# Patient Record
Sex: FEMALE | Race: WHITE | NOT HISPANIC OR LATINO | Employment: UNEMPLOYED | URBAN - METROPOLITAN AREA
[De-identification: names, ages, dates, MRNs, and addresses within clinical notes are randomized per-mention and may not be internally consistent; named-entity substitution may affect disease eponyms.]

---

## 2019-01-15 ENCOUNTER — HOSPITAL ENCOUNTER (EMERGENCY)
Facility: HOSPITAL | Age: 69
Discharge: HOME | End: 2019-01-15
Attending: EMERGENCY MEDICINE
Payer: MEDICARE

## 2019-01-15 VITALS
RESPIRATION RATE: 18 BRPM | OXYGEN SATURATION: 97 % | SYSTOLIC BLOOD PRESSURE: 97 MMHG | WEIGHT: 151 LBS | HEIGHT: 62 IN | DIASTOLIC BLOOD PRESSURE: 61 MMHG | TEMPERATURE: 98 F | BODY MASS INDEX: 27.79 KG/M2 | HEART RATE: 94 BPM

## 2019-01-15 DIAGNOSIS — F17.200 TOBACCO DEPENDENCE: ICD-10-CM

## 2019-01-15 DIAGNOSIS — H65.91 FLUID LEVEL BEHIND TYMPANIC MEMBRANE OF RIGHT EAR: ICD-10-CM

## 2019-01-15 DIAGNOSIS — J40 BRONCHITIS: Primary | ICD-10-CM

## 2019-01-15 LAB
CTP QC/QA: YES
FLUAV AG NPH QL: NEGATIVE
FLUBV AG NPH QL: NEGATIVE

## 2019-01-15 PROCEDURE — 87804 INFLUENZA ASSAY W/OPTIC: CPT | Mod: ER

## 2019-01-15 PROCEDURE — 99284 EMERGENCY DEPT VISIT MOD MDM: CPT | Mod: ER

## 2019-01-15 RX ORDER — PROMETHAZINE HYDROCHLORIDE AND DEXTROMETHORPHAN HYDROBROMIDE 6.25; 15 MG/5ML; MG/5ML
5 SYRUP ORAL 4 TIMES DAILY PRN
Qty: 180 ML | Refills: 0 | Status: SHIPPED | OUTPATIENT
Start: 2019-01-15 | End: 2019-01-20

## 2019-01-15 RX ORDER — MOMETASONE FUROATE 50 UG/1
2 SPRAY, METERED NASAL DAILY
Qty: 17 G | Refills: 0 | Status: ON HOLD | OUTPATIENT
Start: 2019-01-15 | End: 2020-01-03

## 2019-01-15 RX ORDER — AZITHROMYCIN 250 MG/1
TABLET, FILM COATED ORAL
Qty: 6 TABLET | Refills: 0 | Status: ON HOLD | OUTPATIENT
Start: 2019-01-15 | End: 2019-12-28 | Stop reason: HOSPADM

## 2019-01-15 RX ORDER — IBUPROFEN 600 MG/1
600 TABLET ORAL EVERY 6 HOURS PRN
Qty: 20 TABLET | Refills: 0 | Status: ON HOLD | OUTPATIENT
Start: 2019-01-15 | End: 2020-01-03

## 2019-01-15 RX ORDER — ALBUTEROL SULFATE 90 UG/1
1-2 AEROSOL, METERED RESPIRATORY (INHALATION) EVERY 6 HOURS PRN
Qty: 1 INHALER | Refills: 0 | Status: SHIPPED | OUTPATIENT
Start: 2019-01-15 | End: 2019-07-06 | Stop reason: SDUPTHER

## 2019-01-15 RX ORDER — CETIRIZINE HYDROCHLORIDE, PSEUDOEPHEDRINE HYDROCHLORIDE 5; 120 MG/1; MG/1
1 TABLET, FILM COATED, EXTENDED RELEASE ORAL DAILY
Qty: 10 TABLET | Refills: 0 | Status: SHIPPED | OUTPATIENT
Start: 2019-01-15 | End: 2019-01-20

## 2019-01-15 NOTE — ED PROVIDER NOTES
Encounter Date: 1/15/2019    SCRIBE #1 NOTE: I, Kadi Henson, am scribing for, and in the presence of,  Dr. Espino . I have scribed the following portions of the note - Other sections scribed: HPI,ROS,PE .       History     Chief Complaint   Patient presents with    Cough     pt reports cough and congestion x's 1 week. states cough is productive and varies in color     67 y/o/f with a hx of smoking  presents with brown/white productive cough, runny nose and congestion for one week. She has been taking singulair, cough syrup with codeine which she states helped her at night. States she feels hot and cold. No fever. Sick contacts at home. Also complaining of clogged ears.  She is not using an inhaler and occasionally feels short of breath. She continues to smoke      The history is provided by the patient. No  was used.     Review of patient's allergies indicates:  No Known Allergies  Past Medical History:   Diagnosis Date    Allergic rhinitis     COPD (chronic obstructive pulmonary disease)     Depression     GERD (gastroesophageal reflux disease)     Headache     Hyperlipidemia     Hypertension     Polyarthralgia     Postablative hypothyroidism     hypothyroidism s/p OCASIO therapy    PVD (peripheral vascular disease)     Treated by Dr. Sim     Past Surgical History:   Procedure Laterality Date    CHOLECYSTECTOMY  1986    GALLBLADDER SURGERY  2006    HIP SURGERY  2005    Right hip fracture/surgery    PERIPHERAL ARTERIAL STENT GRAFT Right 2007    stent for femoral artery blockage     Family History   Problem Relation Age of Onset    COPD Mother     Thyroid disease Mother         thyroidectomy    Cancer Mother         lung    Heart attack Father 55        [facility] -     Heart attack Sister 53        [facility]     Social History     Tobacco Use    Smoking status: Current Every Day Smoker     Types: Cigarettes    Smokeless tobacco: Never Used   Substance Use Topics    Alcohol  use: No     Alcohol/week: 0.0 oz    Drug use: Not on file     Review of Systems   HENT: Positive for congestion and rhinorrhea. Negative for ear pain.    Respiratory: Positive for cough.    Cardiovascular: Negative for chest pain.   All other systems reviewed and are negative.      Physical Exam     Initial Vitals [01/15/19 1443]   BP Pulse Resp Temp SpO2   97/61 94 18 98.1 °F (36.7 °C) 97 %      MAP       --         Physical Exam    Nursing note and vitals reviewed.  Constitutional: She appears well-developed and well-nourished.   HENT:   Head: Normocephalic and atraumatic.   Left Ear: External ear normal.   Mouth/Throat: Oropharynx is clear and moist. No oropharyngeal exudate.   Fluid behind right tympanic membrane   Eyes: Conjunctivae are normal.   Neck: Normal range of motion. Neck supple.   Cardiovascular: Normal rate, regular rhythm, normal heart sounds and intact distal pulses. Exam reveals no gallop and no friction rub.    No murmur heard.  Pulmonary/Chest: Effort normal. No respiratory distress. She has decreased breath sounds. She has no wheezes. She has no rhonchi. She has no rales.   Musculoskeletal: Normal range of motion.   Neurological: She is alert and oriented to person, place, and time.   Skin: Skin is warm and dry. Ecchymosis (right chest) noted.        Psychiatric: She has a normal mood and affect.         ED Course   Procedures  Labs Reviewed   POCT INFLUENZA A/B          Imaging Results    None          Medical Decision Making:   Initial Assessment:   69 y/o/f presents with cough and congestion for one week. Exam significant for decreased breath sounds and fluid behind right ear.  Clinical Tests:   Lab Tests: Ordered and Reviewed  ED Management:  Ordered flu swab.  Flu swab was negative. Secondary to the patient's smoking history she will be started on azithromycin.  She was also prescribed Nasonex, Phenergan DM cough medicine, Zyrtec D and  will be referred to the tobacco treatment  program    Additional MDM:   Smoking Cessation: The patient is a smoker. The patient was counseled on smoking cessation for: 3 minutes. The patient was counseled on tobacco related  health complications. The patient was referred to a tobacco treatment program.          Scribe Attestation:   Scribe #1: I performed the above scribed service and the documentation accurately describes the services I performed. I attest to the accuracy of the note.       I, Dr. Flor Espino, personally performed the services described in this documentation. All medical record entries made by the scribe were at my direction and in my presence.  I have reviewed the chart and agree that the record reflects my personal performance and is accurate and complete. Flor Espino MD.  3:35 PM 01/15/2019             Clinical Impression:     1. Bronchitis    2. Tobacco dependence    3. Fluid level behind tympanic membrane of right ear                                   Flor Espino MD  01/15/19 1474

## 2019-01-15 NOTE — DISCHARGE INSTRUCTIONS
Rest.  Drink plenty of fluids.  Return here at any time.  Call your doctor for close follow-up.  No smoking

## 2019-12-26 ENCOUNTER — HOSPITAL ENCOUNTER (INPATIENT)
Facility: HOSPITAL | Age: 69
LOS: 2 days | Discharge: HOME | DRG: 190 | End: 2019-12-28
Attending: EMERGENCY MEDICINE | Admitting: STUDENT IN AN ORGANIZED HEALTH CARE EDUCATION/TRAINING PROGRAM
Payer: MEDICARE

## 2019-12-26 DIAGNOSIS — I73.9 PVD (PERIPHERAL VASCULAR DISEASE): ICD-10-CM

## 2019-12-26 DIAGNOSIS — R91.8 MASS OF MIDDLE LOBE OF RIGHT LUNG: ICD-10-CM

## 2019-12-26 DIAGNOSIS — R09.02 HYPOXIA: ICD-10-CM

## 2019-12-26 DIAGNOSIS — J18.9 PNEUMONIA DUE TO INFECTIOUS ORGANISM, UNSPECIFIED LATERALITY, UNSPECIFIED PART OF LUNG: ICD-10-CM

## 2019-12-26 DIAGNOSIS — J44.1 COPD EXACERBATION: Primary | ICD-10-CM

## 2019-12-26 DIAGNOSIS — R07.9 CHEST PAIN: ICD-10-CM

## 2019-12-26 DIAGNOSIS — J96.01 ACUTE HYPOXEMIC RESPIRATORY FAILURE: ICD-10-CM

## 2019-12-26 PROBLEM — R04.2 HEMOPTYSIS: Status: ACTIVE | Noted: 2019-12-26

## 2019-12-26 LAB
ALBUMIN SERPL BCP-MCNC: 3.2 G/DL (ref 3.5–5.2)
ALLENS TEST: ABNORMAL
ALP SERPL-CCNC: 92 U/L (ref 55–135)
ALT SERPL W/O P-5'-P-CCNC: 12 U/L (ref 10–44)
ANION GAP SERPL CALC-SCNC: 9 MMOL/L (ref 8–16)
AST SERPL-CCNC: 20 U/L (ref 10–40)
BASOPHILS # BLD AUTO: 0.07 K/UL (ref 0–0.2)
BASOPHILS NFR BLD: 0.4 % (ref 0–1.9)
BILIRUB SERPL-MCNC: 0.8 MG/DL (ref 0.1–1)
BNP SERPL-MCNC: 57 PG/ML (ref 0–99)
BUN SERPL-MCNC: 13 MG/DL (ref 8–23)
CALCIUM SERPL-MCNC: 9.1 MG/DL (ref 8.7–10.5)
CHLORIDE SERPL-SCNC: 100 MMOL/L (ref 95–110)
CO2 SERPL-SCNC: 24 MMOL/L (ref 23–29)
CREAT SERPL-MCNC: 1 MG/DL (ref 0.5–1.4)
DELSYS: ABNORMAL
DIFFERENTIAL METHOD: ABNORMAL
EOSINOPHIL # BLD AUTO: 0.2 K/UL (ref 0–0.5)
EOSINOPHIL NFR BLD: 1 % (ref 0–8)
ERYTHROCYTE [DISTWIDTH] IN BLOOD BY AUTOMATED COUNT: 18.4 % (ref 11.5–14.5)
EST. GFR  (AFRICAN AMERICAN): >60 ML/MIN/1.73 M^2
EST. GFR  (NON AFRICAN AMERICAN): 57.6 ML/MIN/1.73 M^2
FLOW: 4
GLUCOSE SERPL-MCNC: 88 MG/DL (ref 70–110)
HCO3 UR-SCNC: 26.6 MMOL/L (ref 24–28)
HCT VFR BLD AUTO: 40.3 % (ref 37–48.5)
HGB BLD-MCNC: 12.4 G/DL (ref 12–16)
IMM GRANULOCYTES # BLD AUTO: 0.09 K/UL (ref 0–0.04)
IMM GRANULOCYTES NFR BLD AUTO: 0.5 % (ref 0–0.5)
LACTATE SERPL-SCNC: 2 MMOL/L (ref 0.5–2.2)
LYMPHOCYTES # BLD AUTO: 1.9 K/UL (ref 1–4.8)
LYMPHOCYTES NFR BLD: 11.5 % (ref 18–48)
MCH RBC QN AUTO: 26.9 PG (ref 27–31)
MCHC RBC AUTO-ENTMCNC: 30.8 G/DL (ref 32–36)
MCV RBC AUTO: 87 FL (ref 82–98)
MODE: ABNORMAL
MONOCYTES # BLD AUTO: 1.6 K/UL (ref 0.3–1)
MONOCYTES NFR BLD: 9.4 % (ref 4–15)
NEUTROPHILS # BLD AUTO: 12.8 K/UL (ref 1.8–7.7)
NEUTROPHILS NFR BLD: 77.2 % (ref 38–73)
NRBC BLD-RTO: 0 /100 WBC
PCO2 BLDA: 44.9 MMHG (ref 35–45)
PH SMN: 7.38 [PH] (ref 7.35–7.45)
PLATELET # BLD AUTO: 267 K/UL (ref 150–350)
PMV BLD AUTO: 10.1 FL (ref 9.2–12.9)
PO2 BLDA: 32 MMHG (ref 40–60)
POC BE: 1 MMOL/L
POC SATURATED O2: 59 % (ref 95–100)
POC TCO2: 28 MMOL/L (ref 24–29)
POTASSIUM SERPL-SCNC: 4.6 MMOL/L (ref 3.5–5.1)
PROCALCITONIN SERPL IA-MCNC: 0.16 NG/ML
PROT SERPL-MCNC: 7 G/DL (ref 6–8.4)
RBC # BLD AUTO: 4.61 M/UL (ref 4–5.4)
SAMPLE: ABNORMAL
SITE: ABNORMAL
SODIUM SERPL-SCNC: 133 MMOL/L (ref 136–145)
SP02: 96
TROPONIN I SERPL DL<=0.01 NG/ML-MCNC: 0.12 NG/ML (ref 0–0.03)
WBC # BLD AUTO: 16.59 K/UL (ref 3.9–12.7)

## 2019-12-26 PROCEDURE — 99285 EMERGENCY DEPT VISIT HI MDM: CPT | Mod: 25

## 2019-12-26 PROCEDURE — 25000003 PHARM REV CODE 250: Performed by: PHYSICIAN ASSISTANT

## 2019-12-26 PROCEDURE — 93010 ELECTROCARDIOGRAM REPORT: CPT | Mod: ,,, | Performed by: INTERNAL MEDICINE

## 2019-12-26 PROCEDURE — 25000003 PHARM REV CODE 250: Performed by: STUDENT IN AN ORGANIZED HEALTH CARE EDUCATION/TRAINING PROGRAM

## 2019-12-26 PROCEDURE — 25000242 PHARM REV CODE 250 ALT 637 W/ HCPCS: Performed by: PHYSICIAN ASSISTANT

## 2019-12-26 PROCEDURE — 83605 ASSAY OF LACTIC ACID: CPT

## 2019-12-26 PROCEDURE — 11000001 HC ACUTE MED/SURG PRIVATE ROOM

## 2019-12-26 PROCEDURE — 25500020 PHARM REV CODE 255: Performed by: STUDENT IN AN ORGANIZED HEALTH CARE EDUCATION/TRAINING PROGRAM

## 2019-12-26 PROCEDURE — 87040 BLOOD CULTURE FOR BACTERIA: CPT

## 2019-12-26 PROCEDURE — 63600175 PHARM REV CODE 636 W HCPCS: Performed by: PHYSICIAN ASSISTANT

## 2019-12-26 PROCEDURE — 99223 1ST HOSP IP/OBS HIGH 75: CPT | Mod: AI,,, | Performed by: STUDENT IN AN ORGANIZED HEALTH CARE EDUCATION/TRAINING PROGRAM

## 2019-12-26 PROCEDURE — 84484 ASSAY OF TROPONIN QUANT: CPT

## 2019-12-26 PROCEDURE — 93005 ELECTROCARDIOGRAM TRACING: CPT

## 2019-12-26 PROCEDURE — 85025 COMPLETE CBC W/AUTO DIFF WBC: CPT

## 2019-12-26 PROCEDURE — 93010 ELECTROCARDIOGRAM REPORT: CPT | Mod: 77,ICN,, | Performed by: INTERNAL MEDICINE

## 2019-12-26 PROCEDURE — 93010 EKG 12-LEAD: ICD-10-PCS | Mod: ,,, | Performed by: INTERNAL MEDICINE

## 2019-12-26 PROCEDURE — 99223 PR INITIAL HOSPITAL CARE,LEVL III: ICD-10-PCS | Mod: AI,,, | Performed by: STUDENT IN AN ORGANIZED HEALTH CARE EDUCATION/TRAINING PROGRAM

## 2019-12-26 PROCEDURE — 96375 TX/PRO/DX INJ NEW DRUG ADDON: CPT

## 2019-12-26 PROCEDURE — 99900035 HC TECH TIME PER 15 MIN (STAT)

## 2019-12-26 PROCEDURE — 94640 AIRWAY INHALATION TREATMENT: CPT

## 2019-12-26 PROCEDURE — 84145 PROCALCITONIN (PCT): CPT

## 2019-12-26 PROCEDURE — 83880 ASSAY OF NATRIURETIC PEPTIDE: CPT

## 2019-12-26 PROCEDURE — 27000221 HC OXYGEN, UP TO 24 HOURS

## 2019-12-26 PROCEDURE — 80048 BASIC METABOLIC PNL TOTAL CA: CPT

## 2019-12-26 PROCEDURE — 25000242 PHARM REV CODE 250 ALT 637 W/ HCPCS: Performed by: STUDENT IN AN ORGANIZED HEALTH CARE EDUCATION/TRAINING PROGRAM

## 2019-12-26 PROCEDURE — 80053 COMPREHEN METABOLIC PANEL: CPT

## 2019-12-26 PROCEDURE — 96374 THER/PROPH/DIAG INJ IV PUSH: CPT

## 2019-12-26 PROCEDURE — 99291 CRITICAL CARE FIRST HOUR: CPT | Mod: ,,, | Performed by: PHYSICIAN ASSISTANT

## 2019-12-26 PROCEDURE — 93010 EKG 12-LEAD: ICD-10-PCS | Mod: 77,ICN,, | Performed by: INTERNAL MEDICINE

## 2019-12-26 PROCEDURE — 94761 N-INVAS EAR/PLS OXIMETRY MLT: CPT

## 2019-12-26 PROCEDURE — 99291 PR CRITICAL CARE, E/M 30-74 MINUTES: ICD-10-PCS | Mod: ,,, | Performed by: PHYSICIAN ASSISTANT

## 2019-12-26 RX ORDER — ACETAMINOPHEN 325 MG/1
650 TABLET ORAL EVERY 8 HOURS PRN
Status: DISCONTINUED | OUTPATIENT
Start: 2019-12-26 | End: 2019-12-28 | Stop reason: HOSPADM

## 2019-12-26 RX ORDER — CLONAZEPAM 1 MG/1
1 TABLET ORAL 2 TIMES DAILY
Status: DISCONTINUED | OUTPATIENT
Start: 2019-12-26 | End: 2019-12-28 | Stop reason: HOSPADM

## 2019-12-26 RX ORDER — TIOTROPIUM BROMIDE 18 UG/1
1 CAPSULE ORAL; RESPIRATORY (INHALATION) DAILY
Qty: 30 CAPSULE | Refills: 3 | Status: SHIPPED | OUTPATIENT
Start: 2019-12-26 | End: 2019-12-27 | Stop reason: HOSPADM

## 2019-12-26 RX ORDER — METHYLPREDNISOLONE SOD SUCC 125 MG
125 VIAL (EA) INJECTION
Status: COMPLETED | OUTPATIENT
Start: 2019-12-26 | End: 2019-12-26

## 2019-12-26 RX ORDER — MELOXICAM 7.5 MG/1
7.5 TABLET ORAL NIGHTLY
Status: DISCONTINUED | OUTPATIENT
Start: 2019-12-26 | End: 2019-12-28 | Stop reason: HOSPADM

## 2019-12-26 RX ORDER — ATORVASTATIN CALCIUM 20 MG/1
40 TABLET, FILM COATED ORAL DAILY
Status: DISCONTINUED | OUTPATIENT
Start: 2019-12-26 | End: 2019-12-28 | Stop reason: HOSPADM

## 2019-12-26 RX ORDER — PREDNISONE 20 MG/1
40 TABLET ORAL DAILY
Status: DISCONTINUED | OUTPATIENT
Start: 2019-12-27 | End: 2019-12-28 | Stop reason: HOSPADM

## 2019-12-26 RX ORDER — SODIUM CHLORIDE 0.9 % (FLUSH) 0.9 %
3 SYRINGE (ML) INJECTION
Status: DISCONTINUED | OUTPATIENT
Start: 2019-12-26 | End: 2019-12-28 | Stop reason: HOSPADM

## 2019-12-26 RX ORDER — LEVOTHYROXINE SODIUM 100 UG/1
100 TABLET ORAL
Status: DISCONTINUED | OUTPATIENT
Start: 2019-12-27 | End: 2019-12-28 | Stop reason: HOSPADM

## 2019-12-26 RX ORDER — FERROUS SULFATE, DRIED 160(50) MG
1 TABLET, EXTENDED RELEASE ORAL 2 TIMES DAILY WITH MEALS
Status: DISCONTINUED | OUTPATIENT
Start: 2019-12-26 | End: 2019-12-28 | Stop reason: HOSPADM

## 2019-12-26 RX ORDER — POLYETHYLENE GLYCOL 3350 17 G/17G
17 POWDER, FOR SOLUTION ORAL DAILY
Status: DISCONTINUED | OUTPATIENT
Start: 2019-12-27 | End: 2019-12-28 | Stop reason: HOSPADM

## 2019-12-26 RX ORDER — AMLODIPINE BESYLATE 5 MG/1
5 TABLET ORAL DAILY
Status: DISCONTINUED | OUTPATIENT
Start: 2019-12-26 | End: 2019-12-28 | Stop reason: HOSPADM

## 2019-12-26 RX ORDER — IPRATROPIUM BROMIDE AND ALBUTEROL SULFATE 2.5; .5 MG/3ML; MG/3ML
3 SOLUTION RESPIRATORY (INHALATION)
Status: COMPLETED | OUTPATIENT
Start: 2019-12-26 | End: 2019-12-26

## 2019-12-26 RX ORDER — BUTALBITAL, ACETAMINOPHEN AND CAFFEINE 50; 325; 40 MG/1; MG/1; MG/1
1 TABLET ORAL EVERY 12 HOURS PRN
Status: DISCONTINUED | OUTPATIENT
Start: 2019-12-26 | End: 2019-12-28 | Stop reason: HOSPADM

## 2019-12-26 RX ORDER — MONTELUKAST SODIUM 10 MG/1
10 TABLET ORAL DAILY
Status: DISCONTINUED | OUTPATIENT
Start: 2019-12-26 | End: 2019-12-28 | Stop reason: HOSPADM

## 2019-12-26 RX ORDER — LOSARTAN POTASSIUM 25 MG/1
25 TABLET ORAL DAILY
Status: DISCONTINUED | OUTPATIENT
Start: 2019-12-26 | End: 2019-12-28 | Stop reason: HOSPADM

## 2019-12-26 RX ORDER — HYDROCODONE BITARTRATE AND ACETAMINOPHEN 5; 325 MG/1; MG/1
1 TABLET ORAL EVERY 6 HOURS PRN
Status: DISCONTINUED | OUTPATIENT
Start: 2019-12-26 | End: 2019-12-28 | Stop reason: HOSPADM

## 2019-12-26 RX ORDER — AZITHROMYCIN 250 MG/1
500 TABLET, FILM COATED ORAL
Status: COMPLETED | OUTPATIENT
Start: 2019-12-26 | End: 2019-12-26

## 2019-12-26 RX ORDER — IBUPROFEN 200 MG
16 TABLET ORAL
Status: DISCONTINUED | OUTPATIENT
Start: 2019-12-26 | End: 2019-12-28 | Stop reason: HOSPADM

## 2019-12-26 RX ORDER — ENOXAPARIN SODIUM 100 MG/ML
40 INJECTION SUBCUTANEOUS EVERY 24 HOURS
Status: DISCONTINUED | OUTPATIENT
Start: 2019-12-26 | End: 2019-12-26

## 2019-12-26 RX ORDER — GLUCAGON 1 MG
1 KIT INJECTION
Status: DISCONTINUED | OUTPATIENT
Start: 2019-12-26 | End: 2019-12-28 | Stop reason: HOSPADM

## 2019-12-26 RX ORDER — IBUPROFEN 200 MG
1 TABLET ORAL DAILY
Status: DISCONTINUED | OUTPATIENT
Start: 2019-12-26 | End: 2019-12-28 | Stop reason: HOSPADM

## 2019-12-26 RX ORDER — ONDANSETRON 8 MG/1
8 TABLET, ORALLY DISINTEGRATING ORAL EVERY 8 HOURS PRN
Status: DISCONTINUED | OUTPATIENT
Start: 2019-12-26 | End: 2019-12-28 | Stop reason: HOSPADM

## 2019-12-26 RX ORDER — TALC
6 POWDER (GRAM) TOPICAL NIGHTLY PRN
Status: DISCONTINUED | OUTPATIENT
Start: 2019-12-26 | End: 2019-12-28 | Stop reason: HOSPADM

## 2019-12-26 RX ORDER — IPRATROPIUM BROMIDE AND ALBUTEROL SULFATE 2.5; .5 MG/3ML; MG/3ML
3 SOLUTION RESPIRATORY (INHALATION)
Status: DISCONTINUED | OUTPATIENT
Start: 2019-12-26 | End: 2019-12-28 | Stop reason: HOSPADM

## 2019-12-26 RX ORDER — CLOPIDOGREL BISULFATE 75 MG/1
75 TABLET ORAL DAILY
Status: DISCONTINUED | OUTPATIENT
Start: 2019-12-26 | End: 2019-12-26

## 2019-12-26 RX ORDER — IBUPROFEN 200 MG
24 TABLET ORAL
Status: DISCONTINUED | OUTPATIENT
Start: 2019-12-26 | End: 2019-12-28 | Stop reason: HOSPADM

## 2019-12-26 RX ORDER — CEFTRIAXONE 1 G/1
1 INJECTION, POWDER, FOR SOLUTION INTRAMUSCULAR; INTRAVENOUS
Status: COMPLETED | OUTPATIENT
Start: 2019-12-26 | End: 2019-12-26

## 2019-12-26 RX ORDER — ASPIRIN 325 MG
325 TABLET ORAL
Status: DISCONTINUED | OUTPATIENT
Start: 2019-12-26 | End: 2019-12-26

## 2019-12-26 RX ORDER — IPRATROPIUM BROMIDE AND ALBUTEROL SULFATE 2.5; .5 MG/3ML; MG/3ML
3 SOLUTION RESPIRATORY (INHALATION) EVERY 4 HOURS PRN
Status: DISCONTINUED | OUTPATIENT
Start: 2019-12-26 | End: 2019-12-28 | Stop reason: HOSPADM

## 2019-12-26 RX ORDER — PANTOPRAZOLE SODIUM 40 MG/1
40 TABLET, DELAYED RELEASE ORAL DAILY
Status: DISCONTINUED | OUTPATIENT
Start: 2019-12-27 | End: 2019-12-28 | Stop reason: HOSPADM

## 2019-12-26 RX ORDER — BISACODYL 10 MG
10 SUPPOSITORY, RECTAL RECTAL DAILY PRN
Status: DISCONTINUED | OUTPATIENT
Start: 2019-12-26 | End: 2019-12-28 | Stop reason: HOSPADM

## 2019-12-26 RX ORDER — TIOTROPIUM BROMIDE 18 UG/1
1 CAPSULE ORAL; RESPIRATORY (INHALATION) DAILY
Status: DISCONTINUED | OUTPATIENT
Start: 2019-12-26 | End: 2019-12-28 | Stop reason: HOSPADM

## 2019-12-26 RX ORDER — ASPIRIN 81 MG/1
81 TABLET ORAL DAILY
Status: DISCONTINUED | OUTPATIENT
Start: 2019-12-26 | End: 2019-12-26

## 2019-12-26 RX ORDER — LEVOFLOXACIN 750 MG/1
750 TABLET ORAL DAILY
Status: DISCONTINUED | OUTPATIENT
Start: 2019-12-27 | End: 2019-12-28 | Stop reason: HOSPADM

## 2019-12-26 RX ORDER — ALBUTEROL SULFATE 90 UG/1
2 AEROSOL, METERED RESPIRATORY (INHALATION) EVERY 6 HOURS PRN
Status: DISCONTINUED | OUTPATIENT
Start: 2019-12-26 | End: 2019-12-26

## 2019-12-26 RX ADMIN — CEFTRIAXONE SODIUM 1 G: 1 INJECTION, POWDER, FOR SOLUTION INTRAMUSCULAR; INTRAVENOUS at 01:12

## 2019-12-26 RX ADMIN — HYDROCODONE BITARTRATE AND ACETAMINOPHEN 1 TABLET: 5; 325 TABLET ORAL at 10:12

## 2019-12-26 RX ADMIN — METHYLPREDNISOLONE SODIUM SUCCINATE 125 MG: 125 INJECTION, POWDER, FOR SOLUTION INTRAMUSCULAR; INTRAVENOUS at 12:12

## 2019-12-26 RX ADMIN — IPRATROPIUM BROMIDE AND ALBUTEROL SULFATE 3 ML: .5; 3 SOLUTION RESPIRATORY (INHALATION) at 01:12

## 2019-12-26 RX ADMIN — IPRATROPIUM BROMIDE AND ALBUTEROL SULFATE 3 ML: .5; 3 SOLUTION RESPIRATORY (INHALATION) at 04:12

## 2019-12-26 RX ADMIN — IOHEXOL 100 ML: 350 INJECTION, SOLUTION INTRAVENOUS at 06:12

## 2019-12-26 RX ADMIN — IPRATROPIUM BROMIDE AND ALBUTEROL SULFATE 3 ML: .5; 3 SOLUTION RESPIRATORY (INHALATION) at 07:12

## 2019-12-26 RX ADMIN — AZITHROMYCIN DIHYDRATE 500 MG: 250 TABLET, FILM COATED ORAL at 01:12

## 2019-12-26 RX ADMIN — MELOXICAM 7.5 MG: 7.5 TABLET ORAL at 09:12

## 2019-12-26 RX ADMIN — CLONAZEPAM 1 MG: 1 TABLET ORAL at 09:12

## 2019-12-26 NOTE — ED TRIAGE NOTES
Sharita Castañeda, an 69 y.o. female presents to the ED with chest pain and right breast pain x 3 days along with a painful cough.  Patient is a half a pack day smoker after having quit for 12 years.  Airway tight on initial auscultation and room air saturation was 78%.  Placed on 4L NC and called respiratory for continuous neb treatments.        Review of patient's allergies indicates:  No Known Allergies  Chief Complaint   Patient presents with    Chest Pain     x 3 days - SOB      Past Medical History:   Diagnosis Date    Allergic rhinitis     COPD (chronic obstructive pulmonary disease)     Depression     GERD (gastroesophageal reflux disease)     Headache     Hyperlipidemia     Hypertension     Polyarthralgia     Postablative hypothyroidism     hypothyroidism s/p OCASIO therapy    PVD (peripheral vascular disease)     Treated by Dr. Sim

## 2019-12-26 NOTE — ED PROVIDER NOTES
Encounter Date: 2019       History     Chief Complaint   Patient presents with    Chest Pain     x 3 days - SOB      12:18 PM.   Patient is a 69-year-old female with a history of HTN, HLD, COPD on rescue inhalers, never use the nebulizer before presents to the ED with shortness of breath, cough, and right sided chest pain.  States for the past week she has been having her symptoms.  Endorses pink tinged sputum last night and noted some red blood this morning with her cough.  Last used her albuterol inhaler this morning without relief of her symptoms which prompted her to come into the emergency department.  Is a chronic tobacco user and quit for 12 years before starting again.  She smokes approximately 10 cigarettes per day, but is trying to decrease the amount. Endorses subjective fever and chills.         Review of patient's allergies indicates:  No Known Allergies  Past Medical History:   Diagnosis Date    Allergic rhinitis     COPD (chronic obstructive pulmonary disease)     Depression     GERD (gastroesophageal reflux disease)     Headache     Hyperlipidemia     Hypertension     Polyarthralgia     Postablative hypothyroidism     hypothyroidism s/p OCASIO therapy    PVD (peripheral vascular disease)     Treated by Dr. Sim     Past Surgical History:   Procedure Laterality Date    CHOLECYSTECTOMY      GALLBLADDER SURGERY  2006    HIP SURGERY  2005    Right hip fracture/surgery    PERIPHERAL ARTERIAL STENT GRAFT Right 2007    stent for femoral artery blockage     Family History   Problem Relation Age of Onset    COPD Mother     Thyroid disease Mother         thyroidectomy    Cancer Mother         lung    Heart attack Father 55        [facility] -     Heart attack Sister 53        [facility]     Social History     Tobacco Use    Smoking status: Current Every Day Smoker     Types: Cigarettes    Smokeless tobacco: Never Used   Substance Use Topics    Alcohol use: No     Alcohol/week: 0.0  standard drinks    Drug use: Not on file     Review of Systems   Constitutional: Positive for chills and fever (sibj).   HENT: Negative for sore throat.    Eyes: Negative for photophobia.   Respiratory: Positive for cough and shortness of breath.    Cardiovascular: Positive for chest pain.   Gastrointestinal: Negative for nausea.   Genitourinary: Negative for dysuria.   Musculoskeletal: Negative for back pain.   Skin: Negative for rash.   Neurological: Negative for weakness.   Hematological: Does not bruise/bleed easily.       Physical Exam     Initial Vitals [12/26/19 1101]   BP Pulse Resp Temp SpO2   (!) 117/54 110 18 99.3 °F (37.4 °C) 96 %      MAP       --         Physical Exam    Vitals reviewed.  Constitutional: She appears well-developed and well-nourished. She is not diaphoretic. She appears distressed.   HENT:   Head: Normocephalic and atraumatic.   Nose: Nose normal.   Eyes: Conjunctivae and EOM are normal.   Neck: Normal range of motion.   Cardiovascular: Normal rate, regular rhythm and normal heart sounds. Exam reveals no friction rub.    No murmur heard.  No peripheral edema.   Pulmonary/Chest: Accessory muscle usage present. Tachypnea noted. She is in respiratory distress. She has decreased breath sounds. She has wheezes.   Abdominal: Soft. Bowel sounds are normal. She exhibits no distension. There is no tenderness. There is no rebound.   Musculoskeletal: Normal range of motion.   Neurological: She is alert and oriented to person, place, and time. She has normal strength. No sensory deficit.   Skin: Skin is warm and dry. No erythema. No pallor.   Psychiatric: She has a normal mood and affect. Her behavior is normal. Judgment and thought content normal.         ED Course   Procedures  Labs Reviewed   CBC W/ AUTO DIFFERENTIAL - Abnormal; Notable for the following components:       Result Value    WBC 16.59 (*)     Mean Corpuscular Hemoglobin 26.9 (*)     Mean Corpuscular Hemoglobin Conc 30.8 (*)     RDW  18.4 (*)     Gran # (ANC) 12.8 (*)     Immature Grans (Abs) 0.09 (*)     Mono # 1.6 (*)     Gran% 77.2 (*)     Lymph% 11.5 (*)     All other components within normal limits   COMPREHENSIVE METABOLIC PANEL - Abnormal; Notable for the following components:    Sodium 133 (*)     Albumin 3.2 (*)     eGFR if non  57.6 (*)     All other components within normal limits   ISTAT PROCEDURE - Abnormal; Notable for the following components:    POC PO2 32 (*)     POC SATURATED O2 59 (*)     All other components within normal limits   LACTIC ACID, PLASMA   B-TYPE NATRIURETIC PEPTIDE   B-TYPE NATRIURETIC PEPTIDE    Narrative:     ADD ON BNP [id number] PER DR. DRAKE  12/26/2019  14:12         ECG Results          EKG 12-lead (Final result)  Result time 12/26/19 12:26:43    Final result by Interface, Lab In [facility] (12/26/19 12:26:43)                 Narrative:    Test Reason : R07.9,    Vent. Rate : 115 BPM     Atrial Rate : 115 BPM     P-R Int : 128 ms          QRS Dur : 076 ms      QT Int : 316 ms       P-R-T Axes : 056 080 060 degrees     QTc Int : 437 ms    Sinus tachycardia with occasional Premature ventricular complexes  Nonspecific ST abnormality  Abnormal ECG  No previous ECGs available  Confirmed by LEVI OLSON MD ([id number]) on 12/26/2019 12:26:35 PM    Referred By:             Confirmed By:LEVI OLSON MD                            Imaging Results          X-Ray Chest AP Portable (Final result)  Result time 12/26/19 13:10:03    Final result by Megan Street MD (12/26/19 13:10:03)                 Impression:      Abnormal chest radiograph.      Electronically signed by: Megan Street MD  Date:    12/26/2019  Time:    13:10             Narrative:    EXAMINATION:  XR CHEST AP PORTABLE    CLINICAL HISTORY:  Chest Pain;    TECHNIQUE:  Single frontal view of the chest was performed.    COMPARISON:  None    FINDINGS:  Mediastinal structures are midline allowing for slight left posterior oblique rotation.  There is  dense calcification in the aortic arch of this 69-year-old woman.    There is soft tissue opacity overlying and obscuring detail of the right hilum and infrahilar region.  Increased attenuation in the right lower lung zone suggests middle lobe disease with or without lower lobe disease.  Differential diagnosis for lung disease and perihilar soft tissue mass includes infection, noninfectious inflammation and neoplasm in this patient with a history of cigarette use.  Chest CT with intravenous contrast medium might provide further information.    I also suspect a small amount of right pleural fluid.  I detect no left pleural fluid.    Patchy heterogeneous subsegmental opacities are present in the right upper lung zone and the left upper lung zone, of uncertain chronicity in the absence of old films.  Although I detect no airspace disease I can not exclude interstitial lung disease including but not limited to interstitial pulmonary edema.    I do not identify pneumothorax, pneumomediastinum, pneumoperitoneum or significant osseous abnormality.                                 Medical Decision Making:   History:   Old Medical Records: I decided to obtain old medical records.  Initial Assessment:   Patient is a 69-year-old female with a history of HTN, HLD, COPD on rescue inhalers, presents to the ED with shortness of breath, cough, and right sided chest pain. She is a chronic tobacco use; quit for 12 years, but started again.  Differential Diagnosis:   Includes but is not limited to COPD exacerbation, pneumonia, pulmonary embolism, and less likely ACS or heart failure.  Clinical Tests:   Lab Tests: Reviewed and Ordered  Radiological Study: Ordered and Reviewed  Medical Tests: Ordered and Reviewed  ED Management:  Patient is satting at 79% on room air during initial exam.  Respiratory therapist notified for urgent need for breathing treatment.  Patient placed on 4 L via nasal cannula.  Satting at 97% on room air.  Much  more comfortable appearing.  Will initiate workup to rule out life-threatening or emergent causes and monitor closely.    EKG with sinus tach at 115 beats per minute.  Occasional PVCs noted.  CBC with leukocytosis at 16.  With left shift at 77%.  No anemia.  CMP with mild hyponatremia at 133.  No kidney injury.  No hyperbilirubinemia or transaminitis.  VBG with normal pH at 7.38.  No signs of hypercapnia.  Chest x-ray with abnormal findings.  Soft tissue opacity overlying right hilum and infrahilar region.  Also with increased attenuation.  Lactic acid within normal limits at 2.0.  Blood cultures pending.    Given history, physical exam, and workup today, will treat for pneumonia given chest x-ray findings and leukocytosis with left shift.  Will give ceftriaxone 1 g IV and azithromycin p.o.  Case discussed with Hospital Medicine .  Hospital Medicine will admit to their service for further evaluation and management in this patient. Will defer further treatment and imaging studies to Hospital Medicine team.    I have reviewed patient's chart and discussed this case with my supervising MD.     Nati Patrick PA-C  Emergent Department  [facility]  [facility] #[id number] or #[phone]                Attending Attestation:     Physician Attestation Statement for NP/PA:   I have conducted a face to face encounter with this patient in addition to the NP/PA, due to Medical Complexity    Other NP/PA Attestation Additions:    History of Present Illness: 68 yo f, h/o COPD, here with SOB/cough, new onset hypoxia, diffuse wheezing.  CXR concerning for pneumonia, possible underlying mass.  Admitted to medicine and treated for CAP and COPD exacerbation.  Will likely need CT chest inpatient.       Attending Critical Care:   Critical Care Times:   ==============================================================  · Total Critical Care Time - exclusive of procedural time: 30  minutes.  ==============================================================  Critical care was necessary to treat or prevent imminent or life-threatening deterioration of the following conditions: respiratory failure.   Critical care was time spent personally by me on the following activities: obtaining history from patient or relative, examination of patient, review of x-rays / CT sent with the patient, review of old charts, ordering lab, x-rays, and/or EKG, development of treatment plan with patient or relative, ordering and performing treatments and interventions, evaluation of patient's response to treatment and re-evaluation of patient's conition.   Critical Care Condition: critical                             Clinical Impression:       ICD-10-CM ICD-9-CM   1. COPD exacerbation J44.1 491.21   2. Chest pain R07.9 786.50   3. Pneumonia due to infectious organism, unspecified laterality, unspecified part of lung J18.9 136.9     484.8   4. Hypoxia R09.02 799.02         Disposition:   Disposition: Admitted  Condition: Xavier Patrick PA-C  12/27/19 0740

## 2019-12-27 LAB
ANION GAP SERPL CALC-SCNC: 12 MMOL/L (ref 8–16)
BASOPHILS # BLD AUTO: 0.02 K/UL (ref 0–0.2)
BASOPHILS NFR BLD: 0.1 % (ref 0–1.9)
BUN SERPL-MCNC: 17 MG/DL (ref 8–23)
CALCIUM SERPL-MCNC: 8.5 MG/DL (ref 8.7–10.5)
CHLORIDE SERPL-SCNC: 102 MMOL/L (ref 95–110)
CO2 SERPL-SCNC: 22 MMOL/L (ref 23–29)
CREAT SERPL-MCNC: 0.9 MG/DL (ref 0.5–1.4)
DIFFERENTIAL METHOD: ABNORMAL
EOSINOPHIL # BLD AUTO: 0 K/UL (ref 0–0.5)
EOSINOPHIL NFR BLD: 0 % (ref 0–8)
ERYTHROCYTE [DISTWIDTH] IN BLOOD BY AUTOMATED COUNT: 18.3 % (ref 11.5–14.5)
EST. GFR  (AFRICAN AMERICAN): >60 ML/MIN/1.73 M^2
EST. GFR  (NON AFRICAN AMERICAN): >60 ML/MIN/1.73 M^2
GLUCOSE SERPL-MCNC: 143 MG/DL (ref 70–110)
HCT VFR BLD AUTO: 34 % (ref 37–48.5)
HGB BLD-MCNC: 10.6 G/DL (ref 12–16)
IMM GRANULOCYTES # BLD AUTO: 0.09 K/UL (ref 0–0.04)
IMM GRANULOCYTES NFR BLD AUTO: 0.6 % (ref 0–0.5)
LYMPHOCYTES # BLD AUTO: 1 K/UL (ref 1–4.8)
LYMPHOCYTES NFR BLD: 6.3 % (ref 18–48)
MAGNESIUM SERPL-MCNC: 2.1 MG/DL (ref 1.6–2.6)
MCH RBC QN AUTO: 27.1 PG (ref 27–31)
MCHC RBC AUTO-ENTMCNC: 31.2 G/DL (ref 32–36)
MCV RBC AUTO: 87 FL (ref 82–98)
MONOCYTES # BLD AUTO: 0.8 K/UL (ref 0.3–1)
MONOCYTES NFR BLD: 4.8 % (ref 4–15)
NEUTROPHILS # BLD AUTO: 14.3 K/UL (ref 1.8–7.7)
NEUTROPHILS NFR BLD: 88.2 % (ref 38–73)
NRBC BLD-RTO: 0 /100 WBC
PHOSPHATE SERPL-MCNC: 2.7 MG/DL (ref 2.7–4.5)
PLATELET # BLD AUTO: 241 K/UL (ref 150–350)
PMV BLD AUTO: 10.9 FL (ref 9.2–12.9)
POTASSIUM SERPL-SCNC: 3.8 MMOL/L (ref 3.5–5.1)
RBC # BLD AUTO: 3.91 M/UL (ref 4–5.4)
SODIUM SERPL-SCNC: 136 MMOL/L (ref 136–145)
TROPONIN I SERPL DL<=0.01 NG/ML-MCNC: 0.1 NG/ML (ref 0–0.03)
TSH SERPL DL<=0.005 MIU/L-ACNC: 0.64 UIU/ML (ref 0.4–4)
WBC # BLD AUTO: 16.2 K/UL (ref 3.9–12.7)

## 2019-12-27 PROCEDURE — 99233 SBSQ HOSP IP/OBS HIGH 50: CPT | Mod: ,,, | Performed by: STUDENT IN AN ORGANIZED HEALTH CARE EDUCATION/TRAINING PROGRAM

## 2019-12-27 PROCEDURE — 27000221 HC OXYGEN, UP TO 24 HOURS

## 2019-12-27 PROCEDURE — 25000003 PHARM REV CODE 250: Performed by: STUDENT IN AN ORGANIZED HEALTH CARE EDUCATION/TRAINING PROGRAM

## 2019-12-27 PROCEDURE — 80048 BASIC METABOLIC PNL TOTAL CA: CPT

## 2019-12-27 PROCEDURE — 85025 COMPLETE CBC W/AUTO DIFF WBC: CPT

## 2019-12-27 PROCEDURE — 94640 AIRWAY INHALATION TREATMENT: CPT

## 2019-12-27 PROCEDURE — 25000242 PHARM REV CODE 250 ALT 637 W/ HCPCS: Performed by: STUDENT IN AN ORGANIZED HEALTH CARE EDUCATION/TRAINING PROGRAM

## 2019-12-27 PROCEDURE — 99233 PR SUBSEQUENT HOSPITAL CARE,LEVL III: ICD-10-PCS | Mod: ,,, | Performed by: STUDENT IN AN ORGANIZED HEALTH CARE EDUCATION/TRAINING PROGRAM

## 2019-12-27 PROCEDURE — 84484 ASSAY OF TROPONIN QUANT: CPT

## 2019-12-27 PROCEDURE — 11000001 HC ACUTE MED/SURG PRIVATE ROOM

## 2019-12-27 PROCEDURE — 36415 COLL VENOUS BLD VENIPUNCTURE: CPT

## 2019-12-27 PROCEDURE — 94761 N-INVAS EAR/PLS OXIMETRY MLT: CPT

## 2019-12-27 PROCEDURE — 84100 ASSAY OF PHOSPHORUS: CPT

## 2019-12-27 PROCEDURE — 84443 ASSAY THYROID STIM HORMONE: CPT

## 2019-12-27 PROCEDURE — S4991 NICOTINE PATCH NONLEGEND: HCPCS | Performed by: STUDENT IN AN ORGANIZED HEALTH CARE EDUCATION/TRAINING PROGRAM

## 2019-12-27 PROCEDURE — 63600175 PHARM REV CODE 636 W HCPCS: Performed by: STUDENT IN AN ORGANIZED HEALTH CARE EDUCATION/TRAINING PROGRAM

## 2019-12-27 PROCEDURE — 83735 ASSAY OF MAGNESIUM: CPT

## 2019-12-27 RX ADMIN — MELOXICAM 7.5 MG: 7.5 TABLET ORAL at 08:12

## 2019-12-27 RX ADMIN — OYSTER SHELL CALCIUM WITH VITAMIN D 1 TABLET: 500; 200 TABLET, FILM COATED ORAL at 06:12

## 2019-12-27 RX ADMIN — CLONAZEPAM 1 MG: 1 TABLET ORAL at 09:12

## 2019-12-27 RX ADMIN — LEVOTHYROXINE SODIUM 100 MCG: 100 TABLET ORAL at 05:12

## 2019-12-27 RX ADMIN — Medication 1 PATCH: at 09:12

## 2019-12-27 RX ADMIN — CLONAZEPAM 1 MG: 1 TABLET ORAL at 08:12

## 2019-12-27 RX ADMIN — IPRATROPIUM BROMIDE AND ALBUTEROL SULFATE 3 ML: .5; 3 SOLUTION RESPIRATORY (INHALATION) at 07:12

## 2019-12-27 RX ADMIN — MONTELUKAST 10 MG: 10 TABLET, FILM COATED ORAL at 09:12

## 2019-12-27 RX ADMIN — PREDNISONE 40 MG: 20 TABLET ORAL at 09:12

## 2019-12-27 RX ADMIN — OYSTER SHELL CALCIUM WITH VITAMIN D 1 TABLET: 500; 200 TABLET, FILM COATED ORAL at 09:12

## 2019-12-27 RX ADMIN — IPRATROPIUM BROMIDE AND ALBUTEROL SULFATE 3 ML: .5; 3 SOLUTION RESPIRATORY (INHALATION) at 08:12

## 2019-12-27 RX ADMIN — LEVOFLOXACIN 750 MG: 750 TABLET, FILM COATED ORAL at 09:12

## 2019-12-27 RX ADMIN — ATORVASTATIN CALCIUM 40 MG: 20 TABLET, FILM COATED ORAL at 09:12

## 2019-12-27 RX ADMIN — PANTOPRAZOLE SODIUM 40 MG: 40 TABLET, DELAYED RELEASE ORAL at 09:12

## 2019-12-27 RX ADMIN — IPRATROPIUM BROMIDE AND ALBUTEROL SULFATE 3 ML: .5; 3 SOLUTION RESPIRATORY (INHALATION) at 03:12

## 2019-12-27 RX ADMIN — IPRATROPIUM BROMIDE AND ALBUTEROL SULFATE 3 ML: .5; 3 SOLUTION RESPIRATORY (INHALATION) at 11:12

## 2019-12-27 NOTE — PLAN OF CARE
12/27/19 1112   Discharge Assessment   Assessment Type Discharge Planning Assessment   Confirmed/corrected address and phone number on facesheet? Yes   Assessment information obtained from? Patient   Expected Length of Stay (days) 3   Communicated expected length of stay with patient/caregiver yes   Prior to hospitilization cognitive status: Alert/Oriented   Prior to hospitalization functional status: Independent   Current cognitive status: Alert/Oriented   Current Functional Status: Independent   Lives With child(alba), adult   Able to Return to Prior Arrangements yes   Is patient able to care for self after discharge? Yes   Patient's perception of discharge disposition home or selfcare   Readmission Within the Last 30 Days no previous admission in last 30 days   Patient currently being followed by outpatient case management? No   Patient currently receives any other outside agency services? No   Equipment Currently Used at Home none   Do you have any problems affording any of your prescribed medications? No   Is the patient taking medications as prescribed? yes   Does the patient have transportation home? Yes   Transportation Anticipated family or friend will provide   Discharge Plan A Home with family   Discharge Plan B Home Health   Patient/Family in Agreement with Plan yes   Met with patient at bedside. She requests nebulizer for home when discharged.

## 2019-12-27 NOTE — CARE UPDATE
Patient was evaluated secondary to contrast extravasation in the left wrist and distal forearm. Patient noted pain and swelling in the left distal forearm, but denied any nausea, vomiting, or loss of consciousness. On physical exam, patient's left distal forearm demonstrated swelling and nodularity. Patient was counseled to alert medical staff if symptoms worsened or if any acute changes occurred. Will evaluate patient again in 24 hours.    Talon López M.D.   PGY-3  Radiology

## 2019-12-27 NOTE — H&P
"Hospital Medicine  History and Physical      Patient Name: Sharita Castañeda  MRN:  [id number]  [facility] Medicine Team: [facility]OSP MED R Joni Matthew MD  Date of Admission:  12/26/2019     Principal Problem:  Chronic obstructive pulmonary disease with acute exacerbation   Primary Care Physician: Wallace Deng MD       History of Present Illness:    Ms. Sharita Castañeda is a 69 y.o. female with PMHx of COPD (diagnosed by PCP, no PFT's on file) on rescue inhaler only, allergic rhinitis, HTN, HLD, PVD on aspirin and plavix, depression, and postablative hypothyroidism who presents to the ED with shortness of breath, hemoptysis, and right sided chest pain. Patient states that she had bright red blood streaks in her sputum yesterday and again this morning and her daughter encouraged her to come in to the ED. No prior episodes of hemoptysis but was treated recently with doxycycline for URI. Also states she has been more short of breath on exertion and has used her rescue inhaler with no relief. Does not have oxygen at home and no recent hospitalizations for COPD exacerbations. Right sided chest pain is described as a dull chronic pain that seems to be "in the ribs." She is an everyday smoker and has been for decades - daughter states that patient smokes 1PPD.     In the ED, patient hypoxic to 79% on room air, given a breathing treatment and placed on 4L NC with improvement in oxygen saturation to 97%. CXR concerning for possible soft tissue mass. Labs notable for leukocytosis of 16k however otherwise unremarkable. Patient was given azithromycin, ceftriaxone, solumedrol 125mg IV and admitted to hospital medicine.         Review of Systems:  Constitutional: Negative for chills, fatigue, fever.   HENT: Negative for sore throat, trouble swallowing.    Eyes: Negative for photophobia, visual disturbance.   Respiratory: +cough, +shortness of breath, +hemoptysis  Cardiovascular: Negative for chest pain, palpitations, leg swelling. "   Gastrointestinal: Negative for abdominal pain, constipation, diarrhea, nausea, vomiting.   Endocrine: Negative for cold intolerance, heat intolerance.   Genitourinary: Negative for dysuria, frequency.   Musculoskeletal: Negative for arthralgias, myalgias.   Skin: Negative for rash, wound, erythema   Neurological: Negative for dizziness, syncope, weakness, light-headedness.   Psychiatric/Behavioral: Negative for confusion, hallucinations, anxiety  All other systems reviewed and are negative.      Past Medical History: Patient has a past medical history of Allergic rhinitis, COPD (chronic obstructive pulmonary disease), Depression, GERD (gastroesophageal reflux disease), Headache, Hyperlipidemia, Hypertension, Polyarthralgia, Postablative hypothyroidism, and PVD (peripheral vascular disease).      Past Surgical History: Patient has a past surgical history that includes Gallbladder surgery (2006); Hip surgery (2005); Cholecystectomy (1986); and Peripheral arterial stent graft (Right, 2007).      Social History: Patient reports that she has been smoking cigarettes. She has never used smokeless tobacco. She reports that she does not drink alcohol.      Family History: Patient's family history includes COPD in her mother; Cancer in her mother; Heart attack (age of onset: 53) in her sister; Heart attack (age of onset: 55) in her father; Thyroid disease in her mother.      Medications: Scheduled Meds:   albuterol-ipratropium  3 mL Nebulization Q4H WAKE    amLODIPine  5 mg Oral Daily    atorvastatin  40 mg Oral Daily    calcium-vitamin D3  1 tablet Oral BID WM    clonazePAM  1 mg Oral BID    [START ON 12/27/2019] levoFLOXacin  750 mg Oral Daily    [START ON 12/27/2019] levothyroxine  100 mcg Oral Before breakfast    losartan  25 mg Oral Daily    meloxicam  7.5 mg Oral QHS    montelukast  10 mg Oral Daily    nicotine  1 patch Transdermal Daily    [START ON 12/27/2019] predniSONE  40 mg Oral Daily    tiotropium   1 capsule Inhalation Daily     Continuous Infusions:  PRN Meds:.acetaminophen, albuterol, butalbital-acetaminophen-caffeine -40 mg, melatonin, ondansetron, sodium chloride 0.9%      Allergies: Patient has No Known Allergies.      Physical Exam:    Temp:  [98.2 °F (36.8 °C)-99.3 °F (37.4 °C)]   Pulse:  []   Resp:  [16-18]   BP: (117-144)/(54-87)   SpO2:  [79 %-100 %]     Constitutional: Appears comfortable on NC, in no acute distress  Head: Normocephalic and atraumatic.   Mouth/Throat: Oropharynx is clear and moist.   Eyes: EOM are normal. Pupils are equal, round, and reactive to light. No scleral icterus.   Neck: Normal range of motion. Neck supple.   Cardiovascular: Normal heart rate.  Regular heart rhythm.  No murmur heard.  Pulmonary/Chest: Effort normal. No respiratory distress. +mild expiratory wheezing, +decreased breath sounds RML/RLL  Abdominal: Soft. Bowel sounds are normal.  No distension.  No tenderness  Musculoskeletal: Normal range of motion. No edema.   Neurological: Alert and oriented to person, place, and time.   Skin: Skin is warm and dry.   Psychiatric: Normal mood and affect. Behavior is normal.       No intake or output data in the 24 hours ending 12/26/19 2012  Recent Labs   Lab 12/26/19  1230   WBC 16.59*   HGB 12.4   HCT 40.3        Recent Labs   Lab 12/26/19  1230   *   K 4.6      CO2 24   BUN 13   CREATININE 1.0   GLU 88   CALCIUM 9.1     Recent Labs   Lab 12/26/19  1230   ALKPHOS 92   ALT 12   AST 20   ALBUMIN 3.2*   PROT 7.0   BILITOT 0.8      No results for input(s): POCTGLUCOSE in the last 168 hours.  Recent Labs     12/26/19  1230   TROPONINI 0.120*       Recent Labs     12/26/19  1257   LACTATE 2.0          Assessment and Plan:    Ms. Sharita Castañeda is a 69 y.o. female who presented to [facility] on 12/26/2019 with hemoptysis.    Hemoptysis  Mass of middle lobe of right lung  - patient presented with hemoptysis however Hb stable, CXR showed perihilar soft  "tissue mass  - CTA showed "Large, irregular soft tissue mass which demonstrates heterogeneous enhancement mainly encompassing the right middle lobe, and involving the right hilum as well as portions of the mediastinum posterior to the left atrium" -- concerning for primary lung malignancy as well as metastatic disease in patient with strong smoking history   - currently stable on 2L NC  - pulm consulted for possible bronch  - if any episodes of hemoptysis with hemodynamic instability/airway compromise, will obtain STAT CTA and IR consult  - MICU team aware of patient overnight  - hold aspirin, plavix, DVT prophylaxis    COPD Exacerbation  Allergic Rhinitis  Acute hypoxic respiratory failure   - patient with diagnosis of COPD by PCP however has not been seen by pulmonary. Presented with SOB and found to be hypoxic however labs only remarkable for leukocytosis (procalcitonin, LA, BMP within normal limits)  - extensive smoking history, nicotine patch while inpatient  - only has rescue albuterol inhaler at home  - given IV solumedrol 125 mg in the ED as well as ceftriaxone and azithromycin  - duonebs q4h, prednisone 40 daily, incentive spirometry  - levofloxacin 750 mg daily  - continue home montelukast daily  - start tiotropium while inpatient   - wean oxygen to maintain O2 sats 88 - 92%    PVD  - hold aspirin and plavix given hemoptysis  - has been on for several years, no recent stent placement    GERD  - chronic and stable  - continue home PPI    Depression  - chronic and stable  - continue home clonazepam    HLD  - chronic and stable  - continue home atorvastatin    HTN  - chronic and stable  - continue home amlodipine and losartan     Postablative hypothyroidism  - chronic and stable  - continue home levothyroxine  - will repeat TSH in AM; previous TSH January 2019 within normal limits (2.510)    Polyarthralgia   - continue home meloxicam qhs        Diet:  Cardiac, NPO at midnight  GI PPx:  N/A  DVT PPx:  " SCD  Goals of Care:  Full       Disposition:  TBD  Discharge Needs:  TBD      Joni Matthew MD  [facility] Medicine  Cell:  [phone]  Spectra:  [phone]  Pager:  [phone]

## 2019-12-27 NOTE — CONSULTS
BRIEF [facility] PCCM CONSULT NOTE        Consult to Pulmonology acknowledged.   Full consult note with recommendations to follow.        Lida Aldridge M.D., PGY - IV  [facility] Pul[facility]/Critical Care Fellow

## 2019-12-27 NOTE — PLAN OF CARE
Fall precaution maintained this shift call bell in reach bed in low position. No acute distress noted. Vs stable. Pt c/o of pain to left swollen arm. Prn pain meds given as directed. Instructed pt to call for assistance.

## 2019-12-27 NOTE — PROGRESS NOTES
Hospital Medicine  Progress Note      Patient Name: Sharita Castañeda  MRN:  [id number]  [facility] Medicine Team: [facility] R Joni Matthew MD  Date of Admission:  12/26/2019     Length of Stay:  LOS: 1 day   Expected Discharge Date: 12/30/2019  Principal Problem:  Hemoptysis      Subjective:    Interval History/Overnight Events:  No acute events overnight, patient reports improvement in breathing and no further hemoptysis however remains on nasal cannula. Pulm consulted, recommend IR consult for biopsy as she is too high risk with mass invading PA. Patient can follow up in pulm clinic in 1-2 weeks after discharge for further options on biopsy. Discussed with patient findings on CT scan were concerning for metastatic cancer with lung primary and patient voiced understanding.       Review of Systems:  Constitutional: Negative for chills, fatigue, fever.   Respiratory: +cough, +shortness of breath.    Cardiovascular: Negative for chest pain, palpitations, leg swelling.   Gastrointestinal: Negative for abdominal pain, nausea, vomiting, diarrhea, constipation  Genitourinary: Negative for dysuria, frequency.   All other systems reviewed and are negative.      Objective:    Physical Exam:  Temp:  [97.3 °F (36.3 °C)-98.2 °F (36.8 °C)]   Pulse:  []   Resp:  [16-20]   BP: ()/(48-87)   SpO2:  [90 %-99 %]     Constitutional: Appears comfortable on NC  Eyes: No scleral icterus or eye discharge  Cardiovascular: Normal heart rate.  Regular heart rhythm.  No murmur heard.  Pulmonary/Chest: +mild expiratory wheezing, +decreased breath sounds RML/RLL  Abdominal: Soft. Bowel sounds are normal.  No distension.  No tenderness  Musculoskeletal: No deformities.  No edema.   Neurological: Alert.  Oriented to person, place, and time.   Skin: Skin is warm and dry.       Assessment and Plan:    Ms. Sharita Castañeda is a 69 y.o. female who presented to [facility] on 12/26/2019 with hemoptysis. Started on COPD pathway and CTA concerning for  "primary lung malignancy as well as metastatic disease in patient with strong smoking history. Pulm consulted, recommend IR consult for biopsy as she is too high risk with mass invading PA. Patient can follow up in pulm clinic in 1-2 weeks after discharge for further options on biopsy. Discussed with patient findings on CT scan were concerning for metastatic cancer with lung primary and patient voiced understanding.       Hemoptysis  Mass of middle lobe of right lung  - patient presented with hemoptysis however Hb stable, CXR showed perihilar soft tissue mass  - CTA showed "Large, irregular soft tissue mass which demonstrates heterogeneous enhancement mainly encompassing the right middle lobe, and involving the right hilum as well as portions of the mediastinum posterior to the left atrium" -- concerning for primary lung malignancy as well as metastatic disease in patient with strong smoking history   - currently stable on 2L NC  - pulm consulted, recommend IR consult for biopsy. Upon further discussion with pulm, she will follow up in their clinic in 1-2 weeks for further options on biopsy  - if any episodes of hemoptysis with hemodynamic instability/airway compromise, will obtain STAT CTA and IR consult  - hold aspirin, plavix, DVT prophylaxis     COPD Exacerbation  Allergic Rhinitis  Acute hypoxic respiratory failure   - patient with diagnosis of COPD by PCP however has not been seen by pulmonary. Presented with SOB and found to be hypoxic however labs only remarkable for leukocytosis (procalcitonin, LA, BMP within normal limits)  - extensive smoking history, nicotine patch while inpatient  - only has rescue albuterol inhaler at home  - given IV solumedrol 125 mg in the ED as well as ceftriaxone and azithromycin  - duonebs q4h, prednisone 40 daily, incentive spirometry  - levofloxacin 750 mg daily  - continue home montelukast daily  - start tiotropium while inpatient   - wean oxygen to maintain O2 sats 88 - " 92%     PVD  - hold aspirin and plavix given hemoptysis  - has been on for several years, no recent stent placement  - will likely hold DAPT upon discharge until follow up with pulm      GERD  - chronic and stable  - continue home PPI     Depression  - chronic and stable  - continue home clonazepam     HLD  - chronic and stable  - continue home atorvastatin     HTN  - chronic and stable  - continue home amlodipine and losartan      Postablative hypothyroidism  - chronic and stable  - continue home levothyroxine  - will repeat TSH in AM; previous TSH January 2019 within normal limits (2.510)     Polyarthralgia   - continue home meloxicam qhs         Diet:  Cardiac  GI PPx:  N/A  DVT PPx:  SCD  Goals of Care:  Full         Disposition:  Home pending clinical improvement  Discharge Needs:  follow up with pipo Matthew MD  [facility] Medicine  Cell:  [phone]  Spectra:  [phone]  Pager:  [phone]

## 2019-12-27 NOTE — CONSULTS
Consult Note  [facility] Pulmonary & Critical Care Medicine    Attending: Rick Joshi  Fellow: Lida Aldridge  Admit Date: 12/26/2019  Today's Date: 12/27/2019  Reason for Consult:  Hemoptysis, Mediastinal Mass    SUBJECTIVE:     HPI:  Ms. Castañeda is a 70yo F with PMHx listed below presenting with hemoptysis. Pulmonology consulted for hemoptysis and mediastinal mass on imaging. Reports productive cough with scant sputum -- first episode of hemoptysis day of presentation. Patient denies other constitutional symptoms such as fatigue, unintentional weight loss, night sweats, swellings consistent with lymphadenopathy.     Reported history of COPD -- states that she has had PFTs in the past with PCP that were consistent with COPD; no PFTs on file. Current regimen only consists of albuterol prn. Denies inhaler use, nebulizer, reports no prior exacerbations of COPD. Current every day smoker with ~26ppy history. Reports extensive family history of lung cancer including mother, brother, and sister all smoking related with mother having metastatic disease at time of diagnosis. Extensive hx of PAD with last DAPT yesterday.  Patient has never had mammogram, no recent breast exam. Reports normal colon cancer screening with Cologuard this year.         Review of patient's allergies indicates:  No Known Allergies    Past Medical History:   Diagnosis Date    Allergic rhinitis     COPD (chronic obstructive pulmonary disease)     Depression     GERD (gastroesophageal reflux disease)     Headache     Hyperlipidemia     Hypertension     Polyarthralgia     Postablative hypothyroidism     hypothyroidism s/p OCAISO therapy    PVD (peripheral vascular disease)     Treated by Dr. Sim     Past Surgical History:   Procedure Laterality Date    CHOLECYSTECTOMY  1986    GALLBLADDER SURGERY  2006    HIP SURGERY  2005    Right hip fracture/surgery    PERIPHERAL ARTERIAL STENT GRAFT Right 2007    stent for femoral artery blockage     Family  History   Problem Relation Age of Onset    COPD Mother     Thyroid disease Mother         thyroidectomy    Cancer Mother         lung    Heart attack Father 55        [facility] -     Heart attack Sister 53        [facility]     Social History     Tobacco Use    Smoking status: Current Every Day Smoker     Types: Cigarettes    Smokeless tobacco: Never Used   Substance Use Topics    Alcohol use: No     Alcohol/week: 0.0 standard drinks    Drug use: Not on file       All medications reviewed.    ROS    OBJECTIVE:     Vital Signs Trends/Hx Reviewed  Vitals:    19 0500 19 0801 19 0806 19 0907   BP: (!) 96/52   (!) 113/56   BP Location: Right arm   Right arm   Patient Position: Lying   Lying   Pulse:  66 85 75   Resp:   20    Temp:    97.3 °F (36.3 °C)   TempSrc:    Oral   SpO2:   96% (!) 94%   Weight:       Height:           Physical Exam:  General: NAD, cooperative & interactive.  HEENT: AT/NC, PERRL, EOMI, oral and nasal mucosa moist.   Neck: Supple without JVD or palpable LAD.   Cardiac: normal rate, regular rhythm, with no MRG with brisk cap refill and symmetric pulses in distal extremities.  Respiratory: Bilateral Rhonchi   Abdomen: Soft, NT/ND. +BS. No hepatosplenomegaly.   Extremities: No edema.   Neuro: Grossly intact to brief exam. Oriented x3 with appropriate mood/affect to situation.       Laboratory:  Recent Labs   Lab 19  1320   PH 7.380   PCO2 44.9   PO2 32*   HCO3 26.6   POCSATURATED 59*   BE 1     Recent Labs   Lab 19  0333   WBC 16.20*   RBC 3.91*   HGB 10.6*   HCT 34.0*      MCV 87   MCH 27.1   MCHC 31.2*     Recent Labs   Lab 19  0332      K 3.8      CO2 22*   BUN 17   CREATININE 0.9   MG 2.1       Microbiology Data:   Microbiology Results (last 7 days)     Procedure Component Value Units Date/Time    Blood culture #1 **CANNOT BE ORDERED STAT** [[id number]] Collected:  19 1230    Order Status:  Completed Specimen:  Blood from Peripheral,  Lower Arm, Left Updated:  12/26/19 1945     Blood Culture, Routine No Growth to date    Blood culture #2 **CANNOT BE ORDERED STAT** [[id number]] Collected:  12/26/19 1257    Order Status:  Completed Specimen:  Blood from Peripheral, Lower Arm, Right Updated:  12/26/19 1945     Blood Culture, Routine No Growth to date           Chest Imaging:   CTA Impression   · No pulmonary embolism to the segmental level.  · Large, irregular soft tissue mass which demonstrates heterogeneous enhancement mainly encompassing the right middle lobe, and involving the right hilum as well as portions of the mediastinum posterior to the left atrium.  These findings are most consistent with primary lung malignancy, and prompt evaluation with pulmonary medicine is recommended.  · Apparent mediastinal involvement of right middle lobe mass including significant mediastinal lymphadenopathy and apparent filling defect in the right superior pulmonary vein, which may represent tumor invasion or associated thrombus.  Clinical correlation is advised.  · 1.2 x 1.0 cm irregular soft tissue nodule in the anterior basal segment of the left lower lobe as well as a soft tissue nodule in the anterior segment of the right upper lobe measuring 1.0 cm.  These nodules are concerning for metastatic disease in this patient with large irregular soft tissue mass in the right middle lobe.  · Dense consolidation mix with ground-glass attenuation in the peripheral right middle lobe, with considerations including infection, non infectious inflammation, pulmonary edema, alveolar hemorrhage, as well as aspiration.  Given large approximate right middle lobe mass, postobstructive atelectasis is also a consideration.  · Irregular, heterogeneous mass in the left adrenal gland measuring 7.3 x 4.3 cm, most consistent with metastatic disease.  · Multiple hypodense lesions throughout the liver, the largest right hepatic lobe measuring 1.8 x 1.5 cm, concerning for metastatic  disease.  · Trace right pleural effusion with associated compressive atelectasis.    Scheduled Medications:    albuterol-ipratropium  3 mL Nebulization Q4H WAKE    amLODIPine  5 mg Oral Daily    atorvastatin  40 mg Oral Daily    calcium-vitamin D3  1 tablet Oral BID WM    clonazePAM  1 mg Oral BID    levoFLOXacin  750 mg Oral Daily    levothyroxine  100 mcg Oral Before breakfast    losartan  25 mg Oral Daily    meloxicam  7.5 mg Oral QHS    montelukast  10 mg Oral Daily    nicotine  1 patch Transdermal Daily    pantoprazole  40 mg Oral Daily    polyethylene glycol  17 g Oral Daily    predniSONE  40 mg Oral Daily    tiotropium  1 capsule Inhalation Daily       PRN Medications:   acetaminophen, albuterol-ipratropium, bisacodyl, butalbital-acetaminophen-caffeine -40 mg, Dextrose 10% Bolus, Dextrose 10% Bolus, glucagon (human recombinant), glucose, glucose, HYDROcodone-acetaminophen, melatonin, ondansetron, promethazine (PHENERGAN) IVPB, sodium chloride 0.9%    Assessment & Plan:   Patient Active Problem List   Diagnosis    Postablative hypothyroidism    Hyperlipidemia    Hypertension    Depression    PVD (peripheral vascular disease)    Allergic rhinitis    Chronic obstructive pulmonary disease with acute exacerbation    GERD (gastroesophageal reflux disease)    Polyarthralgia    COPD exacerbation    Hemoptysis    Acute hypoxemic respiratory failure    Mass of middle lobe of right lung       ASSESSMENT & RECOMMENDATIONS     Ms. Castañeda is a 70 yo F with PMHx as above presenting with hemoptysis. CTA notable for large right sided soft tissue mass that encases the right PA with bilateral lung nodules, hilar and mediastinal lymphadenopathy, adrenal mass and liver nodules. These findings are likely representative of metastatic primary lung cancer. Patient has strong risk factors with extensive smoking history, active smoking, and strong family history.     · Would suggest IR guided biopsy as  patient has high risk lesion with evidence of ?erosion into PA given hemoptysis; would be high risk for EBUS  · Patient counseled on the importance of smoking cessation  · Given evidence of metastatic disease, it would be appropriate to consult Palliative care to help guide patient through health care decisions    Thank you for allowing us to participate in the care of this patient. We will sign off. Please call with questions.    Lida Aldridge M.D., PGY-IV  [facility]/Critical Care Fellow

## 2019-12-27 NOTE — PLAN OF CARE
Patient resting in bed. No pain noted. Patient medicated per orders. Patient free of falls. Bed in lowest position. Side rails up x2. Call light in reach.

## 2019-12-28 VITALS
SYSTOLIC BLOOD PRESSURE: 125 MMHG | DIASTOLIC BLOOD PRESSURE: 60 MMHG | TEMPERATURE: 98 F | HEART RATE: 83 BPM | RESPIRATION RATE: 18 BRPM | HEIGHT: 62 IN | WEIGHT: 150 LBS | BODY MASS INDEX: 27.6 KG/M2 | OXYGEN SATURATION: 94 %

## 2019-12-28 LAB
ANION GAP SERPL CALC-SCNC: 8 MMOL/L (ref 8–16)
BASOPHILS # BLD AUTO: 0.03 K/UL (ref 0–0.2)
BASOPHILS NFR BLD: 0.1 % (ref 0–1.9)
BUN SERPL-MCNC: 18 MG/DL (ref 8–23)
CALCIUM SERPL-MCNC: 9 MG/DL (ref 8.7–10.5)
CHLORIDE SERPL-SCNC: 103 MMOL/L (ref 95–110)
CO2 SERPL-SCNC: 27 MMOL/L (ref 23–29)
CREAT SERPL-MCNC: 0.9 MG/DL (ref 0.5–1.4)
DIFFERENTIAL METHOD: ABNORMAL
EOSINOPHIL # BLD AUTO: 0 K/UL (ref 0–0.5)
EOSINOPHIL NFR BLD: 0 % (ref 0–8)
ERYTHROCYTE [DISTWIDTH] IN BLOOD BY AUTOMATED COUNT: 18.5 % (ref 11.5–14.5)
EST. GFR  (AFRICAN AMERICAN): >60 ML/MIN/1.73 M^2
EST. GFR  (NON AFRICAN AMERICAN): >60 ML/MIN/1.73 M^2
GLUCOSE SERPL-MCNC: 101 MG/DL (ref 70–110)
HCT VFR BLD AUTO: 35.2 % (ref 37–48.5)
HGB BLD-MCNC: 10.8 G/DL (ref 12–16)
IMM GRANULOCYTES # BLD AUTO: 0.12 K/UL (ref 0–0.04)
IMM GRANULOCYTES NFR BLD AUTO: 0.6 % (ref 0–0.5)
LYMPHOCYTES # BLD AUTO: 1.5 K/UL (ref 1–4.8)
LYMPHOCYTES NFR BLD: 7.1 % (ref 18–48)
MAGNESIUM SERPL-MCNC: 2.1 MG/DL (ref 1.6–2.6)
MCH RBC QN AUTO: 27 PG (ref 27–31)
MCHC RBC AUTO-ENTMCNC: 30.7 G/DL (ref 32–36)
MCV RBC AUTO: 88 FL (ref 82–98)
MONOCYTES # BLD AUTO: 1.3 K/UL (ref 0.3–1)
MONOCYTES NFR BLD: 6.6 % (ref 4–15)
NEUTROPHILS # BLD AUTO: 17.4 K/UL (ref 1.8–7.7)
NEUTROPHILS NFR BLD: 85.6 % (ref 38–73)
NRBC BLD-RTO: 0 /100 WBC
PHOSPHATE SERPL-MCNC: 3.4 MG/DL (ref 2.7–4.5)
PLATELET # BLD AUTO: 282 K/UL (ref 150–350)
PMV BLD AUTO: 10.3 FL (ref 9.2–12.9)
POTASSIUM SERPL-SCNC: 5.1 MMOL/L (ref 3.5–5.1)
RBC # BLD AUTO: 4 M/UL (ref 4–5.4)
SODIUM SERPL-SCNC: 138 MMOL/L (ref 136–145)
WBC # BLD AUTO: 20.29 K/UL (ref 3.9–12.7)

## 2019-12-28 PROCEDURE — 63600175 PHARM REV CODE 636 W HCPCS: Performed by: STUDENT IN AN ORGANIZED HEALTH CARE EDUCATION/TRAINING PROGRAM

## 2019-12-28 PROCEDURE — 36415 COLL VENOUS BLD VENIPUNCTURE: CPT

## 2019-12-28 PROCEDURE — 83735 ASSAY OF MAGNESIUM: CPT

## 2019-12-28 PROCEDURE — 27000221 HC OXYGEN, UP TO 24 HOURS

## 2019-12-28 PROCEDURE — 99239 PR HOSPITAL DISCHARGE DAY,>30 MIN: ICD-10-PCS | Mod: ,,, | Performed by: INTERNAL MEDICINE

## 2019-12-28 PROCEDURE — 99223 1ST HOSP IP/OBS HIGH 75: CPT | Mod: ,,, | Performed by: INTERNAL MEDICINE

## 2019-12-28 PROCEDURE — 84100 ASSAY OF PHOSPHORUS: CPT

## 2019-12-28 PROCEDURE — S4991 NICOTINE PATCH NONLEGEND: HCPCS | Performed by: STUDENT IN AN ORGANIZED HEALTH CARE EDUCATION/TRAINING PROGRAM

## 2019-12-28 PROCEDURE — 99239 HOSP IP/OBS DSCHRG MGMT >30: CPT | Mod: ,,, | Performed by: INTERNAL MEDICINE

## 2019-12-28 PROCEDURE — 94640 AIRWAY INHALATION TREATMENT: CPT

## 2019-12-28 PROCEDURE — 99223 PR INITIAL HOSPITAL CARE,LEVL III: ICD-10-PCS | Mod: ,,, | Performed by: INTERNAL MEDICINE

## 2019-12-28 PROCEDURE — 94761 N-INVAS EAR/PLS OXIMETRY MLT: CPT

## 2019-12-28 PROCEDURE — 25000242 PHARM REV CODE 250 ALT 637 W/ HCPCS: Performed by: STUDENT IN AN ORGANIZED HEALTH CARE EDUCATION/TRAINING PROGRAM

## 2019-12-28 PROCEDURE — 25000003 PHARM REV CODE 250: Performed by: STUDENT IN AN ORGANIZED HEALTH CARE EDUCATION/TRAINING PROGRAM

## 2019-12-28 PROCEDURE — 85025 COMPLETE CBC W/AUTO DIFF WBC: CPT

## 2019-12-28 PROCEDURE — 80048 BASIC METABOLIC PNL TOTAL CA: CPT

## 2019-12-28 RX ORDER — PREDNISONE 20 MG/1
40 TABLET ORAL DAILY
Qty: 4 TABLET | Refills: 0 | Status: SHIPPED | OUTPATIENT
Start: 2019-12-29 | End: 2019-12-31

## 2019-12-28 RX ORDER — LEVOFLOXACIN 750 MG/1
750 TABLET ORAL DAILY
Qty: 2 TABLET | Refills: 0 | Status: SHIPPED | OUTPATIENT
Start: 2019-12-29 | End: 2019-12-31

## 2019-12-28 RX ORDER — ASPIRIN 81 MG/1
81 TABLET ORAL DAILY
Qty: 90 TABLET | Refills: 3 | Status: ON HOLD | OUTPATIENT
Start: 2019-12-28 | End: 2020-01-15 | Stop reason: HOSPADM

## 2019-12-28 RX ORDER — CLOPIDOGREL BISULFATE 75 MG/1
75 TABLET ORAL DAILY
Status: ON HOLD
Start: 2019-12-28 | End: 2020-01-15 | Stop reason: HOSPADM

## 2019-12-28 RX ADMIN — TIOTROPIUM BROMIDE 18 MCG: 18 CAPSULE ORAL; RESPIRATORY (INHALATION) at 08:12

## 2019-12-28 RX ADMIN — IPRATROPIUM BROMIDE AND ALBUTEROL SULFATE 3 ML: .5; 3 SOLUTION RESPIRATORY (INHALATION) at 09:12

## 2019-12-28 RX ADMIN — LOSARTAN POTASSIUM 25 MG: 25 TABLET ORAL at 08:12

## 2019-12-28 RX ADMIN — ATORVASTATIN CALCIUM 40 MG: 20 TABLET, FILM COATED ORAL at 08:12

## 2019-12-28 RX ADMIN — MONTELUKAST 10 MG: 10 TABLET, FILM COATED ORAL at 08:12

## 2019-12-28 RX ADMIN — AMLODIPINE BESYLATE 5 MG: 5 TABLET ORAL at 08:12

## 2019-12-28 RX ADMIN — LEVOTHYROXINE SODIUM 100 MCG: 100 TABLET ORAL at 05:12

## 2019-12-28 RX ADMIN — CLONAZEPAM 1 MG: 1 TABLET ORAL at 08:12

## 2019-12-28 RX ADMIN — Medication 1 PATCH: at 08:12

## 2019-12-28 RX ADMIN — OYSTER SHELL CALCIUM WITH VITAMIN D 1 TABLET: 500; 200 TABLET, FILM COATED ORAL at 08:12

## 2019-12-28 RX ADMIN — PANTOPRAZOLE SODIUM 40 MG: 40 TABLET, DELAYED RELEASE ORAL at 08:12

## 2019-12-28 RX ADMIN — PREDNISONE 40 MG: 20 TABLET ORAL at 08:12

## 2019-12-28 RX ADMIN — LEVOFLOXACIN 750 MG: 750 TABLET, FILM COATED ORAL at 08:12

## 2019-12-28 RX ADMIN — OYSTER SHELL CALCIUM WITH VITAMIN D 1 TABLET: 500; 200 TABLET, FILM COATED ORAL at 05:12

## 2019-12-28 NOTE — NURSING
Patient is being discharged home.  Monitored patients O2 sats on room air for 3 hours.  Patient range per md was to [facility] o2 sats at 88%.  Patient maintained sats at 92-96%.  No distress noted.  Condition stable at this time.

## 2019-12-28 NOTE — PLAN OF CARE
Fall precaution maintained this shift call bell in reach. No acute distress noted vs stable. Instructed pt to call for assistance.. Denies any pain this shift.

## 2019-12-29 NOTE — HPI
"Ms. Sharita Castañeda is a 69 y.o. female with PMHx of COPD (diagnosed by PCP, no PFT's on file) on rescue inhaler only, allergic rhinitis, HTN, HLD, PVD on aspirin and plavix, depression, and postablative hypothyroidism who presents to the ED with shortness of breath, hemoptysis, and right sided chest pain. Patient states that she had bright red blood streaks in her sputum yesterday and again this morning and her daughter encouraged her to come in to the ED. No prior episodes of hemoptysis but was treated recently with doxycycline for URI. Also states she has been more short of breath on exertion and has used her rescue inhaler with no relief. Does not have oxygen at home and no recent hospitalizations for COPD exacerbations. Right sided chest pain is described as a dull chronic pain that seems to be "in the ribs." She is an everyday smoker and has been for decades - daughter states that patient smokes 1PPD.      In the ED, patient hypoxic to 79% on room air, given a breathing treatment and placed on 4L NC with improvement in oxygen saturation to 97%. CXR concerning for possible soft tissue mass. Labs notable for leukocytosis of 16k however otherwise unremarkable. Patient was given azithromycin, ceftriaxone, solumedrol 125mg IV and admitted to hospital medicine.      "

## 2019-12-29 NOTE — HOSPITAL COURSE
Ms. Sharita Castañeda is a 69 y.o. female who presented to [facility] on 12/26/2019 with hemoptysis. Started on COPD pathway and CTA concerning for primary lung malignancy as well as metastatic disease in patient with significant smoking history. Pulm consulted, recommend IR consult for biopsy as she is too high risk with mass invading PA. Patient can follow up in pulm clinic in 1-2 weeks after discharge for further options on biopsy. Discussed with patient findings on CT scan were concerning for metastatic cancer with lung primary and patient voiced understanding. On 12/28 pt successfully weaned off oxygen. Plan to continue levaquin and pred 40mg po daily to complete 5 days total. Outpt f/u with pulmonology next week. Medically stable for discharge.

## 2019-12-29 NOTE — DISCHARGE SUMMARY
"[facility] Hospital Medicine  Discharge Summary      Patient Name: Sharita Castañeda  MRN: [id number]  Admission Date: 12/26/2019  Hospital Length of Stay: 2 days  Discharge Date and Time:  12/28/2019 8:13 PM  Attending Physician: No att. providers found   Discharging Provider: Carlita Clay MD  Primary Care Provider: Wallace Deng MD  [facility] Medicine Team: [facility] MED  Carlita Clay MD    HPI:   Ms. Sharita Castañeda is a 69 y.o. female with PMHx of COPD (diagnosed by PCP, no PFT's on file) on rescue inhaler only, allergic rhinitis, HTN, HLD, PVD on aspirin and plavix, depression, and postablative hypothyroidism who presents to the ED with shortness of breath, hemoptysis, and right sided chest pain. Patient states that she had bright red blood streaks in her sputum yesterday and again this morning and her daughter encouraged her to come in to the ED. No prior episodes of hemoptysis but was treated recently with doxycycline for URI. Also states she has been more short of breath on exertion and has used her rescue inhaler with no relief. Does not have oxygen at home and no recent hospitalizations for COPD exacerbations. Right sided chest pain is described as a dull chronic pain that seems to be "in the ribs." She is an everyday smoker and has been for decades - daughter states that patient smokes 1PPD.      In the ED, patient hypoxic to 79% on room air, given a breathing treatment and placed on 4L NC with improvement in oxygen saturation to 97%. CXR concerning for possible soft tissue mass. Labs notable for leukocytosis of 16k however otherwise unremarkable. Patient was given azithromycin, ceftriaxone, solumedrol 125mg IV and admitted to hospital medicine.        * No surgery found *      Hospital Course:   Ms. Sharita Castañeda is a 69 y.o. female who presented to [facility] on 12/26/2019 with hemoptysis. Started on COPD pathway and CTA concerning for primary lung malignancy as well as metastatic " disease in patient with significant smoking history. Pulm consulted, recommend IR consult for biopsy as she is too high risk with mass invading PA. Patient can follow up in pulm clinic in 1-2 weeks after discharge for further options on biopsy. Hemoptysis resolved. Discussed with patient findings on CT scan were concerning for metastatic cancer with lung primary and patient voiced understanding.  On 12/28 pt successfully weaned off oxygen. Plan to continue levaquin and pred 40mg po daily to complete 5 days total. Outpt f/u with pulmonology next week. Medically stable for discharge.          Consults:   Consults (From admission, onward)        Status Ordering Provider     Inpatient consult to Pulmonology  Once     Provider:  (Not yet assigned)    Completed AALIYAH DRAKE          No new Assessment & Plan notes have been filed under this hospital service since the last note was generated.  Service: Hospital Medicine    Final Active Diagnoses:    Diagnosis Date Noted POA    PRINCIPAL PROBLEM:  Hemoptysis [R04.2] 12/26/2019 Yes    COPD exacerbation [J44.1] 12/26/2019 Yes    Acute hypoxemic respiratory failure [J96.01] 12/26/2019 Yes    Mass of middle lobe of right lung [R91.8] 12/26/2019 Yes    Allergic rhinitis [J30.9]  Yes    Chronic obstructive pulmonary disease with acute exacerbation [J44.1]  Yes    Depression [F32.9]  Yes    GERD (gastroesophageal reflux disease) [K21.9]  Yes    Hyperlipidemia [E78.5]  Yes    Hypertension [I10]  Yes    Polyarthralgia [M25.50]  Yes    Postablative hypothyroidism [E89.0]  Yes    PVD (peripheral vascular disease) [I73.9]  Yes      Problems Resolved During this Admission:       Discharged Condition: stable    Disposition: Home or Self Care    Follow Up:  Follow-up Information     Wallace Deng MD.    Specialty:  Family Medicine  Contact information:  [address] [facility] [address] [phone]                 Patient Instructions:      Ambulatory Referral to  Pulmonology   Referral Priority: Routine Referral Type: Consultation   Referral Reason: Specialty Services Required   Referred to Provider: ANGELICA HOPKINS Requested Specialty: Pulmonary Disease   Number of Visits Requested: 1     Diet Cardiac     Notify your health care provider if you experience any of the following:  temperature >100.4     Notify your health care provider if you experience any of the following:  difficulty breathing or increased cough     Notify your health care provider if you experience any of the following:  persistent dizziness, light-headedness, or visual disturbances     Notify your health care provider if you experience any of the following:  persistent nausea and vomiting or diarrhea     Notify your health care provider if you experience any of the following:  increased confusion or weakness     Activity as tolerated       Significant Diagnostic Studies: Labs: All labs within the past 24 hours have been reviewed    Pending Diagnostic Studies:     None         Medications:  Reconciled Home Medications:      Medication List      START taking these medications    Incruse Ellipta 62.5 mcg/actuation Dsdv  Generic drug:  umeclidinium  Inhale 1 each into the lungs once daily. Controller     levoFLOXacin 750 MG tablet  Commonly known as:  LEVAQUIN  Take 1 tablet (750 mg total) by mouth once daily. for 2 days  Start taking on:  December 29, 2019     predniSONE 20 MG tablet  Commonly known as:  DELTASONE  Take 2 tablets (40 mg total) by mouth once daily. for 2 days  Start taking on:  December 29, 2019        CHANGE how you take these medications    montelukast 10 mg tablet  Commonly known as:  SINGULAIR  TAKE 1 TABLET(10 MG) BY MOUTH EVERY DAY  What changed:  Another medication with the same name was removed. Continue taking this medication, and follow the directions you see here.        CONTINUE taking these medications    * albuterol 90 mcg/actuation inhaler  Commonly known as:  ProAir  HFA  Inhale 2 puffs into the lungs every 6 (six) hours as needed for Wheezing.     * albuterol 90 mcg/actuation inhaler  Commonly known as:  PROVENTIL/VENTOLIN HFA  Inhale 1-2 puffs into the lungs every 6 (six) hours as needed for Wheezing.     alendronate 70 MG tablet  Commonly known as:  FOSAMAX  TAKE 1 TABLET(70 MG) BY MOUTH EVERY 7 DAYS     amLODIPine 5 MG tablet  Commonly known as:  NORVASC  Take 1 tablet (5 mg total) by mouth once daily.     aspirin 81 MG EC tablet  Commonly known as:  ECOTRIN  Take 1 tablet (81 mg total) by mouth once daily.     atorvastatin 40 MG tablet  Commonly known as:  LIPITOR  TAKE 1 TABLET(40 MG) BY MOUTH EVERY DAY     butalbital-acetaminophen-caffeine -40 mg -40 mg per tablet  Commonly known as:  FIORICET, ESGIC  Take 1 tablet by mouth every 12 (twelve) hours as needed for Pain or Headaches.     calcium-vitamin D3 500 mg(1,250mg) -200 unit per tablet  Commonly known as:  Calcium 500 + D  Take 1 tablet by mouth 2 (two) times daily with meals.     clonazePAM 1 MG tablet  Commonly known as:  KLONOPIN  Take 1 tablet (1 mg total) by mouth 2 (two) times daily.     clopidogrel 75 mg tablet  Commonly known as:  PLAVIX  Take 1 tablet (75 mg total) by mouth once daily.     ergocalciferol 50,000 unit Cap  Commonly known as:  ERGOCALCIFEROL  Take 1 capsule (50,000 Units total) by mouth every 7 days.     HYDROcodone-acetaminophen 7.5-325 mg per tablet  Commonly known as:  NORCO  Take 1 tablet by mouth 3 (three) times daily.     ibuprofen 600 MG tablet  Commonly known as:  ADVIL,MOTRIN  Take 1 tablet (600 mg total) by mouth every 6 (six) hours as needed for Pain.     levothyroxine 100 MCG tablet  Commonly known as:  SYNTHROID  Take 1 tablet (100 mcg total) by mouth before breakfast.     losartan 25 MG tablet  Commonly known as:  COZAAR  Take 1 tablet (25 mg total) by mouth once daily.     meloxicam 7.5 MG tablet  Commonly known as:  MOBIC  TAKE 1 TABLET(7.5 MG) BY MOUTH EVERY DAY      mometasone 50 mcg/actuation nasal spray  Commonly known as:  NASONEX  2 sprays by Nasal route once daily.     naproxen 500 MG tablet  Commonly known as:  NAPROSYN  Take 1 tablet (500 mg total) by mouth 2 (two) times daily with meals.     paroxetine 20 MG tablet  Commonly known as:  PAXIL  Take 1 tablet (20 mg total) by mouth every morning.     promethazine-codeine 6.25-10 mg/5 ml 6.25-10 mg/5 mL syrup  Commonly known as:  PHENERGAN with CODEINE  Take 5 mLs by mouth nightly as needed for Cough. DO NOT DRIVE AFTER TAKING MED     promethazine-dextromethorphan 6.25-15 mg/5 mL Syrp  Commonly known as:  PROMETHAZINE-DM  1 teaspoon PO Q 8 hrs PRN cough. DO NOT DRIVE AFTER TAKING MED     ranitidine 150 MG tablet  Commonly known as:  ZANTAC  Take 1 tablet (150 mg total) by mouth 2 (two) times daily.     topiramate 25 MG tablet  Commonly known as:  TOPAMAX  Take 1 tablet (25 mg total) by mouth 2 (two) times daily.         * This list has 2 medication(s) that are the same as other medications prescribed for you. Read the directions carefully, and ask your doctor or other care provider to review them with you.            STOP taking these medications    azithromycin 250 MG tablet  Commonly known as:  Z-MARYLIN     doxycycline 100 MG tablet  Commonly known as:  VIBRA-TABS     omeprazole 40 MG capsule  Commonly known as:  PRILOSEC     pantoprazole 40 MG tablet  Commonly known as:  PROTONIX            Indwelling Lines/Drains at time of discharge:   Lines/Drains/Airways     None                 Time spent on the discharge of patient: 35 minutes  Patient was seen and examined on the date of discharge and determined to be suitable for discharge.         Carlita Clay MD  Department of Hospital Medicine  [facility]

## 2019-12-31 LAB
BACTERIA BLD CULT: NORMAL
BACTERIA BLD CULT: NORMAL

## 2020-01-02 ENCOUNTER — HOSPITAL ENCOUNTER (INPATIENT)
Facility: HOSPITAL | Age: 70
LOS: 6 days | Discharge: HOME HEALTH | DRG: 871 | End: 2020-01-08
Attending: EMERGENCY MEDICINE | Admitting: EMERGENCY MEDICINE
Payer: MEDICARE

## 2020-01-02 DIAGNOSIS — Z71.89 ADVANCED CARE PLANNING/COUNSELING DISCUSSION: ICD-10-CM

## 2020-01-02 DIAGNOSIS — J18.9 HAP (HOSPITAL-ACQUIRED PNEUMONIA): Primary | ICD-10-CM

## 2020-01-02 DIAGNOSIS — J18.9 POSTOBSTRUCTIVE PNEUMONIA: ICD-10-CM
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
## 2020-01-02 DIAGNOSIS — R07.9 CHEST PAIN: ICD-10-CM

## 2020-01-02 DIAGNOSIS — J44.1 COPD EXACERBATION: ICD-10-CM

## 2020-01-02 DIAGNOSIS — R00.0 TACHYCARDIA: ICD-10-CM

## 2020-01-02 DIAGNOSIS — R91.8 MASS OF MIDDLE LOBE OF RIGHT LUNG: ICD-10-CM

## 2020-01-02 DIAGNOSIS — Z71.89 GOALS OF CARE, COUNSELING/DISCUSSION: ICD-10-CM

## 2020-01-02 DIAGNOSIS — Z51.5 PALLIATIVE CARE ENCOUNTER: ICD-10-CM

## 2020-01-02 DIAGNOSIS — J18.9 PNEUMONIA: ICD-10-CM

## 2020-01-02 PROBLEM — G43.909 MIGRAINE: Status: ACTIVE | Noted: 2020-01-02

## 2020-01-02 PROBLEM — R11.0 NAUSEA: Status: ACTIVE | Noted: 2020-01-02

## 2020-01-02 PROBLEM — F41.9 ANXIETY: Status: ACTIVE | Noted: 2020-01-02

## 2020-01-02 LAB
ADENOVIRUS: NOT DETECTED
ALBUMIN SERPL BCP-MCNC: 3 G/DL (ref 3.5–5.2)
ALP SERPL-CCNC: 90 U/L (ref 55–135)
ALT SERPL W/O P-5'-P-CCNC: 19 U/L (ref 10–44)
ANION GAP SERPL CALC-SCNC: 11 MMOL/L (ref 8–16)
ANION GAP SERPL CALC-SCNC: 9 MMOL/L (ref 8–16)
ANISOCYTOSIS BLD QL SMEAR: SLIGHT
AST SERPL-CCNC: 29 U/L (ref 10–40)
BACTERIA #/AREA URNS AUTO: NORMAL /HPF
BASOPHILS # BLD AUTO: 0.05 K/UL (ref 0–0.2)
BASOPHILS NFR BLD: 0.2 % (ref 0–1.9)
BILIRUB SERPL-MCNC: 1 MG/DL (ref 0.1–1)
BILIRUB UR QL STRIP: NEGATIVE
BNP SERPL-MCNC: 77 PG/ML (ref 0–99)
BORDETELLA PARAPERTUSSIS (IS1001): NOT DETECTED
BORDETELLA PERTUSSIS (PTXP): NOT DETECTED
BUN SERPL-MCNC: 12 MG/DL (ref 8–23)
BUN SERPL-MCNC: 15 MG/DL (ref 8–23)
CALCIUM SERPL-MCNC: 7.2 MG/DL (ref 8.7–10.5)
CALCIUM SERPL-MCNC: 8.9 MG/DL (ref 8.7–10.5)
CHLAMYDIA PNEUMONIAE: NOT DETECTED
CHLORIDE SERPL-SCNC: 105 MMOL/L (ref 95–110)
CHLORIDE SERPL-SCNC: 97 MMOL/L (ref 95–110)
CLARITY UR REFRACT.AUTO: CLEAR
CO2 SERPL-SCNC: 19 MMOL/L (ref 23–29)
CO2 SERPL-SCNC: 25 MMOL/L (ref 23–29)
COLOR UR AUTO: YELLOW
CORONAVIRUS 229E, COMMON COLD VIRUS: NOT DETECTED
CORONAVIRUS HKU1, COMMON COLD VIRUS: NOT DETECTED
CORONAVIRUS NL63, COMMON COLD VIRUS: NOT DETECTED
CORONAVIRUS OC43, COMMON COLD VIRUS: NOT DETECTED
CREAT SERPL-MCNC: 0.9 MG/DL (ref 0.5–1.4)
CREAT SERPL-MCNC: 1.1 MG/DL (ref 0.5–1.4)
DIFFERENTIAL METHOD: ABNORMAL
DOHLE BOD BLD QL SMEAR: PRESENT
EOSINOPHIL # BLD AUTO: 0.3 K/UL (ref 0–0.5)
EOSINOPHIL NFR BLD: 1 % (ref 0–8)
ERYTHROCYTE [DISTWIDTH] IN BLOOD BY AUTOMATED COUNT: 18.2 % (ref 11.5–14.5)
EST. GFR  (AFRICAN AMERICAN): 59.2 ML/MIN/1.73 M^2
EST. GFR  (AFRICAN AMERICAN): >60 ML/MIN/1.73 M^2
EST. GFR  (NON AFRICAN AMERICAN): 51.3 ML/MIN/1.73 M^2
EST. GFR  (NON AFRICAN AMERICAN): >60 ML/MIN/1.73 M^2
ESTIMATED AVG GLUCOSE: 108 MG/DL (ref 68–131)
FLUBV RNA NPH QL NAA+NON-PROBE: NOT DETECTED
GLUCOSE SERPL-MCNC: 106 MG/DL (ref 70–110)
GLUCOSE SERPL-MCNC: 96 MG/DL (ref 70–110)
GLUCOSE UR QL STRIP: NEGATIVE
HBA1C MFR BLD HPLC: 5.4 % (ref 4–5.6)
HCT VFR BLD AUTO: 40.5 % (ref 37–48.5)
HGB BLD-MCNC: 12.8 G/DL (ref 12–16)
HGB UR QL STRIP: ABNORMAL
HPIV1 RNA NPH QL NAA+NON-PROBE: NOT DETECTED
HPIV2 RNA NPH QL NAA+NON-PROBE: NOT DETECTED
HPIV3 RNA NPH QL NAA+NON-PROBE: NOT DETECTED
HPIV4 RNA NPH QL NAA+NON-PROBE: NOT DETECTED
HUMAN METAPNEUMOVIRUS: NOT DETECTED
HYPOCHROMIA BLD QL SMEAR: ABNORMAL
IMM GRANULOCYTES # BLD AUTO: 0.35 K/UL (ref 0–0.04)
IMM GRANULOCYTES NFR BLD AUTO: 1.4 % (ref 0–0.5)
INFLUENZA A (SUBTYPES H1,H1-2009,H3): NOT DETECTED
INFLUENZA A, MOLECULAR: NEGATIVE
INFLUENZA B, MOLECULAR: NEGATIVE
INR PPP: 1 (ref 0.8–1.2)
KETONES UR QL STRIP: NEGATIVE
LACTATE SERPL-SCNC: 1.5 MMOL/L (ref 0.5–2.2)
LACTATE SERPL-SCNC: 3.1 MMOL/L (ref 0.5–2.2)
LEUKOCYTE ESTERASE UR QL STRIP: ABNORMAL
LYMPHOCYTES # BLD AUTO: 2.1 K/UL (ref 1–4.8)
LYMPHOCYTES NFR BLD: 8.5 % (ref 18–48)
MCH RBC QN AUTO: 26.9 PG (ref 27–31)
MCHC RBC AUTO-ENTMCNC: 31.6 G/DL (ref 32–36)
MCV RBC AUTO: 85 FL (ref 82–98)
MICROSCOPIC COMMENT: NORMAL
MONOCYTES # BLD AUTO: 2.3 K/UL (ref 0.3–1)
MONOCYTES NFR BLD: 9 % (ref 4–15)
MYCOPLASMA PNEUMONIAE: NOT DETECTED
NEUTROPHILS # BLD AUTO: 20 K/UL (ref 1.8–7.7)
NEUTROPHILS NFR BLD: 79.9 % (ref 38–73)
NITRITE UR QL STRIP: NEGATIVE
NRBC BLD-RTO: 0 /100 WBC
OVALOCYTES BLD QL SMEAR: ABNORMAL
PH UR STRIP: 7 [PH] (ref 5–8)
PLATELET # BLD AUTO: 193 K/UL (ref 150–350)
PLATELET BLD QL SMEAR: ABNORMAL
PMV BLD AUTO: 11 FL (ref 9.2–12.9)
POIKILOCYTOSIS BLD QL SMEAR: SLIGHT
POLYCHROMASIA BLD QL SMEAR: ABNORMAL
POTASSIUM SERPL-SCNC: 4.6 MMOL/L (ref 3.5–5.1)
POTASSIUM SERPL-SCNC: 5.2 MMOL/L (ref 3.5–5.1)
PROT SERPL-MCNC: 7 G/DL (ref 6–8.4)
PROT UR QL STRIP: NEGATIVE
PROTHROMBIN TIME: 10.6 SEC (ref 9–12.5)
RBC # BLD AUTO: 4.76 M/UL (ref 4–5.4)
RBC #/AREA URNS AUTO: 1 /HPF (ref 0–4)
RESPIRATORY INFECTION PANEL SOURCE: NORMAL
RSV RNA NPH QL NAA+NON-PROBE: NOT DETECTED
RV+EV RNA NPH QL NAA+NON-PROBE: NOT DETECTED
SODIUM SERPL-SCNC: 133 MMOL/L (ref 136–145)
SODIUM SERPL-SCNC: 133 MMOL/L (ref 136–145)
SP GR UR STRIP: 1.02 (ref 1–1.03)
SPECIMEN SOURCE: NORMAL
SQUAMOUS #/AREA URNS AUTO: 1 /HPF
T4 FREE SERPL-MCNC: 0.81 NG/DL (ref 0.71–1.51)
TOXIC GRANULES BLD QL SMEAR: PRESENT
TROPONIN I SERPL DL<=0.01 NG/ML-MCNC: 0.02 NG/ML (ref 0–0.03)
TSH SERPL DL<=0.005 MIU/L-ACNC: 9.85 UIU/ML (ref 0.4–4)
URN SPEC COLLECT METH UR: ABNORMAL
WBC # BLD AUTO: 25.03 K/UL (ref 3.9–12.7)
WBC #/AREA URNS AUTO: 0 /HPF (ref 0–5)

## 2020-01-02 PROCEDURE — 96375 TX/PRO/DX INJ NEW DRUG ADDON: CPT

## 2020-01-02 PROCEDURE — 63600175 PHARM REV CODE 636 W HCPCS: Performed by: STUDENT IN AN ORGANIZED HEALTH CARE EDUCATION/TRAINING PROGRAM

## 2020-01-02 PROCEDURE — 85610 PROTHROMBIN TIME: CPT

## 2020-01-02 PROCEDURE — 83880 ASSAY OF NATRIURETIC PEPTIDE: CPT

## 2020-01-02 PROCEDURE — 99900035 HC TECH TIME PER 15 MIN (STAT)

## 2020-01-02 PROCEDURE — 94761 N-INVAS EAR/PLS OXIMETRY MLT: CPT

## 2020-01-02 PROCEDURE — 99291 CRITICAL CARE FIRST HOUR: CPT | Mod: ,,, | Performed by: EMERGENCY MEDICINE

## 2020-01-02 PROCEDURE — 96366 THER/PROPH/DIAG IV INF ADDON: CPT

## 2020-01-02 PROCEDURE — 80053 COMPREHEN METABOLIC PANEL: CPT

## 2020-01-02 PROCEDURE — 25000003 PHARM REV CODE 250: Performed by: STUDENT IN AN ORGANIZED HEALTH CARE EDUCATION/TRAINING PROGRAM

## 2020-01-02 PROCEDURE — 99291 PR CRITICAL CARE, E/M 30-74 MINUTES: ICD-10-PCS | Mod: ,,, | Performed by: EMERGENCY MEDICINE

## 2020-01-02 PROCEDURE — 93005 ELECTROCARDIOGRAM TRACING: CPT

## 2020-01-02 PROCEDURE — 25500020 PHARM REV CODE 255: Performed by: EMERGENCY MEDICINE

## 2020-01-02 PROCEDURE — 25000242 PHARM REV CODE 250 ALT 637 W/ HCPCS: Performed by: STUDENT IN AN ORGANIZED HEALTH CARE EDUCATION/TRAINING PROGRAM

## 2020-01-02 PROCEDURE — 96365 THER/PROPH/DIAG IV INF INIT: CPT

## 2020-01-02 PROCEDURE — 93010 EKG 12-LEAD: ICD-10-PCS | Mod: ,,, | Performed by: INTERNAL MEDICINE

## 2020-01-02 PROCEDURE — 99285 EMERGENCY DEPT VISIT HI MDM: CPT | Mod: 25

## 2020-01-02 PROCEDURE — 63600175 PHARM REV CODE 636 W HCPCS: Performed by: EMERGENCY MEDICINE

## 2020-01-02 PROCEDURE — 83605 ASSAY OF LACTIC ACID: CPT | Mod: 91

## 2020-01-02 PROCEDURE — 87633 RESP VIRUS 12-25 TARGETS: CPT

## 2020-01-02 PROCEDURE — 96367 TX/PROPH/DG ADDL SEQ IV INF: CPT

## 2020-01-02 PROCEDURE — 36415 COLL VENOUS BLD VENIPUNCTURE: CPT

## 2020-01-02 PROCEDURE — 85025 COMPLETE CBC W/AUTO DIFF WBC: CPT

## 2020-01-02 PROCEDURE — 99223 PR INITIAL HOSPITAL CARE,LEVL III: ICD-10-PCS | Mod: AI,GC,,

## 2020-01-02 PROCEDURE — 87502 INFLUENZA DNA AMP PROBE: CPT

## 2020-01-02 PROCEDURE — 27000221 HC OXYGEN, UP TO 24 HOURS

## 2020-01-02 PROCEDURE — 94799 UNLISTED PULMONARY SVC/PX: CPT

## 2020-01-02 PROCEDURE — 81001 URINALYSIS AUTO W/SCOPE: CPT

## 2020-01-02 PROCEDURE — 96372 THER/PROPH/DIAG INJ SC/IM: CPT | Mod: 59

## 2020-01-02 PROCEDURE — 99223 1ST HOSP IP/OBS HIGH 75: CPT | Mod: AI,GC,,

## 2020-01-02 PROCEDURE — 94640 AIRWAY INHALATION TREATMENT: CPT

## 2020-01-02 PROCEDURE — 84439 ASSAY OF FREE THYROXINE: CPT

## 2020-01-02 PROCEDURE — 87070 CULTURE OTHR SPECIMN AEROBIC: CPT

## 2020-01-02 PROCEDURE — 87040 BLOOD CULTURE FOR BACTERIA: CPT | Mod: 59

## 2020-01-02 PROCEDURE — 93010 ELECTROCARDIOGRAM REPORT: CPT | Mod: ,,, | Performed by: INTERNAL MEDICINE

## 2020-01-02 PROCEDURE — 96361 HYDRATE IV INFUSION ADD-ON: CPT

## 2020-01-02 PROCEDURE — 11000001 HC ACUTE MED/SURG PRIVATE ROOM

## 2020-01-02 PROCEDURE — 83036 HEMOGLOBIN GLYCOSYLATED A1C: CPT

## 2020-01-02 PROCEDURE — 84484 ASSAY OF TROPONIN QUANT: CPT

## 2020-01-02 PROCEDURE — 80048 BASIC METABOLIC PNL TOTAL CA: CPT

## 2020-01-02 PROCEDURE — 84443 ASSAY THYROID STIM HORMONE: CPT

## 2020-01-02 PROCEDURE — 87205 SMEAR GRAM STAIN: CPT

## 2020-01-02 RX ORDER — PAROXETINE HYDROCHLORIDE 20 MG/1
20 TABLET, FILM COATED ORAL EVERY MORNING
Status: DISCONTINUED | OUTPATIENT
Start: 2020-01-02 | End: 2020-01-08 | Stop reason: HOSPADM

## 2020-01-02 RX ORDER — HEPARIN SODIUM 5000 [USP'U]/ML
5000 INJECTION, SOLUTION INTRAVENOUS; SUBCUTANEOUS EVERY 8 HOURS
Status: DISCONTINUED | OUTPATIENT
Start: 2020-01-02 | End: 2020-01-03

## 2020-01-02 RX ORDER — BENZONATATE 100 MG/1
100 CAPSULE ORAL 3 TIMES DAILY PRN
Status: DISCONTINUED | OUTPATIENT
Start: 2020-01-02 | End: 2020-01-08 | Stop reason: HOSPADM

## 2020-01-02 RX ORDER — GLUCAGON 1 MG
1 KIT INJECTION
Status: DISCONTINUED | OUTPATIENT
Start: 2020-01-02 | End: 2020-01-08 | Stop reason: HOSPADM

## 2020-01-02 RX ORDER — MORPHINE SULFATE 2 MG/ML
2 INJECTION, SOLUTION INTRAMUSCULAR; INTRAVENOUS
Status: COMPLETED | OUTPATIENT
Start: 2020-01-02 | End: 2020-01-02

## 2020-01-02 RX ORDER — MONTELUKAST SODIUM 10 MG/1
10 TABLET ORAL DAILY
Status: DISCONTINUED | OUTPATIENT
Start: 2020-01-02 | End: 2020-01-08 | Stop reason: HOSPADM

## 2020-01-02 RX ORDER — FAMOTIDINE 20 MG/1
20 TABLET, FILM COATED ORAL DAILY
Status: DISCONTINUED | OUTPATIENT
Start: 2020-01-02 | End: 2020-01-08 | Stop reason: HOSPADM

## 2020-01-02 RX ORDER — IBUPROFEN 200 MG
16 TABLET ORAL
Status: DISCONTINUED | OUTPATIENT
Start: 2020-01-02 | End: 2020-01-08 | Stop reason: HOSPADM

## 2020-01-02 RX ORDER — CLONAZEPAM 1 MG/1
1 TABLET ORAL 2 TIMES DAILY PRN
Status: DISCONTINUED | OUTPATIENT
Start: 2020-01-02 | End: 2020-01-02

## 2020-01-02 RX ORDER — TOPIRAMATE 25 MG/1
25 TABLET ORAL 2 TIMES DAILY
Status: DISCONTINUED | OUTPATIENT
Start: 2020-01-02 | End: 2020-01-08 | Stop reason: HOSPADM

## 2020-01-02 RX ORDER — IBUPROFEN 200 MG
24 TABLET ORAL
Status: DISCONTINUED | OUTPATIENT
Start: 2020-01-02 | End: 2020-01-08 | Stop reason: HOSPADM

## 2020-01-02 RX ORDER — PREDNISONE 10 MG/1
40 TABLET ORAL DAILY
Status: DISCONTINUED | OUTPATIENT
Start: 2020-01-02 | End: 2020-01-07

## 2020-01-02 RX ORDER — BUTALBITAL, ACETAMINOPHEN AND CAFFEINE 50; 325; 40 MG/1; MG/1; MG/1
1 TABLET ORAL 2 TIMES DAILY PRN
Status: DISCONTINUED | OUTPATIENT
Start: 2020-01-02 | End: 2020-01-08 | Stop reason: HOSPADM

## 2020-01-02 RX ORDER — IPRATROPIUM BROMIDE AND ALBUTEROL SULFATE 2.5; .5 MG/3ML; MG/3ML
3 SOLUTION RESPIRATORY (INHALATION) EVERY 4 HOURS
Status: DISCONTINUED | OUTPATIENT
Start: 2020-01-02 | End: 2020-01-08 | Stop reason: HOSPADM

## 2020-01-02 RX ORDER — LEVOTHYROXINE SODIUM 100 UG/1
100 TABLET ORAL
Status: DISCONTINUED | OUTPATIENT
Start: 2020-01-03 | End: 2020-01-08 | Stop reason: HOSPADM

## 2020-01-02 RX ORDER — PROMETHAZINE HYDROCHLORIDE 12.5 MG/1
12.5 TABLET ORAL EVERY 6 HOURS PRN
Status: DISCONTINUED | OUTPATIENT
Start: 2020-01-02 | End: 2020-01-08 | Stop reason: HOSPADM

## 2020-01-02 RX ORDER — SODIUM CHLORIDE 0.9 % (FLUSH) 0.9 %
10 SYRINGE (ML) INJECTION
Status: DISCONTINUED | OUTPATIENT
Start: 2020-01-02 | End: 2020-01-08 | Stop reason: HOSPADM

## 2020-01-02 RX ORDER — VANCOMYCIN 2 GRAM/500 ML IN 0.9 % SODIUM CHLORIDE INTRAVENOUS
2000
Status: COMPLETED | OUTPATIENT
Start: 2020-01-02 | End: 2020-01-02

## 2020-01-02 RX ORDER — ALBUTEROL SULFATE 90 UG/1
2 AEROSOL, METERED RESPIRATORY (INHALATION) EVERY 6 HOURS PRN
Status: DISCONTINUED | OUTPATIENT
Start: 2020-01-02 | End: 2020-01-02

## 2020-01-02 RX ORDER — ATORVASTATIN CALCIUM 20 MG/1
40 TABLET, FILM COATED ORAL NIGHTLY
Status: DISCONTINUED | OUTPATIENT
Start: 2020-01-02 | End: 2020-01-08 | Stop reason: HOSPADM

## 2020-01-02 RX ORDER — TIOTROPIUM BROMIDE 18 UG/1
1 CAPSULE ORAL; RESPIRATORY (INHALATION) DAILY
Status: DISCONTINUED | OUTPATIENT
Start: 2020-01-03 | End: 2020-01-08 | Stop reason: HOSPADM

## 2020-01-02 RX ORDER — HYDROCODONE BITARTRATE AND ACETAMINOPHEN 7.5; 325 MG/1; MG/1
1 TABLET ORAL EVERY 8 HOURS PRN
Status: DISCONTINUED | OUTPATIENT
Start: 2020-01-02 | End: 2020-01-08 | Stop reason: HOSPADM

## 2020-01-02 RX ORDER — ONDANSETRON 2 MG/ML
4 INJECTION INTRAMUSCULAR; INTRAVENOUS EVERY 8 HOURS PRN
Status: DISCONTINUED | OUTPATIENT
Start: 2020-01-02 | End: 2020-01-08 | Stop reason: HOSPADM

## 2020-01-02 RX ORDER — FLUTICASONE PROPIONATE 50 MCG
2 SPRAY, SUSPENSION (ML) NASAL DAILY
Status: DISCONTINUED | OUTPATIENT
Start: 2020-01-03 | End: 2020-01-02

## 2020-01-02 RX ORDER — IPRATROPIUM BROMIDE AND ALBUTEROL SULFATE 2.5; .5 MG/3ML; MG/3ML
3 SOLUTION RESPIRATORY (INHALATION) EVERY 4 HOURS PRN
Status: DISCONTINUED | OUTPATIENT
Start: 2020-01-02 | End: 2020-01-02

## 2020-01-02 RX ADMIN — PREDNISONE 40 MG: 10 TABLET ORAL at 02:01

## 2020-01-02 RX ADMIN — PAROXETINE 20 MG: 10 TABLET, FILM COATED ORAL at 12:01

## 2020-01-02 RX ADMIN — HYDROCODONE BITARTRATE AND ACETAMINOPHEN 1 TABLET: 7.5; 325 TABLET ORAL at 12:01

## 2020-01-02 RX ADMIN — PIPERACILLIN AND TAZOBACTAM 4.5 G: 4; .5 INJECTION, POWDER, LYOPHILIZED, FOR SOLUTION INTRAVENOUS; PARENTERAL at 09:01

## 2020-01-02 RX ADMIN — SODIUM CHLORIDE 2013 ML: 0.9 INJECTION, SOLUTION INTRAVENOUS at 07:01

## 2020-01-02 RX ADMIN — IPRATROPIUM BROMIDE AND ALBUTEROL SULFATE 3 ML: .5; 3 SOLUTION RESPIRATORY (INHALATION) at 05:01

## 2020-01-02 RX ADMIN — IOHEXOL 75 ML: 350 INJECTION, SOLUTION INTRAVENOUS at 08:01

## 2020-01-02 RX ADMIN — VANCOMYCIN HYDROCHLORIDE 2000 MG: 100 INJECTION, POWDER, LYOPHILIZED, FOR SOLUTION INTRAVENOUS at 10:01

## 2020-01-02 RX ADMIN — MONTELUKAST 10 MG: 10 TABLET, FILM COATED ORAL at 01:01

## 2020-01-02 RX ADMIN — ONDANSETRON 4 MG: 2 INJECTION INTRAMUSCULAR; INTRAVENOUS at 03:01

## 2020-01-02 RX ADMIN — ATORVASTATIN CALCIUM 40 MG: 20 TABLET, FILM COATED ORAL at 09:01

## 2020-01-02 RX ADMIN — MORPHINE SULFATE 2 MG: 2 INJECTION, SOLUTION INTRAMUSCULAR; INTRAVENOUS at 09:01

## 2020-01-02 RX ADMIN — HEPARIN SODIUM 5000 UNITS: 5000 INJECTION, SOLUTION INTRAVENOUS; SUBCUTANEOUS at 02:01

## 2020-01-02 RX ADMIN — PIPERACILLIN AND TAZOBACTAM 4.5 G: 4; .5 INJECTION, POWDER, FOR SOLUTION INTRAVENOUS at 06:01

## 2020-01-02 RX ADMIN — IPRATROPIUM BROMIDE AND ALBUTEROL SULFATE 3 ML: .5; 3 SOLUTION RESPIRATORY (INHALATION) at 07:01

## 2020-01-02 RX ADMIN — BENZONATATE 100 MG: 100 CAPSULE ORAL at 04:01

## 2020-01-02 RX ADMIN — FAMOTIDINE 20 MG: 20 TABLET ORAL at 01:01

## 2020-01-02 RX ADMIN — HEPARIN SODIUM 5000 UNITS: 5000 INJECTION, SOLUTION INTRAVENOUS; SUBCUTANEOUS at 09:01

## 2020-01-02 RX ADMIN — HYDROCODONE BITARTRATE AND ACETAMINOPHEN 1 TABLET: 7.5; 325 TABLET ORAL at 09:01

## 2020-01-02 RX ADMIN — TOPIRAMATE 25 MG: 25 TABLET, FILM COATED ORAL at 09:01

## 2020-01-02 RX ADMIN — SODIUM CHLORIDE 500 ML: 0.9 INJECTION, SOLUTION INTRAVENOUS at 04:01

## 2020-01-02 NOTE — ASSESSMENT & PLAN NOTE
Patients presents with probable pneumonia that is causing an exacerbation of her COPD    Plan  Scheduled duo-nebs q4  Tiotropium qd

## 2020-01-02 NOTE — ASSESSMENT & PLAN NOTE
Patients CT scans very concerning for lung cancer with mets.    Plan  Will try to obtain biopsy while patient is in the hospital  Pallative care consulted

## 2020-01-02 NOTE — PHYSICIAN QUERY
PT Name: Sharita Castañeda  MR #: [id number]    Physician Query Form - Relationship to Procedure Clarification     CDS: Jovon Giraldo RN CCDS               Contact information: [email or web address] or [phone]    This form is a permanent document in the medical record.     Query Date: January 2, 2020      By submitting this query, we are merely seeking further clarification of documentation. Please utilize your independent clinical judgment when addressing the question(s) below.    The Medical record contains the following:  Supporting Clinical Findings Location in Medical Record   Patient was evaluated secondary to contrast extravasation in the left wrist and distal forearm. Patient noted pain and swelling in the left distal forearm, but denied any nausea, vomiting, or loss of consciousness. On physical exam, patient's left distal forearm demonstrated swelling and nodularity. Patient was counseled to alert medical staff if symptoms worsened or if any acute changes occurred. Will evaluate patient again in 24 hours.    No acute events overnight, patient reports improvement in breathing and no further hemoptysis however remains on nasal cannula. Pulm consulted, recommend IR consult for biopsy as she is too high risk with mass invading PA. Patient can follow up in pulm clinic in 1-2 weeks after discharge for further options on biopsy. Discussed with patient findings on CT scan were concerning for metastatic cancer with lung primary and patient voiced understanding.   Skin: Skin is warm and dry. 12/26/19 IR care update Talon López MD               12/27/19 Hospital Medicine progress notes Joni Matthew MD       Please clarify if extravasation in the left wrist and distal forearm is      [  ] Incidental finding, not clinically significant   [x  ] Complication of procedure   [  ] Other (please specify): __________________   [  ] Clinically Undetermined

## 2020-01-02 NOTE — H&P
[facility] Medicine  History & Physical    Patient Name: Sharita Castañeda  MRN: [id number]  Admission Date: 1/2/2020  Attending Physician: Julio Sagastume MD   Primary Care Provider: Wallace Deng MD    [facility] Medicine Team: [facility] HOSP MED 2 Andrea Crowell MD     Patient information was obtained from patient, relative(s), past medical records and ER records.     Subjective:     Principal Problem:HAP (hospital-acquired pneumonia)    Chief Complaint:   Chief Complaint   Patient presents with    Tachycardia     Endorsing generalized weakness sicne yesterday, Pt stated to feel her heart race. Per EMS EKG showed sinus tachycardia vent rate 130s. Recent dx of liver and lung cancer. Not currently on chemo or radiation.         HPI:  68 yo WF w/ hx of COPD, HTN, HLD, PVD (was on ASA and plavix, recently held in preparation for biopsy), MDD, hypothyroidism (post-ablation), and allergic rhinitis who presents to [facility] ED c/o tachycardia, SOB, and R sided CP. Patient was recently admitted to [facility] on 12/26 for hemoptysis concerning for malignancy. CT scan revealed a large mass in the right lung/ Patient also presented with signs of pneumonia and was treated with levaquin. Patient discharged on 12/28 and was instructed to complete 5 day course of levaquin and prednisone 40mg PO. Patient stated that she was doing well since discharge on w/ 1 or 2 further small episodes of hemoptysis. However, patient notes that her coughing became worse over the past few days w/ some pleuritic CP appreciated that could be relieved by massaging the area. Her symptoms of SOB began around 1AM this morning w/ severe right sided CP (8/10 - worse than before).      In the ER patient remained SOB and tachycardic w/ severe pleuritic CP. Patient placed on 2L nc w/ improvement in SOB and SpO2 to low to mid 90s. Patient febrile to 100.5 F and WBC of 25k. Patient endorses nausea, chills, fatigue, CP, SOB, cough, wheeze, constipation, and  congestion. ER started patient on Vanc and Zosyn and gave fluid 30 cc/kg. CTA negative for PE. EKG unremarkable    On presentation to hospital medicine team patients vitals are stable. The shortness of breath has gotten better with oxygen. Still complaining of a sharp right sided chest pain. Got better with morphine.     Past Medical History:   Diagnosis Date    Allergic rhinitis     COPD (chronic obstructive pulmonary disease)     Depression     GERD (gastroesophageal reflux disease)     Headache     Hyperlipidemia     Hypertension     Polyarthralgia     Postablative hypothyroidism     hypothyroidism s/p OCASIO therapy    PVD (peripheral vascular disease)     Treated by Dr. Sim       Past Surgical History:   Procedure Laterality Date    CHOLECYSTECTOMY  1986    GALLBLADDER SURGERY  2006    HIP SURGERY  2005    Right hip fracture/surgery    PERIPHERAL ARTERIAL STENT GRAFT Right 2007    stent for femoral artery blockage       Review of patient's allergies indicates:  No Known Allergies    No current facility-administered medications on file prior to encounter.      Current Outpatient Medications on File Prior to Encounter   Medication Sig    albuterol (PROAIR HFA) 90 mcg/actuation inhaler Inhale 2 puffs into the lungs every 6 (six) hours as needed for Wheezing.    albuterol (PROVENTIL/VENTOLIN HFA) 90 mcg/actuation inhaler Inhale 1-2 puffs into the lungs every 6 (six) hours as needed for Wheezing.    alendronate (FOSAMAX) 70 MG tablet TAKE 1 TABLET(70 MG) BY MOUTH EVERY 7 DAYS    amLODIPine (NORVASC) 5 MG tablet Take 1 tablet (5 mg total) by mouth once daily.    aspirin (ECOTRIN) 81 MG EC tablet Take 1 tablet (81 mg total) by mouth once daily.    atorvastatin (LIPITOR) 40 MG tablet TAKE 1 TABLET(40 MG) BY MOUTH EVERY DAY    butalbital-acetaminophen-caffeine -40 mg (FIORICET, ESGIC) -40 mg per tablet Take 1 tablet by mouth every 12 (twelve) hours as needed for Pain or Headaches.     calcium-vitamin D3 (CALCIUM 500 + D) 500 mg(1,250mg) -200 unit per tablet Take 1 tablet by mouth 2 (two) times daily with meals.    clonazePAM (KLONOPIN) 1 MG tablet Take 1 tablet (1 mg total) by mouth 2 (two) times daily.    clopidogrel (PLAVIX) 75 mg tablet Take 1 tablet (75 mg total) by mouth once daily.    ergocalciferol (ERGOCALCIFEROL) 50,000 unit Cap Take 1 capsule (50,000 Units total) by mouth every 7 days.    hydrocodone-acetaminophen 7.5-325mg (NORCO) 7.5-325 mg per tablet Take 1 tablet by mouth 3 (three) times daily.    ibuprofen (ADVIL,MOTRIN) 600 MG tablet Take 1 tablet (600 mg total) by mouth every 6 (six) hours as needed for Pain.    levothyroxine (SYNTHROID) 100 MCG tablet Take 1 tablet (100 mcg total) by mouth before breakfast.    losartan (COZAAR) 25 MG tablet Take 1 tablet (25 mg total) by mouth once daily.    meloxicam (MOBIC) 7.5 MG tablet TAKE 1 TABLET(7.5 MG) BY MOUTH EVERY DAY    mometasone (NASONEX) 50 mcg/actuation nasal spray 2 sprays by Nasal route once daily.    montelukast (SINGULAIR) 10 mg tablet TAKE 1 TABLET(10 MG) BY MOUTH EVERY DAY    naproxen (NAPROSYN) 500 MG tablet Take 1 tablet (500 mg total) by mouth 2 (two) times daily with meals.    paroxetine (PAXIL) 20 MG tablet Take 1 tablet (20 mg total) by mouth every morning.    promethazine-codeine 6.25-10 mg/5 ml (PHENERGAN WITH CODEINE) 6.25-10 mg/5 mL syrup Take 5 mLs by mouth nightly as needed for Cough. DO NOT DRIVE AFTER TAKING MED    promethazine-dextromethorphan (PROMETHAZINE-DM) 6.25-15 mg/5 mL Syrp 1 teaspoon PO Q 8 hrs PRN cough. DO NOT DRIVE AFTER TAKING MED    ranitidine (ZANTAC) 150 MG tablet Take 1 tablet (150 mg total) by mouth 2 (two) times daily.    topiramate (TOPAMAX) 25 MG tablet Take 1 tablet (25 mg total) by mouth 2 (two) times daily.    umeclidinium (INCRUSE ELLIPTA) 62.5 mcg/actuation DsDv Inhale 1 each into the lungs once daily. Controller     Family History     Problem Relation (Age of  Onset)    COPD Mother    Cancer Mother    Heart attack Father (55), Sister (53)    Thyroid disease Mother        Tobacco Use    Smoking status: Current Every Day Smoker     Types: Cigarettes    Smokeless tobacco: Never Used   Substance and Sexual Activity    Alcohol use: No     Alcohol/week: 0.0 standard drinks    Drug use: Not on file    Sexual activity: Not on file     Review of Systems   Constitutional: Positive for activity change, chills and fever.   HENT: Negative for congestion and postnasal drip.    Eyes: Negative for pain and discharge.   Respiratory: Positive for cough, shortness of breath and wheezing. Negative for chest tightness.    Cardiovascular: Positive for chest pain. Negative for palpitations and leg swelling.   Gastrointestinal: Negative for abdominal pain, constipation, diarrhea, nausea and vomiting.   Genitourinary: Negative for difficulty urinating and dysuria.   Musculoskeletal: Positive for arthralgias and myalgias.   Skin: Negative for rash and wound.   Neurological: Negative for light-headedness and headaches.   Psychiatric/Behavioral: Negative for agitation and confusion.     Objective:     Vital Signs (Most Recent):  Temp: (!) 100.5 °F (38.1 °C) (01/02/20 0635)  Pulse: 94 (01/02/20 1250)  Resp: (!) 24 (01/02/20 1250)  BP: 134/62 (01/02/20 1250)  SpO2: (!) 92 % (01/02/20 1250) Vital Signs (24h Range):  Temp:  [100.5 °F (38.1 °C)] 100.5 °F (38.1 °C)  Pulse:  [] 94  Resp:  [16-31] 24  SpO2:  [88 %-99 %] 92 %  BP: (104-134)/(51-76) 134/62     Weight: 67.1 kg (148 lb)  Body mass index is 27.07 kg/m².    Physical Exam   Constitutional: She is oriented to person, place, and time. She appears well-developed and well-nourished. She appears distressed.   HENT:   Head: Normocephalic and atraumatic.   Eyes: Conjunctivae and EOM are normal. No scleral icterus.   Neck: Normal range of motion.   Cardiovascular: Normal rate, regular rhythm and intact distal pulses. Exam reveals no gallop and  no friction rub.   No murmur heard.  Pulmonary/Chest: Effort normal. She has wheezes. She exhibits tenderness.   Wheezing throughout bilateral lung fields   Abdominal: Soft. Bowel sounds are normal. She exhibits no distension. There is no tenderness.   Musculoskeletal: Normal range of motion. She exhibits no edema.   Neurological: She is alert and oriented to person, place, and time. No cranial nerve deficit.   Skin: Skin is warm and dry.   Psychiatric: She has a normal mood and affect.         CRANIAL NERVES     CN III, IV, VI   Extraocular motions are normal.        Significant Labs:   Recent Results (from the past 48 hour(s))   CBC auto differential    Collection Time: 01/02/20  7:05 AM   Result Value Ref Range    WBC 25.03 (H) 3.90 - 12.70 K/uL    RBC 4.76 4.00 - 5.40 M/uL    Hemoglobin 12.8 12.0 - 16.0 g/dL    Hematocrit 40.5 37.0 - 48.5 %    Mean Corpuscular Volume 85 82 - 98 fL    Mean Corpuscular Hemoglobin 26.9 (L) 27.0 - 31.0 pg    Mean Corpuscular Hemoglobin Conc 31.6 (L) 32.0 - 36.0 g/dL    RDW 18.2 (H) 11.5 - 14.5 %    Platelets 193 150 - 350 K/uL    MPV 11.0 9.2 - 12.9 fL    Immature Granulocytes 1.4 (H) 0.0 - 0.5 %    Gran # (ANC) 20.0 (H) 1.8 - 7.7 K/uL    Immature Grans (Abs) 0.35 (H) 0.00 - 0.04 K/uL    Lymph # 2.1 1.0 - 4.8 K/uL    Mono # 2.3 (H) 0.3 - 1.0 K/uL    Eos # 0.3 0.0 - 0.5 K/uL    Baso # 0.05 0.00 - 0.20 K/uL    nRBC 0 0 /100 WBC    Gran% 79.9 (H) 38.0 - 73.0 %    Lymph% 8.5 (L) 18.0 - 48.0 %    Mono% 9.0 4.0 - 15.0 %    Eosinophil% 1.0 0.0 - 8.0 %    Basophil% 0.2 0.0 - 1.9 %    Platelet Estimate Appears normal     Aniso Slight     Poik Slight     Poly Occasional     Hypo Occasional     Ovalocytes Occasional     Dohle Bodies Present     Toxic Granulation Present     Differential Method Automated    Comprehensive metabolic panel    Collection Time: 01/02/20  7:05 AM   Result Value Ref Range    Sodium 133 (L) 136 - 145 mmol/L    Potassium 5.2 (H) 3.5 - 5.1 mmol/L    Chloride 97 95 - 110  mmol/L    CO2 25 23 - 29 mmol/L    Glucose 96 70 - 110 mg/dL    BUN, Bld 15 8 - 23 mg/dL    Creatinine 1.1 0.5 - 1.4 mg/dL    Calcium 8.9 8.7 - 10.5 mg/dL    Total Protein 7.0 6.0 - 8.4 g/dL    Albumin 3.0 (L) 3.5 - 5.2 g/dL    Total Bilirubin 1.0 0.1 - 1.0 mg/dL    Alkaline Phosphatase 90 55 - 135 U/L    AST 29 10 - 40 U/L    ALT 19 10 - 44 U/L    Anion Gap 11 8 - 16 mmol/L    eGFR if African American 59.2 (A) >60 mL/min/1.73 m^2    eGFR if non  51.3 (A) >60 mL/min/1.73 m^2   Lactic acid, plasma #1    Collection Time: 01/02/20  7:05 AM   Result Value Ref Range    Lactate (Lactic Acid) 3.1 (H) 0.5 - 2.2 mmol/L   Protime-INR    Collection Time: 01/02/20  7:05 AM   Result Value Ref Range    Prothrombin Time 10.6 9.0 - 12.5 sec    INR 1.0 0.8 - 1.2   Urinalysis, Reflex to Urine Culture Urine, Clean Catch    Collection Time: 01/02/20  9:08 AM   Result Value Ref Range    Specimen UA Urine, Clean Catch     Color, UA Yellow Yellow, Straw, Viridiana    Appearance, UA Clear Clear    pH, UA 7.0 5.0 - 8.0    Specific Gravity, UA 1.025 1.005 - 1.030    Protein, UA Negative Negative    Glucose, UA Negative Negative    Ketones, UA Negative Negative    Bilirubin (UA) Negative Negative    Occult Blood UA 1+ (A) Negative    Nitrite, UA Negative Negative    Leukocytes, UA 1+ (A) Negative   Urinalysis Microscopic    Collection Time: 01/02/20  9:08 AM   Result Value Ref Range    RBC, UA 1 0 - 4 /hpf    WBC, UA 0 0 - 5 /hpf    Bacteria Rare None-Occ /hpf    Squam Epithel, UA 1 /hpf    Microscopic Comment SEE COMMENT    Lactic acid, plasma #2    Collection Time: 01/02/20 11:40 AM   Result Value Ref Range    Lactate (Lactic Acid) 1.5 0.5 - 2.2 mmol/L   Culture, Respiratory with Gram Stain    Collection Time: 01/02/20 12:10 PM   Result Value Ref Range    Gram Stain (Respiratory) <10 epithelial cells per low power field.     Gram Stain (Respiratory) Rare WBC's     Gram Stain (Respiratory) Few Gram positive cocci     Gram Stain  (Respiratory) Few Gram negative diplococci          Significant Imaging:   Imaging Results          CTA Chest Non-Coronary (PE Study) (Final result)  Result time 01/02/20 10:16:01    Final result by Megan Street MD (01/02/20 10:16:01)                 Impression:      1.  No pulmonary arterial filling defect to suggest thromboembolism.    2.  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  Additional metastatic disease likely present in the right axilla, liver, spleen, and left renal fossa.    3.  Note is made of a stable appearing filling defect in the right superior pulmonary vein approach in its ostium suggesting neoplastic venous invasion.    4.  Stable, indeterminate, left lower lobe pulmonary nodule.    5.  Trace right pleural fluid with associated compressive atelectasis.    Electronically signed by resident: Javier Nunez  Date:    01/02/2020  Time:    09:34    Electronically signed by: Megan Street MD  Date:    01/02/2020  Time:    10:16             Narrative:    EXAMINATION:  CTA CHEST NON CORONARY    CLINICAL HISTORY:  Chest pain, acute, nonspecific, low prob CAD;    TECHNIQUE:  The chest was surveyed from the costophrenic angles through the lung apices at 3-mm increments after the administration of 75 cc of Omnipaque 350 intravenous contrast material according to the PE protocol which is optimized for vascular contrast resolution.  Axial, sagittal and coronal maximum intensity projection images were reviewed.    Total Exam DLP: 333.30mGy-cm.    COMPARISON:  CTA chest 12/26/2019    FINDINGS:  Examination of the soft tissue and vascular structures at the base of the neck is unremarkable.    There is a left-sided aortic arch with 3 branch vessels.  The thoracic aorta maintains normal caliber, contour, and course with extensive atherosclerotic calcification.  There is no  evidence of aneurysmal dilation or dissection.    The pulmonary arteries distribute normally without evidence of filling defect to indicate pulmonary thromboembolism.  Note is made of stable, attenuated appearance of the middle lobe segmental/subsegmental pulmonary arteries likely secondary to adjacent mass.  There are four pulmonary veins.  Again identified is a filling defect in the right superior pulmonary vein near its ostium consistent with venous invasion by the adjacent neoplastic process.  Systemic and pulmonary venoatrial connections remain concordant.    The heart is not enlarged and there is no evidence of pericardial effusion.    There is a stable 2.3 cm nodular soft tissue density in the right axilla likely representing lymphadenopathy.  Subcarinal and right hilar soft tissue density appears stable and likely represents a combination of mass invasion/lymphadenopathy.    The esophagus maintains a normal course.    Limited images of the upper abdomen obtained during the course of this dedicated thoracic CT multiple hepatic hypodensities measuring up to 2.3 cm, a 3.3 cm splenic hypodensity, and partially visualized 7 cm nodular opacity in the left renal fossa.    The osseous structures demonstrate degenerative joint disease without aggressive marrow replacement process or acute fracture.    The trachea and proximal airways are patent.  Middle lobe segmental/subsegmental airways are mostly obstructed.    The lungs demonstrate a 12.5 x 4.5 cm middle lobe mass extending into the right hilum and subcarinal mediastinum with significant postobstructive consolidation.  There is stable subsegmental consolidative opacity in the anterior segment of the right upper lobe, as well as compressive atelectasis in the right lung base.  A 1.1 cm nodular opacity remains present in the anterior basal segment of the left lower lobe.  There is underlying apical predominant centrilobular and paraseptal emphysema.  Trace right  pleural fluid is present.  No pneumothorax.                               X-Ray Chest AP Portable (Final result)  Result time 01/02/20 07:41:48    Final result by Jae Bolden MD (01/02/20 07:41:48)                 Impression:      Continued abnormal chest radiograph, but no significant detrimental interval change, allowing for differences in patient positioning, since 12/26/2019.      Electronically signed by: Jae Bolden MD  Date:    01/02/2020  Time:    07:41             Narrative:    EXAMINATION:  XR CHEST AP PORTABLE    CLINICAL HISTORY:  Sepsis;    COMPARISON:  Comparison is made to the chest radiograph of 12/26/2019.  Reference is also made to a subsequent thoracic CT examination of that same date.    FINDINGS:  Opacity in the inferior hemothorax on the right side with obliteration of the right cardiac border and extending to the inferior aspect of the right hilum is again observed.  This was documented on the CT examination referenced above to primarily relate to a mass opacity in the right middle lobe.  This opacity appears essentially unchanged since the previous exam.  Heart size remains normal.  The left lung and the mid and upper lung zones on the right side appear stable, with no significant superimposed airspace consolidation or volume loss.  No pleural fluid of any substantial volume is seen on either side.  No pneumothorax.  Calcification in the wall of the aortic arch is again incidentally observed.                                  Assessment/Plan:     * HAP (hospital-acquired pneumonia)   70 yo WF w/ hx of COPD, HTN, HLD, PVD (was on ASA and plavix, recently held in preparation for biopsy), MDD, hypothyroidism (post-ablation), and allergic rhinitis who presents to [facility] ED c/o tachycardia, SOB, and R sided CP. Patient recently admitted and treated for pneumonia with 5 day course of levaquin. Symptoms and vitals still point to pneumonia.     Plan  Vanc and Zosyn  Sputum Culture  Blood cultures  pending  Home [place] 7.5-35 q8 prn      Chronic obstructive pulmonary disease with acute exacerbation  Patients presents with probable pneumonia that is causing an exacerbation of her COPD    Plan  Scheduled duo-nebs q4  Tiotropium qd    Migraine  Home fiorcet ordered prn      Nausea  Promethazine and zofran ordered prn      Anxiety  Holding klonopin in setting of increased 02 requirements      Mass of middle lobe of right lung  Patients CT scans very concerning for lung cancer with mets.    Plan  Will try to obtain biopsy while patient is in the hospital  Pallative care consulted      GERD (gastroesophageal reflux disease)  Famotidine 20 mg daily      Allergic rhinitis  Continue home montelukast       PVD (peripheral vascular disease)  Patient has stents in her leg. Normally on asprin and plavix. Being held for possible biopsy of lung mass       Depression  Continue home paxil and topiramate    Hypertension  Holding home meds in setting of sepsis      Hyperlipidemia  Continue home statin      Postablative hypothyroidism  Continue home levothyroxine        VTE Risk Mitigation (From admission, onward)         Ordered     heparin (porcine) injection 5,000 Units  Every 8 hours      01/02/20 1211     Place sequential compression device  Until discontinued      01/02/20 1150     IP VTE HIGH RISK PATIENT  Once      01/02/20 1150                   Andrea Crowell MD  Department of Hospital Medicine   [facility]

## 2020-01-02 NOTE — SUBJECTIVE & OBJECTIVE
Past Medical History:   Diagnosis Date    Allergic rhinitis     COPD (chronic obstructive pulmonary disease)     Depression     GERD (gastroesophageal reflux disease)     Headache     Hyperlipidemia     Hypertension     Polyarthralgia     Postablative hypothyroidism     hypothyroidism s/p OCASIO therapy    PVD (peripheral vascular disease)     Treated by Dr. Sim       Past Surgical History:   Procedure Laterality Date    CHOLECYSTECTOMY  1986    GALLBLADDER SURGERY  2006    HIP SURGERY  2005    Right hip fracture/surgery    PERIPHERAL ARTERIAL STENT GRAFT Right 2007    stent for femoral artery blockage       Review of patient's allergies indicates:  No Known Allergies    No current facility-administered medications on file prior to encounter.      Current Outpatient Medications on File Prior to Encounter   Medication Sig    albuterol (PROAIR HFA) 90 mcg/actuation inhaler Inhale 2 puffs into the lungs every 6 (six) hours as needed for Wheezing.    albuterol (PROVENTIL/VENTOLIN HFA) 90 mcg/actuation inhaler Inhale 1-2 puffs into the lungs every 6 (six) hours as needed for Wheezing.    alendronate (FOSAMAX) 70 MG tablet TAKE 1 TABLET(70 MG) BY MOUTH EVERY 7 DAYS    amLODIPine (NORVASC) 5 MG tablet Take 1 tablet (5 mg total) by mouth once daily.    aspirin (ECOTRIN) 81 MG EC tablet Take 1 tablet (81 mg total) by mouth once daily.    atorvastatin (LIPITOR) 40 MG tablet TAKE 1 TABLET(40 MG) BY MOUTH EVERY DAY    butalbital-acetaminophen-caffeine -40 mg (FIORICET, ESGIC) -40 mg per tablet Take 1 tablet by mouth every 12 (twelve) hours as needed for Pain or Headaches.    calcium-vitamin D3 (CALCIUM 500 + D) 500 mg(1,250mg) -200 unit per tablet Take 1 tablet by mouth 2 (two) times daily with meals.    clonazePAM (KLONOPIN) 1 MG tablet Take 1 tablet (1 mg total) by mouth 2 (two) times daily.    clopidogrel (PLAVIX) 75 mg tablet Take 1 tablet (75 mg total) by mouth once daily.     ergocalciferol (ERGOCALCIFEROL) 50,000 unit Cap Take 1 capsule (50,000 Units total) by mouth every 7 days.    hydrocodone-acetaminophen 7.5-325mg (NORCO) 7.5-325 mg per tablet Take 1 tablet by mouth 3 (three) times daily.    ibuprofen (ADVIL,MOTRIN) 600 MG tablet Take 1 tablet (600 mg total) by mouth every 6 (six) hours as needed for Pain.    levothyroxine (SYNTHROID) 100 MCG tablet Take 1 tablet (100 mcg total) by mouth before breakfast.    losartan (COZAAR) 25 MG tablet Take 1 tablet (25 mg total) by mouth once daily.    meloxicam (MOBIC) 7.5 MG tablet TAKE 1 TABLET(7.5 MG) BY MOUTH EVERY DAY    mometasone (NASONEX) 50 mcg/actuation nasal spray 2 sprays by Nasal route once daily.    montelukast (SINGULAIR) 10 mg tablet TAKE 1 TABLET(10 MG) BY MOUTH EVERY DAY    naproxen (NAPROSYN) 500 MG tablet Take 1 tablet (500 mg total) by mouth 2 (two) times daily with meals.    paroxetine (PAXIL) 20 MG tablet Take 1 tablet (20 mg total) by mouth every morning.    promethazine-codeine 6.25-10 mg/5 ml (PHENERGAN WITH CODEINE) 6.25-10 mg/5 mL syrup Take 5 mLs by mouth nightly as needed for Cough. DO NOT DRIVE AFTER TAKING MED    promethazine-dextromethorphan (PROMETHAZINE-DM) 6.25-15 mg/5 mL Syrp 1 teaspoon PO Q 8 hrs PRN cough. DO NOT DRIVE AFTER TAKING MED    ranitidine (ZANTAC) 150 MG tablet Take 1 tablet (150 mg total) by mouth 2 (two) times daily.    topiramate (TOPAMAX) 25 MG tablet Take 1 tablet (25 mg total) by mouth 2 (two) times daily.    umeclidinium (INCRUSE ELLIPTA) 62.5 mcg/actuation DsDv Inhale 1 each into the lungs once daily. Controller     Family History     Problem Relation (Age of Onset)    COPD Mother    Cancer Mother    Heart attack Father (55), Sister (53)    Thyroid disease Mother        Tobacco Use    Smoking status: Current Every Day Smoker     Types: Cigarettes    Smokeless tobacco: Never Used   Substance and Sexual Activity    Alcohol use: No     Alcohol/week: 0.0 standard drinks     Drug use: Not on file    Sexual activity: Not on file     Review of Systems   Constitutional: Positive for activity change, chills and fever.   HENT: Negative for congestion and postnasal drip.    Eyes: Negative for pain and discharge.   Respiratory: Positive for cough, shortness of breath and wheezing. Negative for chest tightness.    Cardiovascular: Positive for chest pain. Negative for palpitations and leg swelling.   Gastrointestinal: Negative for abdominal pain, constipation, diarrhea, nausea and vomiting.   Genitourinary: Negative for difficulty urinating and dysuria.   Musculoskeletal: Positive for arthralgias and myalgias.   Skin: Negative for rash and wound.   Neurological: Negative for light-headedness and headaches.   Psychiatric/Behavioral: Negative for agitation and confusion.     Objective:     Vital Signs (Most Recent):  Temp: (!) 100.5 °F (38.1 °C) (01/02/20 0635)  Pulse: 94 (01/02/20 1250)  Resp: (!) 24 (01/02/20 1250)  BP: 134/62 (01/02/20 1250)  SpO2: (!) 92 % (01/02/20 1250) Vital Signs (24h Range):  Temp:  [100.5 °F (38.1 °C)] 100.5 °F (38.1 °C)  Pulse:  [] 94  Resp:  [16-31] 24  SpO2:  [88 %-99 %] 92 %  BP: (104-134)/(51-76) 134/62     Weight: 67.1 kg (148 lb)  Body mass index is 27.07 kg/m².    Physical Exam   Constitutional: She is oriented to person, place, and time. She appears well-developed and well-nourished. She appears distressed.   HENT:   Head: Normocephalic and atraumatic.   Eyes: Conjunctivae and EOM are normal. No scleral icterus.   Neck: Normal range of motion.   Cardiovascular: Normal rate, regular rhythm and intact distal pulses. Exam reveals no gallop and no friction rub.   No murmur heard.  Pulmonary/Chest: Effort normal. She has wheezes. She exhibits tenderness.   Wheezing throughout bilateral lung fields   Abdominal: Soft. Bowel sounds are normal. She exhibits no distension. There is no tenderness.   Musculoskeletal: Normal range of motion. She exhibits no edema.    Neurological: She is alert and oriented to person, place, and time. No cranial nerve deficit.   Skin: Skin is warm and dry.   Psychiatric: She has a normal mood and affect.         CRANIAL NERVES     CN III, IV, VI   Extraocular motions are normal.        Significant Labs:   Recent Results (from the past 48 hour(s))   CBC auto differential    Collection Time: 01/02/20  7:05 AM   Result Value Ref Range    WBC 25.03 (H) 3.90 - 12.70 K/uL    RBC 4.76 4.00 - 5.40 M/uL    Hemoglobin 12.8 12.0 - 16.0 g/dL    Hematocrit 40.5 37.0 - 48.5 %    Mean Corpuscular Volume 85 82 - 98 fL    Mean Corpuscular Hemoglobin 26.9 (L) 27.0 - 31.0 pg    Mean Corpuscular Hemoglobin Conc 31.6 (L) 32.0 - 36.0 g/dL    RDW 18.2 (H) 11.5 - 14.5 %    Platelets 193 150 - 350 K/uL    MPV 11.0 9.2 - 12.9 fL    Immature Granulocytes 1.4 (H) 0.0 - 0.5 %    Gran # (ANC) 20.0 (H) 1.8 - 7.7 K/uL    Immature Grans (Abs) 0.35 (H) 0.00 - 0.04 K/uL    Lymph # 2.1 1.0 - 4.8 K/uL    Mono # 2.3 (H) 0.3 - 1.0 K/uL    Eos # 0.3 0.0 - 0.5 K/uL    Baso # 0.05 0.00 - 0.20 K/uL    nRBC 0 0 /100 WBC    Gran% 79.9 (H) 38.0 - 73.0 %    Lymph% 8.5 (L) 18.0 - 48.0 %    Mono% 9.0 4.0 - 15.0 %    Eosinophil% 1.0 0.0 - 8.0 %    Basophil% 0.2 0.0 - 1.9 %    Platelet Estimate Appears normal     Aniso Slight     Poik Slight     Poly Occasional     Hypo Occasional     Ovalocytes Occasional     Dohle Bodies Present     Toxic Granulation Present     Differential Method Automated    Comprehensive metabolic panel    Collection Time: 01/02/20  7:05 AM   Result Value Ref Range    Sodium 133 (L) 136 - 145 mmol/L    Potassium 5.2 (H) 3.5 - 5.1 mmol/L    Chloride 97 95 - 110 mmol/L    CO2 25 23 - 29 mmol/L    Glucose 96 70 - 110 mg/dL    BUN, Bld 15 8 - 23 mg/dL    Creatinine 1.1 0.5 - 1.4 mg/dL    Calcium 8.9 8.7 - 10.5 mg/dL    Total Protein 7.0 6.0 - 8.4 g/dL    Albumin 3.0 (L) 3.5 - 5.2 g/dL    Total Bilirubin 1.0 0.1 - 1.0 mg/dL    Alkaline Phosphatase 90 55 - 135 U/L    AST 29 10  - 40 U/L    ALT 19 10 - 44 U/L    Anion Gap 11 8 - 16 mmol/L    eGFR if African American 59.2 (A) >60 mL/min/1.73 m^2    eGFR if non  51.3 (A) >60 mL/min/1.73 m^2   Lactic acid, plasma #1    Collection Time: 01/02/20  7:05 AM   Result Value Ref Range    Lactate (Lactic Acid) 3.1 (H) 0.5 - 2.2 mmol/L   Protime-INR    Collection Time: 01/02/20  7:05 AM   Result Value Ref Range    Prothrombin Time 10.6 9.0 - 12.5 sec    INR 1.0 0.8 - 1.2   Urinalysis, Reflex to Urine Culture Urine, Clean Catch    Collection Time: 01/02/20  9:08 AM   Result Value Ref Range    Specimen UA Urine, Clean Catch     Color, UA Yellow Yellow, Straw, Viridiana    Appearance, UA Clear Clear    pH, UA 7.0 5.0 - 8.0    Specific Gravity, UA 1.025 1.005 - 1.030    Protein, UA Negative Negative    Glucose, UA Negative Negative    Ketones, UA Negative Negative    Bilirubin (UA) Negative Negative    Occult Blood UA 1+ (A) Negative    Nitrite, UA Negative Negative    Leukocytes, UA 1+ (A) Negative   Urinalysis Microscopic    Collection Time: 01/02/20  9:08 AM   Result Value Ref Range    RBC, UA 1 0 - 4 /hpf    WBC, UA 0 0 - 5 /hpf    Bacteria Rare None-Occ /hpf    Squam Epithel, UA 1 /hpf    Microscopic Comment SEE COMMENT    Lactic acid, plasma #2    Collection Time: 01/02/20 11:40 AM   Result Value Ref Range    Lactate (Lactic Acid) 1.5 0.5 - 2.2 mmol/L   Culture, Respiratory with Gram Stain    Collection Time: 01/02/20 12:10 PM   Result Value Ref Range    Gram Stain (Respiratory) <10 epithelial cells per low power field.     Gram Stain (Respiratory) Rare WBC's     Gram Stain (Respiratory) Few Gram positive cocci     Gram Stain (Respiratory) Few Gram negative diplococci          Significant Imaging:   Imaging Results          CTA Chest Non-Coronary (PE Study) (Final result)  Result time 01/02/20 10:16:01    Final result by Megan Street MD (01/02/20 10:16:01)                 Impression:      1.  No pulmonary arterial filling defect to  suggest thromboembolism.    2.  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  Additional metastatic disease likely present in the right axilla, liver, spleen, and left renal fossa.    3.  Note is made of a stable appearing filling defect in the right superior pulmonary vein approach in its ostium suggesting neoplastic venous invasion.    4.  Stable, indeterminate, left lower lobe pulmonary nodule.    5.  Trace right pleural fluid with associated compressive atelectasis.    Electronically signed by resident: Javier Nunez  Date:    01/02/2020  Time:    09:34    Electronically signed by: Megan Street MD  Date:    01/02/2020  Time:    10:16             Narrative:    EXAMINATION:  CTA CHEST NON CORONARY    CLINICAL HISTORY:  Chest pain, acute, nonspecific, low prob CAD;    TECHNIQUE:  The chest was surveyed from the costophrenic angles through the lung apices at 3-mm increments after the administration of 75 cc of Omnipaque 350 intravenous contrast material according to the PE protocol which is optimized for vascular contrast resolution.  Axial, sagittal and coronal maximum intensity projection images were reviewed.    Total Exam DLP: 333.30mGy-cm.    COMPARISON:  CTA chest 12/26/2019    FINDINGS:  Examination of the soft tissue and vascular structures at the base of the neck is unremarkable.    There is a left-sided aortic arch with 3 branch vessels.  The thoracic aorta maintains normal caliber, contour, and course with extensive atherosclerotic calcification.  There is no evidence of aneurysmal dilation or dissection.    The pulmonary arteries distribute normally without evidence of filling defect to indicate pulmonary thromboembolism.  Note is made of stable, attenuated appearance of the middle lobe segmental/subsegmental pulmonary arteries likely secondary to adjacent mass.  There are  four pulmonary veins.  Again identified is a filling defect in the right superior pulmonary vein near its ostium consistent with venous invasion by the adjacent neoplastic process.  Systemic and pulmonary venoatrial connections remain concordant.    The heart is not enlarged and there is no evidence of pericardial effusion.    There is a stable 2.3 cm nodular soft tissue density in the right axilla likely representing lymphadenopathy.  Subcarinal and right hilar soft tissue density appears stable and likely represents a combination of mass invasion/lymphadenopathy.    The esophagus maintains a normal course.    Limited images of the upper abdomen obtained during the course of this dedicated thoracic CT multiple hepatic hypodensities measuring up to 2.3 cm, a 3.3 cm splenic hypodensity, and partially visualized 7 cm nodular opacity in the left renal fossa.    The osseous structures demonstrate degenerative joint disease without aggressive marrow replacement process or acute fracture.    The trachea and proximal airways are patent.  Middle lobe segmental/subsegmental airways are mostly obstructed.    The lungs demonstrate a 12.5 x 4.5 cm middle lobe mass extending into the right hilum and subcarinal mediastinum with significant postobstructive consolidation.  There is stable subsegmental consolidative opacity in the anterior segment of the right upper lobe, as well as compressive atelectasis in the right lung base.  A 1.1 cm nodular opacity remains present in the anterior basal segment of the left lower lobe.  There is underlying apical predominant centrilobular and paraseptal emphysema.  Trace right pleural fluid is present.  No pneumothorax.                               X-Ray Chest AP Portable (Final result)  Result time 01/02/20 07:41:48    Final result by Jae Bolden MD (01/02/20 07:41:48)                 Impression:      Continued abnormal chest radiograph, but no significant detrimental interval change,  allowing for differences in patient positioning, since 12/26/2019.      Electronically signed by: Jae Bolden MD  Date:    01/02/2020  Time:    07:41             Narrative:    EXAMINATION:  XR CHEST AP PORTABLE    CLINICAL HISTORY:  Sepsis;    COMPARISON:  Comparison is made to the chest radiograph of 12/26/2019.  Reference is also made to a subsequent thoracic CT examination of that same date.    FINDINGS:  Opacity in the inferior hemothorax on the right side with obliteration of the right cardiac border and extending to the inferior aspect of the right hilum is again observed.  This was documented on the CT examination referenced above to primarily relate to a mass opacity in the right middle lobe.  This opacity appears essentially unchanged since the previous exam.  Heart size remains normal.  The left lung and the mid and upper lung zones on the right side appear stable, with no significant superimposed airspace consolidation or volume loss.  No pleural fluid of any substantial volume is seen on either side.  No pneumothorax.  Calcification in the wall of the aortic arch is again incidentally observed.

## 2020-01-02 NOTE — ED PROVIDER NOTES
Encounter Date: 1/2/2020       History     Chief Complaint   Patient presents with    Tachycardia     Endorsing generalized weakness sicne yesterday, Pt stated to feel her heart race. Per EMS EKG showed sinus tachycardia vent rate 130s. Recent dx of liver and lung cancer. Not currently on chemo or radiation.      Sharita Castañeda is a 70 yo WF w/ hx of COPD, HTN, HLD, PVD (was on ASA and plavix, recently held in preparation for biopsy), MDD, hypothyroidism (post-ablation), and allergic rhinitis who [place] [facility] [place] c/o tachycardia, SOB, and R sided CP. Patient was recently [place] [facility] [place] for hemoptysis concerning for malignancy. CT imaging concerning for malignancy w/ lung primary and mets to liver. Pulmonary was consulted for biopsy, however, location of the primary lesion near the PA prompetd referral to IR for biopsy. Hemoptysis resolved and patient was to be scheduled for OP biopsy w/ Pulmonology follow up a week later. Patient discharged on 12/28 and was instructed to complete 5 day course of levaquin and prednisone 40mg PO. Patient stated that she was doing well since discharge on w/ 1 or 2 further small episodes of hemoptysis. However, patient notes that her coughing became worse over the past few days w/ some pleuritic CP appreciated that could be relieved by massaging the area. Her symptoms of SOB began around 1AM this morning w/ severe right sided CP (8/10 - worse than before). Patient reports an SpO2 in the 70s and 80s at home w/ tachycardia to 130s prompting her visit to the ED. On arrival, patient remained SOB and tachycardic w/ severe pleuritic CP. Patient placed on 2L nc w/ improvement in SOB and SpO2 to low to mid 90s. Patient febrile to 100.5 F and WBC of 25k. Patient endorses nausea, chills, fatigue, CP, SOB, cough, wheeze, constipation, and congestion. She denies headache, vomiting, fever, palp, abd pain, hematochezia, dysuria, hematuria, light headed ness, dizziness, weakness, sore  throat, recent travel, or recent illness.         Review of patient's allergies indicates:  No Known Allergies  Past Medical History:   Diagnosis Date    Allergic rhinitis     COPD (chronic obstructive pulmonary disease)     Depression     GERD (gastroesophageal reflux disease)     Headache     Hyperlipidemia     Hypertension     Polyarthralgia     Postablative hypothyroidism     hypothyroidism s/p OCASIO therapy    PVD (peripheral vascular disease)     Treated by Dr. Sim     Past Surgical History:   Procedure Laterality Date    CHOLECYSTECTOMY  1986    GALLBLADDER SURGERY  2006    HIP SURGERY  2005    Right hip fracture/surgery    PERIPHERAL ARTERIAL STENT GRAFT Right 2007    stent for femoral artery blockage     Family History   Problem Relation Age of Onset    COPD Mother     Thyroid disease Mother         thyroidectomy    Cancer Mother         lung    Heart attack Father 55        [facility] -     Heart attack Sister 53        [facility]     Social History     Tobacco Use    Smoking status: Current Every Day Smoker     Types: Cigarettes    Smokeless tobacco: Never Used   Substance Use Topics    Alcohol use: No     Alcohol/week: 0.0 standard drinks    Drug use: Not on file     Review of Systems   Constitutional: Positive for chills, fatigue and unexpected weight change. Negative for diaphoresis and fever.   HENT: Positive for congestion. Negative for hearing loss and sore throat.    Eyes: Negative for visual disturbance.   Respiratory: Positive for cough, shortness of breath and wheezing.    Cardiovascular: Positive for chest pain. Negative for palpitations and leg swelling.   Gastrointestinal: Positive for constipation and nausea. Negative for abdominal distention, abdominal pain, blood in stool, diarrhea and vomiting.   Genitourinary: Negative for difficulty urinating, dysuria and hematuria.   Musculoskeletal: Positive for back pain and neck pain. Negative for myalgias.   Skin: Negative for color  change and rash.   Neurological: Negative for dizziness, weakness, light-headedness, numbness and headaches.   Psychiatric/Behavioral: Negative for agitation, behavioral problems and confusion.       Physical Exam     Initial Vitals [01/02/20 0635]   BP Pulse Resp Temp SpO2   110/60 (!) 130 16 (!) 100.5 °F (38.1 °C) 98 %      MAP       --         Physical Exam    Nursing note and vitals reviewed.  Constitutional: She appears well-developed and well-nourished. She is not diaphoretic. No distress.   HENT:   Head: Normocephalic and atraumatic.   Mouth/Throat: No oropharyngeal exudate.   White plaques noted on tongue   Eyes: EOM are normal. Pupils are equal, round, and reactive to light. No scleral icterus.   Neck: Normal range of motion. Neck supple. No JVD present.   Cardiovascular: Regular rhythm, normal heart sounds and intact distal pulses.   No murmur heard.  Tachycardia   Pulmonary/Chest: She is in respiratory distress. She has no wheezes. She has no rales.   Decreased BS in RLL field   Abdominal: Soft. Bowel sounds are normal. She exhibits no distension. There is tenderness.   Mild L flank tenderness   Musculoskeletal: Normal range of motion. She exhibits tenderness. She exhibits no edema.   R sided chest wall tenderness under R breast  Pleuritic and reproducible upon palpation   Neurological: She is alert and oriented to person, place, and time. She has normal strength. No cranial nerve deficit or sensory deficit.   Skin: Skin is warm and dry. No erythema.   Psychiatric: She has a normal mood and affect. Her behavior is normal. Judgment and thought content normal.         ED Course   Procedures  Labs Reviewed   CBC W/ AUTO DIFFERENTIAL - Abnormal; Notable for the following components:       Result Value    WBC 25.03 (*)     Mean Corpuscular Hemoglobin 26.9 (*)     Mean Corpuscular Hemoglobin Conc 31.6 (*)     RDW 18.2 (*)     Immature Granulocytes 1.4 (*)     Gran # (ANC) 20.0 (*)     Immature Grans (Abs) 0.35  (*)     Mono # 2.3 (*)     Gran% 79.9 (*)     Lymph% 8.5 (*)     All other components within normal limits   COMPREHENSIVE METABOLIC PANEL - Abnormal; Notable for the following components:    Sodium 133 (*)     Potassium 5.2 (*)     Albumin 3.0 (*)     eGFR if  59.2 (*)     eGFR if non  51.3 (*)     All other components within normal limits   LACTIC ACID, PLASMA - Abnormal; Notable for the following components:    Lactate (Lactic Acid) 3.1 (*)     All other components within normal limits   URINALYSIS, REFLEX TO URINE CULTURE - Abnormal; Notable for the following components:    Occult Blood UA 1+ (*)     Leukocytes, UA 1+ (*)     All other components within normal limits    Narrative:     Preferred Collection Type->Urine, Clean Catch   INFLUENZA A & B BY MOLECULAR   PROTIME-INR   URINALYSIS MICROSCOPIC    Narrative:     Preferred Collection Type->Urine, Clean Catch   LACTIC ACID, PLASMA        ECG Results          EKG 12-lead (Final result)  Result time 01/02/20 08:42:44    Final result by Interface, Lab In [facility] (01/02/20 08:42:44)                 Narrative:    Test Reason : R00.0,    Vent. Rate : 135 BPM     Atrial Rate : 135 BPM     P-R Int : 124 ms          QRS Dur : 066 ms      QT Int : 300 ms       P-R-T Axes : 063 084 074 degrees     QTc Int : 450 ms    Sinus tachycardia  Nonspecific ST and/or T wave abnormalities  Abnormal ECG  When compared with ECG of 26-DEC-2019 16:32,  No significant change was found  Confirmed by Piotr Brunner MD ([id number]) on 1/2/2020 8:42:33 AM    Referred By: [id number]   SELF           Confirmed By:Piotr Brunner MD                            Imaging Results          CTA Chest Non-Coronary (PE Study) (Final result)  Result time 01/02/20 10:16:01    Final result by Megan Street MD (01/02/20 10:16:01)                 Impression:      1.  No pulmonary arterial filling defect to suggest thromboembolism.    2.  Overall unchanged thoracic appearance  noting a large middle lobe mass extending to the right hilum and subcarinal mediastinum consistent with primary and secondary neoplasia.  Additional metastatic disease likely present in the right axilla, liver, spleen, and left renal fossa.    3.  Note is made of a stable appearing filling defect in the right superior pulmonary vein approach in its ostium suggesting neoplastic venous invasion.    4.  Stable, indeterminate, left lower lobe pulmonary nodule.    5.  Trace right pleural fluid with associated compressive atelectasis.    Electronically signed by resident: Javier Nunez  Date:    01/02/2020  Time:    09:34    Electronically signed by: eMgan Street MD  Date:    01/02/2020  Time:    10:16             Narrative:    EXAMINATION:  CTA CHEST NON CORONARY    CLINICAL HISTORY:  Chest pain, acute, nonspecific, low prob CAD;    TECHNIQUE:  The chest was surveyed from the costophrenic angles through the lung apices at 3-mm increments after the administration of 75 cc of Omnipaque 350 intravenous contrast material according to the PE protocol which is optimized for vascular contrast resolution.  Axial, sagittal and coronal maximum intensity projection images were reviewed.    Total Exam DLP: 333.30mGy-cm.    COMPARISON:  CTA chest 12/26/2019    FINDINGS:  Examination of the soft tissue and vascular structures at the base of the neck is unremarkable.    There is a left-sided aortic arch with 3 branch vessels.  The thoracic aorta maintains normal caliber, contour, and course with extensive atherosclerotic calcification.  There is no evidence of aneurysmal dilation or dissection.    The pulmonary arteries distribute normally without evidence of filling defect to indicate pulmonary thromboembolism.  Note is made of stable, attenuated appearance of the middle lobe segmental/subsegmental pulmonary arteries likely secondary to adjacent mass.  There are four pulmonary veins.  Again identified is a filling defect in the  right superior pulmonary vein near its ostium consistent with venous invasion by the adjacent neoplastic process.  Systemic and pulmonary venoatrial connections remain concordant.    The heart is not enlarged and there is no evidence of pericardial effusion.    There is a stable 2.3 cm nodular soft tissue density in the right axilla likely representing lymphadenopathy.  Subcarinal and right hilar soft tissue density appears stable and likely represents a combination of mass invasion/lymphadenopathy.    The esophagus maintains a normal course.    Limited images of the upper abdomen obtained during the course of this dedicated thoracic CT multiple hepatic hypodensities measuring up to 2.3 cm, a 3.3 cm splenic hypodensity, and partially visualized 7 cm nodular opacity in the left renal fossa.    The osseous structures demonstrate degenerative joint disease without aggressive marrow replacement process or acute fracture.    The trachea and proximal airways are patent.  Middle lobe segmental/subsegmental airways are mostly obstructed.    The lungs demonstrate a 12.5 x 4.5 cm middle lobe mass extending into the right hilum and subcarinal mediastinum with significant postobstructive consolidation.  There is stable subsegmental consolidative opacity in the anterior segment of the right upper lobe, as well as compressive atelectasis in the right lung base.  A 1.1 cm nodular opacity remains present in the anterior basal segment of the left lower lobe.  There is underlying apical predominant centrilobular and paraseptal emphysema.  Trace right pleural fluid is present.  No pneumothorax.                               X-Ray Chest AP Portable (Final result)  Result time 01/02/20 07:41:48    Final result by Jae Bolden MD (01/02/20 07:41:48)                 Impression:      Continued abnormal chest radiograph, but no significant detrimental interval change, allowing for differences in patient positioning, since  12/26/2019.      Electronically signed by: Jae Bolden MD  Date:    01/02/2020  Time:    07:41             Narrative:    EXAMINATION:  XR CHEST AP PORTABLE    CLINICAL HISTORY:  Sepsis;    COMPARISON:  Comparison is made to the chest radiograph of 12/26/2019.  Reference is also made to a subsequent thoracic CT examination of that same date.    FINDINGS:  Opacity in the inferior hemothorax on the right side with obliteration of the right cardiac border and extending to the inferior aspect of the right hilum is again observed.  This was documented on the CT examination referenced above to primarily relate to a mass opacity in the right middle lobe.  This opacity appears essentially unchanged since the previous exam.  Heart size remains normal.  The left lung and the mid and upper lung zones on the right side appear stable, with no significant superimposed airspace consolidation or volume loss.  No pleural fluid of any substantial volume is seen on either side.  No pneumothorax.  Calcification in the wall of the aortic arch is again incidentally observed.                                 Medical Decision Making:   History:   Old Medical Records: I decided to obtain old medical records.  Old Records Summarized: records from previous admission(s).       <> Summary of Records: Patient was recently admitted to [facility] on [place]/26 for hemoptysis concerning for malignancy. CT imaging concerning for malignancy w/ lung primary and mets to liver. Pulmonary was consulted for biopsy, however, location of the primary lesion near the PA prompetd referral to IR for biopsy. Hemoptysis resolved and patient was to be scheduled for OP biopsy w/ Pulmonology follow up a week later. Patient discharged on 12/28 and was instructed to complete 5 day course of levaquin and prednisone 40mg PO.  Initial Assessment:   Sharita Castañeda is a 70 yo WF w/ hx of COPD, HTN, HLD, PVD (previously on ASA and plavix), who presents to [facility] ED c/o tachycardia, SOB, and R  sided CP. Patient was [place] [facility] [place] for hemoptysis concerning for malignancy. CT imaging concerning for malignancy w/ lung primary and mets to liver. Patient was to be scheduled for OP biopsy w/ Pulmonology follow up a week later. Persistent cough post admission w/ associated CP, relieved by rubbing area. Symptoms became progressively worse and SOB began around 1AM this morning w/ severe right sided CP (8/10 - worse than before). Patient reports an SpO2 in the 70s and 80s at home w/ tachycardia to 130s prompting her visit to the ED. On arrival, patient remained SOB and tachycardic w/ severe pleuritic CP. Patient placed on 2L nc w/ improvement in SOB and SpO2 to low to mid 90s. Patient febrile to 100.5 F and WBC of 25k. Patient endorses nausea, chills, fatigue, CP, SOB, cough, wheeze, constipation, and congestion. Concern for sepsis 2/2 to HAP vs PE vs Pleural effusion vs pneumothorax.  Differential Diagnosis:   Differential includes but is not limited to pneumonia (post-obstructive), sepsis, Pulmonary Embolism, Pleural effusion, metastatic bone pain, pneumothorax, ACS  Clinical Tests:   Lab Tests: Ordered and Reviewed  The following lab test(s) were unremarkable: PT       <> Summary of Lab: CBC: WBC 25k  CMP: Na 133, K+ 5.2, Alb 3  Lactic Acid: 3.1  Radiological Study: Ordered and Reviewed  Medical Tests: Ordered and Reviewed  ED Management:  Patient seen and examined in ED this AM in mild respiratory distress w/ acute onset R sided, pleuritic chest pain. Patient states that she has had this chest pain on and off for the past week, but this has been the worst it has been. Patient reports SOB and CP SpO2 in the 70s and 80s at home plus tachycardia to the 130s. Patient placed on 2L nc w/ improvement in SOB and SpO2 to low to mid 90s. Patient still notes sharp pain w/ deep inspiration -> morphine 2mg IV. Sepsis work up initiated as patient tachy w/ fever to 100.5. Blood cultures drawn (x2). CBC w/  WBC of 25k. CXR significant for RLL infiltrate vs effusion concerning for post-obstructive PNA vs. PE vs. Pleural effusion. CTA chest non-coronary ordered. Tachycardia improved w/ administration of NS bolus. Lactic acid elevated at 3.1. Initiate BS antibiotics w/ vanc and zosyn for prophylactic treatment of HAP. Patient to be admitted to Hospital Medicine for further evaluation and management of likely sepsis 2/2 HAP.              Attending Attestation:   Physician Attestation Statement for Resident:  As the supervising MD   Physician Attestation Statement: I have personally seen and examined this patient.   I agree with the above history. -: 69-year-old woman who was recently diagnosed with lung cancer.  She developed right-sided pleuritic chest pain which is severe.  She complains of shortness of breath associated with it.   As the supervising MD I agree with the above PE.   -: Patient appears uncomfortable.  She is tachypneic.  She is splinting.  Her lungs are clear to auscultation.  She is tachycardic.   As the supervising MD I agree with the above treatment, course, plan, and disposition.   -: Patient was given 30 cc/kilos fluid bolus for the possibility of sepsis.  Her chest x-ray showed some increased opacification at the right base on my independent interpretation.  A CT PE study was ordered to further evaluate this area as well as to rule out PE.  No PE was seen but a postobstructive pneumonia was seen.  There was placed on antibiotics to cover healthcare associated pneumonia.  She will be admitted to the hospital.  At the time of admission she was feeling much improved after some pain relief as well as the IV fluids.  Her vital signs had improved as well.        Attending Critical Care:   Critical Care Times:   Direct Patient Care (initial evaluation, reassessments, and time considering the case)................................................................15 minutes.   Additional History from reviewing  old medical records or taking additional history from the family, EMS, PCP, etc.......................5 minutes.   Ordering, Reviewing, and Interpreting Diagnostic Studies...............................................................................................................5 minutes.   Documentation..................................................................................................................................................................................5 minutes.   ==============================================================  · Total Critical Care Time - exclusive of procedural time: 30 minutes.  ==============================================================  Critical care was necessary to treat or prevent imminent or life-threatening deterioration of the following conditions: sepsis.   Critical Care Condition: life-threatening   Critical Care Comments: Critical care time required in this patient who presented with signs and symptoms concerning for sepsis that ultimately proved to be related to a post obstructive pneumonia.Patient had the potential for deterioration in condition at any time.                               Clinical Impression:       ICD-10-CM ICD-9-CM   1. HAP (hospital-acquired pneumonia) J18.9 486    Y95    2. Tachycardia R00.0 785.0   3. Chest pain R07.9 786.50   4. Pneumonia J18.9 486   5. Advanced care planning/counseling discussion Z71.89 V65.49   6. Goals of care, counseling/discussion Z71.89 V65.49   7. Palliative care encounter Z51.5 V66.7   8. Postobstructive pneumonia J18.9 486   9. Mass of middle lobe of right lung R91.8 786.6   10. COPD exacerbation J44.1 491.21         Disposition:   Disposition: Admitted  Condition: Stable  Admit to hospital Medicine                     Arnold Prado MD  Resident  01/02/20 1132       Iris Sutton MD  01/07/20 1001

## 2020-01-02 NOTE — ASSESSMENT & PLAN NOTE
70 yo WF w/ hx of COPD, HTN, HLD, PVD (was on ASA and plavix, recently held in preparation for biopsy), MDD, hypothyroidism (post-ablation), and allergic rhinitis who [place] [facility] [place] c/o tachycardia, SOB, and R sided CP. Patient recently admitted and treated for pneumonia with 5 day course of levaquin. Symptoms and vitals still point to pneumonia.     Plan  Vanc and Zosyn  Sputum Culture  Blood cultures pending  Home [place] 7.5-35 q8 prn

## 2020-01-02 NOTE — CONSULTS
Palliative medicine consult received for goals of care.  Patient discussed with Dr. Thibodeaux, IM2 resident.  Currently in ED and will be admitted.  Palliative medicine will see when admitted.     Full consult to follow.

## 2020-01-02 NOTE — HPI
70 yo WF w/ hx of COPD, HTN, HLD, PVD (was on ASA and plavix, recently held in preparation for biopsy), MDD, hypothyroidism (post-ablation), and allergic rhinitis who presents to [facility] ED c/o tachycardia, SOB, and R sided CP. Patient was recently admitted to [facility] on 12/26 for hemoptysis concerning for malignancy. CT scan revealed a large mass in the right lung/ Patient also presented with signs of pneumonia and was treated with levaquin. Patient discharged on 12/28 and was instructed to complete 5 day course of levaquin and prednisone 40mg PO. Patient stated that she was doing well since discharge on w/ 1 or 2 further small episodes of hemoptysis. However, patient notes that her coughing became worse over the past few days w/ some pleuritic CP appreciated that could be relieved by massaging the area. Her symptoms of SOB began around 1AM this morning w/ severe right sided CP (8/10 - worse than before).      In the ER patient remained SOB and tachycardic w/ severe pleuritic CP. Patient placed on 2L nc w/ improvement in SOB and SpO2 to low to mid 90s. Patient febrile to 100.5 F and WBC of 25k. Patient endorses nausea, chills, fatigue, CP, SOB, cough, wheeze, constipation, and congestion. ER started patient on Vanc and Zosyn and gave fluid 30 cc/kg. CTA negative for PE. EKG unremarkable    On presentation to hospital medicine team patients vitals are stable. The shortness of breath has gotten better with oxygen. Still complaining of a sharp right sided chest pain. Got better with morphine.

## 2020-01-02 NOTE — ED NOTES
Assumed care of patient at this time.   Patient is in bed with side rails x2 and bed wheels locked. Call bell within reach. Pt is on ongoing cardiac monitoring and continuous pulse ox. Family at bedside. Will continue to monitor.

## 2020-01-02 NOTE — HOSPITAL COURSE
Admitted on 1/2 for suspected pneumonia. Started on vanc and zosyn. Patient with large mass in the right lung. Most likely post obstructive pneumonia.   1/4/2020: Patient symptoms are improving she can speak in full sentences. Maintaining O2 sat on 2 L NC. She is on antibiotics (piperacillin/tazobactam and vanc), awaiting cultures and sensitivities results.    1/6/20 Patient doing well and still maintaning sats on 2l. Liver biopsy done today. Vanc dced and only on zosyn  1/7/20 Patient having some abdominal pain. She felt better after a bowel movement but still complaining of some pain. U/S liver being performed to ensure no bleeding. She is also having pain in her right leg after the 6 minute walk test  1/8/20 Patient is doing much better today. She is safe to be discharged. She will followup with pulm. She is being dced with home oxygen and nebulizer machine.

## 2020-01-02 NOTE — ED NOTES
Patient identifiers verified and correct for Sharita Castañeda.    LOC: The patient is awake, alert and aware of environment with an appropriate affect, the patient is oriented x3 and speaking appropriately.  APPEARANCE: Patient resting comfortably and in no acute distress, patient is clean and well groomed, patient's clothing is properly fastened.  SKIN: The skin is warm and dry, color consistent with ethnicity, patient has normal skin turgor and moist mucus membranes, skin intact, no breakdown or bruising noted.  MUSCULOSKELETAL: Patient moving all extremities spontaneously. Generalized weakness. Pt states she ambulates at home with standby assistance. Pt does complain of some lower left sided flank pain.   RESPIRATORY: Airway is open and patent, respirations are spontaneous. Pts breathing is slightly tacheypnic. She has been placed on 2L of O2; does not use O2 at home.   CARDIAC: Patient is tachycardic. HR = 110-130s. no periphreal edema noted, capillary refill < 3 seconds.  ABDOMEN: Soft and non tender to palpation.

## 2020-01-02 NOTE — HPI
HPI obtained from chart review:     Ms. Castañeda is a  68 yo lady with PMH of:  COPD, HTN, HLD, PVD, MDD, hypothyroidism (post-ablation), and allergic rhinitis. [place] [facility] [place] c/o tachycardia, SOB, and R sided CP. Recently [place] [facility] [place] for hemoptysis concerning for malignancy.     CT imaging concerning for primary lung cancer with mets to liver. Pulmonary was consulted for biopsy,  primary lesion near the PA, IR referral placed for biopsy and scheduled  Outpatient with follow up in pulmonary clinic.  Discharged on 12/28 with course of levaquine and prednisone.     Patient reports doing well since discharge with only 1 or 2 further small episodes of hemoptysis.   Coughing became worse and she had some  pleuritic CP. Her symptoms of SOB began around 1AM this morning w/ severe right sided CP (8/10 - worse than before). Patient reports an SpO2 in the 70s and 80s at home w/ tachycardia to 130s.     On arrival, placed on 2L nc w/ improvement in SOB and SpO2 to low to mid 90s. Patient febrile to 100.5 F and WBC of 25k. Patient also complaints of  nausea, chills, fatigue, CP, SOB, cough, wheeze, constipation, and congestion. No reports of:  headache, vomiting, fever, palp, abd pain, hematochezia, dysuria, hematuria, light headed ness, dizziness, weakness, sore throat, recent travel, or recent illness.     Palliative medicine consulted for goals of care.

## 2020-01-02 NOTE — PROGRESS NOTES
[facility] Hospital Medicine  Progress Note    Patient Name: Sharita Castañeda  MRN: [id number]  Patient Class: IP- Inpatient   Admission Date: 1/2/2020  Length of Stay: 0 days  Attending Physician: Julio Sagastume MD  Primary Care Provider: Wallace Deng MD    [facility] Medicine Team: [facility] 2 Andrea Crowell MD    Subjective:     Principal Problem:HAP (hospital-acquired pneumonia)        HPI:   70 yo WF w/ hx of COPD, HTN, HLD, PVD (was on ASA and plavix, recently held in preparation for biopsy), MDD, hypothyroidism (post-ablation), and allergic rhinitis who [place] [facility] [place] c/o tachycardia, SOB, and R sided CP. Patient was recently [place] [facility] [place] for hemoptysis concerning for malignancy. CT scan revealed a large mass in the right lung/ Patient also presented with signs of pneumonia and was treated with levaquin. Patient discharged on 12/28 and was instructed to complete 5 day course of levaquin and prednisone 40mg PO. Patient stated that she was doing well since discharge on w/ 1 or 2 further small episodes of hemoptysis. However, patient notes that her coughing became worse over the past few days w/ some pleuritic CP appreciated that could be relieved by massaging the area. Her symptoms of SOB began around 1AM this morning w/ severe right sided CP (8/10 - worse than before).      In the ER patient remained SOB and tachycardic w/ severe pleuritic CP. Patient placed on 2L nc w/ improvement in SOB and SpO2 to low to mid 90s. Patient febrile to 100.5 F and WBC of 25k. Patient endorses nausea, chills, fatigue, CP, SOB, cough, wheeze, constipation, and congestion. ER started patient on Vanc and Zosyn and gave fluid 30 cc/kg. CTA negative for PE. EKG unremarkable    On presentation to hospital medicine team patients vitals are stable. The shortness of breath has gotten better with oxygen. Still complaining of a sharp right sided chest pain. Got better with morphine.     Overview/Hospital  Course:  Admitted on 1/2 for suspected pneumonia. Started on vanc and zosyn. Patient with large mass in the right lung. Most likely post obstructive pneumonia.     Past Medical History:   Diagnosis Date    Allergic rhinitis     COPD (chronic obstructive pulmonary disease)     Depression     GERD (gastroesophageal reflux disease)     Headache     Hyperlipidemia     Hypertension     Polyarthralgia     Postablative hypothyroidism     hypothyroidism s/p OCASIO therapy    PVD (peripheral vascular disease)     Treated by Dr. Sim       Past Surgical History:   Procedure Laterality Date    CHOLECYSTECTOMY  1986    GALLBLADDER SURGERY  2006    HIP SURGERY  2005    Right hip fracture/surgery    PERIPHERAL ARTERIAL STENT GRAFT Right 2007    stent for femoral artery blockage       Review of patient's allergies indicates:  No Known Allergies    No current facility-administered medications on file prior to encounter.      Current Outpatient Medications on File Prior to Encounter   Medication Sig    albuterol (PROAIR HFA) 90 mcg/actuation inhaler Inhale 2 puffs into the lungs every 6 (six) hours as needed for Wheezing.    albuterol (PROVENTIL/VENTOLIN HFA) 90 mcg/actuation inhaler Inhale 1-2 puffs into the lungs every 6 (six) hours as needed for Wheezing.    alendronate (FOSAMAX) 70 MG tablet TAKE 1 TABLET(70 MG) BY MOUTH EVERY 7 DAYS    amLODIPine (NORVASC) 5 MG tablet Take 1 tablet (5 mg total) by mouth once daily.    aspirin (ECOTRIN) 81 MG EC tablet Take 1 tablet (81 mg total) by mouth once daily.    atorvastatin (LIPITOR) 40 MG tablet TAKE 1 TABLET(40 MG) BY MOUTH EVERY DAY    butalbital-acetaminophen-caffeine -40 mg (FIORICET, ESGIC) -40 mg per tablet Take 1 tablet by mouth every 12 (twelve) hours as needed for Pain or Headaches.    calcium-vitamin D3 (CALCIUM 500 + D) 500 mg(1,250mg) -200 unit per tablet Take 1 tablet by mouth 2 (two) times daily with meals.    clonazePAM (KLONOPIN) 1 MG  tablet Take 1 tablet (1 mg total) by mouth 2 (two) times daily.    clopidogrel (PLAVIX) 75 mg tablet Take 1 tablet (75 mg total) by mouth once daily.    ergocalciferol (ERGOCALCIFEROL) 50,000 unit Cap Take 1 capsule (50,000 Units total) by mouth every 7 days.    hydrocodone-acetaminophen 7.5-325mg (NORCO) 7.5-325 mg per tablet Take 1 tablet by mouth 3 (three) times daily.    ibuprofen (ADVIL,MOTRIN) 600 MG tablet Take 1 tablet (600 mg total) by mouth every 6 (six) hours as needed for Pain.    levothyroxine (SYNTHROID) 100 MCG tablet Take 1 tablet (100 mcg total) by mouth before breakfast.    losartan (COZAAR) 25 MG tablet Take 1 tablet (25 mg total) by mouth once daily.    meloxicam (MOBIC) 7.5 MG tablet TAKE 1 TABLET(7.5 MG) BY MOUTH EVERY DAY    mometasone (NASONEX) 50 mcg/actuation nasal spray 2 sprays by Nasal route once daily.    montelukast (SINGULAIR) 10 mg tablet TAKE 1 TABLET(10 MG) BY MOUTH EVERY DAY    naproxen (NAPROSYN) 500 MG tablet Take 1 tablet (500 mg total) by mouth 2 (two) times daily with meals.    paroxetine (PAXIL) 20 MG tablet Take 1 tablet (20 mg total) by mouth every morning.    promethazine-codeine 6.25-10 mg/5 ml (PHENERGAN WITH CODEINE) 6.25-10 mg/5 mL syrup Take 5 mLs by mouth nightly as needed for Cough. DO NOT DRIVE AFTER TAKING MED    promethazine-dextromethorphan (PROMETHAZINE-DM) 6.25-15 mg/5 mL Syrp 1 teaspoon PO Q 8 hrs PRN cough. DO NOT DRIVE AFTER TAKING MED    ranitidine (ZANTAC) 150 MG tablet Take 1 tablet (150 mg total) by mouth 2 (two) times daily.    topiramate (TOPAMAX) 25 MG tablet Take 1 tablet (25 mg total) by mouth 2 (two) times daily.    umeclidinium (INCRUSE ELLIPTA) 62.5 mcg/actuation DsDv Inhale 1 each into the lungs once daily. Controller     Family History     Problem Relation (Age of Onset)    COPD Mother    Cancer Mother    Heart attack Father (55), Sister (53)    Thyroid disease Mother        Tobacco Use    Smoking status: Current Every Day  Smoker     Types: Cigarettes    Smokeless tobacco: Never Used   Substance and Sexual Activity    Alcohol use: No     Alcohol/week: 0.0 standard drinks    Drug use: Not on file    Sexual activity: Not on file     Review of Systems   Constitutional: Positive for activity change, chills and fever.   HENT: Negative for congestion and postnasal drip.    Eyes: Negative for pain and discharge.   Respiratory: Positive for cough, shortness of breath and wheezing. Negative for chest tightness.    Cardiovascular: Positive for chest pain. Negative for palpitations and leg swelling.   Gastrointestinal: Negative for abdominal pain, constipation, diarrhea, nausea and vomiting.   Genitourinary: Negative for difficulty urinating and dysuria.   Musculoskeletal: Positive for arthralgias and myalgias.   Skin: Negative for rash and wound.   Neurological: Negative for light-headedness and headaches.   Psychiatric/Behavioral: Negative for agitation and confusion.     Objective:     Vital Signs (Most Recent):  Temp: (!) 100.5 °F (38.1 °C) (01/02/20 0635)  Pulse: 94 (01/02/20 1250)  Resp: (!) 24 (01/02/20 1250)  BP: 134/62 (01/02/20 1250)  SpO2: (!) 92 % (01/02/20 1250) Vital Signs (24h Range):  Temp:  [100.5 °F (38.1 °C)] 100.5 °F (38.1 °C)  Pulse:  [] 94  Resp:  [16-31] 24  SpO2:  [88 %-99 %] 92 %  BP: (104-134)/(51-76) 134/62     Weight: 67.1 kg (148 lb)  Body mass index is 27.07 kg/m².    Physical Exam   Constitutional: She is oriented to person, place, and time. She appears well-developed and well-nourished. She appears distressed.   HENT:   Head: Normocephalic and atraumatic.   Eyes: Conjunctivae and EOM are normal. No scleral icterus.   Neck: Normal range of motion.   Cardiovascular: Normal rate, regular rhythm and intact distal pulses. Exam reveals no gallop and no friction rub.   No murmur heard.  Pulmonary/Chest: Effort normal. She has wheezes. She exhibits tenderness.   Wheezing throughout bilateral lung fields    Abdominal: Soft. Bowel sounds are normal. She exhibits no distension. There is no tenderness.   Musculoskeletal: Normal range of motion. She exhibits no edema.   Neurological: She is alert and oriented to person, place, and time. No cranial nerve deficit.   Skin: Skin is warm and dry.   Psychiatric: She has a normal mood and affect.         CRANIAL NERVES     CN III, IV, VI   Extraocular motions are normal.        Significant Labs:   Recent Results (from the past 48 hour(s))   CBC auto differential    Collection Time: 01/02/20  7:05 AM   Result Value Ref Range    WBC 25.03 (H) 3.90 - 12.70 K/uL    RBC 4.76 4.00 - 5.40 M/uL    Hemoglobin 12.8 12.0 - 16.0 g/dL    Hematocrit 40.5 37.0 - 48.5 %    Mean Corpuscular Volume 85 82 - 98 fL    Mean Corpuscular Hemoglobin 26.9 (L) 27.0 - 31.0 pg    Mean Corpuscular Hemoglobin Conc 31.6 (L) 32.0 - 36.0 g/dL    RDW 18.2 (H) 11.5 - 14.5 %    Platelets 193 150 - 350 K/uL    MPV 11.0 9.2 - 12.9 fL    Immature Granulocytes 1.4 (H) 0.0 - 0.5 %    Gran # (ANC) 20.0 (H) 1.8 - 7.7 K/uL    Immature Grans (Abs) 0.35 (H) 0.00 - 0.04 K/uL    Lymph # 2.1 1.0 - 4.8 K/uL    Mono # 2.3 (H) 0.3 - 1.0 K/uL    Eos # 0.3 0.0 - 0.5 K/uL    Baso # 0.05 0.00 - 0.20 K/uL    nRBC 0 0 /100 WBC    Gran% 79.9 (H) 38.0 - 73.0 %    Lymph% 8.5 (L) 18.0 - 48.0 %    Mono% 9.0 4.0 - 15.0 %    Eosinophil% 1.0 0.0 - 8.0 %    Basophil% 0.2 0.0 - 1.9 %    Platelet Estimate Appears normal     Aniso Slight     Poik Slight     Poly Occasional     Hypo Occasional     Ovalocytes Occasional     Dohle Bodies Present     Toxic Granulation Present     Differential Method Automated    Comprehensive metabolic panel    Collection Time: 01/02/20  7:05 AM   Result Value Ref Range    Sodium 133 (L) 136 - 145 mmol/L    Potassium 5.2 (H) 3.5 - 5.1 mmol/L    Chloride 97 95 - 110 mmol/L    CO2 25 23 - 29 mmol/L    Glucose 96 70 - 110 mg/dL    BUN, Bld 15 8 - 23 mg/dL    Creatinine 1.1 0.5 - 1.4 mg/dL    Calcium 8.9 8.7 - 10.5 mg/dL     Total Protein 7.0 6.0 - 8.4 g/dL    Albumin 3.0 (L) 3.5 - 5.2 g/dL    Total Bilirubin 1.0 0.1 - 1.0 mg/dL    Alkaline Phosphatase 90 55 - 135 U/L    AST 29 10 - 40 U/L    ALT 19 10 - 44 U/L    Anion Gap 11 8 - 16 mmol/L    eGFR if African American 59.2 (A) >60 mL/min/1.73 m^2    eGFR if non  51.3 (A) >60 mL/min/1.73 m^2   Lactic acid, plasma #1    Collection Time: 01/02/20  7:05 AM   Result Value Ref Range    Lactate (Lactic Acid) 3.1 (H) 0.5 - 2.2 mmol/L   Protime-INR    Collection Time: 01/02/20  7:05 AM   Result Value Ref Range    Prothrombin Time 10.6 9.0 - 12.5 sec    INR 1.0 0.8 - 1.2   Urinalysis, Reflex to Urine Culture Urine, Clean Catch    Collection Time: 01/02/20  9:08 AM   Result Value Ref Range    Specimen UA Urine, Clean Catch     Color, UA Yellow Yellow, Straw, Virid[place]    Appearance, UA Clear Clear    pH, UA 7.0 5.0 - 8.0    Specific Gravity, UA 1.025 1.005 - 1.030    Protein, UA Negative Negative    Glucose, UA Negative Negative    Ketones, UA Negative Negative    Bilirubin (UA) Negative Negative    Occult Blood UA 1+ (A) Negative    Nitrite, UA Negative Negative    Leukocytes, UA 1+ (A) Negative   Urinalysis Microscopic    Collection Time: 01/02/20  9:08 AM   Result Value Ref Range    RBC, UA 1 0 - 4 /hpf    WBC, UA 0 0 - 5 /hpf    Bacteria Rare None-Occ /hpf    Squam Epithel, UA 1 /hpf    Microscopic Comment SEE COMMENT    Lactic acid, plasma #2    Collection Time: 01/02/20 11:40 AM   Result Value Ref Range    Lactate (Lactic Acid) 1.5 0.5 - 2.2 mmol/L   Culture, Respiratory with Gram Stain    Collection Time: 01/02/20 12:10 PM   Result Value Ref Range    Gram Stain (Respiratory) <10 epithelial cells per low power field.     Gram Stain (Respiratory) Rare WBC's     Gram Stain (Respiratory) Few Gram positive cocci     Gram Stain (Respiratory) Few Gram negative diplococci          Significant Imaging:   Imaging Results          CTA Chest Non-Coronary (PE Study) (Final result)  Result  time 01/02/20 10:16:01    Final result by Megan Street MD (01/02/20 10:16:01)                 Impression:      1.  No pulmonary arterial filling defect to suggest thromboembolism.    2.  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  Additional metastatic disease likely present in the right axilla, liver, spleen, and left renal fossa.    3.  Note is made of a stable appearing filling defect in the right superior pulmonary vein approach in its ostium suggesting neoplastic venous invasion.    4.  Stable, indeterminate, left lower lobe pulmonary nodule.    5.  Trace right pleural fluid with associated compressive atelectasis.    Electronically signed by resident: Javier Nunez  Date:    01/02/2020  Time:    09:34    Electronically signed by: Megan Street MD  Date:    01/02/2020  Time:    10:16             Narrative:    EXAMINATION:  CTA CHEST NON CORONARY    CLINICAL HISTORY:  Chest pain, acute, nonspecific, low prob CAD;    TECHNIQUE:  The chest was surveyed from the costophrenic angles through the lung apices at 3-mm increments after the administration of 75 cc of Omnipaque 350 intravenous contrast material according to the PE protocol which is optimized for vascular contrast resolution.  Axial, sagittal and coronal maximum intensity projection images were reviewed.    Total Exam DLP: 333.30mGy-cm.    COMPARISON:  CTA chest 12/26/2019    FINDINGS:  Examination of the soft tissue and vascular structures at the base of the neck is unremarkable.    There is a left-sided aortic arch with 3 branch vessels.  The thoracic aorta maintains normal caliber, contour, and course with extensive atherosclerotic calcification.  There is no evidence of aneurysmal dilation or dissection.    The pulmonary arteries distribute normally without evidence of filling defect to indicate pulmonary  thromboembolism.  Note is made of stable, attenuated appearance of the middle lobe segmental/subsegmental pulmonary arteries likely secondary to adjacent mass.  There are four pulmonary veins.  Again identified is a filling defect in the right superior pulmonary vein near its ostium consistent with venous invasion by the adjacent neoplastic process.  Systemic and pulmonary venoatrial connections remain concordant.    The heart is not enlarged and there is no evidence of pericardial effusion.    There is a stable 2.3 cm nodular soft tissue density in the right axilla likely representing lymphadenopathy.  Subcarinal and right hilar soft tissue density appears stable and likely represents a combination of mass invasion/lymphadenopathy.    The esophagus maintains a normal course.    Limited images of the upper abdomen obtained during the course of this dedicated thoracic CT multiple hepatic hypodensities measuring up to 2.3 cm, a 3.3 cm splenic hypodensity, and partially visualized 7 cm nodular opacity in the left renal fossa.    The osseous structures demonstrate degenerative joint disease without aggressive marrow replacement process or acute fracture.    The trachea and proximal airways are patent.  Middle lobe segmental/subsegmental airways are mostly obstructed.    The lungs demonstrate a 12.5 x 4.5 cm middle lobe mass extending into the right hilum and subcarinal mediastinum with significant postobstructive consolidation.  There is stable subsegmental consolidative opacity in the anterior segment of the right upper lobe, as well as compressive atelectasis in the right lung base.  A 1.1 cm nodular opacity remains present in the anterior basal segment of the left lower lobe.  There is underlying apical predominant centrilobular and paraseptal emphysema.  Trace right pleural fluid is present.  No pneumothorax.                               X-Ray Chest AP Portable (Final result)  Result time 01/02/20 07:41:48    Final  result by Jae Bolden MD (01/02/20 07:41:48)                 Impression:      Continued abnormal chest radiograph, but no significant detrimental interval change, allowing for differences in patient positioning, since 12/26/2019.      Electronically signed by: Jae Bolden MD  Date:    01/02/2020  Time:    07:41             Narrative:    EXAMINATION:  XR CHEST AP PORTABLE    CLINICAL HISTORY:  Sepsis;    COMPARISON:  Comparison is made to the chest radiograph of 12/26/2019.  Reference is also made to a subsequent thoracic CT examination of that same date.    FINDINGS:  Opacity in the inferior hemothorax on the right side with obliteration of the right cardiac border and extending to the inferior aspect of the right hilum is again observed.  This was documented on the CT examination referenced above to primarily relate to a mass opacity in the right middle lobe.  This opacity appears essentially unchanged since the previous exam.  Heart size remains normal.  The left lung and the mid and upper lung zones on the right side appear stable, with no significant superimposed airspace consolidation or volume loss.  No pleural fluid of any substantial volume is seen on either side.  No pneumothorax.  Calcification in the wall of the aortic arch is again incidentally observed.                                    Assessment/Plan:      * HAP (hospital-acquired pneumonia)   70 yo WF w/ hx of COPD, HTN, HLD, PVD (was on ASA and plavix, recently held in preparation for biopsy), MDD, hypothyroidism (post-ablation), and allergic rhinitis who [place] [facility] [place]/o tachycardia, SOB, and R sided CP. Patient recently admitted and treated for pneumonia with 5 day course of levaquin. Symptoms and vitals still point to pneumonia.     Plan  Vanc and Zosyn  Sputum Culture  Blood cultures pending  Home [place] 7.5-35 q8 prn      Chronic obstructive pulmonary disease with acute exacerbation  Patients presents with probable pneumonia that is  causing an exacerbation of her COPD    Plan  Scheduled duo-nebs q4  Tiotropium qd    Migraine  Home fiorcet ordered prn      Nausea  Promethazine and zofran ordered prn      Anxiety  Holding klonopin in setting of increased 02 requirements      Mass of middle lobe of right lung  Patients CT scans very concerning for lung cancer with mets.    Plan  Will try to obtain biopsy while patient is in the hospital  Pallative care consulted      GERD (gastroesophageal reflux disease)  Famotidine 20 mg daily      Allergic rhinitis  Continue home montelukast       PVD (peripheral vascular disease)  Patient has stents in her leg. Normally on asprin and plavix. Being held for possible biopsy of lung mass       Depression  Continue home paxil and topiramate    Hypertension  Holding home meds in setting of sepsis      Hyperlipidemia  Continue home statin      Postablative hypothyroidism  Continue home levothyroxine        VTE Risk Mitigation (From admission, onward)         Ordered     heparin (porcine) injection 5,000 Units  Every 8 hours      01/02/20 1211     Place sequential compression device  Until discontinued      01/02/20 1150     IP VTE HIGH RISK PATIENT  Once      01/02/20 1150                      Andrea Crowell MD  Department of Hospital Medicine   [facility]

## 2020-01-02 NOTE — ASSESSMENT & PLAN NOTE
Patient has stents in her leg. Normally on asprin and plavix. Being held for possible biopsy of lung mass

## 2020-01-02 NOTE — ASSESSMENT & PLAN NOTE
Palliative medicine consult for goals of care received.  Chart reviewed and patient discussed with Dr. Carter. Palliative medicine APRN and [facility] at bedside.  Palliative Medicine introduced and psychosocial assessment completed.     Impression:   is a 70 yo lady admitted with shortness of breath and tachycardia.  Recent diagnosis of lung and liver mass. Tissue diagnosis pending.  She is awake, alert and oriented to person, place, time and situation.  No complaints of pain or shortness of breath.  No acute distress     Advance Care Planning   - no advanced directives received  - Advanced care planning education - How to start the conversation provided.  At this time she states she is unable to designate a surrogate decision maker.   - Next of kin for medical decisions are her adult children Shruti Castañeda [phone] and Leon Castañeda   - full code per primary team. Ms. Castañeda is in agreement.  She states she would choose to have every chance to recover from an significant event.  She also states she would not choose to have long term life support if it would cause suffering and she would never be herself again.         Goals of Care  -palliative medicine building rapport with patient.  She is receptive to continued palliative medicine.   - Ms. Castañeda states understanding this mass is most likely a malignancy. She appears to be coping well with this information.  - Most important to Ms. Castañeda is to have a definitive diagnosis. After having the biopsy her goal is to receive available treatment - undetermined at this time.   -Her goal is to be live  long enough to meet her great granddaughter - May 2020.      Plan/Recommendation:  - Would benefit from continued information and education regarding treatment plan  - Palliative medicine will continue to follow for goals of care and advanced care planning.   - If discharged over the weekend, recommend follow up in the [facility]  Outpatient Palliative Medicine Clinic for goals of care and advanced care planning   - emotional support     Primary team aware of the above conversation and recommendation.

## 2020-01-03 LAB
ALBUMIN SERPL BCP-MCNC: 2.1 G/DL (ref 3.5–5.2)
ALP SERPL-CCNC: 70 U/L (ref 55–135)
ALT SERPL W/O P-5'-P-CCNC: 20 U/L (ref 10–44)
ANION GAP SERPL CALC-SCNC: 9 MMOL/L (ref 8–16)
ANISOCYTOSIS BLD QL SMEAR: SLIGHT
ANISOCYTOSIS BLD QL SMEAR: SLIGHT
APTT BLDCRRT: 26.2 SEC (ref 21–32)
ASCENDING AORTA: 3.16 CM
AST SERPL-CCNC: 20 U/L (ref 10–40)
AV INDEX (PROSTH): 0.68
AV MEAN GRADIENT: 5 MMHG
AV PEAK GRADIENT: 11 MMHG
AV VALVE AREA: 2.01 CM2
AV VELOCITY RATIO: 0.73
BASO STIPL BLD QL SMEAR: ABNORMAL
BASO STIPL BLD QL SMEAR: ABNORMAL
BASOPHILS # BLD AUTO: 0.03 K/UL (ref 0–0.2)
BASOPHILS # BLD AUTO: 0.04 K/UL (ref 0–0.2)
BASOPHILS NFR BLD: 0.1 % (ref 0–1.9)
BASOPHILS NFR BLD: 0.2 % (ref 0–1.9)
BILIRUB SERPL-MCNC: 0.5 MG/DL (ref 0.1–1)
BSA FOR ECHO PROCEDURE: 1.71 M2
BUN SERPL-MCNC: 14 MG/DL (ref 8–23)
BURR CELLS BLD QL SMEAR: ABNORMAL
BURR CELLS BLD QL SMEAR: ABNORMAL
CALCIUM SERPL-MCNC: 7.7 MG/DL (ref 8.7–10.5)
CHLORIDE SERPL-SCNC: 107 MMOL/L (ref 95–110)
CO2 SERPL-SCNC: 22 MMOL/L (ref 23–29)
CREAT SERPL-MCNC: 0.9 MG/DL (ref 0.5–1.4)
CV ECHO LV RWT: 0.37 CM
D DIMER PPP IA.FEU-MCNC: 4.55 MG/L FEU
DIFFERENTIAL METHOD: ABNORMAL
DIFFERENTIAL METHOD: ABNORMAL
DOP CALC AO PEAK VEL: 1.65 M/S
DOP CALC AO VTI: 34.99 CM
DOP CALC LVOT AREA: 3 CM2
DOP CALC LVOT DIAMETER: 1.94 CM
DOP CALC LVOT PEAK VEL: 1.21 M/S
DOP CALC LVOT STROKE VOLUME: 70.49 CM3
DOP CALCLVOT PEAK VEL VTI: 23.86 CM
E WAVE DECELERATION TIME: 182.5 MSEC
E/A RATIO: 1.46
E/E' RATIO: 10.32 M/S
ECHO LV POSTERIOR WALL: 0.8 CM (ref 0.6–1.1)
EOSINOPHIL # BLD AUTO: 0 K/UL (ref 0–0.5)
EOSINOPHIL # BLD AUTO: 0 K/UL (ref 0–0.5)
EOSINOPHIL NFR BLD: 0 % (ref 0–8)
EOSINOPHIL NFR BLD: 0.1 % (ref 0–8)
ERYTHROCYTE [DISTWIDTH] IN BLOOD BY AUTOMATED COUNT: 18.1 % (ref 11.5–14.5)
ERYTHROCYTE [DISTWIDTH] IN BLOOD BY AUTOMATED COUNT: 18.2 % (ref 11.5–14.5)
EST. GFR  (AFRICAN AMERICAN): >60 ML/MIN/1.73 M^2
EST. GFR  (NON AFRICAN AMERICAN): >60 ML/MIN/1.73 M^2
FIBRINOGEN PPP-MCNC: 564 MG/DL (ref 182–366)
FOLATE SERPL-MCNC: 3.4 NG/ML (ref 4–24)
FRACTIONAL SHORTENING: 26 % (ref 28–44)
GIANT PLATELETS BLD QL SMEAR: PRESENT
GIANT PLATELETS BLD QL SMEAR: PRESENT
GLUCOSE SERPL-MCNC: 97 MG/DL (ref 70–110)
HAPTOGLOB SERPL-MCNC: 336 MG/DL (ref 30–250)
HCT VFR BLD AUTO: 30.5 % (ref 37–48.5)
HCT VFR BLD AUTO: 31.1 % (ref 37–48.5)
HGB BLD-MCNC: 9.4 G/DL (ref 12–16)
HGB BLD-MCNC: 9.5 G/DL (ref 12–16)
HYPOCHROMIA BLD QL SMEAR: ABNORMAL
HYPOCHROMIA BLD QL SMEAR: ABNORMAL
IMM GRANULOCYTES # BLD AUTO: 0.24 K/UL (ref 0–0.04)
IMM GRANULOCYTES # BLD AUTO: 0.25 K/UL (ref 0–0.04)
IMM GRANULOCYTES NFR BLD AUTO: 1 % (ref 0–0.5)
IMM GRANULOCYTES NFR BLD AUTO: 1 % (ref 0–0.5)
INR PPP: 1 (ref 0.8–1.2)
INTERVENTRICULAR SEPTUM: 0.88 CM (ref 0.6–1.1)
IRON SERPL-MCNC: <10 UG/DL (ref 30–160)
LA MAJOR: 4.77 CM
LA MINOR: 4.78 CM
LA WIDTH: 3.53 CM
LDH SERPL L TO P-CCNC: 411 U/L (ref 110–260)
LEFT ATRIUM SIZE: 3 CM
LEFT ATRIUM VOLUME INDEX: 25.5 ML/M2
LEFT ATRIUM VOLUME: 42.98 CM3
LEFT INTERNAL DIMENSION IN SYSTOLE: 3.14 CM (ref 2.1–4)
LEFT VENTRICLE DIASTOLIC VOLUME INDEX: 48.52 ML/M2
LEFT VENTRICLE DIASTOLIC VOLUME: 81.86 ML
LEFT VENTRICLE MASS INDEX: 66 G/M2
LEFT VENTRICLE SYSTOLIC VOLUME INDEX: 23.1 ML/M2
LEFT VENTRICLE SYSTOLIC VOLUME: 38.99 ML
LEFT VENTRICULAR INTERNAL DIMENSION IN DIASTOLE: 4.27 CM (ref 3.5–6)
LEFT VENTRICULAR MASS: 111.08 G
LV LATERAL E/E' RATIO: 8.91 M/S
LV SEPTAL E/E' RATIO: 12.25 M/S
LYMPHOCYTES # BLD AUTO: 1.5 K/UL (ref 1–4.8)
LYMPHOCYTES # BLD AUTO: 1.6 K/UL (ref 1–4.8)
LYMPHOCYTES NFR BLD: 6.1 % (ref 18–48)
LYMPHOCYTES NFR BLD: 6.4 % (ref 18–48)
MAGNESIUM SERPL-MCNC: 2.1 MG/DL (ref 1.6–2.6)
MCH RBC QN AUTO: 26.9 PG (ref 27–31)
MCH RBC QN AUTO: 27.2 PG (ref 27–31)
MCHC RBC AUTO-ENTMCNC: 30.2 G/DL (ref 32–36)
MCHC RBC AUTO-ENTMCNC: 31.1 G/DL (ref 32–36)
MCV RBC AUTO: 87 FL (ref 82–98)
MCV RBC AUTO: 89 FL (ref 82–98)
MONOCYTES # BLD AUTO: 2 K/UL (ref 0.3–1)
MONOCYTES # BLD AUTO: 2.1 K/UL (ref 0.3–1)
MONOCYTES NFR BLD: 7.9 % (ref 4–15)
MONOCYTES NFR BLD: 8.5 % (ref 4–15)
MV PEAK A VEL: 0.67 M/S
MV PEAK E VEL: 0.98 M/S
NEUTROPHILS # BLD AUTO: 20.6 K/UL (ref 1.8–7.7)
NEUTROPHILS # BLD AUTO: 20.9 K/UL (ref 1.8–7.7)
NEUTROPHILS NFR BLD: 84.3 % (ref 38–73)
NEUTROPHILS NFR BLD: 84.4 % (ref 38–73)
NRBC BLD-RTO: 0 /100 WBC
NRBC BLD-RTO: 0 /100 WBC
OVALOCYTES BLD QL SMEAR: ABNORMAL
OVALOCYTES BLD QL SMEAR: ABNORMAL
PATH REV BLD -IMP: NORMAL
PATH REV BLD -IMP: NORMAL
PHOSPHATE SERPL-MCNC: 3.1 MG/DL (ref 2.7–4.5)
PISA TR MAX VEL: 3.26 M/S
PLATELET # BLD AUTO: 137 K/UL (ref 150–350)
PLATELET # BLD AUTO: 151 K/UL (ref 150–350)
PLATELET BLD QL SMEAR: ABNORMAL
PLATELET BLD QL SMEAR: ABNORMAL
PMV BLD AUTO: 10.4 FL (ref 9.2–12.9)
PMV BLD AUTO: 11.1 FL (ref 9.2–12.9)
POCT GLUCOSE: 108 MG/DL (ref 70–110)
POCT GLUCOSE: 146 MG/DL (ref 70–110)
POCT GLUCOSE: 171 MG/DL (ref 70–110)
POIKILOCYTOSIS BLD QL SMEAR: SLIGHT
POIKILOCYTOSIS BLD QL SMEAR: SLIGHT
POLYCHROMASIA BLD QL SMEAR: ABNORMAL
POLYCHROMASIA BLD QL SMEAR: ABNORMAL
POTASSIUM SERPL-SCNC: 5 MMOL/L (ref 3.5–5.1)
PROT SERPL-MCNC: 5.3 G/DL (ref 6–8.4)
PROTHROMBIN TIME: 10.3 SEC (ref 9–12.5)
PULM VEIN S/D RATIO: 1.36
PV PEAK D VEL: 0.75 M/S
PV PEAK S VEL: 1.02 M/S
RA MAJOR: 3.53 CM
RA PRESSURE: 3 MMHG
RA WIDTH: 3.23 CM
RBC # BLD AUTO: 3.49 M/UL (ref 4–5.4)
RBC # BLD AUTO: 3.5 M/UL (ref 4–5.4)
RETICS/RBC NFR AUTO: 2.2 % (ref 0.5–2.5)
RIGHT VENTRICULAR END-DIASTOLIC DIMENSION: 3.04 CM
SATURATED IRON: ABNORMAL % (ref 20–50)
SCHISTOCYTES BLD QL SMEAR: ABNORMAL
SCHISTOCYTES BLD QL SMEAR: PRESENT
SINUS: 3.53 CM
SMUDGE CELLS BLD QL SMEAR: PRESENT
SODIUM SERPL-SCNC: 138 MMOL/L (ref 136–145)
STJ: 2.56 CM
TDI LATERAL: 0.11 M/S
TDI SEPTAL: 0.08 M/S
TDI: 0.1 M/S
TOTAL IRON BINDING CAPACITY: 219 UG/DL (ref 250–450)
TOXIC GRANULES BLD QL SMEAR: PRESENT
TR MAX PG: 43 MMHG
TRANSFERRIN SERPL-MCNC: 148 MG/DL (ref 200–375)
TRICUSPID ANNULAR PLANE SYSTOLIC EXCURSION: 2.11 CM
TV REST PULMONARY ARTERY PRESSURE: 46 MMHG
VIT B12 SERPL-MCNC: 401 PG/ML (ref 210–950)
WBC # BLD AUTO: 24.49 K/UL (ref 3.9–12.7)
WBC # BLD AUTO: 24.73 K/UL (ref 3.9–12.7)

## 2020-01-03 PROCEDURE — 25000003 PHARM REV CODE 250: Performed by: STUDENT IN AN ORGANIZED HEALTH CARE EDUCATION/TRAINING PROGRAM

## 2020-01-03 PROCEDURE — 99497 PR ADVNCD CARE PLAN 30 MIN: ICD-10-PCS | Mod: ,,, | Performed by: CLINICAL NURSE SPECIALIST

## 2020-01-03 PROCEDURE — 11000001 HC ACUTE MED/SURG PRIVATE ROOM

## 2020-01-03 PROCEDURE — 99900035 HC TECH TIME PER 15 MIN (STAT)

## 2020-01-03 PROCEDURE — 83735 ASSAY OF MAGNESIUM: CPT

## 2020-01-03 PROCEDURE — 83010 ASSAY OF HAPTOGLOBIN QUANT: CPT

## 2020-01-03 PROCEDURE — 83615 LACTATE (LD) (LDH) ENZYME: CPT

## 2020-01-03 PROCEDURE — 27000221 HC OXYGEN, UP TO 24 HOURS

## 2020-01-03 PROCEDURE — 94668 MNPJ CHEST WALL SBSQ: CPT

## 2020-01-03 PROCEDURE — 92610 EVALUATE SWALLOWING FUNCTION: CPT

## 2020-01-03 PROCEDURE — 85379 FIBRIN DEGRADATION QUANT: CPT

## 2020-01-03 PROCEDURE — 80053 COMPREHEN METABOLIC PANEL: CPT

## 2020-01-03 PROCEDURE — 94640 AIRWAY INHALATION TREATMENT: CPT

## 2020-01-03 PROCEDURE — 99497 ADVNCD CARE PLAN 30 MIN: CPT | Mod: ,,, | Performed by: CLINICAL NURSE SPECIALIST

## 2020-01-03 PROCEDURE — 94664 DEMO&/EVAL PT USE INHALER: CPT

## 2020-01-03 PROCEDURE — 83540 ASSAY OF IRON: CPT

## 2020-01-03 PROCEDURE — 99233 SBSQ HOSP IP/OBS HIGH 50: CPT | Mod: GC,,, | Performed by: STUDENT IN AN ORGANIZED HEALTH CARE EDUCATION/TRAINING PROGRAM

## 2020-01-03 PROCEDURE — 99233 PR SUBSEQUENT HOSPITAL CARE,LEVL III: ICD-10-PCS | Mod: ,,, | Performed by: CLINICAL NURSE SPECIALIST

## 2020-01-03 PROCEDURE — 84100 ASSAY OF PHOSPHORUS: CPT

## 2020-01-03 PROCEDURE — 63600175 PHARM REV CODE 636 W HCPCS: Performed by: STUDENT IN AN ORGANIZED HEALTH CARE EDUCATION/TRAINING PROGRAM

## 2020-01-03 PROCEDURE — 85060 PATHOLOGIST REVIEW: ICD-10-PCS | Mod: ,,, | Performed by: PATHOLOGY

## 2020-01-03 PROCEDURE — 82746 ASSAY OF FOLIC ACID SERUM: CPT

## 2020-01-03 PROCEDURE — 25000242 PHARM REV CODE 250 ALT 637 W/ HCPCS: Performed by: STUDENT IN AN ORGANIZED HEALTH CARE EDUCATION/TRAINING PROGRAM

## 2020-01-03 PROCEDURE — 97161 PT EVAL LOW COMPLEX 20 MIN: CPT

## 2020-01-03 PROCEDURE — 85060 BLOOD SMEAR INTERPRETATION: CPT | Mod: ,,, | Performed by: PATHOLOGY

## 2020-01-03 PROCEDURE — 27000646 HC AEROBIKA DEVICE

## 2020-01-03 PROCEDURE — 85025 COMPLETE CBC W/AUTO DIFF WBC: CPT | Mod: 91

## 2020-01-03 PROCEDURE — 99233 PR SUBSEQUENT HOSPITAL CARE,LEVL III: ICD-10-PCS | Mod: GC,,, | Performed by: STUDENT IN AN ORGANIZED HEALTH CARE EDUCATION/TRAINING PROGRAM

## 2020-01-03 PROCEDURE — 94799 UNLISTED PULMONARY SVC/PX: CPT

## 2020-01-03 PROCEDURE — 85045 AUTOMATED RETICULOCYTE COUNT: CPT

## 2020-01-03 PROCEDURE — 94761 N-INVAS EAR/PLS OXIMETRY MLT: CPT

## 2020-01-03 PROCEDURE — 85610 PROTHROMBIN TIME: CPT

## 2020-01-03 PROCEDURE — 99233 SBSQ HOSP IP/OBS HIGH 50: CPT | Mod: ,,, | Performed by: CLINICAL NURSE SPECIALIST

## 2020-01-03 PROCEDURE — 63600175 PHARM REV CODE 636 W HCPCS

## 2020-01-03 PROCEDURE — 85384 FIBRINOGEN ACTIVITY: CPT

## 2020-01-03 PROCEDURE — 36415 COLL VENOUS BLD VENIPUNCTURE: CPT

## 2020-01-03 PROCEDURE — 97116 GAIT TRAINING THERAPY: CPT

## 2020-01-03 PROCEDURE — 94667 MNPJ CHEST WALL 1ST: CPT

## 2020-01-03 PROCEDURE — 85730 THROMBOPLASTIN TIME PARTIAL: CPT

## 2020-01-03 PROCEDURE — 82607 VITAMIN B-12: CPT

## 2020-01-03 RX ORDER — ENOXAPARIN SODIUM 100 MG/ML
40 INJECTION SUBCUTANEOUS EVERY 24 HOURS
Status: DISCONTINUED | OUTPATIENT
Start: 2020-01-03 | End: 2020-01-05

## 2020-01-03 RX ORDER — VANCOMYCIN HCL IN 5 % DEXTROSE 1G/250ML
1000 PLASTIC BAG, INJECTION (ML) INTRAVENOUS
Status: DISCONTINUED | OUTPATIENT
Start: 2020-01-03 | End: 2020-01-04

## 2020-01-03 RX ADMIN — PIPERACILLIN AND TAZOBACTAM 4.5 G: 4; .5 INJECTION, POWDER, FOR SOLUTION INTRAVENOUS at 05:01

## 2020-01-03 RX ADMIN — IPRATROPIUM BROMIDE AND ALBUTEROL SULFATE 3 ML: .5; 3 SOLUTION RESPIRATORY (INHALATION) at 11:01

## 2020-01-03 RX ADMIN — FAMOTIDINE 20 MG: 20 TABLET ORAL at 10:01

## 2020-01-03 RX ADMIN — HEPARIN SODIUM 5000 UNITS: 5000 INJECTION, SOLUTION INTRAVENOUS; SUBCUTANEOUS at 06:01

## 2020-01-03 RX ADMIN — LEVOTHYROXINE SODIUM 100 MCG: 100 TABLET ORAL at 06:01

## 2020-01-03 RX ADMIN — IPRATROPIUM BROMIDE AND ALBUTEROL SULFATE 3 ML: .5; 3 SOLUTION RESPIRATORY (INHALATION) at 04:01

## 2020-01-03 RX ADMIN — ATORVASTATIN CALCIUM 40 MG: 20 TABLET, FILM COATED ORAL at 09:01

## 2020-01-03 RX ADMIN — ENOXAPARIN SODIUM 40 MG: 100 INJECTION SUBCUTANEOUS at 05:01

## 2020-01-03 RX ADMIN — HYDROCODONE BITARTRATE AND ACETAMINOPHEN 1 TABLET: 7.5; 325 TABLET ORAL at 12:01

## 2020-01-03 RX ADMIN — IPRATROPIUM BROMIDE AND ALBUTEROL SULFATE 3 ML: .5; 3 SOLUTION RESPIRATORY (INHALATION) at 07:01

## 2020-01-03 RX ADMIN — PIPERACILLIN AND TAZOBACTAM 4.5 G: 4; .5 INJECTION, POWDER, FOR SOLUTION INTRAVENOUS at 11:01

## 2020-01-03 RX ADMIN — IPRATROPIUM BROMIDE AND ALBUTEROL SULFATE 3 ML: .5; 3 SOLUTION RESPIRATORY (INHALATION) at 08:01

## 2020-01-03 RX ADMIN — TIOTROPIUM BROMIDE 18 MCG: 18 CAPSULE ORAL; RESPIRATORY (INHALATION) at 10:01

## 2020-01-03 RX ADMIN — MONTELUKAST 10 MG: 10 TABLET, FILM COATED ORAL at 10:01

## 2020-01-03 RX ADMIN — PAROXETINE 20 MG: 10 TABLET, FILM COATED ORAL at 06:01

## 2020-01-03 RX ADMIN — VANCOMYCIN HYDROCHLORIDE 1000 MG: 1 INJECTION, POWDER, LYOPHILIZED, FOR SOLUTION INTRAVENOUS at 09:01

## 2020-01-03 RX ADMIN — PIPERACILLIN AND TAZOBACTAM 4.5 G: 4; .5 INJECTION, POWDER, FOR SOLUTION INTRAVENOUS at 01:01

## 2020-01-03 RX ADMIN — PREDNISONE 40 MG: 10 TABLET ORAL at 10:01

## 2020-01-03 NOTE — PLAN OF CARE
Quiet and cooperative. O2 4L NC in place. Tolerating well. NPO past midnight maintained. Complain of pain at beginning of shift. Pain medicine given as ordered. Relief noted. Bed low and locked. Side rails up x 2. Call bell in reach. Telemetry

## 2020-01-03 NOTE — PLAN OF CARE
"[facility]  Palliative Care   Psychosocial Assessment    Patient Name: Sharita Castañeda  MRN: [id number]  Admission Date: 1/2/2020  Hospital Length of Stay: 1 days  Code Status: Full Code   Attending Provider: Arnold Banegas MD  Palliative Care Provider: KISHORE Barriga  Primary Care Physician: Wallace Deng MD  Principal Problem:HAP (hospital-acquired pneumonia)    Reason for Referral: assistance with clarification of goals of care  Consult Order Date: 01/03/2019  Primary CM/SW: Monique / Sneha     Present during Interview: patient in bed, palliatice care APRN, palliatice care  .      Primary Language:English   Needed: no      Past Medical Situation:   PMH:   Past Medical History:   Diagnosis Date    Allergic rhinitis     COPD (chronic obstructive pulmonary disease)     Depression     GERD (gastroesophageal reflux disease)     Headache     Hyperlipidemia     Hypertension     Polyarthralgia     Postablative hypothyroidism     hypothyroidism s/p OCASIO therapy    PVD (peripheral vascular disease)     Treated by Dr. Sim     Mental Health/Substance Use History: patient reports drinking "a little" in her twenties. Patient smoked most of her life, quit for twelve years and started again six years ago  Non-traditional Health practices: none identified     Understanding of diagnosis and prognosis: Patient can describe how she has been feeling the last few months. Patient is aware of the lung mass and awaiting a biopsy and other test results to be able to fully understand prognosis.   Experience/Comfort level with health care system: Good     Patients Mental Status: Patient is alert and oriented to person, place, and time. Patient reports not sleeping well and wakes up disoriented but able to re orient quickly.     Socio-Economic Factors/Resources:  Address: [address] [place] [address] [address] [place] [address]  Phone Number: [phone] (home)     Marital Status: "   Household Composition: Patient lives alone in [place]   Children: Patient has two children Shaylee  Relationships with Family:  Patient reports having a loving, caring, concerned family. Patient states that there are differences of opinion within the family and patient states trying to be neutral and allowing all to have their own perspectives. When patient talks about children she gives good insight into understanding their through processes and how they deal with challenges.     Patient reports four grandchildren. Patient states her youngest grandson Galdino, who is 19, lived with patient for a while. Patient reports one great granddaughter and a second one on the way that is due in May of this year.     Emergency Contacts: Daveyteoscar / daughter [phone]    Activities of Daily Living: Patient is independent with activities of daily living.   Support Systems-Family & Community (Home Health, HME etc): Patient's family is supportive     Transportation:  yes    Work/Education History: disability   History: no    Financial Resources:Medicare      Advanced Care Planning & Legal Concerns:   Advanced Directives/Living Will: no   Planning:  no    Power of : no  Surrogate Decision Maker: Discussion was had regarding who patient would like to make decisions for her if she were unable. Patient stated needing time to think about this.       Spirituality, Culture & Coping Mechanisms:  [place] and Belief: Caodaism     I - Importance: yes    C - Community/Culture Values:     A - Address in Care: yes      Strengths/Coping Strategies: Patient is pleasant and outgoing. Patient appears to receive support and motivation from family   Self-Care Activities/Hobbies: Patient states liking to dance, cook, and bake.     Goals/Hopes/Expectations: Patient wants to live to see her great granddaughter in May of this year  Fears/Anxiety/Concerns: Patient did not express any fears during conversation  but anxiety surrounding diagnosis and biopsy results is to be expected         Preferences about EOL Environment: (own bed, family nearby, pets, music, etc)      Complicated Bereavement Risk Assessment Tool (CBRAT)  Reference:  [facility] Practice Group (May 2016). Bereavement Risk Screening and Management Guidelines.  Retrieved from: http://www.LightUpcc.com.au/wp-content/uploads//AQJTJ-Bvqpwoudfno-Jxjrxjbvh-and-Management-Guideline-2016.pdf      Bereaved Client Characteristics   ? Under 18      no  ? Was a Twin   no  ? Young Spouse   no  ? Elderly Spouse    no  ? Isolated    no  ? Lacks Meaningful Social Support   no  ? Dissatisfied with help available during illness   no  ? New to [place] no  ? New to Decision-Making   no    Illness  ? Inherited Disorder   no  ? Stigmatized Disease in the family/community   yes  ? Lengthy/Burdensome   no     Bereaved Client's History of Loss   ? Cumulative Multiple Losses   no  ? Previous Mental Health Illnesses   no  ? Current Mental Health Illness   no  ? Other Significant Health Issues   no   ? Migrant/Refugee   no Death  ? Sudden or Unexpected   no  ? Traumatic Circumstances Associated with Death   no  ? Significant Cultural/Social Burdens as a result of Death   no   Relationship with   ? Profound Lifelong Partner   no  ? Highly Dependent    no  ? Antagonistic   no  ? Ambivalent   no  ? Deeply Connected   no  ? Culturally Defined   no   Risk Factors Scores  0-2  Low  3-5  Moderate  5+  High  All persons scoring moderate to high presume to be at risk**    (** It is acknowledged that protective factors and resilience may outweigh apparent risk factors.      Total Risk Factors Score:   Mild to Moderate    The way patient describes her relationship with children and grandchildren indicates patient's death will have an impact on the family. Grief may be intense at times. Family will benefit from  follow up and offerings of support.       Discharge Planning Needs/Plan of Care:     Palliative care APRN and  visited patient in room. Patient was pleasant and forthcoming with information about illness, family, and life history. Patient perceives she will discharge home after this hospital stay. As of now it is unknown what needs patient will have upon discharge. This  to follow for support.     Matheus Booker, REJIW

## 2020-01-03 NOTE — PT/OT/SLP EVAL
Speech Language Pathology Evaluation & Discharge Summary  Bedside Swallow    Patient Name:  Sharita Castañeda   MRN:  [id number]  Admitting Diagnosis: Postobstructive pneumonia    Recommendations:                 General Recommendations:  Follow-up not indicated  Diet recommendations:  Regular, Thin   Aspiration Precautions: Standard aspiration precautions   General Precautions: Standard,    Communication strategies:  none    History:     Past Medical History:   Diagnosis Date    Allergic rhinitis     COPD (chronic obstructive pulmonary disease)     Depression     GERD (gastroesophageal reflux disease)     Headache     Hyperlipidemia     Hypertension     Polyarthralgia     Postablative hypothyroidism     hypothyroidism s/p OCASIO therapy    PVD (peripheral vascular disease)     Treated by Dr. Sim       Past Surgical History:   Procedure Laterality Date    CHOLECYSTECTOMY  1986    GALLBLADDER SURGERY  2006    HIP SURGERY  2005    Right hip fracture/surgery    PERIPHERAL ARTERIAL STENT GRAFT Right 2007    stent for femoral artery blockage     Prior Intubation HX:  None this admission    Modified Barium Swallow: None on file    Chest X-Rays 1/02/20: The left lung and the mid and upper lung zones on the right side appear stable, with no significant superimposed airspace consolidation or volume loss.  No pleural fluid of any substantial volume is seen on either side.  No pneumothorax.     Prior diet: regular/thin    Subjective     Patient awake and alert during session  Communicated with team prior to eval     Pain/Comfort:  · Pain Rating 1: 0/10  · Pain Rating Post-Intervention 1: 0/10    Objective:     Oral Musculature Evaluation  · Oral Musculature: WFL  · Dentition: upper and lower dentures, uses dentures to eat  · Secretion Management: adequate  · Mucosal Quality: good  · Mandibular Strength and Mobility: WFL  · Oral Labial Strength and Mobility: WFL  · Lingual Strength and Mobility: WFL  · Buccal Strength and  Mobility: WFL  · Volitional Cough: strong  · Volitional Swallow: timely; good laryngeal elevation  · Voice Prior to PO Intake: WFL    Bedside Swallow Eval:   Consistencies Assessed:  · Thin liquids x6 (via straw)  · Solids x5 (erwin crackers)    Oral Phase:   · WFL    Pharyngeal Phase:   · no overt clinical signs/symptoms of aspiration  · no overt clinical signs/symptoms of pharyngeal dysphagia    Compensatory Strategies  · None    Treatment: Patient seen for a bedside swallow eval. She was sitting up in chair with empty lunch tray next to her. She reported no difficulties with swallowing prior to or during admission. SLP educated her regarding risks/warning signs of aspiration and she verbalized understanding. Whiteboard updated. Patient left in room with RN present.     Assessment:     Sharita Castañeda is a 69 y.o. female who presents with a safe oropharyngeal swallow at this time.     Goals:   Multidisciplinary Problems     SLP Goals     Not on file          Multidisciplinary Problems (Resolved)        Problem: SLP Goal    Goal Priority Disciplines Outcome   SLP Goal   (Resolved)     SLP Met                   Plan:     · Plan of Care reviewed with:  patient   · SLP Follow-Up:  No       Discharge recommendations:  (no further ST recommended)   Barriers to Discharge:  None    Time Tracking:     SLP Treatment Date:   01/03/20  Speech Start Time:  1524  Speech Stop Time:  1537     Speech Total Time (min):  13 min    Billable Minutes: Eval Swallow and Oral Function 13    Jarrod Sanchez CCC-SLP  Speech-Language Pathology  Pager: [phone]   01/03/2020

## 2020-01-03 NOTE — CONSULTS
[facility]  Palliative Medicine  Consult Note    Patient Name: Sharita Castañeda  MRN: [id number]  Admission Date: 1/2/2020  Hospital Length of Stay: 1 days  Code Status: Full Code   Attending Provider: Arnold Banegas MD  Consulting Provider: NIKOLAS Garces  Primary Care Physician: Wallace Deng MD  Principal Problem:HAP (hospital-acquired pneumonia)    Patient information was obtained from patient, past medical records and ER records.      Consults  Assessment/Plan:     Palliative care encounter  Palliative medicine consult for goals of care received.  Chart reviewed and patient discussed with Dr. Carter. Palliative medicine APRN and [place] Booker [facility] met with Ms. Castañeda at bedside.  Palliative Medicine introduced and psychosocial assessment completed.     Impression:   is a 70 yo lady admitted with shortness of breath and tachycardia.  Recent diagnosis of lung and liver mass. Tissue diagnosis pending.  She is awake, alert and oriented to person, place, time and situation.  No complaints of pain or shortness of breath.  No acute distress     Advance Care Planning   - no advanced directives received  - Advanced care planning education - How to start the conversation provided.  At this time she states she is unable to designate a surrogate decision maker.   - Next of kin for medical decisions are her adult children Shruti Castañeda [phone] and Leon Castañeda   - full code per primary team. Ms. Castañeda is in agreement.  She states she would choose to have every chance to recover from an significant event.  She also states she would not choose to have long term life support if it would cause suffering and she would never be herself again.        Goals of Care  -palliative medicine building rapport with patient.  She is receptive to continued palliative medicine.   - Ms. Castañeda states understanding this mass is most likely a malignancy. She appears to be coping well with this information.  - Most  important to Ms. Castañeda is to have a definitive diagnosis. After having the biopsy her goal is to receive available treatment - undetermined at this time.   -Her goal is to be live  long enough to meet her great granddaughter - May 2020.      Plan/Recommendation:  - Would benefit from continued information and education regarding treatment plan  - Palliative medicine will continue to follow for goals of care and advanced care planning.   - If discharged over the weekend, recommend follow up in the [facility] for goals of care and advanced care planning   - emotional support     Primary team aware of the above conversation and recommendation.           Thank you for your consult. I will follow-up with patient. Please contact us if you have any additional questions.    Subjective:     HPI:   HPI obtained from chart review:     Ms. Castañeda is a  68 yo lady with PMH of:  COPD, HTN, HLD, PVD, MDD, hypothyroidism (post-ablation), and allergic rhinitis. She presented to [facility] ED with c/o tachycardia, SOB, and R sided CP. Recently admitted to [facility] on 12/26 for hemoptysis concerning for malignancy.     CT imaging concerning for primary lung cancer with mets to liver. Pulmonary was consulted for biopsy,  primary lesion near the PA, IR referral placed for biopsy and scheduled  Outpatient with follow up in pulmonary clinic.  Discharged on 12/28 with course of levaquine and prednisone.     Patient reports doing well since discharge with only 1 or 2 further small episodes of hemoptysis.   Coughing became worse and she had some  pleuritic CP. Her symptoms of SOB began around 1AM this morning w/ severe right sided CP (8/10 - worse than before). Patient reports an SpO2 in the 70s and 80s at home w/ tachycardia to 130s.     On arrival, placed on 2L nc w/ improvement in SOB and SpO2 to low to mid 90s. Patient febrile to 100.5 F and WBC of 25k. Patient also complaints of  nausea, chills, fatigue, CP,  SOB, cough, wheeze, constipation, and congestion. No reports of:  headache, vomiting, fever, palp, abd pain, hematochezia, dysuria, hematuria, light headed ness, dizziness, weakness, sore throat, recent travel, or recent illness.     Palliative medicine consulted for goals of care.     Hospital Course:  No notes on file    Interval History:     Past Medical History:   Diagnosis Date    Allergic rhinitis     COPD (chronic obstructive pulmonary disease)     Depression     GERD (gastroesophageal reflux disease)     Headache     Hyperlipidemia     Hypertension     Polyarthralgia     Postablative hypothyroidism     hypothyroidism s/p OCASIO therapy    PVD (peripheral vascular disease)     Treated by Dr. Sim       Past Surgical History:   Procedure Laterality Date    CHOLECYSTECTOMY  1986    GALLBLADDER SURGERY  2006    HIP SURGERY  2005    Right hip fracture/surgery    PERIPHERAL ARTERIAL STENT GRAFT Right 2007    stent for femoral artery blockage       Review of patient's allergies indicates:  No Known Allergies    Medications:  Continuous Infusions:  Scheduled Meds:   albuterol-ipratropium  3 mL Nebulization Q4H    atorvastatin  40 mg Oral QHS    enoxaparin  40 mg Subcutaneous Daily    famotidine  20 mg Oral Daily    levothyroxine  100 mcg Oral Before breakfast    montelukast  10 mg Oral Daily    paroxetine  20 mg Oral QAM    piperacillin-tazobactam (ZOSYN) IVPB  4.5 g Intravenous Q8H    predniSONE  40 mg Oral Daily    tiotropium  1 capsule Inhalation Daily    topiramate  25 mg Oral BID    vancomycin (VANCOCIN) IVPB  1,000 mg Intravenous Q24H     PRN Meds:benzonatate, butalbital-acetaminophen-caffeine -40 mg, Dextrose 10% Bolus, Dextrose 10% Bolus, glucagon (human recombinant), glucose, glucose, HYDROcodone-acetaminophen, ondansetron, promethazine, sodium chloride 0.9%    Family History     Problem Relation (Age of Onset)    COPD Mother    Cancer Mother    Heart attack Father (55),  Sister (53)    Thyroid disease Mother        Tobacco Use    Smoking status: Current Every Day Smoker     Types: Cigarettes    Smokeless tobacco: Never Used   Substance and Sexual Activity    Alcohol use: No     Alcohol/week: 0.0 standard drinks    Drug use: Not on file    Sexual activity: Not on file       Review of Systems  Objective:     Vital Signs (Most Recent):  Temp: 97.5 °F (36.4 °C) (01/03/20 0802)  Pulse: 75 (01/03/20 1102)  Resp: 17 (01/03/20 1052)  BP: (!) 105/57 (01/03/20 0802)  SpO2: (!) 94 % (01/03/20 1052) Vital Signs (24h Range):  Temp:  [97.4 °F (36.3 °C)-98.5 °F (36.9 °C)] 97.5 °F (36.4 °C)  Pulse:  [] 75  Resp:  [15-29] 17  SpO2:  [54 %-97 %] 94 %  BP: ()/(56-75) 105/57     Weight: 66.7 kg (147 lb 0.8 oz)  Body mass index is 26.47 kg/m².    Review of Symptoms  Symptom Assessment (ESAS 0-10 scale)   ESAS 0 1 2 3 4 5 6 7 8 9 10   Pain x             Dyspnea x             Anxiety x             Nausea x             Depression  x             Anorexia    x          Fatigue x             Insomnia x             Restlessness  x             Agitation x             CAM / Delirium __ --  ___+   Constipation     __ --  ___+   Diarrhea           __ --  ___+  Bowel Management Plan (BMP): No    Comments: reports no pain or shortness of breath at this time.  Able to have long conversation and does not appear to have dyspnea     Pain Assessment: reports no pain     OME in 24 hours: 0    Performance Status: 60    ECOG Performance Status Grade: 1 - Ambulates, capable of light work    Physical Exam    Significant Labs: All pertinent labs within the past 24 hours have been reviewed.  CBC:   Recent Labs   Lab 01/03/20  1045   WBC 24.73*   HGB 9.5*   HCT 30.5*   MCV 87        BMP:  Recent Labs   Lab 01/03/20  0651   GLU 97      K 5.0      CO2 22*   BUN 14   CREATININE 0.9   CALCIUM 7.7*   MG 2.1     LFT:  Lab Results   Component Value Date    AST 20 01/03/2020    ALKPHOS 70 01/03/2020     BILITOT 0.5 01/03/2020     Albumin:   Albumin   Date Value Ref Range Status   01/03/2020 2.1 (L) 3.5 - 5.2 g/dL Final     Protein:   Total Protein   Date Value Ref Range Status   01/03/2020 5.3 (L) 6.0 - 8.4 g/dL Final     Lactic acid:   Lab Results   Component Value Date    LACTATE 1.5 01/02/2020    LACTATE 3.1 (H) 01/02/2020       Significant Imaging: I have reviewed all pertinent imaging results/findings within the past 24 hours.    Advance Care Planning   Advanced Directives::  Living Will: No  LaPOST: No  Do Not Resuscitate Status: No  Medical Power of : No    Decision-Making Capacity: Patient answered questions       Living Arrangements: Lives alone    Psychosocial/Cultural:  , two adult children, three grandchildren, one great grandchild and one on the way. Worked as a  and at [employer].    Spiritual:     [place] and Belief:  Raised Anabaptist and identifies as a non Moravian Gnosticism     I - Importance:   .  C - Community:     A - Address in Care: amenable to  visits and would receive anointing of the sick.       > 50% of 70  min visit spent in chart review, face to face discussion of goals of care,  symptom assessment, coordination of care and emotional support.  >20 minutes spent in advanced care planning     Cherise Montelongo, CNS  Palliative Medicine  [facility]

## 2020-01-03 NOTE — PLAN OF CARE
Patient seen for a bedside swallow eval. SLP recommending continued regular diet/thin liquids as patient exhibits a safe oropharyngeal swallow at this time.     Jarrod Sanchez CCC-SLP  Speech-Language Pathology  Pager: [phone]

## 2020-01-03 NOTE — PLAN OF CARE
Problem: Physical Therapy Goal  Goal: Physical Therapy Goal  Description  Goals to be met by: 20     Patient will increase functional independence with mobility by performin. Bed to chair transfer with Stand-by Assistance using Rolling Walker.  2. Gait  x 150 feet with Stand-by Assistance using Rolling Walker.   3. Ascend/Descend 6 inch curb step with Stand-by Assistance using Rolling Walker.  4. Lower extremity exercise program x 20 reps per handout, with assistance as needed.     Outcome: Ongoing, Progressing     PT goals established.

## 2020-01-03 NOTE — ASSESSMENT & PLAN NOTE
Patients CT scans very concerning for lung cancer with mets. CT surgery contacted and are unable to stent     Plan  Will try to obtain biopsy while patient is in the hospital.  IR will attempt liver biopsy Monday.   Pulm consulted and possible bronch on Monday  Pallative care consulted for goals of care

## 2020-01-03 NOTE — CONSULTS
Patient was discharged home prior to being staffed by pulmonary.  Readmitted this week with large right upper lobe lung mass.  Full consult note to follow, plan on bronchoscopy Monday morning.  Please make NPO p MN

## 2020-01-03 NOTE — CARE UPDATE
IR full note to follow, will plan for outpatient lung biopsy of R lung mass.  We will contact patient with date of procedure upon discharge.        Maicol Reid MD, MS  Radiology  PGY-2  Pager: [phone]

## 2020-01-03 NOTE — PLAN OF CARE
CM met with patient for discharge planning.  Patient states she lives with her daughter, son-in-law and grandson in a one story house with one step to enter.  Patient is independent with ADL's.  Patient's daughter will provide ride home.  Plan is to discharge home with family.    Pharmacy:    [facility] - [place] - [address]  [address]  [place] [address]  Phone: [phone] Fax: [phone]    PCP:  Wallace Deng MD    Payor: MEDICARE / Plan: MEDICARE PART A & B / Product Type: [facility] /      01/03/20 1342   Discharge Assessment   Assessment Type Discharge Planning Assessment   Confirmed/corrected address and phone number on facesheet? Yes   Assessment information obtained from? Patient   Expected Length of Stay (days) 3   Communicated expected length of stay with patient/caregiver yes   Prior to hospitilization cognitive status: Alert/Oriented   Prior to hospitalization functional status: Independent   Current cognitive status: Alert/Oriented   Current Functional Status: Independent   Facility Arrived From: Emergency room   Lives With child(alba), adult;grandchild(alba)   Able to Return to Prior Arrangements yes   Is patient able to care for self after discharge? Unable to determine at this time (comments)   Patient's perception of discharge disposition home or selfcare   Readmission Within the Last 30 Days no previous admission in last 30 days   Patient currently being followed by outpatient case management? No   Patient currently receives any other outside agency services? No   Equipment Currently Used at Home walker, rolling;wheelchair   Do you have any problems affording any of your prescribed medications? No   Is the patient taking medications as prescribed? yes   Does the patient have transportation home? Yes   Transportation Anticipated family or friend will provide   Does the patient receive services at the Coumadin Clinic? No   Discharge Plan A Home with family    DME Needed Upon Discharge  none   Patient/Family in Agreement with Plan yes

## 2020-01-03 NOTE — PHARMACY MED REC
"Admission Medication Reconciliation - Pharmacy Consult Note    The home medication history was taken by Dora Salas CPhT.  Based on information gathered and subsequent review by the clinical pharmacist, the items below may need attention.     You may go to "Admission" then "Reconcile Home Medications" tabs to review and/or act upon these items.     Potentially problematic discrepancies with current MAR  o Patient IS taking the following which was not ordered upon admit  o Alendronate 70mg PO QMondays  o Patient IS NOT taking the following which was ordered upon admit  o Paroxetine 20mg PO QD  o Topiramate 25mg BID     Potential issues to be addressed PRIOR TO DISCHARGE  o Patient reports not taking Albuterol inhaler, Paroxetine, and Topiramate, may need refills upon discharge if clinically indicated.   o Amlodipine and losartan are held in the setting of sepsis, restart when appropriate  o Clopidogrel and aspirin were held in preparation for biopsy, restart when appropriate    Please address this information as you see fit.  Feel free to contact us if you have any questions or require assistance.    Yasmine Penn, PGY-1 Resident   [id number]                .    .            "

## 2020-01-03 NOTE — SUBJECTIVE & OBJECTIVE
Interval History:     Past Medical History:   Diagnosis Date    Allergic rhinitis     COPD (chronic obstructive pulmonary disease)     Depression     GERD (gastroesophageal reflux disease)     Headache     Hyperlipidemia     Hypertension     Polyarthralgia     Postablative hypothyroidism     hypothyroidism s/p OCASIO therapy    PVD (peripheral vascular disease)     Treated by Dr. Sim       Past Surgical History:   Procedure Laterality Date    CHOLECYSTECTOMY  1986    GALLBLADDER SURGERY  2006    HIP SURGERY  2005    Right hip fracture/surgery    PERIPHERAL ARTERIAL STENT GRAFT Right 2007    stent for femoral artery blockage       Review of patient's allergies indicates:  No Known Allergies    Medications:  Continuous Infusions:  Scheduled Meds:   albuterol-ipratropium  3 mL Nebulization Q4H    atorvastatin  40 mg Oral QHS    enoxaparin  40 mg Subcutaneous Daily    famotidine  20 mg Oral Daily    levothyroxine  100 mcg Oral Before breakfast    montelukast  10 mg Oral Daily    paroxetine  20 mg Oral QAM    piperacillin-tazobactam (ZOSYN) IVPB  4.5 g Intravenous Q8H    predniSONE  40 mg Oral Daily    tiotropium  1 capsule Inhalation Daily    topiramate  25 mg Oral BID    vancomycin (VANCOCIN) IVPB  1,000 mg Intravenous Q24H     PRN Meds:benzonatate, butalbital-acetaminophen-caffeine -40 mg, Dextrose 10% Bolus, Dextrose 10% Bolus, glucagon (human recombinant), glucose, glucose, HYDROcodone-acetaminophen, ondansetron, promethazine, sodium chloride 0.9%    Family History     Problem Relation (Age of Onset)    COPD Mother    Cancer Mother    Heart attack Father (55), Sister (53)    Thyroid disease Mother        Tobacco Use    Smoking status: Current Every Day Smoker     Types: Cigarettes    Smokeless tobacco: Never Used   Substance and Sexual Activity    Alcohol use: No     Alcohol/week: 0.0 standard drinks    Drug use: Not on file    Sexual activity: Not on file       Review of  Systems  Objective:     Vital Signs (Most Recent):  Temp: 97.5 °F (36.4 °C) (01/03/20 0802)  Pulse: 75 (01/03/20 1102)  Resp: 17 (01/03/20 1052)  BP: (!) 105/57 (01/03/20 0802)  SpO2: (!) 94 % (01/03/20 1052) Vital Signs (24h Range):  Temp:  [97.4 °F (36.3 °C)-98.5 °F (36.9 °C)] 97.5 °F (36.4 °C)  Pulse:  [] 75  Resp:  [15-29] 17  SpO2:  [54 %-97 %] 94 %  BP: ()/(56-75) 105/57     Weight: 66.7 kg (147 lb 0.8 oz)  Body mass index is 26.47 kg/m².    Review of Symptoms  Symptom Assessment (ESAS 0-10 scale)   ESAS 0 1 2 3 4 5 6 7 8 9 10   Pain x             Dyspnea x             Anxiety x             Nausea x             Depression  x             Anorexia    x          Fatigue x             Insomnia x             Restlessness  x             Agitation x             CAM / Delirium __ --  ___+   Constipation     __ --  ___+   Diarrhea           __ --  ___+  Bowel Management Plan (BMP): No    Comments: reports no pain or shortness of breath at this time.  Able to have long conversation and does not appear to have dyspnea     Pain Assessment: reports no pain     OME in 24 hours: 0    Performance Status: 60    ECOG Performance Status Grade: 1 - Ambulates, capable of light work    Physical Exam    Significant Labs: All pertinent labs within the past 24 hours have been reviewed.  CBC:   Recent Labs   Lab 01/03/20  1045   WBC 24.73*   HGB 9.5*   HCT 30.5*   MCV 87        BMP:  Recent Labs   Lab 01/03/20  0651   GLU 97      K 5.0      CO2 22*   BUN 14   CREATININE 0.9   CALCIUM 7.7*   MG 2.1     LFT:  Lab Results   Component Value Date    AST 20 01/03/2020    ALKPHOS 70 01/03/2020    BILITOT 0.5 01/03/2020     Albumin:   Albumin   Date Value Ref Range Status   01/03/2020 2.1 (L) 3.5 - 5.2 g/dL Final     Protein:   Total Protein   Date Value Ref Range Status   01/03/2020 5.3 (L) 6.0 - 8.4 g/dL Final     Lactic acid:   Lab Results   Component Value Date    LACTATE 1.5 01/02/2020    LACTATE 3.1 (H)  01/02/2020       Significant Imaging: I have reviewed all pertinent imaging results/findings within the past 24 hours.    Advance Care Planning   Advanced Directives::  Living Will: No  LaPOST: No  Do Not Resuscitate Status: No  Medical Power of : No    Decision-Making Capacity: Patient answered questions       Living Arrangements: Lives alone    Psychosocial/Cultural:  , two adult children, three grandchildren, one great grandchild and one on the way. Worked as a  and at [employer].    Spiritual:     [place] and Belief:  Raised Adventist and identifies as a non Baptist Yazidi     I - Importance:   .  C - Community:     A - Address in Care: amenable to  visits and would receive anointing of the sick.

## 2020-01-03 NOTE — PT/OT/SLP EVAL
"Physical Therapy Evaluation    Patient Name:  Sharita Castañeda   MRN:  [id number]    Recommendations:     Discharge Recommendations:  outpatient PT   Discharge Equipment Recommendations: bath bench   Barriers to discharge: None    Assessment:     Sharita Castañeda is a 69 y.o. female admitted with a medical diagnosis of Postobstructive pneumonia.  She presents with the following impairments/functional limitations:  weakness, impaired endurance, impaired functional mobilty, gait instability, impaired balance, pain, decreased safety awareness, impaired cardiopulmonary response to activity.    Pt is safe to amb in the hallway with CGA of 1 person, with use of RW for support.  Pt requires supp O2 via NC at this time, 6 LPM.  Pt remains in the hospital for consult with oncology and to determine if her metastases have spread to the brain.  No history of falls, though pt admits to episodes of LOB - what she called "near falls".    Rehab Prognosis: Good; patient would benefit from acute skilled PT services to address these deficits and reach maximum level of function.    Recent Surgery: * No surgery found *      Plan:     During this hospitalization, patient to be seen 2 x/week to address the identified rehab impairments via gait training, therapeutic activities, therapeutic exercises, neuromuscular re-education and progress toward the following goals:    · Plan of Care Expires:  01/30/20    Subjective     Chief Complaint: Chest pain  Patient/Family Comments/goals: "I've been taking the breathing treatments."  Pain/Comfort:  · Pain Rating 1: 4/10  · Location 1: chest  · Pain Addressed 1: Pre-medicate for activity, Cessation of Activity, Distraction    Patients cultural, spiritual, Roman Catholic conflicts given the current situation: no    Living Environment:  Pt lives with daughter, son in law, and grandson, SSH, threshold to enter.  Prior to admission, patients level of function was I with all gait, requires A with tub transfers.  " Equipment used at home: none.  DME owned (not currently used): rolling walker, single point cane, bedside commode, wheelchair and rollator.  Upon discharge, patient will have assistance from family.    Objective:     Communicated with nursing prior to session.  Patient found supine with peripheral IV, oxygen  upon PT entry to room.    General Precautions: Standard, fall, respiratory   Orthopedic Precautions:N/A   Braces: N/A     Exams:  · Cognitive Exam:  Patient is oriented to Person, Place, Time and Situation  · Fine Motor Coordination:    · -       Intact  Left hand thumb/finger opposition skills and Right hand thumb/finger opposition skills  · Gross Motor Coordination:  WFL  · Postural Exam:  Patient presented with the following abnormalities:    · -       No postural abnormalities identified  · Sensation:    · -       Intact  · Skin Integrity/Edema:      · -       Edema: None noted B LEs  · RUE ROM: WFL  · RUE Strength: WFL  · LUE ROM: WFL  · LUE Strength: WFL  · RLE ROM: WFL  · RLE Strength: WFL  · LLE ROM: WFL  · LLE Strength: WFL    Functional Mobility:  · Bed Mobility:     · Rolling Right: independence  · Scooting: independence  · Supine to Sit: independence    · Transfers:     · Sit to Stand:  independence with no AD  · Bed to Chair: contact guard assistance with  rolling walker  using  Stand Pivot    · Gait: Pt amb 176', CGA, RW, with 6LPM of supp O2 via NC, 2 episodes of LOB both times occured with horizontal head turns, deviation from straight path noted    · Balance: CGA: dynamic standing balance with AD      Therapeutic Activities and Exercises:   Whiteboard updated    AM-PAC 6 CLICK MOBILITY  Total Score:21     Patient left up in chair with all lines intact, call button in reach and nursing present.    GOALS:   Multidisciplinary Problems     Physical Therapy Goals        Problem: Physical Therapy Goal    Goal Priority Disciplines Outcome Goal Variances Interventions   Physical Therapy Goal     PT,  PT/OT Ongoing, Progressing     Description:  Goals to be met by: 20     Patient will increase functional independence with mobility by performin. Bed to chair transfer with Stand-by Assistance using Rolling Walker.  2. Gait  x 150 feet with Stand-by Assistance using Rolling Walker.   3. Ascend/Descend 6 inch curb step with Stand-by Assistance using Rolling Walker.  4. Lower extremity exercise program x 20 reps per handout, with assistance as needed.                      History:     Past Medical History:   Diagnosis Date    Allergic rhinitis     COPD (chronic obstructive pulmonary disease)     Depression     GERD (gastroesophageal reflux disease)     Headache     Hyperlipidemia     Hypertension     Polyarthralgia     Postablative hypothyroidism     hypothyroidism s/p OCASIO therapy    PVD (peripheral vascular disease)     Treated by Dr. Sim       Past Surgical History:   Procedure Laterality Date    CHOLECYSTECTOMY  1986    GALLBLADDER SURGERY  2006    HIP SURGERY  2005    Right hip fracture/surgery    PERIPHERAL ARTERIAL STENT GRAFT Right 2007    stent for femoral artery blockage       Time Tracking:     PT Received On: 20  PT Start Time: 1218     PT Stop Time: 1253  PT Total Time (min): 35 min     Billable Minutes: Evaluation 8 and Gait Training 24      Kathy Hernandez, PT  2020

## 2020-01-03 NOTE — PROGRESS NOTES
[facility] Hospital Medicine  Progress Note    Patient Name: Sharita Castañeda  MRN: [id number]  Patient Class: IP- Inpatient   Admission Date: 1/2/2020  Length of Stay: 1 days  Attending Physician: Arnold Banegas MD  Primary Care Provider: Wallace Deng MD    [facility] Medicine Team: [facility] HOSP MED 2 Andrea Crowell MD    Subjective:     Principal Problem:Postobstructive pneumonia        HPI:   70 yo WF w/ hx of COPD, HTN, HLD, PVD (was on ASA and plavix, recently held in preparation for biopsy), MDD, hypothyroidism (post-ablation), and allergic rhinitis who presents to [facility] ED c/o tachycardia, SOB, and R sided CP. Patient was recently admitted to [facility] on 12/26 for hemoptysis concerning for malignancy. CT scan revealed a large mass in the right lung/ Patient also presented with signs of pneumonia and was treated with levaquin. Patient discharged on 12/28 and was instructed to complete 5 day course of levaquin and prednisone 40mg PO. Patient stated that she was doing well since discharge on w/ 1 or 2 further small episodes of hemoptysis. However, patient notes that her coughing became worse over the past few days w/ some pleuritic CP appreciated that could be relieved by massaging the area. Her symptoms of SOB began around 1AM this morning w/ severe right sided CP (8/10 - worse than before).      In the ER patient remained SOB and tachycardic w/ severe pleuritic CP. Patient placed on 2L nc w/ improvement in SOB and SpO2 to low to mid 90s. Patient febrile to 100.5 F and WBC of 25k. Patient endorses nausea, chills, fatigue, CP, SOB, cough, wheeze, constipation, and congestion. ER started patient on Vanc and Zosyn and gave fluid 30 cc/kg. CTA negative for PE. EKG unremarkable    On presentation to hospital medicine team patients vitals are stable. The shortness of breath has gotten better with oxygen. Still complaining of a sharp right sided chest pain. Got better with morphine.     Overview/Hospital  Course:  Admitted on 1/2 for suspected pneumonia. Started on vanc and zosyn. Patient with large mass in the right lung. Most likely post obstructive pneumonia.     Interval History: Patient doing about the same today. Still requiring 5 liters of oxygen. Pulm is going to attempt a bronch on Monday. Possible that IR gets a liver biopsy on Monday. Will continue vanc and zosyn.     Review of Systems   Constitutional: Positive for activity change, chills and fever.   HENT: Negative for congestion and postnasal drip.    Eyes: Negative for pain and discharge.   Respiratory: Positive for cough, shortness of breath and wheezing. Negative for chest tightness.    Cardiovascular: Positive for chest pain. Negative for palpitations and leg swelling.   Gastrointestinal: Negative for abdominal pain, constipation, diarrhea, nausea and vomiting.   Genitourinary: Negative for difficulty urinating and dysuria.   Musculoskeletal: Positive for arthralgias and myalgias.   Skin: Negative for rash and wound.   Neurological: Negative for light-headedness and headaches.   Psychiatric/Behavioral: Negative for agitation and confusion.     Objective:     Vital Signs (Most Recent):  Temp: 98.2 °F (36.8 °C) (01/03/20 1142)  Pulse: 81 (01/03/20 1142)  Resp: 18 (01/03/20 1142)  BP: (!) 131/58 (01/03/20 1142)  SpO2: 97 % (01/03/20 1142) Vital Signs (24h Range):  Temp:  [97.4 °F (36.3 °C)-98.5 °F (36.9 °C)] 98.2 °F (36.8 °C)  Pulse:  [] 81  Resp:  [15-28] 18  SpO2:  [54 %-97 %] 97 %  BP: ()/(56-75) 131/58     Weight: 66.7 kg (147 lb 0.8 oz)  Body mass index is 26.47 kg/m².    Intake/Output Summary (Last 24 hours) at 1/3/2020 1233  Last data filed at 1/3/2020 0500  Gross per 24 hour   Intake 320 ml   Output 550 ml   Net -230 ml      Physical Exam   Constitutional: She is oriented to person, place, and time. She appears well-developed and well-nourished. She appears distressed.   HENT:   Head: Normocephalic and atraumatic.   Eyes:  Conjunctivae and EOM are normal. No scleral icterus.   Neck: Normal range of motion.   Cardiovascular: Normal rate, regular rhythm and intact distal pulses. Exam reveals no gallop and no friction rub.   No murmur heard.  Pulmonary/Chest: Effort normal. She has wheezes. She exhibits tenderness.   Wheezing throughout bilateral lung fields   Abdominal: Soft. Bowel sounds are normal. She exhibits no distension. There is no tenderness.   Musculoskeletal: Normal range of motion. She exhibits no edema.   Neurological: She is alert and oriented to person, place, and time. No cranial nerve deficit.   Skin: Skin is warm and dry.   Psychiatric: She has a normal mood and affect.       Significant Labs:   Recent Results (from the past 48 hour(s))   Blood culture x two cultures. Draw prior to antibiotics.    Collection Time: 01/02/20  7:00 AM   Result Value Ref Range    Blood Culture, Routine No Growth to date     Blood Culture, Routine No Growth to date    CBC auto differential    Collection Time: 01/02/20  7:05 AM   Result Value Ref Range    WBC 25.03 (H) 3.90 - 12.70 K/uL    RBC 4.76 4.00 - 5.40 M/uL    Hemoglobin 12.8 12.0 - 16.0 g/dL    Hematocrit 40.5 37.0 - 48.5 %    Mean Corpuscular Volume 85 82 - 98 fL    Mean Corpuscular Hemoglobin 26.9 (L) 27.0 - 31.0 pg    Mean Corpuscular Hemoglobin Conc 31.6 (L) 32.0 - 36.0 g/dL    RDW 18.2 (H) 11.5 - 14.5 %    Platelets 193 150 - 350 K/uL    MPV 11.0 9.2 - 12.9 fL    Immature Granulocytes 1.4 (H) 0.0 - 0.5 %    Gran # (ANC) 20.0 (H) 1.8 - 7.7 K/uL    Immature Grans (Abs) 0.35 (H) 0.00 - 0.04 K/uL    Lymph # 2.1 1.0 - 4.8 K/uL    Mono # 2.3 (H) 0.3 - 1.0 K/uL    Eos # 0.3 0.0 - 0.5 K/uL    Baso # 0.05 0.00 - 0.20 K/uL    nRBC 0 0 /100 WBC    Gran% 79.9 (H) 38.0 - 73.0 %    Lymph% 8.5 (L) 18.0 - 48.0 %    Mono% 9.0 4.0 - 15.0 %    Eosinophil% 1.0 0.0 - 8.0 %    Basophil% 0.2 0.0 - 1.9 %    Platelet Estimate Appears normal     Aniso Slight     Poik Slight     Poly Occasional     Hypo  Occasional     Ovalocytes Occasional     Dohle Bodies Present     Toxic Granulation Present     Differential Method Automated    Comprehensive metabolic panel    Collection Time: 01/02/20  7:05 AM   Result Value Ref Range    Sodium 133 (L) 136 - 145 mmol/L    Potassium 5.2 (H) 3.5 - 5.1 mmol/L    Chloride 97 95 - 110 mmol/L    CO2 25 23 - 29 mmol/L    Glucose 96 70 - 110 mg/dL    BUN, Bld 15 8 - 23 mg/dL    Creatinine 1.1 0.5 - 1.4 mg/dL    Calcium 8.9 8.7 - 10.5 mg/dL    Total Protein 7.0 6.0 - 8.4 g/dL    Albumin 3.0 (L) 3.5 - 5.2 g/dL    Total Bilirubin 1.0 0.1 - 1.0 mg/dL    Alkaline Phosphatase 90 55 - 135 U/L    AST 29 10 - 40 U/L    ALT 19 10 - 44 U/L    Anion Gap 11 8 - 16 mmol/L    eGFR if African American 59.2 (A) >60 mL/min/1.73 m^2    eGFR if non  51.3 (A) >60 mL/min/1.73 m^2   Lactic acid, plasma #1    Collection Time: 01/02/20  7:05 AM   Result Value Ref Range    Lactate (Lactic Acid) 3.1 (H) 0.5 - 2.2 mmol/L   Protime-INR    Collection Time: 01/02/20  7:05 AM   Result Value Ref Range    Prothrombin Time 10.6 9.0 - 12.5 sec    INR 1.0 0.8 - 1.2   Blood culture x two cultures. Draw prior to antibiotics.    Collection Time: 01/02/20  7:25 AM   Result Value Ref Range    Blood Culture, Routine No Growth to date     Blood Culture, Routine No Growth to date    Urinalysis, Reflex to Urine Culture Urine, Clean Catch    Collection Time: 01/02/20  9:08 AM   Result Value Ref Range    Specimen UA Urine, Clean Catch     Color, UA Yellow Yellow, Straw, Virid[place]    Appearance, UA Clear Clear    pH, UA 7.0 5.0 - 8.0    Specific Gravity, UA 1.025 1.005 - 1.030    Protein, UA Negative Negative    Glucose, UA Negative Negative    Ketones, UA Negative Negative    Bilirubin (UA) Negative Negative    Occult Blood UA 1+ (A) Negative    Nitrite, UA Negative Negative    Leukocytes, UA 1+ (A) Negative   Urinalysis Microscopic    Collection Time: 01/02/20  9:08 AM   Result Value Ref Range    RBC, UA 1 0 - 4 /hpf     WBC, UA 0 0 - 5 /hpf    Bacteria Rare None-Occ /hpf    Squam Epithel, UA 1 /hpf    Microscopic Comment SEE COMMENT    Lactic acid, plasma #2    Collection Time: 01/02/20 11:40 AM   Result Value Ref Range    Lactate (Lactic Acid) 1.5 0.5 - 2.2 mmol/L   Culture, Respiratory with Gram Stain    Collection Time: 01/02/20 12:10 PM   Result Value Ref Range    Respiratory Culture Normal respiratory shamar     Gram Stain (Respiratory) <10 epithelial cells per low power field.     Gram Stain (Respiratory) Rare WBC's     Gram Stain (Respiratory) Few Gram positive cocci     Gram Stain (Respiratory) Few Gram negative diplococci    Influenza A & B by Molecular    Collection Time: 01/02/20  2:03 PM   Result Value Ref Range    Influenza A, Molecular Negative Negative    Influenza B, Molecular Negative Negative    Flu A & B Source Nasal swab    Hemoglobin A1c    Collection Time: 01/02/20  4:07 PM   Result Value Ref Range    Hemoglobin A1C 5.4 4.0 - 5.6 %    Estimated Avg Glucose 108 68 - 131 mg/dL   TSH    Collection Time: 01/02/20  4:07 PM   Result Value Ref Range    TSH 9.853 (H) 0.400 - 4.000 uIU/mL   Brain natriuretic peptide    Collection Time: 01/02/20  4:07 PM   Result Value Ref Range    BNP 77 0 - 99 pg/mL   Basic metabolic panel    Collection Time: 01/02/20  4:07 PM   Result Value Ref Range    Sodium 133 (L) 136 - 145 mmol/L    Potassium 4.6 3.5 - 5.1 mmol/L    Chloride 105 95 - 110 mmol/L    CO2 19 (L) 23 - 29 mmol/L    Glucose 106 70 - 110 mg/dL    BUN, Bld 12 8 - 23 mg/dL    Creatinine 0.9 0.5 - 1.4 mg/dL    Calcium 7.2 (L) 8.7 - 10.5 mg/dL    Anion Gap 9 8 - 16 mmol/L    eGFR if African American >60.0 >60 mL/min/1.73 m^2    eGFR if non African American >60.0 >60 mL/min/1.73 m^2   T4, free    Collection Time: 01/02/20  4:07 PM   Result Value Ref Range    Free T4 0.81 0.71 - 1.51 ng/dL   Troponin I    Collection Time: 01/02/20  4:07 PM   Result Value Ref Range    Troponin I 0.022 0.000 - 0.026 ng/mL   Respiratory Infection  Panel, Nasopharyngeal    Collection Time: 01/02/20  5:35 PM   Result Value Ref Range    Respiratory Infection Panel Source NP Swab Not Detected    Adenovirus Not Detected Not Detected    Coronavirus 229E Not Detected Not Detected    Coronavirus HKU1 Not Detected Not Detected    Coronavirus NL63 Not Detected Not Detected    Coronavirus OC43 Not Detected Not Detected    Human Metapneumovirus Not Detected Not Detected    Human Rhinovirus/Enterovirus Not Detected Not Detected    Influenza A (subtypes H1, H1-2009,H3) Not Detected Not Detected    Influenza B Not Detected Not Detected    Parainfluenza Virus 1 Not Detected Not Detected    Parainfluenza Virus 2 Not Detected Not Detected    Parainfluenza Virus 3 Not Detected Not Detected    Parainfluenza Virus 4 Not Detected Not Detected    Respiratory Syncytial Virus Not Detected Not Detected    Bordetella Parapertussis (SS0968) Not Detected Not Detected    Bordetella pertussis (ptxP) Not Detected Not Detected    Chlamydia pneumoniae Not Detected Not Detected    Mycoplasma pneumoniae Not Detected Not Detected   CBC auto differential    Collection Time: 01/03/20  6:51 AM   Result Value Ref Range    WBC 24.49 (H) 3.90 - 12.70 K/uL    RBC 3.50 (L) 4.00 - 5.40 M/uL    Hemoglobin 9.4 (L) 12.0 - 16.0 g/dL    Hematocrit 31.1 (L) 37.0 - 48.5 %    Mean Corpuscular Volume 89 82 - 98 fL    Mean Corpuscular Hemoglobin 26.9 (L) 27.0 - 31.0 pg    Mean Corpuscular Hemoglobin Conc 30.2 (L) 32.0 - 36.0 g/dL    RDW 18.2 (H) 11.5 - 14.5 %    Platelets 137 (L) 150 - 350 K/uL    MPV 11.1 9.2 - 12.9 fL    Immature Granulocytes 1.0 (H) 0.0 - 0.5 %    Gran # (ANC) 20.6 (H) 1.8 - 7.7 K/uL    Immature Grans (Abs) 0.25 (H) 0.00 - 0.04 K/uL    Lymph # 1.5 1.0 - 4.8 K/uL    Mono # 2.1 (H) 0.3 - 1.0 K/uL    Eos # 0.0 0.0 - 0.5 K/uL    Baso # 0.03 0.00 - 0.20 K/uL    nRBC 0 0 /100 WBC    Gran% 84.3 (H) 38.0 - 73.0 %    Lymph% 6.1 (L) 18.0 - 48.0 %    Mono% 8.5 4.0 - 15.0 %    Eosinophil% 0.0 0.0 - 8.0 %     Basophil% 0.1 0.0 - 1.9 %    Platelet Estimate Decreased (A)     Aniso Slight     Poik Slight     Poly Occasional     Hypo Occasional     Ovalocytes Occasional     [place] Cells Occasional     Schistocytes Present     Basophilic Stippling Occasional     Large/Giant Platelets Present     Fragmented Cells Occasional     Differential Method Automated    Comprehensive metabolic panel    Collection Time: 01/03/20  6:51 AM   Result Value Ref Range    Sodium 138 136 - 145 mmol/L    Potassium 5.0 3.5 - 5.1 mmol/L    Chloride 107 95 - 110 mmol/L    CO2 22 (L) 23 - 29 mmol/L    Glucose 97 70 - 110 mg/dL    BUN, Bld 14 8 - 23 mg/dL    Creatinine 0.9 0.5 - 1.4 mg/dL    Calcium 7.7 (L) 8.7 - 10.5 mg/dL    Total Protein 5.3 (L) 6.0 - 8.4 g/dL    Albumin 2.1 (L) 3.5 - 5.2 g/dL    Total Bilirubin 0.5 0.1 - 1.0 mg/dL    Alkaline Phosphatase 70 55 - 135 U/L    AST 20 10 - 40 U/L    ALT 20 10 - 44 U/L    Anion Gap 9 8 - 16 mmol/L    eGFR if African American >60.0 >60 mL/min/1.73 m^2    eGFR if non African American >60.0 >60 mL/min/1.73 m^2   Phosphorus    Collection Time: 01/03/20  6:51 AM   Result Value Ref Range    Phosphorus 3.1 2.7 - 4.5 mg/dL   Magnesium    Collection Time: 01/03/20  6:51 AM   Result Value Ref Range    Magnesium 2.1 1.6 - 2.6 mg/dL   Echo Color Flow Doppler? Yes    Collection Time: 01/03/20  8:54 AM   Result Value Ref Range    Ascending aorta 3.16 cm    STJ 2.56 cm    AV mean gradient 5 mmHg    Ao peak akna 1.65 m/s    Ao VTI 34.99 cm    IVS 0.88 0.6 - 1.1 cm    LA size 3.00 cm    Left Atrium Major Axis 4.77 cm    Left Atrium Minor Axis 4.78 cm    LVIDD 4.27 3.5 - 6.0 cm    LVIDS 3.14 2.1 - 4.0 cm    LVOT diameter 1.94 cm    LVOT peak VTI 23.86 cm    PW 0.80 0.6 - 1.1 cm    MV Peak A Kana 0.67 m/s    E wave decelartion time 182.50 msec    MV Peak E Kana 0.98 m/s    PV Peak D Kana 0.75 m/s    PV Peak S Kana 1.02 m/s    RA Major Axis 3.53 cm    RA Width 3.23 cm    RVDD 3.04 cm    Sinus 3.53 cm    TAPSE 2.11 cm    TR Max  Kana 3.26 m/s    TDI LATERAL 0.11 m/s    TDI SEPTAL 0.08 m/s    LA WIDTH 3.53 cm    LV Diastolic Volume 81.86 mL    LV Systolic Volume 38.99 mL    LVOT peak kana 1.21 m/s    LV LATERAL E/E' RATIO 8.91 m/s    LV SEPTAL E/E' RATIO 12.25 m/s    FS 26 %    LA volume 42.98 cm3    LV mass 111.08 g    Left Ventricle Relative Wall Thickness 0.37 cm    AV valve area 2.01 cm2    AV Velocity Ratio 0.73     AV index (prosthetic) 0.68     E/A ratio 1.46     Mean e' 0.10 m/s    Pulm vein S/D ratio 1.36     LVOT area 3.0 cm2    LVOT stroke volume 70.49 cm3    AV peak gradient 11 mmHg    E/E' ratio 10.32 m/s    LV Systolic Volume Index 23.1 mL/m2    LV Diastolic Volume Index 48.52 mL/m2    LA Volume Index 25.5 mL/m2    LV Mass Index 66 g/m2    Triscuspid Valve Regurgitation Peak Gradient 43 mmHg    BSA 1.71 m2    Right Atrial Pressure (from IVC) 3 mmHg    TV rest pulmonary artery pressure 46 mmHg   Lactate dehydrogenase    Collection Time: 01/03/20 10:45 AM   Result Value Ref Range     (H) 110 - 260 U/L   Reticulocytes    Collection Time: 01/03/20 10:45 AM   Result Value Ref Range    Retic 2.2 0.5 - 2.5 %   Iron and TIBC    Collection Time: 01/03/20 10:45 AM   Result Value Ref Range    Iron <10 (L) 30 - 160 ug/dL    Transferrin 148 (L) 200 - 375 mg/dL    TIBC 219 (L) 250 - 450 ug/dL    Saturated Iron Unable to calculate 20 - 50 %   Folate    Collection Time: 01/03/20 10:45 AM   Result Value Ref Range    Folate 3.4 (L) 4.0 - 24.0 ng/mL   Vitamin B12    Collection Time: 01/03/20 10:45 AM   Result Value Ref Range    Vitamin B-12 401 210 - 950 pg/mL   Pathologist Interpretation Differential    Collection Time: 01/03/20 10:45 AM   Result Value Ref Range    Pathologist Review Review required    CBC auto differential    Collection Time: 01/03/20 10:45 AM   Result Value Ref Range    WBC 24.73 (H) 3.90 - 12.70 K/uL    RBC 3.49 (L) 4.00 - 5.40 M/uL    Hemoglobin 9.5 (L) 12.0 - 16.0 g/dL    Hematocrit 30.5 (L) 37.0 - 48.5 %    Mean  Corpuscular Volume 87 82 - 98 fL    Mean Corpuscular Hemoglobin 27.2 27.0 - 31.0 pg    Mean Corpuscular Hemoglobin Conc 31.1 (L) 32.0 - 36.0 g/dL    RDW 18.1 (H) 11.5 - 14.5 %    Platelets 151 150 - 350 K/uL    MPV 10.4 9.2 - 12.9 fL    Immature Granulocytes 1.0 (H) 0.0 - 0.5 %    Gran # (ANC) 20.9 (H) 1.8 - 7.7 K/uL    Immature Grans (Abs) 0.24 (H) 0.00 - 0.04 K/uL    Lymph # 1.6 1.0 - 4.8 K/uL    Mono # 2.0 (H) 0.3 - 1.0 K/uL    Eos # 0.0 0.0 - 0.5 K/uL    Baso # 0.04 0.00 - 0.20 K/uL    nRBC 0 0 /100 WBC    Gran% 84.4 (H) 38.0 - 73.0 %    Lymph% 6.4 (L) 18.0 - 48.0 %    Mono% 7.9 4.0 - 15.0 %    Eosinophil% 0.1 0.0 - 8.0 %    Basophil% 0.2 0.0 - 1.9 %    Platelet Estimate Appears normal     Aniso Slight     Poik Slight     Poly Occasional     Hypo Occasional     Ovalocytes Occasional     [place] Cells Occasional     Basophilic Stippling Occasional     Toxic Granulation Present     Smudge Cells Present     Large/Giant Platelets Present     Differential Method Automated    Fibrinogen    Collection Time: 01/03/20 10:45 AM   Result Value Ref Range    Fibrinogen 564 (H) 182 - 366 mg/dL   D dimer, quantitative    Collection Time: 01/03/20 10:45 AM   Result Value Ref Range    D-Dimer 4.55 (H) <0.50 mg/L FEU         Significant Imaging:   Imaging Results          CTA Chest Non-Coronary (PE Study) (Final result)  Result time 01/02/20 10:16:01    Final result by Megan Street MD (01/02/20 10:16:01)                 Impression:      1.  No pulmonary arterial filling defect to suggest thromboembolism.    2.  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  Additional metastatic disease likely present in the right axilla, liver, spleen, and left renal fossa.    3.  Note is made of a stable appearing filling defect in the right superior pulmonary vein approach in its ostium suggesting neoplastic venous  invasion.    4.  Stable, indeterminate, left lower lobe pulmonary nodule.    5.  Trace right pleural fluid with associated compressive atelectasis.    Electronically signed by resident: Javier Nunez  Date:    01/02/2020  Time:    09:34    Electronically signed by: Megan Street MD  Date:    01/02/2020  Time:    10:16             Narrative:    EXAMINATION:  CTA CHEST NON CORONARY    CLINICAL HISTORY:  Chest pain, acute, nonspecific, low prob CAD;    TECHNIQUE:  The chest was surveyed from the costophrenic angles through the lung apices at 3-mm increments after the administration of 75 cc of Omnipaque 350 intravenous contrast material according to the PE protocol which is optimized for vascular contrast resolution.  Axial, sagittal and coronal maximum intensity projection images were reviewed.    Total Exam DLP: 333.30mGy-cm.    COMPARISON:  CTA chest 12/26/2019    FINDINGS:  Examination of the soft tissue and vascular structures at the base of the neck is unremarkable.    There is a left-sided aortic arch with 3 branch vessels.  The thoracic aorta maintains normal caliber, contour, and course with extensive atherosclerotic calcification.  There is no evidence of aneurysmal dilation or dissection.    The pulmonary arteries distribute normally without evidence of filling defect to indicate pulmonary thromboembolism.  Note is made of stable, attenuated appearance of the middle lobe segmental/subsegmental pulmonary arteries likely secondary to adjacent mass.  There are four pulmonary veins.  Again identified is a filling defect in the right superior pulmonary vein near its ostium consistent with venous invasion by the adjacent neoplastic process.  Systemic and pulmonary venoatrial connections remain concordant.    The heart is not enlarged and there is no evidence of pericardial effusion.    There is a stable 2.3 cm nodular soft tissue density in the right axilla likely representing lymphadenopathy.  Subcarinal and  right hilar soft tissue density appears stable and likely represents a combination of mass invasion/lymphadenopathy.    The esophagus maintains a normal course.    Limited images of the upper abdomen obtained during the course of this dedicated thoracic CT multiple hepatic hypodensities measuring up to 2.3 cm, a 3.3 cm splenic hypodensity, and partially visualized 7 cm nodular opacity in the left renal fossa.    The osseous structures demonstrate degenerative joint disease without aggressive marrow replacement process or acute fracture.    The trachea and proximal airways are patent.  Middle lobe segmental/subsegmental airways are mostly obstructed.    The lungs demonstrate a 12.5 x 4.5 cm middle lobe mass extending into the right hilum and subcarinal mediastinum with significant postobstructive consolidation.  There is stable subsegmental consolidative opacity in the anterior segment of the right upper lobe, as well as compressive atelectasis in the right lung base.  A 1.1 cm nodular opacity remains present in the anterior basal segment of the left lower lobe.  There is underlying apical predominant centrilobular and paraseptal emphysema.  Trace right pleural fluid is present.  No pneumothorax.                               X-Ray Chest AP Portable (Final result)  Result time 01/02/20 07:41:48    Final result by Jae Bolden MD (01/02/20 07:41:48)                 Impression:      Continued abnormal chest radiograph, but no significant detrimental interval change, allowing for differences in patient positioning, since 12/26/2019.      Electronically signed by: Jae Bolden MD  Date:    01/02/2020  Time:    07:41             Narrative:    EXAMINATION:  XR CHEST AP PORTABLE    CLINICAL HISTORY:  Sepsis;    COMPARISON:  Comparison is made to the chest radiograph of 12/26/2019.  Reference is also made to a subsequent thoracic CT examination of that same date.    FINDINGS:  Opacity in the inferior hemothorax on the right  side with obliteration of the right cardiac border and extending to the inferior aspect of the right hilum is again observed.  This was documented on the CT examination referenced above to primarily relate to a mass opacity in the right middle lobe.  This opacity appears essentially unchanged since the previous exam.  Heart size remains normal.  The left lung and the mid and upper lung zones on the right side appear stable, with no significant superimposed airspace consolidation or volume loss.  No pleural fluid of any substantial volume is seen on either side.  No pneumothorax.  Calcification in the wall of the aortic arch is again incidentally observed.                                    Assessment/Plan:      * Postobstructive pneumonia   68 yo WF w/ hx of COPD, HTN, HLD, PVD (was on ASA and plavix, recently held in preparation for biopsy), MDD, hypothyroidism (post-ablation), and allergic rhinitis who presents to [facility] ED c/o tachycardia, SOB, and R sided CP. Patient recently admitted and treated for pneumonia with 5 day course of levaquin. Symptoms and vitals still point to pneumonia.     Plan  Vanc and Zosyn  Sputum Culture  Blood cultures pending  Home [place] 7.5-35 q8 prn      Chronic obstructive pulmonary disease with acute exacerbation  Patients presents with probable pneumonia that is causing an exacerbation of her COPD    Plan  Scheduled duo-nebs q4  Tiotropium qd    Mass of middle lobe of right lung  Patients CT scans very concerning for lung cancer with mets. CT surgery contacted and are unable to stent     Plan  Will try to obtain biopsy while patient is in the hospital.  IR will attempt liver biopsy Monday.   Pulm consulted and possible bronch on Monday  Pallative care consulted for goals of care      Migraine  Home fiorcet ordered prn      Nausea  Promethazine and zofran ordered prn      Anxiety  Holding klonopin in setting of increased 02 requirements      GERD (gastroesophageal reflux  disease)  Famotidine 20 mg daily      Allergic rhinitis  Continue home montelukast       PVD (peripheral vascular disease)  Patient has stents in her leg. Normally on asprin and plavix. Being held for possible biopsy of lung mass       Depression  Continue home paxil and topiramate    Hypertension  Holding home meds in setting of sepsis      Hyperlipidemia  Continue home statin      Postablative hypothyroidism  Continue home levothyroxine        VTE Risk Mitigation (From admission, onward)         Ordered     enoxaparin injection 40 mg  Daily      01/03/20 1030     Place sequential compression device  Until discontinued      01/02/20 1150     IP VTE HIGH RISK PATIENT  Once      01/02/20 1150                      Andrea Crowell MD  Department of Hospital Medicine   [facility]

## 2020-01-03 NOTE — PROGRESS NOTES
Pharmacokinetic Initial Assessment: IV Vancomycin    Assessment/Plan:    Patient received Vancomycin 2g load in the ED.  Initiate a maintenance dose of Vancomycin 1000mg Q24h.   Desired empiric serum trough concentration is 15 to 20 mcg/mL  Draw vancomycin trough level 30 min prior to third dose on 01/04 at approximately 0930  Pharmacy will continue to follow and monitor vancomycin.      Please contact pharmacy at extension [phone] with any questions regarding this assessment.     Thank you for the consult,   Julia Yeondam Roh       Patient brief summary:  Sharita Castañeda is a 69 y.o. female initiated on antimicrobial therapy with IV Vancomycin for treatment of suspected lower respiratory infection    Drug Allergies:   Review of patient's allergies indicates:  No Known Allergies    Actual Body Weight:   66.7 kg     Renal Function:   Estimated Creatinine Clearance: 53.6 mL/min (based on SCr of 0.9 mg/dL).,     CBC (last 72 hours):  Recent Labs   Lab Result Units 01/02/20  0705 01/02/20  1607   WBC K/uL 25.03*  --    Hemoglobin g/dL 12.8  --    Hemoglobin A1C %  --  5.4   Hematocrit % 40.5  --    Platelets K/uL 193  --    Gran% % 79.9*  --    Lymph% % 8.5*  --    Mono% % 9.0  --    Eosinophil% % 1.0  --    Basophil% % 0.2  --    Differential Method  Automated  --        Metabolic Panel (last 72 hours):  Recent Labs   Lab Result Units 01/02/20  0705 01/02/20  0908 01/02/20  1607   Sodium mmol/L 133*  --  133*   Potassium mmol/L 5.2*  --  4.6   Chloride mmol/L 97  --  105   CO2 mmol/L 25  --  19*   Glucose mg/dL 96  --  106   Glucose, UA   --  Negative  --    BUN, Bld mg/dL 15  --  12   Creatinine mg/dL 1.1  --  0.9   Albumin g/dL 3.0*  --   --    Total Bilirubin mg/dL 1.0  --   --    Alkaline Phosphatase U/L 90  --   --    AST U/L 29  --   --    ALT U/L 19  --   --        Drug levels (last 3 results):  No results for input(s): VANCOMYCINRA, VANCOMYCINPE, VANCOMYCINTR in the last 72 hours.    Microbiologic  Results:  Microbiology Results (last 7 days)     Procedure Component Value Units Date/Time    Respiratory Infection Panel, Nasopharyngeal [[id number]] Collected:  01/02/20 1735    Order Status:  Completed Specimen:  Nasopharyngeal Swab Updated:  01/02/20 2326     Respiratory Infection Panel Source NP Swab     Adenovirus Not Detected     Coronavirus 229E Not Detected     Coronavirus HKU1 Not Detected     Coronavirus NL63 Not Detected     Coronavirus OC43 Not Detected     Human Metapneumovirus Not Detected     Human Rhinovirus/Enterovirus Not Detected     Influenza A (subtypes H1, H1-2009,H3) Not Detected     Influenza B Not Detected     Parainfluenza Virus 1 Not Detected     Parainfluenza Virus 2 Not Detected     Parainfluenza Virus 3 Not Detected     Parainfluenza Virus 4 Not Detected     Respiratory Syncytial Virus Not Detected     Bordetella Parapertussis (GR7029) Not Detected     Bordetella pertussis (ptxP) Not Detected     Chlamydia pneumoniae Not Detected     Mycoplasma pneumoniae Not Detected     Comment: Respiratory Infection Panel testing performed by Multiplex PCR.       Narrative:       For all other respiratory sources order [id number] Respiratory  Viral Panel by PCR (RVPCR)    Blood culture x two cultures. Draw prior to antibiotics. [[id number]] Collected:  01/02/20 0700    Order Status:  Completed Specimen:  Blood from Peripheral, Hand, Right Updated:  01/02/20 1515     Blood Culture, Routine No Growth to date    Narrative:       Aerobic and anaerobic    Blood culture x two cultures. Draw prior to antibiotics. [[id number]] Collected:  01/02/20 0725    Order Status:  Completed Specimen:  Blood from Peripheral, Antecubital, Left Updated:  01/02/20 1515     Blood Culture, Routine No Growth to date    Narrative:       Aerobic and anaerobic    Influenza A & B by Molecular [[id number]] Collected:  01/02/20 1403    Order Status:  Completed Specimen:  Nasopharyngeal Swab Updated:  01/02/20 1434     Influenza A,  Molecular Negative     Influenza B, Molecular Negative     Flu A & B Source Nasal swab    Culture, Respiratory with Gram Stain [[id number]] Collected:  01/02/20 1210    Order Status:  Completed Specimen:  Respiratory from Sputum Updated:  01/02/20 1312     Gram Stain (Respiratory) <10 epithelial cells per low power field.     Gram Stain (Respiratory) Rare WBC's     Gram Stain (Respiratory) Few Gram positive cocci     Gram Stain (Respiratory) Few Gram negative diplococci

## 2020-01-03 NOTE — SUBJECTIVE & OBJECTIVE
Interval History: Patient doing about the same today. Still requiring 5 liters of oxygen. Pulm is going to attempt a bronch on Monday. Possible that IR gets a liver biopsy on Monday. Will continue vanc and zosyn.     Review of Systems   Constitutional: Positive for activity change, chills and fever.   HENT: Negative for congestion and postnasal drip.    Eyes: Negative for pain and discharge.   Respiratory: Positive for cough, shortness of breath and wheezing. Negative for chest tightness.    Cardiovascular: Positive for chest pain. Negative for palpitations and leg swelling.   Gastrointestinal: Negative for abdominal pain, constipation, diarrhea, nausea and vomiting.   Genitourinary: Negative for difficulty urinating and dysuria.   Musculoskeletal: Positive for arthralgias and myalgias.   Skin: Negative for rash and wound.   Neurological: Negative for light-headedness and headaches.   Psychiatric/Behavioral: Negative for agitation and confusion.     Objective:     Vital Signs (Most Recent):  Temp: 98.2 °F (36.8 °C) (01/03/20 1142)  Pulse: 81 (01/03/20 1142)  Resp: 18 (01/03/20 1142)  BP: (!) 131/58 (01/03/20 1142)  SpO2: 97 % (01/03/20 1142) Vital Signs (24h Range):  Temp:  [97.4 °F (36.3 °C)-98.5 °F (36.9 °C)] 98.2 °F (36.8 °C)  Pulse:  [] 81  Resp:  [15-28] 18  SpO2:  [54 %-97 %] 97 %  BP: ()/(56-75) 131/58     Weight: 66.7 kg (147 lb 0.8 oz)  Body mass index is 26.47 kg/m².    Intake/Output Summary (Last 24 hours) at 1/3/2020 1233  Last data filed at 1/3/2020 0500  Gross per 24 hour   Intake 320 ml   Output 550 ml   Net -230 ml      Physical Exam   Constitutional: She is oriented to person, place, and time. She appears well-developed and well-nourished. She appears distressed.   HENT:   Head: Normocephalic and atraumatic.   Eyes: Conjunctivae and EOM are normal. No scleral icterus.   Neck: Normal range of motion.   Cardiovascular: Normal rate, regular rhythm and intact distal pulses. Exam reveals no  gallop and no friction rub.   No murmur heard.  Pulmonary/Chest: Effort normal. She has wheezes. She exhibits tenderness.   Wheezing throughout bilateral lung fields   Abdominal: Soft. Bowel sounds are normal. She exhibits no distension. There is no tenderness.   Musculoskeletal: Normal range of motion. She exhibits no edema.   Neurological: She is alert and oriented to person, place, and time. No cranial nerve deficit.   Skin: Skin is warm and dry.   Psychiatric: She has a normal mood and affect.       Significant Labs:   Recent Results (from the past 48 hour(s))   Blood culture x two cultures. Draw prior to antibiotics.    Collection Time: 01/02/20  7:00 AM   Result Value Ref Range    Blood Culture, Routine No Growth to date     Blood Culture, Routine No Growth to date    CBC auto differential    Collection Time: 01/02/20  7:05 AM   Result Value Ref Range    WBC 25.03 (H) 3.90 - 12.70 K/uL    RBC 4.76 4.00 - 5.40 M/uL    Hemoglobin 12.8 12.0 - 16.0 g/dL    Hematocrit 40.5 37.0 - 48.5 %    Mean Corpuscular Volume 85 82 - 98 fL    Mean Corpuscular Hemoglobin 26.9 (L) 27.0 - 31.0 pg    Mean Corpuscular Hemoglobin Conc 31.6 (L) 32.0 - 36.0 g/dL    RDW 18.2 (H) 11.5 - 14.5 %    Platelets 193 150 - 350 K/uL    MPV 11.0 9.2 - 12.9 fL    Immature Granulocytes 1.4 (H) 0.0 - 0.5 %    Gran # (ANC) 20.0 (H) 1.8 - 7.7 K/uL    Immature Grans (Abs) 0.35 (H) 0.00 - 0.04 K/uL    Lymph # 2.1 1.0 - 4.8 K/uL    Mono # 2.3 (H) 0.3 - 1.0 K/uL    Eos # 0.3 0.0 - 0.5 K/uL    Baso # 0.05 0.00 - 0.20 K/uL    nRBC 0 0 /100 WBC    Gran% 79.9 (H) 38.0 - 73.0 %    Lymph% 8.5 (L) 18.0 - 48.0 %    Mono% 9.0 4.0 - 15.0 %    Eosinophil% 1.0 0.0 - 8.0 %    Basophil% 0.2 0.0 - 1.9 %    Platelet Estimate Appears normal     Aniso Slight     Poik Slight     Poly Occasional     Hypo Occasional     Ovalocytes Occasional     Dohle Bodies Present     Toxic Granulation Present     Differential Method Automated    Comprehensive metabolic panel    Collection  Time: 01/02/20  7:05 AM   Result Value Ref Range    Sodium 133 (L) 136 - 145 mmol/L    Potassium 5.2 (H) 3.5 - 5.1 mmol/L    Chloride 97 95 - 110 mmol/L    CO2 25 23 - 29 mmol/L    Glucose 96 70 - 110 mg/dL    BUN, Bld 15 8 - 23 mg/dL    Creatinine 1.1 0.5 - 1.4 mg/dL    Calcium 8.9 8.7 - 10.5 mg/dL    Total Protein 7.0 6.0 - 8.4 g/dL    Albumin 3.0 (L) 3.5 - 5.2 g/dL    Total Bilirubin 1.0 0.1 - 1.0 mg/dL    Alkaline Phosphatase 90 55 - 135 U/L    AST 29 10 - 40 U/L    ALT 19 10 - 44 U/L    Anion Gap 11 8 - 16 mmol/L    eGFR if African American 59.2 (A) >60 mL/min/1.73 m^2    eGFR if non  51.3 (A) >60 mL/min/1.73 m^2   Lactic acid, plasma #1    Collection Time: 01/02/20  7:05 AM   Result Value Ref Range    Lactate (Lactic Acid) 3.1 (H) 0.5 - 2.2 mmol/L   Protime-INR    Collection Time: 01/02/20  7:05 AM   Result Value Ref Range    Prothrombin Time 10.6 9.0 - 12.5 sec    INR 1.0 0.8 - 1.2   Blood culture x two cultures. Draw prior to antibiotics.    Collection Time: 01/02/20  7:25 AM   Result Value Ref Range    Blood Culture, Routine No Growth to date     Blood Culture, Routine No Growth to date    Urinalysis, Reflex to Urine Culture Urine, Clean Catch    Collection Time: 01/02/20  9:08 AM   Result Value Ref Range    Specimen UA Urine, Clean Catch     Color, UA Yellow Yellow, Straw, Viridiana    Appearance, UA Clear Clear    pH, UA 7.0 5.0 - 8.0    Specific Gravity, UA 1.025 1.005 - 1.030    Protein, UA Negative Negative    Glucose, UA Negative Negative    Ketones, UA Negative Negative    Bilirubin (UA) Negative Negative    Occult Blood UA 1+ (A) Negative    Nitrite, UA Negative Negative    Leukocytes, UA 1+ (A) Negative   Urinalysis Microscopic    Collection Time: 01/02/20  9:08 AM   Result Value Ref Range    RBC, UA 1 0 - 4 /hpf    WBC, UA 0 0 - 5 /hpf    Bacteria Rare None-Occ /hpf    Squam Epithel, UA 1 /hpf    Microscopic Comment SEE COMMENT    Lactic acid, plasma #2    Collection Time: 01/02/20  11:40 AM   Result Value Ref Range    Lactate (Lactic Acid) 1.5 0.5 - 2.2 mmol/L   Culture, Respiratory with Gram Stain    Collection Time: 01/02/20 12:10 PM   Result Value Ref Range    Respiratory Culture Normal respiratory shamar     Gram Stain (Respiratory) <10 epithelial cells per low power field.     Gram Stain (Respiratory) Rare WBC's     Gram Stain (Respiratory) Few Gram positive cocci     Gram Stain (Respiratory) Few Gram negative diplococci    Influenza A & B by Molecular    Collection Time: 01/02/20  2:03 PM   Result Value Ref Range    Influenza A, Molecular Negative Negative    Influenza B, Molecular Negative Negative    Flu A & B Source Nasal swab    Hemoglobin A1c    Collection Time: 01/02/20  4:07 PM   Result Value Ref Range    Hemoglobin A1C 5.4 4.0 - 5.6 %    Estimated Avg Glucose 108 68 - 131 mg/dL   TSH    Collection Time: 01/02/20  4:07 PM   Result Value Ref Range    TSH 9.853 (H) 0.400 - 4.000 uIU/mL   Brain natriuretic peptide    Collection Time: 01/02/20  4:07 PM   Result Value Ref Range    BNP 77 0 - 99 pg/mL   Basic metabolic panel    Collection Time: 01/02/20  4:07 PM   Result Value Ref Range    Sodium 133 (L) 136 - 145 mmol/L    Potassium 4.6 3.5 - 5.1 mmol/L    Chloride 105 95 - 110 mmol/L    CO2 19 (L) 23 - 29 mmol/L    Glucose 106 70 - 110 mg/dL    BUN, Bld 12 8 - 23 mg/dL    Creatinine 0.9 0.5 - 1.4 mg/dL    Calcium 7.2 (L) 8.7 - 10.5 mg/dL    Anion Gap 9 8 - 16 mmol/L    eGFR if African American >60.0 >60 mL/min/1.73 m^2    eGFR if non African American >60.0 >60 mL/min/1.73 m^2   T4, free    Collection Time: 01/02/20  4:07 PM   Result Value Ref Range    Free T4 0.81 0.71 - 1.51 ng/dL   Troponin I    Collection Time: 01/02/20  4:07 PM   Result Value Ref Range    Troponin I 0.022 0.000 - 0.026 ng/mL   Respiratory Infection Panel, Nasopharyngeal    Collection Time: 01/02/20  5:35 PM   Result Value Ref Range    Respiratory Infection Panel Source NP Swab Not Detected    Adenovirus Not  Detected Not Detected    Coronavirus 229E Not Detected Not Detected    Coronavirus HKU1 Not Detected Not Detected    Coronavirus NL63 Not Detected Not Detected    Coronavirus OC43 Not Detected Not Detected    Human Metapneumovirus Not Detected Not Detected    Human Rhinovirus/Enterovirus Not Detected Not Detected    Influenza A (subtypes H1, H1-2009,H3) Not Detected Not Detected    Influenza B Not Detected Not Detected    Parainfluenza Virus 1 Not Detected Not Detected    Parainfluenza Virus 2 Not Detected Not Detected    Parainfluenza Virus 3 Not Detected Not Detected    Parainfluenza Virus 4 Not Detected Not Detected    Respiratory Syncytial Virus Not Detected Not Detected    Bordetella Parapertussis (UV6699) Not Detected Not Detected    Bordetella pertussis (ptxP) Not Detected Not Detected    Chlamydia pneumoniae Not Detected Not Detected    Mycoplasma pneumoniae Not Detected Not Detected   CBC auto differential    Collection Time: 01/03/20  6:51 AM   Result Value Ref Range    WBC 24.49 (H) 3.90 - 12.70 K/uL    RBC 3.50 (L) 4.00 - 5.40 M/uL    Hemoglobin 9.4 (L) 12.0 - 16.0 g/dL    Hematocrit 31.1 (L) 37.0 - 48.5 %    Mean Corpuscular Volume 89 82 - 98 fL    Mean Corpuscular Hemoglobin 26.9 (L) 27.0 - 31.0 pg    Mean Corpuscular Hemoglobin Conc 30.2 (L) 32.0 - 36.0 g/dL    RDW 18.2 (H) 11.5 - 14.5 %    Platelets 137 (L) 150 - 350 K/uL    MPV 11.1 9.2 - 12.9 fL    Immature Granulocytes 1.0 (H) 0.0 - 0.5 %    Gran # (ANC) 20.6 (H) 1.8 - 7.7 K/uL    Immature Grans (Abs) 0.25 (H) 0.00 - 0.04 K/uL    Lymph # 1.5 1.0 - 4.8 K/uL    Mono # 2.1 (H) 0.3 - 1.0 K/uL    Eos # 0.0 0.0 - 0.5 K/uL    Baso # 0.03 0.00 - 0.20 K/uL    nRBC 0 0 /100 WBC    Gran% 84.3 (H) 38.0 - 73.0 %    Lymph% 6.1 (L) 18.0 - 48.0 %    Mono% 8.5 4.0 - 15.0 %    Eosinophil% 0.0 0.0 - 8.0 %    Basophil% 0.1 0.0 - 1.9 %    Platelet Estimate Decreased (A)     Aniso Slight     Poik Slight     Poly Occasional     Hypo Occasional     Ovalocytes Occasional      [place] Cells Occasional     Schistocytes Present     Basophilic Stippling Occasional     Large/Giant Platelets Present     Fragmented Cells Occasional     Differential Method Automated    Comprehensive metabolic panel    Collection Time: 01/03/20  6:51 AM   Result Value Ref Range    Sodium 138 136 - 145 mmol/L    Potassium 5.0 3.5 - 5.1 mmol/L    Chloride 107 95 - 110 mmol/L    CO2 22 (L) 23 - 29 mmol/L    Glucose 97 70 - 110 mg/dL    BUN, Bld 14 8 - 23 mg/dL    Creatinine 0.9 0.5 - 1.4 mg/dL    Calcium 7.7 (L) 8.7 - 10.5 mg/dL    Total Protein 5.3 (L) 6.0 - 8.4 g/dL    Albumin 2.1 (L) 3.5 - 5.2 g/dL    Total Bilirubin 0.5 0.1 - 1.0 mg/dL    Alkaline Phosphatase 70 55 - 135 U/L    AST 20 10 - 40 U/L    ALT 20 10 - 44 U/L    Anion Gap 9 8 - 16 mmol/L    eGFR if African American >60.0 >60 mL/min/1.73 m^2    eGFR if non African American >60.0 >60 mL/min/1.73 m^2   Phosphorus    Collection Time: 01/03/20  6:51 AM   Result Value Ref Range    Phosphorus 3.1 2.7 - 4.5 mg/dL   Magnesium    Collection Time: 01/03/20  6:51 AM   Result Value Ref Range    Magnesium 2.1 1.6 - 2.6 mg/dL   Echo Color Flow Doppler? Yes    Collection Time: 01/03/20  8:54 AM   Result Value Ref Range    Ascending aorta 3.16 cm    STJ 2.56 cm    AV mean gradient 5 mmHg    Ao peak kingsley 1.65 m/s    Ao VTI 34.99 cm    IVS 0.88 0.6 - 1.1 cm    LA size 3.00 cm    Left Atrium Major Axis 4.77 cm    Left Atrium Minor Axis 4.78 cm    LVIDD 4.27 3.5 - 6.0 cm    LVIDS 3.14 2.1 - 4.0 cm    LVOT diameter 1.94 cm    LVOT peak VTI 23.86 cm    PW 0.80 0.6 - 1.1 cm    MV Peak A Kingsley 0.67 m/s    E wave decelartion time 182.50 msec    MV Peak E Kingsley 0.98 m/s    PV Peak D Kingsley 0.75 m/s    PV Peak S Kingsley 1.02 m/s    RA Major Axis 3.53 cm    RA Width 3.23 cm    RVDD 3.04 cm    Sinus 3.53 cm    TAPSE 2.11 cm    TR Max Kingsley 3.26 m/s    TDI LATERAL 0.11 m/s    TDI SEPTAL 0.08 m/s    LA WIDTH 3.53 cm    LV Diastolic Volume 81.86 mL    LV Systolic Volume 38.99 mL    LVOT peak kingsley  1.21 m/s    LV LATERAL E/E' RATIO 8.91 m/s    LV SEPTAL E/E' RATIO 12.25 m/s    FS 26 %    LA volume 42.98 cm3    LV mass 111.08 g    Left Ventricle Relative Wall Thickness 0.37 cm    AV valve area 2.01 cm2    AV Velocity Ratio 0.73     AV index (prosthetic) 0.68     E/A ratio 1.46     Mean e' 0.10 m/s    Pulm vein S/D ratio 1.36     LVOT area 3.0 cm2    LVOT stroke volume 70.49 cm3    AV peak gradient 11 mmHg    E/E' ratio 10.32 m/s    LV Systolic Volume Index 23.1 mL/m2    LV Diastolic Volume Index 48.52 mL/m2    LA Volume Index 25.5 mL/m2    LV Mass Index 66 g/m2    Triscuspid Valve Regurgitation Peak Gradient 43 mmHg    BSA 1.71 m2    Right Atrial Pressure (from IVC) 3 mmHg    TV rest pulmonary artery pressure 46 mmHg   Lactate dehydrogenase    Collection Time: 01/03/20 10:45 AM   Result Value Ref Range     (H) 110 - 260 U/L   Reticulocytes    Collection Time: 01/03/20 10:45 AM   Result Value Ref Range    Retic 2.2 0.5 - 2.5 %   Iron and TIBC    Collection Time: 01/03/20 10:45 AM   Result Value Ref Range    Iron <10 (L) 30 - 160 ug/dL    Transferrin 148 (L) 200 - 375 mg/dL    TIBC 219 (L) 250 - 450 ug/dL    Saturated Iron Unable to calculate 20 - 50 %   Folate    Collection Time: 01/03/20 10:45 AM   Result Value Ref Range    Folate 3.4 (L) 4.0 - 24.0 ng/mL   Vitamin B12    Collection Time: 01/03/20 10:45 AM   Result Value Ref Range    Vitamin B-12 401 210 - 950 pg/mL   Pathologist Interpretation Differential    Collection Time: 01/03/20 10:45 AM   Result Value Ref Range    Pathologist Review Review required    CBC auto differential    Collection Time: 01/03/20 10:45 AM   Result Value Ref Range    WBC 24.73 (H) 3.90 - 12.70 K/uL    RBC 3.49 (L) 4.00 - 5.40 M/uL    Hemoglobin 9.5 (L) 12.0 - 16.0 g/dL    Hematocrit 30.5 (L) 37.0 - 48.5 %    Mean Corpuscular Volume 87 82 - 98 fL    Mean Corpuscular Hemoglobin 27.2 27.0 - 31.0 pg    Mean Corpuscular Hemoglobin Conc 31.1 (L) 32.0 - 36.0 g/dL    RDW 18.1 (H) 11.5  - 14.5 %    Platelets 151 150 - 350 K/uL    MPV 10.4 9.2 - 12.9 fL    Immature Granulocytes 1.0 (H) 0.0 - 0.5 %    Gran # (ANC) 20.9 (H) 1.8 - 7.7 K/uL    Immature Grans (Abs) 0.24 (H) 0.00 - 0.04 K/uL    Lymph # 1.6 1.0 - 4.8 K/uL    Mono # 2.0 (H) 0.3 - 1.0 K/uL    Eos # 0.0 0.0 - 0.5 K/uL    Baso # 0.04 0.00 - 0.20 K/uL    nRBC 0 0 /100 WBC    Gran% 84.4 (H) 38.0 - 73.0 %    Lymph% 6.4 (L) 18.0 - 48.0 %    Mono% 7.9 4.0 - 15.0 %    Eosinophil% 0.1 0.0 - 8.0 %    Basophil% 0.2 0.0 - 1.9 %    Platelet Estimate Appears normal     Aniso Slight     Poik Slight     Poly Occasional     Hypo Occasional     Ovalocytes Occasional     [place] Cells Occasional     Basophilic Stippling Occasional     Toxic Granulation Present     Smudge Cells Present     Large/Giant Platelets Present     Differential Method Automated    Fibrinogen    Collection Time: 01/03/20 10:45 AM   Result Value Ref Range    Fibrinogen 564 (H) 182 - 366 mg/dL   D dimer, quantitative    Collection Time: 01/03/20 10:45 AM   Result Value Ref Range    D-Dimer 4.55 (H) <0.50 mg/L FEU         Significant Imaging:   Imaging Results          CTA Chest Non-Coronary (PE Study) (Final result)  Result time 01/02/20 10:16:01    Final result by Megan Street MD (01/02/20 10:16:01)                 Impression:      1.  No pulmonary arterial filling defect to suggest thromboembolism.    2.  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  Additional metastatic disease likely present in the right axilla, liver, spleen, and left renal fossa.    3.  Note is made of a stable appearing filling defect in the right superior pulmonary vein approach in its ostium suggesting neoplastic venous invasion.    4.  Stable, indeterminate, left lower lobe pulmonary nodule.    5.  Trace right pleural fluid with associated compressive atelectasis.    Electronically  signed by resident: Javier Nunez  Date:    01/02/2020  Time:    09:34    Electronically signed by: Megan Street MD  Date:    01/02/2020  Time:    10:16             Narrative:    EXAMINATION:  CTA CHEST NON CORONARY    CLINICAL HISTORY:  Chest pain, acute, nonspecific, low prob CAD;    TECHNIQUE:  The chest was surveyed from the costophrenic angles through the lung apices at 3-mm increments after the administration of 75 cc of Omnipaque 350 intravenous contrast material according to the PE protocol which is optimized for vascular contrast resolution.  Axial, sagittal and coronal maximum intensity projection images were reviewed.    Total Exam DLP: 333.30mGy-cm.    COMPARISON:  CTA chest 12/26/2019    FINDINGS:  Examination of the soft tissue and vascular structures at the base of the neck is unremarkable.    There is a left-sided aortic arch with 3 branch vessels.  The thoracic aorta maintains normal caliber, contour, and course with extensive atherosclerotic calcification.  There is no evidence of aneurysmal dilation or dissection.    The pulmonary arteries distribute normally without evidence of filling defect to indicate pulmonary thromboembolism.  Note is made of stable, attenuated appearance of the middle lobe segmental/subsegmental pulmonary arteries likely secondary to adjacent mass.  There are four pulmonary veins.  Again identified is a filling defect in the right superior pulmonary vein near its ostium consistent with venous invasion by the adjacent neoplastic process.  Systemic and pulmonary venoatrial connections remain concordant.    The heart is not enlarged and there is no evidence of pericardial effusion.    There is a stable 2.3 cm nodular soft tissue density in the right axilla likely representing lymphadenopathy.  Subcarinal and right hilar soft tissue density appears stable and likely represents a combination of mass invasion/lymphadenopathy.    The esophagus maintains a normal  course.    Limited images of the upper abdomen obtained during the course of this dedicated thoracic CT multiple hepatic hypodensities measuring up to 2.3 cm, a 3.3 cm splenic hypodensity, and partially visualized 7 cm nodular opacity in the left renal fossa.    The osseous structures demonstrate degenerative joint disease without aggressive marrow replacement process or acute fracture.    The trachea and proximal airways are patent.  Middle lobe segmental/subsegmental airways are mostly obstructed.    The lungs demonstrate a 12.5 x 4.5 cm middle lobe mass extending into the right hilum and subcarinal mediastinum with significant postobstructive consolidation.  There is stable subsegmental consolidative opacity in the anterior segment of the right upper lobe, as well as compressive atelectasis in the right lung base.  A 1.1 cm nodular opacity remains present in the anterior basal segment of the left lower lobe.  There is underlying apical predominant centrilobular and paraseptal emphysema.  Trace right pleural fluid is present.  No pneumothorax.                               X-Ray Chest AP Portable (Final result)  Result time 01/02/20 07:41:48    Final result by Jae Bolden MD (01/02/20 07:41:48)                 Impression:      Continued abnormal chest radiograph, but no significant detrimental interval change, allowing for differences in patient positioning, since 12/26/2019.      Electronically signed by: Jae Bolden MD  Date:    01/02/2020  Time:    07:41             Narrative:    EXAMINATION:  XR CHEST AP PORTABLE    CLINICAL HISTORY:  Sepsis;    COMPARISON:  Comparison is made to the chest radiograph of 12/26/2019.  Reference is also made to a subsequent thoracic CT examination of that same date.    FINDINGS:  Opacity in the inferior hemothorax on the right side with obliteration of the right cardiac border and extending to the inferior aspect of the right hilum is again observed.  This was documented on the  CT examination referenced above to primarily relate to a mass opacity in the right middle lobe.  This opacity appears essentially unchanged since the previous exam.  Heart size remains normal.  The left lung and the mid and upper lung zones on the right side appear stable, with no significant superimposed airspace consolidation or volume loss.  No pleural fluid of any substantial volume is seen on either side.  No pneumothorax.  Calcification in the wall of the aortic arch is again incidentally observed.

## 2020-01-03 NOTE — CONSULTS
Inpatient consult to Interventional Radiology  Consult performed by: Maicol Reid MD  Consult ordered by: Andrea Crowell MD        Radiology Consult    Sharita Castañeda is a 69 y.o. female recently admitted to [facility] on 12/26 for hemoptysis concerning for malignancy. CT scan revealed a large mass in the right lung.  Follow up imaging here revealed the same mass, as well as multiple liver lesions concerning for mets as well as L lung lesion and L adrenal lesion also concerning for mets.    IR consulted for biopsy of R lung mass.      IR recommending pulm consult and EBUS if the right mass is desired for biopsy.  IR happy to offer biopsy of a liver lesion if desired to assist with oncological management.  If patient is expecting a long inpatient stay we can biopsy liver lesion as inpatient, otherwise we can plan for outpatient biopsy in near future.      Past Medical History:   Diagnosis Date    Allergic rhinitis     COPD (chronic obstructive pulmonary disease)     Depression     GERD (gastroesophageal reflux disease)     Headache     Hyperlipidemia     Hypertension     Polyarthralgia     Postablative hypothyroidism     hypothyroidism s/p OCASIO therapy    PVD (peripheral vascular disease)     Treated by Dr. Sim     Past Surgical History:   Procedure Laterality Date    CHOLECYSTECTOMY  1986    GALLBLADDER SURGERY  2006    HIP SURGERY  2005    Right hip fracture/surgery    PERIPHERAL ARTERIAL STENT GRAFT Right 2007    stent for femoral artery blockage       Discussed with primary team, Dr. Carter.    Imaging reviewed with Radiology staff, Dr. Sepulveda.     Procedure: Liver lesion biopsy    Scheduled Meds:    albuterol-ipratropium  3 mL Nebulization Q4H    atorvastatin  40 mg Oral QHS    enoxaparin  40 mg Subcutaneous Daily    famotidine  20 mg Oral Daily    levothyroxine  100 mcg Oral Before breakfast    montelukast  10 mg Oral Daily    paroxetine  20 mg Oral QAM    piperacillin-tazobactam  (ZOSYN) IVPB  4.5 g Intravenous Q8H    predniSONE  40 mg Oral Daily    tiotropium  1 capsule Inhalation Daily    topiramate  25 mg Oral BID    vancomycin (VANCOCIN) IVPB  1,000 mg Intravenous Q24H     Continuous Infusions:   PRN Meds:benzonatate, butalbital-acetaminophen-caffeine -40 mg, Dextrose 10% Bolus, Dextrose 10% Bolus, glucagon (human recombinant), glucose, glucose, HYDROcodone-acetaminophen, ondansetron, promethazine, sodium chloride 0.9%    Allergies: Review of patient's allergies indicates:  No Known Allergies    Labs:  Recent Labs   Lab 01/02/20  0705   INR 1.0       Recent Labs   Lab 01/03/20  1045   WBC 24.73*   HGB 9.5*   HCT 30.5*   MCV 87         Recent Labs   Lab 01/03/20  0651   GLU 97      K 5.0      CO2 22*   BUN 14   CREATININE 0.9   CALCIUM 7.7*   MG 2.1   ALT 20   AST 20   ALBUMIN 2.1*   BILITOT 0.5         Vitals (Most Recent):  Temp: 97.5 °F (36.4 °C) (01/03/20 0802)  Pulse: 75 (01/03/20 1102)  Resp: 17 (01/03/20 1052)  BP: (!) 105/57 (01/03/20 0802)  SpO2: (!) 94 % (01/03/20 1052)      Plan:   IR recommending pulm consult and EBUS if the right mass is desired for biopsy.  IR happy to offer biopsy of a liver lesion if desired to assist with oncological management.  If patient is expecting a long inpatient stay we can biopsy liver lesion as inpatient, otherwise we can plan for outpatient biopsy in near future.        Maicol Reid MD, MS  Radiology  PGY-2  Pager: [phone]

## 2020-01-04 LAB
ALBUMIN SERPL BCP-MCNC: 2.1 G/DL (ref 3.5–5.2)
ALBUMIN SERPL BCP-MCNC: 2.1 G/DL (ref 3.5–5.2)
ALP SERPL-CCNC: 72 U/L (ref 55–135)
ALP SERPL-CCNC: 72 U/L (ref 55–135)
ALT SERPL W/O P-5'-P-CCNC: 27 U/L (ref 10–44)
ALT SERPL W/O P-5'-P-CCNC: 27 U/L (ref 10–44)
ANION GAP SERPL CALC-SCNC: 8 MMOL/L (ref 8–16)
ANION GAP SERPL CALC-SCNC: 8 MMOL/L (ref 8–16)
AST SERPL-CCNC: 28 U/L (ref 10–40)
AST SERPL-CCNC: 28 U/L (ref 10–40)
BACTERIA SPEC AEROBE CULT: NORMAL
BACTERIA SPEC AEROBE CULT: NORMAL
BASOPHILS # BLD AUTO: 0.03 K/UL (ref 0–0.2)
BASOPHILS NFR BLD: 0.1 % (ref 0–1.9)
BILIRUB SERPL-MCNC: 0.4 MG/DL (ref 0.1–1)
BILIRUB SERPL-MCNC: 0.4 MG/DL (ref 0.1–1)
BUN SERPL-MCNC: 12 MG/DL (ref 8–23)
BUN SERPL-MCNC: 12 MG/DL (ref 8–23)
CALCIUM SERPL-MCNC: 7.8 MG/DL (ref 8.7–10.5)
CALCIUM SERPL-MCNC: 7.8 MG/DL (ref 8.7–10.5)
CHLORIDE SERPL-SCNC: 106 MMOL/L (ref 95–110)
CHLORIDE SERPL-SCNC: 106 MMOL/L (ref 95–110)
CO2 SERPL-SCNC: 25 MMOL/L (ref 23–29)
CO2 SERPL-SCNC: 25 MMOL/L (ref 23–29)
CREAT SERPL-MCNC: 0.9 MG/DL (ref 0.5–1.4)
CREAT SERPL-MCNC: 0.9 MG/DL (ref 0.5–1.4)
DIFFERENTIAL METHOD: ABNORMAL
EOSINOPHIL # BLD AUTO: 0 K/UL (ref 0–0.5)
EOSINOPHIL NFR BLD: 0.1 % (ref 0–8)
ERYTHROCYTE [DISTWIDTH] IN BLOOD BY AUTOMATED COUNT: 18.2 % (ref 11.5–14.5)
EST. GFR  (AFRICAN AMERICAN): >60 ML/MIN/1.73 M^2
EST. GFR  (AFRICAN AMERICAN): >60 ML/MIN/1.73 M^2
EST. GFR  (NON AFRICAN AMERICAN): >60 ML/MIN/1.73 M^2
EST. GFR  (NON AFRICAN AMERICAN): >60 ML/MIN/1.73 M^2
GLUCOSE SERPL-MCNC: 100 MG/DL (ref 70–110)
GLUCOSE SERPL-MCNC: 100 MG/DL (ref 70–110)
GRAM STN SPEC: NORMAL
HCT VFR BLD AUTO: 29 % (ref 37–48.5)
HGB BLD-MCNC: 8.8 G/DL (ref 12–16)
IMM GRANULOCYTES # BLD AUTO: 0.26 K/UL (ref 0–0.04)
IMM GRANULOCYTES NFR BLD AUTO: 1.2 % (ref 0–0.5)
LYMPHOCYTES # BLD AUTO: 1.4 K/UL (ref 1–4.8)
LYMPHOCYTES NFR BLD: 6.5 % (ref 18–48)
MAGNESIUM SERPL-MCNC: 2.1 MG/DL (ref 1.6–2.6)
MCH RBC QN AUTO: 27.2 PG (ref 27–31)
MCHC RBC AUTO-ENTMCNC: 30.3 G/DL (ref 32–36)
MCV RBC AUTO: 90 FL (ref 82–98)
MONOCYTES # BLD AUTO: 1.5 K/UL (ref 0.3–1)
MONOCYTES NFR BLD: 6.7 % (ref 4–15)
NEUTROPHILS # BLD AUTO: 18.4 K/UL (ref 1.8–7.7)
NEUTROPHILS NFR BLD: 85.4 % (ref 38–73)
NRBC BLD-RTO: 0 /100 WBC
OB PNL STL: NEGATIVE
PHOSPHATE SERPL-MCNC: 1.9 MG/DL (ref 2.7–4.5)
PLATELET # BLD AUTO: 171 K/UL (ref 150–350)
PMV BLD AUTO: 11.1 FL (ref 9.2–12.9)
POCT GLUCOSE: 101 MG/DL (ref 70–110)
POCT GLUCOSE: 120 MG/DL (ref 70–110)
POCT GLUCOSE: 131 MG/DL (ref 70–110)
POCT GLUCOSE: 149 MG/DL (ref 70–110)
POTASSIUM SERPL-SCNC: 4.9 MMOL/L (ref 3.5–5.1)
POTASSIUM SERPL-SCNC: 4.9 MMOL/L (ref 3.5–5.1)
PROT SERPL-MCNC: 5.3 G/DL (ref 6–8.4)
PROT SERPL-MCNC: 5.3 G/DL (ref 6–8.4)
RBC # BLD AUTO: 3.24 M/UL (ref 4–5.4)
SODIUM SERPL-SCNC: 139 MMOL/L (ref 136–145)
SODIUM SERPL-SCNC: 139 MMOL/L (ref 136–145)
VANCOMYCIN TROUGH SERPL-MCNC: 11.8 UG/ML (ref 10–22)
WBC # BLD AUTO: 21.52 K/UL (ref 3.9–12.7)

## 2020-01-04 PROCEDURE — 80053 COMPREHEN METABOLIC PANEL: CPT

## 2020-01-04 PROCEDURE — 11000001 HC ACUTE MED/SURG PRIVATE ROOM

## 2020-01-04 PROCEDURE — 99900035 HC TECH TIME PER 15 MIN (STAT)

## 2020-01-04 PROCEDURE — 63600175 PHARM REV CODE 636 W HCPCS: Performed by: STUDENT IN AN ORGANIZED HEALTH CARE EDUCATION/TRAINING PROGRAM

## 2020-01-04 PROCEDURE — 97165 OT EVAL LOW COMPLEX 30 MIN: CPT

## 2020-01-04 PROCEDURE — 36415 COLL VENOUS BLD VENIPUNCTURE: CPT

## 2020-01-04 PROCEDURE — 25000003 PHARM REV CODE 250: Performed by: STUDENT IN AN ORGANIZED HEALTH CARE EDUCATION/TRAINING PROGRAM

## 2020-01-04 PROCEDURE — 27000221 HC OXYGEN, UP TO 24 HOURS

## 2020-01-04 PROCEDURE — 83735 ASSAY OF MAGNESIUM: CPT

## 2020-01-04 PROCEDURE — 99233 SBSQ HOSP IP/OBS HIGH 50: CPT | Mod: ,,, | Performed by: INTERNAL MEDICINE

## 2020-01-04 PROCEDURE — 99233 SBSQ HOSP IP/OBS HIGH 50: CPT | Mod: GC,,, | Performed by: STUDENT IN AN ORGANIZED HEALTH CARE EDUCATION/TRAINING PROGRAM

## 2020-01-04 PROCEDURE — 99233 PR SUBSEQUENT HOSPITAL CARE,LEVL III: ICD-10-PCS | Mod: ,,, | Performed by: INTERNAL MEDICINE

## 2020-01-04 PROCEDURE — 94761 N-INVAS EAR/PLS OXIMETRY MLT: CPT

## 2020-01-04 PROCEDURE — 97535 SELF CARE MNGMENT TRAINING: CPT

## 2020-01-04 PROCEDURE — 94664 DEMO&/EVAL PT USE INHALER: CPT

## 2020-01-04 PROCEDURE — 25000242 PHARM REV CODE 250 ALT 637 W/ HCPCS: Performed by: STUDENT IN AN ORGANIZED HEALTH CARE EDUCATION/TRAINING PROGRAM

## 2020-01-04 PROCEDURE — 99233 PR SUBSEQUENT HOSPITAL CARE,LEVL III: ICD-10-PCS | Mod: GC,,, | Performed by: STUDENT IN AN ORGANIZED HEALTH CARE EDUCATION/TRAINING PROGRAM

## 2020-01-04 PROCEDURE — 63600175 PHARM REV CODE 636 W HCPCS

## 2020-01-04 PROCEDURE — 27000646 HC AEROBIKA DEVICE

## 2020-01-04 PROCEDURE — 94640 AIRWAY INHALATION TREATMENT: CPT

## 2020-01-04 PROCEDURE — 94799 UNLISTED PULMONARY SVC/PX: CPT

## 2020-01-04 PROCEDURE — 85025 COMPLETE CBC W/AUTO DIFF WBC: CPT

## 2020-01-04 PROCEDURE — 80202 ASSAY OF VANCOMYCIN: CPT

## 2020-01-04 PROCEDURE — 84100 ASSAY OF PHOSPHORUS: CPT

## 2020-01-04 PROCEDURE — 82272 OCCULT BLD FECES 1-3 TESTS: CPT

## 2020-01-04 RX ORDER — SODIUM,POTASSIUM PHOSPHATES 280-250MG
2 POWDER IN PACKET (EA) ORAL EVERY 4 HOURS
Status: COMPLETED | OUTPATIENT
Start: 2020-01-04 | End: 2020-01-04

## 2020-01-04 RX ORDER — AMOXICILLIN 250 MG
1 CAPSULE ORAL DAILY
Status: DISCONTINUED | OUTPATIENT
Start: 2020-01-04 | End: 2020-01-04

## 2020-01-04 RX ORDER — POLYETHYLENE GLYCOL 3350 17 G/17G
17 POWDER, FOR SOLUTION ORAL ONCE
Status: COMPLETED | OUTPATIENT
Start: 2020-01-04 | End: 2020-01-04

## 2020-01-04 RX ADMIN — POTASSIUM & SODIUM PHOSPHATES POWDER PACK 280-160-250 MG 2 PACKET: 280-160-250 PACK at 09:01

## 2020-01-04 RX ADMIN — MONTELUKAST 10 MG: 10 TABLET, FILM COATED ORAL at 09:01

## 2020-01-04 RX ADMIN — POLYETHYLENE GLYCOL 3350 17 G: 17 POWDER, FOR SOLUTION ORAL at 09:01

## 2020-01-04 RX ADMIN — IPRATROPIUM BROMIDE AND ALBUTEROL SULFATE 3 ML: .5; 3 SOLUTION RESPIRATORY (INHALATION) at 11:01

## 2020-01-04 RX ADMIN — PREDNISONE 40 MG: 10 TABLET ORAL at 09:01

## 2020-01-04 RX ADMIN — PIPERACILLIN AND TAZOBACTAM 4.5 G: 4; .5 INJECTION, POWDER, FOR SOLUTION INTRAVENOUS at 09:01

## 2020-01-04 RX ADMIN — POTASSIUM & SODIUM PHOSPHATES POWDER PACK 280-160-250 MG 2 PACKET: 280-160-250 PACK at 01:01

## 2020-01-04 RX ADMIN — BENZONATATE 100 MG: 100 CAPSULE ORAL at 09:01

## 2020-01-04 RX ADMIN — PAROXETINE 20 MG: 10 TABLET, FILM COATED ORAL at 06:01

## 2020-01-04 RX ADMIN — IPRATROPIUM BROMIDE AND ALBUTEROL SULFATE 3 ML: .5; 3 SOLUTION RESPIRATORY (INHALATION) at 03:01

## 2020-01-04 RX ADMIN — ENOXAPARIN SODIUM 40 MG: 100 INJECTION SUBCUTANEOUS at 04:01

## 2020-01-04 RX ADMIN — LEVOTHYROXINE SODIUM 100 MCG: 100 TABLET ORAL at 06:01

## 2020-01-04 RX ADMIN — FAMOTIDINE 20 MG: 20 TABLET ORAL at 09:01

## 2020-01-04 RX ADMIN — POTASSIUM & SODIUM PHOSPHATES POWDER PACK 280-160-250 MG 2 PACKET: 280-160-250 PACK at 05:01

## 2020-01-04 RX ADMIN — IPRATROPIUM BROMIDE AND ALBUTEROL SULFATE 3 ML: .5; 3 SOLUTION RESPIRATORY (INHALATION) at 04:01

## 2020-01-04 RX ADMIN — PIPERACILLIN AND TAZOBACTAM 4.5 G: 4; .5 INJECTION, POWDER, FOR SOLUTION INTRAVENOUS at 11:01

## 2020-01-04 RX ADMIN — VANCOMYCIN HYDROCHLORIDE 1000 MG: 1 INJECTION, POWDER, LYOPHILIZED, FOR SOLUTION INTRAVENOUS at 09:01

## 2020-01-04 RX ADMIN — IPRATROPIUM BROMIDE AND ALBUTEROL SULFATE 3 ML: .5; 3 SOLUTION RESPIRATORY (INHALATION) at 07:01

## 2020-01-04 RX ADMIN — ATORVASTATIN CALCIUM 40 MG: 20 TABLET, FILM COATED ORAL at 09:01

## 2020-01-04 RX ADMIN — PIPERACILLIN AND TAZOBACTAM 4.5 G: 4; .5 INJECTION, POWDER, FOR SOLUTION INTRAVENOUS at 02:01

## 2020-01-04 RX ADMIN — TIOTROPIUM BROMIDE 18 MCG: 18 CAPSULE ORAL; RESPIRATORY (INHALATION) at 09:01

## 2020-01-04 NOTE — HPI
Ms. Castañeda is a 68 yo WF with a PMH of COPD, HTN, HLD, PVD (on ASA and Plavix which has been held for possible biopsy), hypothyroidism (post-ablation) who presented on 1/2 for R-sided pleuritic chest pain and hypoxia (requiring 1L NC).    Patient was recently admitted 12/26-12/28 for SOB, hemoptysis, and R-sided pleuritic chest pain. CTA of the chest was significant for R-sided soft tissue mass encompassing RML and R hilum, as well as mediastinal LAD. LLL and RUL soft tissue nodules are also noted. Multiple lesions are also noted in the liver (largest is 1.8 x 1.5 cm) and L adrenal gland (7.3 x 4.3 cm). Neoplastic venous invasion into the right superior pulmonary vein was also noted. Patient was treated with Levaquin and prednisone 40 mg for possible COPD exacerbation and possible post-obstructive PNA. IR consultation was placed for biopsy and pulmonology appointment was made for following week.    Patient denies any symptoms of fevers, chills, cough, sputum production, abdominal pain, N/V/D. She states that she does have exertional SOB, R-sided pleuritic chest pain, and was having bright red hemoptysis which has resolved since last admission. Patient has smoked 1 PPD for about 40 years and quit a decade ago. She denies any weight loss, appetite change, or fatigue/overall feeling of malaise.    CTA was repeated on 1/2 (today) shows same findings as original CTA on 12/26 but does show worsening post-obstructive consolidation of the RML.

## 2020-01-04 NOTE — PT/OT/SLP EVAL
Occupational Therapy   Evaluation    Name: Sharita Castañeda  MRN: [id number]  Admitting Diagnosis:  Postobstructive pneumonia      Recommendations:     Discharge Recommendations: home  Discharge Equipment Recommendations:  bath bench  Barriers to discharge:  None    Assessment:     Sharita Castañeda is a 69 y.o. female with a medical diagnosis of Postobstructive pneumonia.  She presents with a decline in occupational participation and functional mobility. Performance deficits affecting function: weakness, impaired endurance, impaired self care skills, impaired functional mobilty, gait instability, impaired balance, impaired cardiopulmonary response to activity.      Rehab Prognosis: Good; patient would benefit from acute skilled OT services to address these deficits and reach maximum level of function.       Plan:     Patient to be seen 2 x/week to address the above listed problems via self-care/home management, therapeutic activities, therapeutic exercises  · Plan of Care Expires: 02/04/20  · Plan of Care Reviewed with: patient, son    Subjective     Chief Complaint: shortness of breath  Patient/Family Comments/goals: To feel better and return home    Occupational Profile:  Living Environment: Patient lives with her daughter, son-in-law, and grandson in a [facility] with a threshold to enter. Patient's bathroom has a tub/shower combination.  Previous level of function: Mostly independent with ADLs - patient sometimes needs assistance with washing her hair due to long length. Patient is independent with functional mobility except transferring out of shower - requires assistance.   Roles and Routines: Mother, grandmother. Patient does not work or drive. She enjoys cross stitch, reading, and going for walks.  Equipment Used at Home:  walker, rolling, wheelchair, bedside commode, cane, straight, rollator  Assistance upon Discharge: Family is available to assist    Pain/Comfort:  · Pain Rating 1: 0/10    Patients cultural, spiritual,  Jainism conflicts given the current situation: no    Objective:     Communicated with: RN prior to session.  Patient found up in chair with peripheral IV, oxygen upon OT entry to room.    General Precautions: Standard, fall, respiratory   Orthopedic Precautions:N/A   Braces: N/A     Occupational Performance:    Bed Mobility:    · Did not observe    Functional Mobility/Transfers:  · Patient completed Sit <> Stand Transfer with supervision  with  no assistive device   · Patient completed Toilet Transfer onto toilet in restroom via Step Transfer technique with supervision with  grab bars  · Functional Mobility: Patient completed functional mobility within room ~20' with CGA and no AD. Patient utilized wall intermittently for steadying assist.    Activities of Daily Living:  · Toileting: independence Patient completed toileting on the toilet in the restroom independently.    Cognitive/Visual Perceptual:  Cognitive/Psychosocial Skills:     -       Oriented to: Person, Place, Time and Situation   -       Follows Commands/attention:Follows multistep  commands    Physical Exam:  Upper Extremity Range of Motion:     -       Right Upper Extremity: WFL  -       Left Upper Extremity: WFL  Upper Extremity Strength:    -       Right Upper Extremity: WFL  -       Left Upper Extremity: WFL   Strength:    -       Right Upper Extremity: WFL  -       Left Upper Extremity: WFL  Fine Motor Coordination:    -       Intact  Left hand thumb/finger opposition skills and Right hand thumb/finger opposition skills    AMPAC 6 Click ADL:  AMPAC Total Score: 21    Treatment & Education:  Role of OT/evaluation  Call button for assistance  Education:    Patient left up in chair with all lines intact, call button in reach, RN notified and son present    GOALS:   Multidisciplinary Problems     Occupational Therapy Goals        Problem: Occupational Therapy Goal    Goal Priority Disciplines Outcome Interventions   Occupational Therapy Goal      OT, PT/OT Ongoing, Progressing    Description:  Goals to be met by: 1/18/2020     Patient will increase functional independence with ADLs by performing:    LE Dressing with [place].  Grooming while standing with Modified [place].  Toilet transfer to toilet with Modified [place] and LRAD PRN.  Upper extremity exercise program x10 reps per handout, with independence.                     History:     Past Medical History:   Diagnosis Date    Allergic rhinitis     COPD (chronic obstructive pulmonary disease)     Depression     GERD (gastroesophageal reflux disease)     Headache     Hyperlipidemia     Hypertension     Polyarthralgia     Postablative hypothyroidism     hypothyroidism s/p OCASIO therapy    PVD (peripheral vascular disease)     Treated by Dr. Sim       Past Surgical History:   Procedure Laterality Date    CHOLECYSTECTOMY  1986    GALLBLADDER SURGERY  2006    HIP SURGERY  2005    Right hip fracture/surgery    PERIPHERAL ARTERIAL STENT GRAFT Right 2007    stent for femoral artery blockage       Time Tracking:     OT Date of Treatment: 01/04/20  OT Start Time: 1003  OT Stop Time: 1024  OT Total Time (min): 21 min    Billable Minutes:Evaluation 10 minutes  Self Care/Home Management 11 minutes    Юлия Martinez OT  1/4/2020

## 2020-01-04 NOTE — PLAN OF CARE
Problem: Occupational Therapy Goal  Goal: Occupational Therapy Goal  Description  Goals to be met by: 1/18/2020     Patient will increase functional independence with ADLs by performing:    LE Dressing with [place].  Grooming while standing with Modified [place].  Toilet transfer to toilet with Modified [place] and LRAD PRN.  Upper extremity exercise program x10 reps per handout, with independence.      Outcome: Ongoing, Progressing    OT evaluation completed and POC established.  Юлия Martinez OT  1/4/2020

## 2020-01-04 NOTE — CARE UPDATE
"RAPID RESPONSE NURSE PROACTIVE ROUNDING NOTE     Time of Visit: 04:53    Admit Date: 2020  LOS: 2  Code Status: Full Code   Date of Visit: 2020  : 1950  Age: 69 y.o.  Sex: female  Race: White  Bed: 1106/1106 A:   MRN: [id number]  Was the patient discharged from an ICU this admission? no   Was the patient discharged from a PACU within last 24 hours?  no  Did the patient receive conscious sedation/general anesthesia in last 24 hours?  no  Was the patient in the ED within the past 24 hours?  no  Was the patient started on NIPPV within the past 24 hours?  no  Attending Physician: Arnold Banegas MD  Primary Service: [facility] MED 2    ASSESSMENT     Diagnosis: Postobstructive pneumonia    Abnormal Vital Signs: /60 (BP Location: Right arm, Patient Position: Lying)   Pulse 94   Temp 97.8 °F (36.6 °C) (Oral)   Resp 17   Ht 5' 2.5" (1.588 m)   Wt 66.7 kg (147 lb 0.8 oz)   SpO2 (!) 94%   Breastfeeding? No   BMI 26.47 kg/m²      Clinical Issues: Circulatory    Patient  has a past medical history of Allergic rhinitis, COPD (chronic obstructive pulmonary disease), Depression, GERD (gastroesophageal reflux disease), Headache, Hyperlipidemia, Hypertension, Polyarthralgia, Postablative hypothyroidism, and PVD (peripheral vascular disease).    Rounded on patient secondary to central tele monitoring notifying of patient hr >140. Upon arrival to bedside patient sitting on side of bed AAOX4. She stated she had just went to the bathroom and that sometimes her heart rate will increase when she walks. , temp 97.7 oral, SPO2 93% on 2 LMP via NC, and /65.      INTERVENTIONS/ RECOMMENDATIONS     Continue current plan of care.     Discussed plan of care with Jeannette CAN.    PHYSICIAN ESCALATION     Yes/No  no    Orders received and case discussed with NA.    Disposition: Remain in room 1106.    FOLLOW-UP     Call back the Rapid Response Nurse, Caio Rosas RN at [phone] for additional questions " or concerns.

## 2020-01-04 NOTE — ASSESSMENT & PLAN NOTE
Patients presents with probable pneumonia that is causing an exacerbation of her COPD    Plan  Scheduled duo-nebs q4  Tiotropium qd  Prednisone PO for 5 days

## 2020-01-04 NOTE — PROGRESS NOTES
Pharmacokinetic Assessment Follow Up: IV Vancomycin    Vancomycin serum concentration assessment(s):    The trough level was drawn correctly and can be used to guide therapy at this time. The measurement is below the desired definitive target range of 15 to 20 mcg/mL.    Vancomycin Regimen Plan:    Change regimen to Vancomycin 1250 mg IV every 24 hours with next serum trough concentration measured at 0630 prior to 3 dose on 1/7    Drug levels (last 3 results):  Recent Labs   Lab Result Units 01/04/20  0900   Vancomycin-Trough ug/mL 11.8       Pharmacy will continue to follow and monitor vancomycin.    Please contact pharmacy at extension [phone]  for questions regarding this assessment.    Thank you for the consult,   Olinda Paz       Patient brief summary:  Sharita Castañeda is a 69 y.o. female initiated on antimicrobial therapy with IV Vancomycin for treatment of lower respiratory infection    Drug Allergies:   Review of patient's allergies indicates:  No Known Allergies    Actual Body Weight: 66.7 kg     Renal Function:   Estimated Creatinine Clearance: 53.6 mL/min (based on SCr of 0.9 mg/dL).,         CBC (last 72 hours):  Recent Labs   Lab Result Units 01/02/20  0705 01/02/20  1607 01/03/20  0651 01/03/20  1045 01/04/20  0656   WBC K/uL 25.03*  --  24.49* 24.73* 21.52*   Hemoglobin g/dL 12.8  --  9.4* 9.5* 8.8*   Hemoglobin A1C %  --  5.4  --   --   --    Hematocrit % 40.5  --  31.1* 30.5* 29.0*   Platelets K/uL 193  --  137* 151 171   Gran% % 79.9*  --  84.3* 84.4* 85.4*   Lymph% % 8.5*  --  6.1* 6.4* 6.5*   Mono% % 9.0  --  8.5 7.9 6.7   Eosinophil% % 1.0  --  0.0 0.1 0.1   Basophil% % 0.2  --  0.1 0.2 0.1   Differential Method  Automated  --  Automated Automated Automated       Metabolic Panel (last 72 hours):  Recent Labs   Lab Result Units 01/02/20  0705 01/02/20  0908 01/02/20  1607 01/03/20  0651 01/04/20  0655   Sodium mmol/L 133*  --  133* 138 139  139   Potassium mmol/L 5.2*  --  4.6 5.0 4.9  4.9    Chloride mmol/L 97  --  105 107 106  106   CO2 mmol/L 25  --  19* 22* 25  25   Glucose mg/dL 96  --  106 97 100  100   Glucose, UA   --  Negative  --   --   --    BUN, Bld mg/dL 15  --  12 14 12  12   Creatinine mg/dL 1.1  --  0.9 0.9 0.9  0.9   Albumin g/dL 3.0*  --   --  2.1* 2.1*  2.1*   Total Bilirubin mg/dL 1.0  --   --  0.5 0.4  0.4   Alkaline Phosphatase U/L 90  --   --  70 72  72   AST U/L 29  --   --  20 28  28   ALT U/L 19  --   --  20 27  27   Magnesium mg/dL  --   --   --  2.1 2.1   Phosphorus mg/dL  --   --   --  3.1 1.9*       Vancomycin Administrations:  vancomycin given in the last 96 hours                   vancomycin in dextrose 5 % 1 gram/250 mL IVPB 1,000 mg (mg) 1,000 mg New Bag 01/04/20 0934     1,000 mg New Bag 01/03/20 0940    vancomycin 2 g in 0.9% sodium chloride 500 mL IVPB (mg) 2,000 mg New Bag 01/02/20 1010                Microbiologic Results:  Microbiology Results (last 7 days)     Procedure Component Value Units Date/Time    Blood culture x two cultures. Draw prior to antibiotics. [[id number]] Collected:  01/02/20 0725    Order Status:  Completed Specimen:  Blood from Peripheral, Antecubital, Left Updated:  01/04/20 0812     Blood Culture, Routine No Growth to date      No Growth to date      No Growth to date    Narrative:       Aerobic and anaerobic    Blood culture x two cultures. Draw prior to antibiotics. [[id number]] Collected:  01/02/20 0700    Order Status:  Completed Specimen:  Blood from Peripheral, Hand, Right Updated:  01/04/20 0812     Blood Culture, Routine No Growth to date      No Growth to date      No Growth to date    Narrative:       Aerobic and anaerobic    Culture, Respiratory with Gram Stain [[id number]] Collected:  01/02/20 1210    Order Status:  Completed Specimen:  Respiratory from Sputum Updated:  01/04/20 0809     Respiratory Culture Normal respiratory shamar     Gram Stain (Respiratory) <10 epithelial cells per low power field.     Gram Stain  (Respiratory) Rare WBC's     Gram Stain (Respiratory) Few Gram positive cocci     Gram Stain (Respiratory) Few Gram negative diplococci    Respiratory Infection Panel, Nasopharyngeal [[id number]] Collected:  01/02/20 1735    Order Status:  Completed Specimen:  Nasopharyngeal Swab Updated:  01/02/20 2326     Respiratory Infection Panel Source NP Swab     Adenovirus Not Detected     Coronavirus 229E Not Detected     Coronavirus HKU1 Not Detected     Coronavirus NL63 Not Detected     Coronavirus OC43 Not Detected     Human Metapneumovirus Not Detected     Human Rhinovirus/Enterovirus Not Detected     Influenza A (subtypes H1, H1-2009,H3) Not Detected     Influenza B Not Detected     Parainfluenza Virus 1 Not Detected     Parainfluenza Virus 2 Not Detected     Parainfluenza Virus 3 Not Detected     Parainfluenza Virus 4 Not Detected     Respiratory Syncytial Virus Not Detected     Bordetella Parapertussis (TI3652) Not Detected     Bordetella pertussis (ptxP) Not Detected     Chlamydia pneumoniae Not Detected     Mycoplasma pneumoniae Not Detected     Comment: Respiratory Infection Panel testing performed by Multiplex PCR.       Narrative:       For all other respiratory sources order [id number] Respiratory  Viral Panel by PCR (RVPCR)    Influenza A & B by Molecular [[id number]] Collected:  01/02/20 1403    Order Status:  Completed Specimen:  Nasopharyngeal Swab Updated:  01/02/20 1434     Influenza A, Molecular Negative     Influenza B, Molecular Negative     Flu A & B Source Nasal swab

## 2020-01-04 NOTE — SUBJECTIVE & OBJECTIVE
Past Medical History:   Diagnosis Date    Allergic rhinitis     COPD (chronic obstructive pulmonary disease)     Depression     GERD (gastroesophageal reflux disease)     Headache     Hyperlipidemia     Hypertension     Polyarthralgia     Postablative hypothyroidism     hypothyroidism s/p OCASIO therapy    PVD (peripheral vascular disease)     Treated by Dr. Sim     Past Surgical History:   Procedure Laterality Date    CHOLECYSTECTOMY  1986    GALLBLADDER SURGERY  2006    HIP SURGERY  2005    Right hip fracture/surgery    PERIPHERAL ARTERIAL STENT GRAFT Right 2007    stent for femoral artery blockage     Review of patient's allergies indicates:  No Known Allergies    Family History     Problem Relation (Age of Onset)    COPD Mother    Cancer Mother    Heart attack Father (55), Sister (53)    Thyroid disease Mother        Tobacco Use    Smoking status: Current Every Day Smoker     Types: Cigarettes    Smokeless tobacco: Never Used   Substance and Sexual Activity    Alcohol use: No     Alcohol/week: 0.0 standard drinks    Drug use: Not on file    Sexual activity: Not on file       Review of Systems   Constitutional: Negative for appetite change, chills, diaphoresis, fatigue, fever and unexpected weight change.   HENT: Negative for congestion, postnasal drip, rhinorrhea and sore throat.    Eyes: Negative for pain and redness.   Respiratory: Positive for chest tightness. Negative for cough, shortness of breath and wheezing.    Cardiovascular: Positive for chest pain. Negative for leg swelling.   Gastrointestinal: Negative for abdominal pain, diarrhea, nausea and vomiting.   Genitourinary: Negative for dysuria and frequency.   Musculoskeletal: Negative for arthralgias and myalgias.   Neurological: Negative for dizziness and headaches.     Objective:     Vital Signs (Most Recent):  Temp: 97.8 °F (36.6 °C) (01/03/20 1550)  Pulse: 97 (01/03/20 2151)  Resp: 17 (01/03/20 2151)  BP: 115/62 (01/03/20  2151)  SpO2: 97 % (01/03/20 2151) Vital Signs (24h Range):  Temp:  [97.4 °F (36.3 °C)-98.2 °F (36.8 °C)] 97.8 °F (36.6 °C)  Pulse:  [61-98] 97  Resp:  [14-20] 17  SpO2:  [90 %-97 %] 97 %  BP: ()/(50-66) 115/62     Weight: 66.7 kg (147 lb 0.8 oz)  Body mass index is 26.47 kg/m².    Intake/Output Summary (Last 24 hours) at 1/3/2020 2253  Last data filed at 1/3/2020 0500  Gross per 24 hour   Intake 100 ml   Output 400 ml   Net -300 ml     Physical Exam   Constitutional: She is oriented to person, place, and time. She appears well-developed and well-nourished.   HENT:   Head: Normocephalic and atraumatic.   Eyes: Pupils are equal, round, and reactive to light. Conjunctivae are normal.   Neck: Normal range of motion. Neck supple.   Cardiovascular: Normal rate, regular rhythm and normal heart sounds.   Pulmonary/Chest: Effort normal. No stridor. No respiratory distress. She has no wheezes. She has no rales.   Decreased breath sounds bilaterally R>L. Bronchial breath sounds throughout R lung.   Abdominal: Soft. Bowel sounds are normal. She exhibits no distension. There is no tenderness.   Musculoskeletal: Normal range of motion. She exhibits no edema.   Lymphadenopathy:     She has no cervical adenopathy.   Neurological: She is alert and oriented to person, place, and time.   Skin: Skin is warm and dry. No rash noted.   Psychiatric: She has a normal mood and affect. Her behavior is normal.   Nursing note and vitals reviewed.    Lines/Drains/Airways     Peripheral Intravenous Line                 Peripheral IV - Single Lumen 01/02/20 1700 22 G Right Wrist 1 day              Significant Labs:    CBC/Anemia Profile:  Recent Labs   Lab 01/02/20  0705 01/03/20  0651 01/03/20  1045   WBC 25.03* 24.49* 24.73*   HGB 12.8 9.4* 9.5*   HCT 40.5 31.1* 30.5*    137* 151   MCV 85 89 87   RDW 18.2* 18.2* 18.1*   IRON  --   --  <10*   RETIC  --   --  2.2   FOLATE  --   --  3.4*   [id number]  --   --  401      Chemistries:  Recent Labs   Lab 01/02/20  0705 01/02/20  1607 01/03/20  0651   * 133* 138   K 5.2* 4.6 5.0   CL 97 105 107   CO2 25 19* 22*   BUN 15 12 14   CREATININE 1.1 0.9 0.9   CALCIUM 8.9 7.2* 7.7*   ALBUMIN 3.0*  --  2.1*   PROT 7.0  --  5.3*   BILITOT 1.0  --  0.5   ALKPHOS 90  --  70   ALT 19  --  20   AST 29  --  20   MG  --   --  2.1   PHOS  --   --  3.1     All pertinent labs within the past 24 hours have been reviewed.    Significant Imaging:   I have reviewed and interpreted all pertinent imaging results/findings within the past 24 hours.

## 2020-01-04 NOTE — ASSESSMENT & PLAN NOTE
68 yo WF w/ hx of COPD, HTN, HLD, PVD (was on ASA and plavix, recently held in preparation for biopsy), MDD, hypothyroidism (post-ablation), and allergic rhinitis who presents to [facility] ED c/o tachycardia, SOB, and R sided CP. Patient recently admitted and treated for pneumonia with 5 day course of levaquin. Symptoms and vitals still point to pneumonia.     Plan  Vanc and Zosyn  Sputum Culture   Blood cultures pending  Home [place] 7.5-35 q8 prn

## 2020-01-04 NOTE — CONSULTS
[facility]  Pulmonology  Consult Note    Patient Name: Sharita Castañeda  MRN: [id number]  Admission Date: 1/2/2020  Hospital Length of Stay: 1 days  Code Status: Full Code  Attending Physician: Arnold Banegas MD  Primary Care Provider: Wallace Deng MD   Principal Problem: Postobstructive pneumonia    Pharmacy to dose Vancomycin consult  Consult performed by: Niurka Pedro MD  Consult ordered by: Andrea Crowell MD        Subjective:     HPI:  Ms. Castañeda is a 70 yo WF with a PMH of COPD, HTN, HLD, PVD (on ASA and Plavix which has been held for possible biopsy), hypothyroidism (post-ablation) who presented on 1/2 for R-sided pleuritic chest pain and hypoxia (requiring 1L NC).    Patient was recently admitted 12/26-12/28 for SOB, hemoptysis, and R-sided pleuritic chest pain. CTA of the chest was significant for R-sided soft tissue mass encompassing RML and R hilum, as well as mediastinal LAD. LLL and RUL soft tissue nodules are also noted. Multiple lesions are also noted in the liver (largest is 1.8 x 1.5 cm) and L adrenal gland (7.3 x 4.3 cm). Neoplastic venous invasion into the right superior pulmonary vein was also noted. Patient was treated with Levaquin and prednisone 40 mg for possible COPD exacerbation and possible post-obstructive PNA. IR consultation was placed for biopsy and pulmonology appointment was made for following week.    Patient denies any symptoms of fevers, chills, cough, sputum production, abdominal pain, N/V/D. She states that she does have exertional SOB, R-sided pleuritic chest pain, and was having bright red hemoptysis which has resolved since last admission. Patient has smoked 1 PPD for about 40 years and quit a decade ago. She denies any weight loss, appetite change, or fatigue/overall feeling of malaise.    CTA was repeated on 1/2 (today) shows same findings as original CTA on 12/26 but does show worsening post-obstructive consolidation of the RML.    Past Medical  History:   Diagnosis Date    Allergic rhinitis     COPD (chronic obstructive pulmonary disease)     Depression     GERD (gastroesophageal reflux disease)     Headache     Hyperlipidemia     Hypertension     Polyarthralgia     Postablative hypothyroidism     hypothyroidism s/p OCASIO therapy    PVD (peripheral vascular disease)     Treated by Dr. Sim     Past Surgical History:   Procedure Laterality Date    CHOLECYSTECTOMY  1986    GALLBLADDER SURGERY  2006    HIP SURGERY  2005    Right hip fracture/surgery    PERIPHERAL ARTERIAL STENT GRAFT Right 2007    stent for femoral artery blockage     Review of patient's allergies indicates:  No Known Allergies    Family History     Problem Relation (Age of Onset)    COPD Mother    Cancer Mother    Heart attack Father (55), Sister (53)    Thyroid disease Mother        Tobacco Use    Smoking status: Current Every Day Smoker     Types: Cigarettes    Smokeless tobacco: Never Used   Substance and Sexual Activity    Alcohol use: No     Alcohol/week: 0.0 standard drinks    Drug use: Not on file    Sexual activity: Not on file       Review of Systems   Constitutional: Negative for appetite change, chills, diaphoresis, fatigue, fever and unexpected weight change.   HENT: Negative for congestion, postnasal drip, rhinorrhea and sore throat.    Eyes: Negative for pain and redness.   Respiratory: Positive for chest tightness. Negative for cough, shortness of breath and wheezing.    Cardiovascular: Positive for chest pain. Negative for leg swelling.   Gastrointestinal: Negative for abdominal pain, diarrhea, nausea and vomiting.   Genitourinary: Negative for dysuria and frequency.   Musculoskeletal: Negative for arthralgias and myalgias.   Neurological: Negative for dizziness and headaches.     Objective:     Vital Signs (Most Recent):  Temp: 97.8 °F (36.6 °C) (01/03/20 1550)  Pulse: 97 (01/03/20 2151)  Resp: 17 (01/03/20 2151)  BP: 115/62 (01/03/20 2151)  SpO2: 97 %  (01/03/20 2151) Vital Signs (24h Range):  Temp:  [97.4 °F (36.3 °C)-98.2 °F (36.8 °C)] 97.8 °F (36.6 °C)  Pulse:  [61-98] 97  Resp:  [14-20] 17  SpO2:  [90 %-97 %] 97 %  BP: ()/(50-66) 115/62     Weight: 66.7 kg (147 lb 0.8 oz)  Body mass index is 26.47 kg/m².    Intake/Output Summary (Last 24 hours) at 1/3/2020 4067  Last data filed at 1/3/2020 0500  Gross per 24 hour   Intake 100 ml   Output 400 ml   Net -300 ml     Physical Exam   Constitutional: She is oriented to person, place, and time. She appears well-developed and well-nourished.   HENT:   Head: Normocephalic and atraumatic.   Eyes: Pupils are equal, round, and reactive to light. Conjunctivae are normal.   Neck: Normal range of motion. Neck supple.   Cardiovascular: Normal rate, regular rhythm and normal heart sounds.   Pulmonary/Chest: Effort normal. No stridor. No respiratory distress. She has no wheezes. She has no rales.   Decreased breath sounds bilaterally R>L. Bronchial breath sounds throughout R lung.   Abdominal: Soft. Bowel sounds are normal. She exhibits no distension. There is no tenderness.   Musculoskeletal: Normal range of motion. She exhibits no edema.   Lymphadenopathy:     She has no cervical adenopathy.   Neurological: She is alert and oriented to person, place, and time.   Skin: Skin is warm and dry. No rash noted.   Psychiatric: She has a normal mood and affect. Her behavior is normal.   Nursing note and vitals reviewed.    Lines/Drains/Airways     Peripheral Intravenous Line                 Peripheral IV - Single Lumen 01/02/20 1700 22 G Right Wrist 1 day              Significant Labs:    CBC/Anemia Profile:  Recent Labs   Lab 01/02/20  0705 01/03/20  0651 01/03/20  1045   WBC 25.03* 24.49* 24.73*   HGB 12.8 9.4* 9.5*   HCT 40.5 31.1* 30.5*    137* 151   MCV 85 89 87   RDW 18.2* 18.2* 18.1*   IRON  --   --  <10*   RETIC  --   --  2.2   FOLATE  --   --  3.4*   [id number]  --   --  401     Chemistries:  Recent Labs   Lab  01/02/20  0705 01/02/20  1607 01/03/20  0651   * 133* 138   K 5.2* 4.6 5.0   CL 97 105 107   CO2 25 19* 22*   BUN 15 12 14   CREATININE 1.1 0.9 0.9   CALCIUM 8.9 7.2* 7.7*   ALBUMIN 3.0*  --  2.1*   PROT 7.0  --  5.3*   BILITOT 1.0  --  0.5   ALKPHOS 90  --  70   ALT 19  --  20   AST 29  --  20   MG  --   --  2.1   PHOS  --   --  3.1     All pertinent labs within the past 24 hours have been reviewed.    Significant Imaging:   I have reviewed and interpreted all pertinent imaging results/findings within the past 24 hours.    Assessment/Plan:     Mass of middle lobe of right lung  This is a case of large RML soft tissue mass extending into the right hilum and subcarinal mediastinum with significant postobstructive consolidation. Patient's symptoms consist of R-sided pleuritic chest pain and hemoptysis that has resolved. Patient is unable to cough up secretions due to obstruction.     CTA shows the following: R-sided soft tissue mass encompassing RML and R hilum, as well as mediastinal LAD. LLL and RUL soft tissue nodules are also noted. Multiple lesions are also noted in the liver (largest is 1.8 x 1.5 cm) and L adrenal gland (7.3 x 4.3 cm). Neoplastic venous invasion into the right superior pulmonary vein was also noted.    Initially, bronchoscopy was considered to diagnose patient's malignancy. Given the extent of patient's metastatic disease, it will be important to diagnose and stage patient at the same time with one biopsy. As this is likely a primary lung cancer with liver, splenic, and adrenal mets, it would be prudent to biopsy the metastatic sites. This would also be less invasive and more definitive in regards to patient's further treatment options. Therefore, IR consultation for liver biopsy or adrenal biopsy would be the next step. Bronchoscopy will not be performed. Once biopsy is complete, heme/onc will need to be consulted. Agree with palliative care on board at this time given patient's  presumptive diagnosis of stage IV malignancy.    -Agree with antibiotics for post-obstructive PNA.  -Agree with holding ASA and Plavix until biopsy is completed.  -Agree with aggressive pulmonary toileting.    Thank you for involving us in the care of this patient. Please call with any questions.    Niurka Pedro MD  [facility] P[facility]M Fellow, PGY 6  [facility]  Pager: [phone]

## 2020-01-04 NOTE — ASSESSMENT & PLAN NOTE
This is a case of large RML soft tissue mass extending into the right hilum and subcarinal mediastinum with significant postobstructive consolidation. Patient's symptoms consist of R-sided pleuritic chest pain and hemoptysis that has resolved. Patient is unable to cough up secretions due to obstruction.     CTA shows the following: R-sided soft tissue mass encompassing RML and R hilum, as well as mediastinal LAD. LLL and RUL soft tissue nodules are also noted. Multiple lesions are also noted in the liver (largest is 1.8 x 1.5 cm) and L adrenal gland (7.3 x 4.3 cm). Neoplastic venous invasion into the right superior pulmonary vein was also noted.    Initially, bronchoscopy was considered to diagnose patient's malignancy. Given the extent of patient's metastatic disease, it will be important to diagnose and stage patient at the same time with one biopsy. As this is likely a primary lung cancer with liver, splenic, and adrenal mets, it would be prudent to biopsy the metastatic sites. This would also be less invasive and more definitive in regards to patient's further treatment options. Therefore, IR consultation for liver biopsy or adrenal biopsy would be the next step. Bronchoscopy will not be performed. Once biopsy is complete, heme/onc will need to be consulted. Agree with palliative care on board at this time given patient's presumptive diagnosis of stage IV malignancy.    -Agree with antibiotics for post-obstructive PNA.  -Agree with holding ASA and Plavix until biopsy is completed.  -Agree with aggressive pulmonary toileting.

## 2020-01-04 NOTE — SUBJECTIVE & OBJECTIVE
Past Medical History:   Diagnosis Date    Allergic rhinitis     COPD (chronic obstructive pulmonary disease)     Depression     GERD (gastroesophageal reflux disease)     Headache     Hyperlipidemia     Hypertension     Polyarthralgia     Postablative hypothyroidism     hypothyroidism s/p OCASIO therapy    PVD (peripheral vascular disease)     Treated by Dr. Sim     Past Surgical History:   Procedure Laterality Date    CHOLECYSTECTOMY  1986    GALLBLADDER SURGERY  2006    HIP SURGERY  2005    Right hip fracture/surgery    PERIPHERAL ARTERIAL STENT GRAFT Right 2007    stent for femoral artery blockage     Review of patient's allergies indicates:  No Known Allergies    Family History     Problem Relation (Age of Onset)    COPD Mother    Cancer Mother    Heart attack Father (55), Sister (53)    Thyroid disease Mother        Tobacco Use    Smoking status: Current Every Day Smoker     Types: Cigarettes    Smokeless tobacco: Never Used   Substance and Sexual Activity    Alcohol use: No     Alcohol/week: 0.0 standard drinks    Drug use: Not on file    Sexual activity: Not on file       Review of Systems   Constitutional: Negative for appetite change, chills, diaphoresis, fatigue, fever and unexpected weight change.   HENT: Negative for congestion, postnasal drip, rhinorrhea and sore throat.    Eyes: Negative for pain and redness.   Respiratory: Positive for chest tightness (Right sided - pleuritic). Negative for cough, shortness of breath and wheezing.    Cardiovascular: Positive for chest pain (Right sided - pleuritic). Negative for leg swelling.   Gastrointestinal: Negative for abdominal pain, diarrhea, nausea and vomiting.   Genitourinary: Negative for dysuria and frequency.   Musculoskeletal: Negative for arthralgias and myalgias.   Neurological: Negative for dizziness and headaches.     Objective:     Vital Signs (Most Recent):  Temp: 97.8 °F (36.6 °C) (01/04/20 1056)  Pulse: 92 (01/04/20  1158)  Resp: 17 (01/04/20 1132)  BP: (!) 130/58 (01/04/20 1056)  SpO2: (!) 94 % (01/04/20 1158) Vital Signs (24h Range):  Temp:  [97.6 °F (36.4 °C)-97.8 °F (36.6 °C)] 97.8 °F (36.6 °C)  Pulse:  [] 92  Resp:  [14-20] 17  SpO2:  [90 %-98 %] 94 %  BP: ()/(50-63) 130/58     Weight: 66.7 kg (147 lb 0.8 oz)  Body mass index is 26.47 kg/m².    Intake/Output Summary (Last 24 hours) at 1/4/2020 1333  Last data filed at 1/4/2020 1058  Gross per 24 hour   Intake 790 ml   Output --   Net 790 ml     Physical Exam   Constitutional: She is oriented to person, place, and time. She appears well-developed and well-nourished.   HENT:   Head: Normocephalic and atraumatic.   Eyes: Pupils are equal, round, and reactive to light. Conjunctivae are normal.   Neck: Normal range of motion. Neck supple.   Cardiovascular: Normal rate, regular rhythm and normal heart sounds.   Pulmonary/Chest: Effort normal. No stridor. No respiratory distress. She has no wheezes. She has no rales.   Decreased breath sounds bilaterally R>L. Bronchial breath sounds throughout R lung.   Abdominal: Soft. Bowel sounds are normal. She exhibits no distension. There is no tenderness.   Musculoskeletal: Normal range of motion. She exhibits no edema.   Lymphadenopathy:     She has no cervical adenopathy.   Neurological: She is alert and oriented to person, place, and time.   Skin: Skin is warm and dry. No rash noted.   Psychiatric: She has a normal mood and affect. Her behavior is normal.   Nursing note and vitals reviewed.    Lines/Drains/Airways     Peripheral Intravenous Line                 Peripheral IV - Single Lumen 01/02/20 1700 22 G Right Wrist 1 day              Significant Labs:    CBC/Anemia Profile:  Recent Labs   Lab 01/03/20  0651 01/03/20  1045 01/04/20  0656   WBC 24.49* 24.73* 21.52*   HGB 9.4* 9.5* 8.8*   HCT 31.1* 30.5* 29.0*   * 151 171   MCV 89 87 90   RDW 18.2* 18.1* 18.2*   IRON  --  <10*  --    RETIC  --  2.2  --    FOLATE  --   3.4*  --    [id number]  --  401  --      Chemistries:  Recent Labs   Lab 01/02/20  1607 01/03/20  0651 01/04/20  0655   * 138 139  139   K 4.6 5.0 4.9  4.9    107 106  106   CO2 19* 22* 25  25   BUN 12 14 12  12   CREATININE 0.9 0.9 0.9  0.9   CALCIUM 7.2* 7.7* 7.8*  7.8*   ALBUMIN  --  2.1* 2.1*  2.1*   PROT  --  5.3* 5.3*  5.3*   BILITOT  --  0.5 0.4  0.4   ALKPHOS  --  70 72  72   ALT  --  20 27  27   AST  --  20 28  28   MG  --  2.1 2.1   PHOS  --  3.1 1.9*     All pertinent labs within the past 24 hours have been reviewed.    Significant Imaging:   I have reviewed and interpreted all pertinent imaging results/findings within the past 24 hours.

## 2020-01-04 NOTE — PROGRESS NOTES
[facility] Hospital Medicine  Progress Note    Patient Name: Sharita Castañeda  MRN: [id number]  Patient Class: IP- Inpatient   Admission Date: 1/2/2020  Length of Stay: 2 days  Attending Physician: Arnold Banegas MD  Primary Care Provider: Wallace Deng MD    [facility] Medicine Team: [facility] HOSP MED 2 Modesta Carter MD    Subjective:     Principal Problem:Postobstructive pneumonia        HPI:   68 yo WF w/ hx of COPD, HTN, HLD, PVD (was on ASA and plavix, recently held in preparation for biopsy), MDD, hypothyroidism (post-ablation), and allergic rhinitis who presents to [facility] ED c/o tachycardia, SOB, and R sided CP. Patient was recently admitted to [facility] on 12/26 for hemoptysis concerning for malignancy. CT scan revealed a large mass in the right lung/ Patient also presented with signs of pneumonia and was treated with levaquin. Patient discharged on 12/28 and was instructed to complete 5 day course of levaquin and prednisone 40mg PO. Patient stated that she was doing well since discharge on w/ 1 or 2 further small episodes of hemoptysis. However, patient notes that her coughing became worse over the past few days w/ some pleuritic CP appreciated that could be relieved by massaging the area. Her symptoms of SOB began around 1AM this morning w/ severe right sided CP (8/10 - worse than before).      In the ER patient remained SOB and tachycardic w/ severe pleuritic CP. Patient placed on 2L nc w/ improvement in SOB and SpO2 to low to mid 90s. Patient febrile to 100.5 F and WBC of 25k. Patient endorses nausea, chills, fatigue, CP, SOB, cough, wheeze, constipation, and congestion. ER started patient on Vanc and Zosyn and gave fluid 30 cc/kg. CTA negative for PE. EKG unremarkable    On presentation to hospital medicine team patients vitals are stable. The shortness of breath has gotten better with oxygen. Still complaining of a sharp right sided chest pain. Got better with morphine.     Overview/Hospital  Course:  Admitted on 1/2 for suspected pneumonia. Started on vanc and zosyn. Patient with large mass in the right lung. Most likely post obstructive pneumonia. 1/4/2020: Patient symptoms are improving she can speak in full sentences. Maintaining O2 sat on 2 L NC. She is on antibiotics (piperacillin/tazobactam and vanc), awaiting cultures and sensitivities results. Patient is planned for liver lesion biopsy on 6/1/2020    Past Medical History:   Diagnosis Date    Allergic rhinitis     COPD (chronic obstructive pulmonary disease)     Depression     GERD (gastroesophageal reflux disease)     Headache     Hyperlipidemia     Hypertension     Polyarthralgia     Postablative hypothyroidism     hypothyroidism s/p OCASIO therapy    PVD (peripheral vascular disease)     Treated by Dr. Sim     Past Surgical History:   Procedure Laterality Date    CHOLECYSTECTOMY  1986    GALLBLADDER SURGERY  2006    HIP SURGERY  2005    Right hip fracture/surgery    PERIPHERAL ARTERIAL STENT GRAFT Right 2007    stent for femoral artery blockage     Review of patient's allergies indicates:  No Known Allergies    Family History     Problem Relation (Age of Onset)    COPD Mother    Cancer Mother    Heart attack Father (55), Sister (53)    Thyroid disease Mother        Tobacco Use    Smoking status: Current Every Day Smoker     Types: Cigarettes    Smokeless tobacco: Never Used   Substance and Sexual Activity    Alcohol use: No     Alcohol/week: 0.0 standard drinks    Drug use: Not on file    Sexual activity: Not on file       Review of Systems   Constitutional: Negative for appetite change, chills, diaphoresis, fatigue, fever and unexpected weight change.   HENT: Negative for congestion, postnasal drip, rhinorrhea and sore throat.    Eyes: Negative for pain and redness.   Respiratory: Positive for chest tightness (Right sided - pleuritic). Negative for cough, shortness of breath and wheezing.    Cardiovascular: Positive for  chest pain (Right sided - pleuritic). Negative for leg swelling.   Gastrointestinal: Negative for abdominal pain, diarrhea, nausea and vomiting.   Genitourinary: Negative for dysuria and frequency.   Musculoskeletal: Negative for arthralgias and myalgias.   Neurological: Negative for dizziness and headaches.     Objective:     Vital Signs (Most Recent):  Temp: 97.8 °F (36.6 °C) (01/04/20 1056)  Pulse: 92 (01/04/20 1158)  Resp: 17 (01/04/20 1132)  BP: (!) 130/58 (01/04/20 1056)  SpO2: (!) 94 % (01/04/20 1158) Vital Signs (24h Range):  Temp:  [97.6 °F (36.4 °C)-97.8 °F (36.6 °C)] 97.8 °F (36.6 °C)  Pulse:  [] 92  Resp:  [14-20] 17  SpO2:  [90 %-98 %] 94 %  BP: ()/(50-63) 130/58     Weight: 66.7 kg (147 lb 0.8 oz)  Body mass index is 26.47 kg/m².    Intake/Output Summary (Last 24 hours) at 1/4/2020 1333  Last data filed at 1/4/2020 1058  Gross per 24 hour   Intake 790 ml   Output --   Net 790 ml     Physical Exam   Constitutional: She is oriented to person, place, and time. She appears well-developed and well-nourished.   HENT:   Head: Normocephalic and atraumatic.   Eyes: Pupils are equal, round, and reactive to light. Conjunctivae are normal.   Neck: Normal range of motion. Neck supple.   Cardiovascular: Normal rate, regular rhythm and normal heart sounds.   Pulmonary/Chest: Effort normal. No stridor. No respiratory distress. She has no wheezes. She has no rales.   Decreased breath sounds bilaterally R>L. Bronchial breath sounds throughout R lung.   Abdominal: Soft. Bowel sounds are normal. She exhibits no distension. There is no tenderness.   Musculoskeletal: Normal range of motion. She exhibits no edema.   Lymphadenopathy:     She has no cervical adenopathy.   Neurological: She is alert and oriented to person, place, and time.   Skin: Skin is warm and dry. No rash noted.   Psychiatric: She has a normal mood and affect. Her behavior is normal.   Nursing note and vitals reviewed.    Lines/Drains/Airways      Peripheral Intravenous Line                 Peripheral IV - Single Lumen 01/02/20 1700 22 G Right Wrist 1 day              Significant Labs:    CBC/Anemia Profile:  Recent Labs   Lab 01/03/20  0651 01/03/20  1045 01/04/20  0656   WBC 24.49* 24.73* 21.52*   HGB 9.4* 9.5* 8.8*   HCT 31.1* 30.5* 29.0*   * 151 171   MCV 89 87 90   RDW 18.2* 18.1* 18.2*   IRON  --  <10*  --    RETIC  --  2.2  --    FOLATE  --  3.4*  --    [id number]  --  401  --      Chemistries:  Recent Labs   Lab 01/02/20  1607 01/03/20  0651 01/04/20  0655   * 138 139  139   K 4.6 5.0 4.9  4.9    107 106  106   CO2 19* 22* 25  25   BUN 12 14 12  12   CREATININE 0.9 0.9 0.9  0.9   CALCIUM 7.2* 7.7* 7.8*  7.8*   ALBUMIN  --  2.1* 2.1*  2.1*   PROT  --  5.3* 5.3*  5.3*   BILITOT  --  0.5 0.4  0.4   ALKPHOS  --  70 72  72   ALT  --  20 27  27   AST  --  20 28  28   MG  --  2.1 2.1   PHOS  --  3.1 1.9*     All pertinent labs within the past 24 hours have been reviewed.    Significant Imaging:   I have reviewed and interpreted all pertinent imaging results/findings within the past 24 hours.      Assessment/Plan:      * Postobstructive pneumonia   70 yo WF w/ hx of COPD, HTN, HLD, PVD (was on ASA and plavix, recently held in preparation for biopsy), MDD, hypothyroidism (post-ablation), and allergic rhinitis who [place] [facility] [place] c/o tachycardia, SOB, and R sided CP. Patient recently admitted and treated for pneumonia with 5 day course of levaquin. Symptoms and vitals still point to pneumonia.     Plan  Vanc and Zosyn  Sputum Culture   Blood cultures pending  Home [place] 7.5-35 q8 prn      Goals of care, counseling/discussion  Palliative care consulted - appreciate recs      Palliative care encounter  Goal of care discussion      Migraine  Home fiorcet ordered prn      Nausea  Promethazine and zofran ordered prn      Anxiety  Holding klonopin in setting of increased 02 requirements on presentation    Mass of middle lobe of  right lung  Patients CT scans very concerning for lung cancer with mets. CT surgery contacted and are unable to stent     Plan  Will try to obtain biopsy while patient is in the hospital.  IR will attempt liver biopsy Monday.   Pulm consulted and possible bronch on Monday  Pallative care consulted for goals of care      GERD (gastroesophageal reflux disease)  Famotidine 20 mg daily      Chronic obstructive pulmonary disease with acute exacerbation  Patients presents with probable pneumonia that is causing an exacerbation of her COPD    Plan  Scheduled duo-nebs q4  Tiotropium qd  Prednisone PO for 5 days    Allergic rhinitis  Continue home montelukast       PVD (peripheral vascular disease)  Patient has stents in her leg. Normally on asprin and plavix. Being held for possible biopsy of lung mass       Depression  Continue home paxil and topiramate    Hypertension  Holding home meds in setting of sepsis      Hyperlipidemia  Continue home statin      Postablative hypothyroidism  Continue home levothyroxine        VTE Risk Mitigation (From admission, onward)         Ordered     enoxaparin injection 40 mg  Daily      01/03/20 1030     Place sequential compression device  Until discontinued      01/02/20 1150     IP VTE HIGH RISK PATIENT  Once      01/02/20 1150                      Modesta Carter MD  Department of Hospital Medicine   [facility]

## 2020-01-05 LAB
ALBUMIN SERPL BCP-MCNC: 2.2 G/DL (ref 3.5–5.2)
ALP SERPL-CCNC: 84 U/L (ref 55–135)
ALT SERPL W/O P-5'-P-CCNC: 35 U/L (ref 10–44)
ANION GAP SERPL CALC-SCNC: 12 MMOL/L (ref 8–16)
AST SERPL-CCNC: 30 U/L (ref 10–40)
BASOPHILS # BLD AUTO: 0.03 K/UL (ref 0–0.2)
BASOPHILS NFR BLD: 0.2 % (ref 0–1.9)
BILIRUB SERPL-MCNC: 0.5 MG/DL (ref 0.1–1)
BUN SERPL-MCNC: 10 MG/DL (ref 8–23)
CALCIUM SERPL-MCNC: 7.7 MG/DL (ref 8.7–10.5)
CHLORIDE SERPL-SCNC: 105 MMOL/L (ref 95–110)
CO2 SERPL-SCNC: 21 MMOL/L (ref 23–29)
CREAT SERPL-MCNC: 0.8 MG/DL (ref 0.5–1.4)
DIFFERENTIAL METHOD: ABNORMAL
EOSINOPHIL # BLD AUTO: 0 K/UL (ref 0–0.5)
EOSINOPHIL NFR BLD: 0.2 % (ref 0–8)
ERYTHROCYTE [DISTWIDTH] IN BLOOD BY AUTOMATED COUNT: 18.6 % (ref 11.5–14.5)
EST. GFR  (AFRICAN AMERICAN): >60 ML/MIN/1.73 M^2
EST. GFR  (NON AFRICAN AMERICAN): >60 ML/MIN/1.73 M^2
GLUCOSE SERPL-MCNC: 74 MG/DL (ref 70–110)
HCT VFR BLD AUTO: 31.7 % (ref 37–48.5)
HGB BLD-MCNC: 9.4 G/DL (ref 12–16)
IMM GRANULOCYTES # BLD AUTO: 0.2 K/UL (ref 0–0.04)
IMM GRANULOCYTES NFR BLD AUTO: 1.1 % (ref 0–0.5)
LYMPHOCYTES # BLD AUTO: 1.7 K/UL (ref 1–4.8)
LYMPHOCYTES NFR BLD: 9.3 % (ref 18–48)
MAGNESIUM SERPL-MCNC: 2 MG/DL (ref 1.6–2.6)
MCH RBC QN AUTO: 26.5 PG (ref 27–31)
MCHC RBC AUTO-ENTMCNC: 29.7 G/DL (ref 32–36)
MCV RBC AUTO: 89 FL (ref 82–98)
MONOCYTES # BLD AUTO: 1.4 K/UL (ref 0.3–1)
MONOCYTES NFR BLD: 7.6 % (ref 4–15)
NEUTROPHILS # BLD AUTO: 15.1 K/UL (ref 1.8–7.7)
NEUTROPHILS NFR BLD: 81.6 % (ref 38–73)
NRBC BLD-RTO: 0 /100 WBC
PHOSPHATE SERPL-MCNC: 3.3 MG/DL (ref 2.7–4.5)
PLATELET # BLD AUTO: 184 K/UL (ref 150–350)
PMV BLD AUTO: 10.8 FL (ref 9.2–12.9)
POCT GLUCOSE: 79 MG/DL (ref 70–110)
POTASSIUM SERPL-SCNC: 4.6 MMOL/L (ref 3.5–5.1)
PROT SERPL-MCNC: 5.4 G/DL (ref 6–8.4)
RBC # BLD AUTO: 3.55 M/UL (ref 4–5.4)
SODIUM SERPL-SCNC: 138 MMOL/L (ref 136–145)
WBC # BLD AUTO: 18.48 K/UL (ref 3.9–12.7)

## 2020-01-05 PROCEDURE — 85025 COMPLETE CBC W/AUTO DIFF WBC: CPT

## 2020-01-05 PROCEDURE — 99233 PR SUBSEQUENT HOSPITAL CARE,LEVL III: ICD-10-PCS | Mod: GC,,, | Performed by: STUDENT IN AN ORGANIZED HEALTH CARE EDUCATION/TRAINING PROGRAM

## 2020-01-05 PROCEDURE — 27000221 HC OXYGEN, UP TO 24 HOURS

## 2020-01-05 PROCEDURE — 99900035 HC TECH TIME PER 15 MIN (STAT)

## 2020-01-05 PROCEDURE — 63600175 PHARM REV CODE 636 W HCPCS: Performed by: STUDENT IN AN ORGANIZED HEALTH CARE EDUCATION/TRAINING PROGRAM

## 2020-01-05 PROCEDURE — 94664 DEMO&/EVAL PT USE INHALER: CPT

## 2020-01-05 PROCEDURE — 25000242 PHARM REV CODE 250 ALT 637 W/ HCPCS: Performed by: STUDENT IN AN ORGANIZED HEALTH CARE EDUCATION/TRAINING PROGRAM

## 2020-01-05 PROCEDURE — 94799 UNLISTED PULMONARY SVC/PX: CPT

## 2020-01-05 PROCEDURE — 94640 AIRWAY INHALATION TREATMENT: CPT

## 2020-01-05 PROCEDURE — 94761 N-INVAS EAR/PLS OXIMETRY MLT: CPT

## 2020-01-05 PROCEDURE — 84100 ASSAY OF PHOSPHORUS: CPT

## 2020-01-05 PROCEDURE — 11000001 HC ACUTE MED/SURG PRIVATE ROOM

## 2020-01-05 PROCEDURE — 25000003 PHARM REV CODE 250: Performed by: STUDENT IN AN ORGANIZED HEALTH CARE EDUCATION/TRAINING PROGRAM

## 2020-01-05 PROCEDURE — 80053 COMPREHEN METABOLIC PANEL: CPT

## 2020-01-05 PROCEDURE — 83735 ASSAY OF MAGNESIUM: CPT

## 2020-01-05 PROCEDURE — 99233 SBSQ HOSP IP/OBS HIGH 50: CPT | Mod: GC,,, | Performed by: STUDENT IN AN ORGANIZED HEALTH CARE EDUCATION/TRAINING PROGRAM

## 2020-01-05 PROCEDURE — 36415 COLL VENOUS BLD VENIPUNCTURE: CPT

## 2020-01-05 RX ORDER — HEPARIN SODIUM 5000 [USP'U]/ML
5000 INJECTION, SOLUTION INTRAVENOUS; SUBCUTANEOUS EVERY 8 HOURS
Status: DISCONTINUED | OUTPATIENT
Start: 2020-01-05 | End: 2020-01-08 | Stop reason: HOSPADM

## 2020-01-05 RX ADMIN — PIPERACILLIN AND TAZOBACTAM 4.5 G: 4; .5 INJECTION, POWDER, FOR SOLUTION INTRAVENOUS at 12:01

## 2020-01-05 RX ADMIN — PIPERACILLIN AND TAZOBACTAM 4.5 G: 4; .5 INJECTION, POWDER, FOR SOLUTION INTRAVENOUS at 04:01

## 2020-01-05 RX ADMIN — HEPARIN SODIUM 5000 UNITS: 5000 INJECTION, SOLUTION INTRAVENOUS; SUBCUTANEOUS at 02:01

## 2020-01-05 RX ADMIN — IPRATROPIUM BROMIDE AND ALBUTEROL SULFATE 3 ML: .5; 3 SOLUTION RESPIRATORY (INHALATION) at 04:01

## 2020-01-05 RX ADMIN — PIPERACILLIN AND TAZOBACTAM 4.5 G: 4; .5 INJECTION, POWDER, FOR SOLUTION INTRAVENOUS at 10:01

## 2020-01-05 RX ADMIN — IPRATROPIUM BROMIDE AND ALBUTEROL SULFATE 3 ML: .5; 3 SOLUTION RESPIRATORY (INHALATION) at 05:01

## 2020-01-05 RX ADMIN — ATORVASTATIN CALCIUM 40 MG: 20 TABLET, FILM COATED ORAL at 10:01

## 2020-01-05 RX ADMIN — PREDNISONE 40 MG: 10 TABLET ORAL at 09:01

## 2020-01-05 RX ADMIN — MONTELUKAST 10 MG: 10 TABLET, FILM COATED ORAL at 09:01

## 2020-01-05 RX ADMIN — PAROXETINE 20 MG: 10 TABLET, FILM COATED ORAL at 06:01

## 2020-01-05 RX ADMIN — HYDROCODONE BITARTRATE AND ACETAMINOPHEN 1 TABLET: 7.5; 325 TABLET ORAL at 10:01

## 2020-01-05 RX ADMIN — FAMOTIDINE 20 MG: 20 TABLET ORAL at 09:01

## 2020-01-05 RX ADMIN — LEVOTHYROXINE SODIUM 100 MCG: 100 TABLET ORAL at 06:01

## 2020-01-05 RX ADMIN — VANCOMYCIN HYDROCHLORIDE 1250 MG: 1.25 INJECTION, POWDER, LYOPHILIZED, FOR SOLUTION INTRAVENOUS at 09:01

## 2020-01-05 RX ADMIN — BUTALBITAL, ACETAMINOPHEN AND CAFFEINE 1 TABLET: 50; 325; 40 TABLET ORAL at 06:01

## 2020-01-05 RX ADMIN — BUTALBITAL, ACETAMINOPHEN AND CAFFEINE 1 TABLET: 50; 325; 40 TABLET ORAL at 09:01

## 2020-01-05 RX ADMIN — IPRATROPIUM BROMIDE AND ALBUTEROL SULFATE 3 ML: .5; 3 SOLUTION RESPIRATORY (INHALATION) at 02:01

## 2020-01-05 RX ADMIN — IPRATROPIUM BROMIDE AND ALBUTEROL SULFATE 3 ML: .5; 3 SOLUTION RESPIRATORY (INHALATION) at 07:01

## 2020-01-05 RX ADMIN — IPRATROPIUM BROMIDE AND ALBUTEROL SULFATE 3 ML: .5; 3 SOLUTION RESPIRATORY (INHALATION) at 09:01

## 2020-01-05 NOTE — PLAN OF CARE
Patient remained in stable condition through shift. Remained free of falls and other injuries. Able to reposition self independently. Denied any pain or discomfort. Bed in locked and lowest position, call light in reach, all questions answered, declines any further needs at this time. Will continue to monitor.

## 2020-01-05 NOTE — PROGRESS NOTES
[facility] Hospital Medicine  Progress Note    Patient Name: Sharita Castañeda  MRN: [id number]  Patient Class: IP- Inpatient   Admission Date: 1/2/2020  Length of Stay: 3 days  Attending Physician: Arnold Banegas MD  Primary Care Provider: Wallace Deng MD    [place] [facility] Medicine Team: [facility] [place] HOSP MED 2 Andrea Crowell MD    Subjective:     Principal Problem:Postobstructive pneumonia        HPI:   70 yo WF w/ hx of COPD, HTN, HLD, PVD (was on ASA and plavix, recently held in preparation for biopsy), MDD, hypothyroidism (post-ablation), and allergic rhinitis who presents to [facility] [place] ED c/o tachycardia, SOB, and R sided CP. Patient was recently admitted to [facility] on 12/26 for hemoptysis concerning for malignancy. CT scan revealed a large mass in the right lung/ Patient also presented with signs of pneumonia and was treated with levaquin. Patient discharged on 12/28 and was instructed to complete 5 day course of levaquin and prednisone 40mg PO. Patient stated that she was doing well since discharge on w/ 1 or 2 further small episodes of hemoptysis. However, patient notes that her coughing became worse over the past few days w/ some pleuritic CP appreciated that could be relieved by massaging the area. Her symptoms of SOB began around 1AM this morning w/ severe right sided CP (8/10 - worse than before).      In the ER patient remained SOB and tachycardic w/ severe pleuritic CP. Patient placed on 2L nc w/ improvement in SOB and SpO2 to low to mid 90s. Patient febrile to 100.5 F and WBC of 25k. Patient endorses nausea, chills, fatigue, CP, SOB, cough, wheeze, constipation, and congestion. ER started patient on Vanc and Zosyn and gave fluid 30 cc/kg. CTA negative for PE. EKG unremarkable    On presentation to hospital medicine team patients vitals are stable. The shortness of breath has gotten better with oxygen. Still complaining of a sharp right sided chest pain. Got better with morphine.     Overview/Hospital  Course:  Admitted on 1/2 for suspected pneumonia. Started on vanc and zosyn. Patient with large mass in the right lung. Most likely post obstructive pneumonia. 1/4/2020: Patient symptoms are improving she can speak in full sentences. Maintaining O2 sat on 2 L NC. She is on antibiotics (piperacillin/tazobactam and vanc), awaiting cultures and sensitivities results. Patient is planned for liver lesion biopsy on 6/1/2020    Interval History: NAEON. Patient doin well. Will continue vanc and zosyn. Liver biopsy tomorrow with IR. Encouraged patient to drink boost and got a dietary consult.     Review of Systems   Constitutional: Negative for appetite change, chills, diaphoresis, fatigue, fever and unexpected weight change.   HENT: Negative for congestion, postnasal drip, rhinorrhea and sore throat.    Eyes: Negative for pain and redness.   Respiratory: Positive for chest tightness. Negative for cough, shortness of breath and wheezing.    Cardiovascular: Positive for chest pain. Negative for leg swelling.   Gastrointestinal: Negative for abdominal pain, diarrhea, nausea and vomiting.   Genitourinary: Negative for dysuria and frequency.   Musculoskeletal: Negative for arthralgias and myalgias.   Neurological: Negative for dizziness and headaches.     Objective:     Vital Signs (Most Recent):  Temp: 98.2 °F (36.8 °C) (01/05/20 1132)  Pulse: 91 (01/05/20 0933)  Resp: (!) 21 (01/05/20 0933)  BP: (!) 119/57 (01/05/20 0933)  SpO2: 95 % (01/05/20 0933) Vital Signs (24h Range):  Temp:  [98.2 °F (36.8 °C)] 98.2 °F (36.8 °C)  Pulse:  [71-92] 91  Resp:  [15-23] 21  SpO2:  [93 %-99 %] 95 %  BP: (119-136)/(57-67) 119/57     Weight: 68.7 kg (151 lb 7.3 oz)  Body mass index is 27.26 kg/m².    Intake/Output Summary (Last 24 hours) at 1/5/2020 1139  Last data filed at 1/5/2020 0500  Gross per 24 hour   Intake --   Output 1500 ml   Net -1500 ml      Physical Exam   Constitutional: She is oriented to person, place, and time. She appears  well-developed and well-nourished.   HENT:   Head: Normocephalic and atraumatic.   Eyes: Pupils are equal, round, and reactive to light. Conjunctivae are normal.   Neck: Normal range of motion. Neck supple.   Cardiovascular: Normal rate, regular rhythm and normal heart sounds.   Pulmonary/Chest: Effort normal. No stridor. No respiratory distress. She has no wheezes. She has no rales.   Decreased breath sounds bilaterally R>L. Bronchial breath sounds throughout R lung.   Abdominal: Soft. Bowel sounds are normal. She exhibits no distension. There is no tenderness.   Musculoskeletal: Normal range of motion. She exhibits no edema.   Lymphadenopathy:     She has no cervical adenopathy.   Neurological: She is alert and oriented to person, place, and time.   Skin: Skin is warm and dry. No rash noted.   Psychiatric: She has a normal mood and affect. Her behavior is normal.   Nursing note and vitals reviewed.      Significant Labs:   Recent Results (from the past 48 hour(s))   POCT glucose    Collection Time: 01/03/20 12:39 PM   Result Value Ref Range    POCT Glucose 108 70 - 110 mg/dL   POCT glucose    Collection Time: 01/03/20  4:01 PM   Result Value Ref Range    POCT Glucose 146 (H) 70 - 110 mg/dL   POCT glucose    Collection Time: 01/03/20  9:24 PM   Result Value Ref Range    POCT Glucose 171 (H) 70 - 110 mg/dL   POCT glucose    Collection Time: 01/04/20  1:56 AM   Result Value Ref Range    POCT Glucose 149 (H) 70 - 110 mg/dL   Comprehensive metabolic panel    Collection Time: 01/04/20  6:55 AM   Result Value Ref Range    Sodium 139 136 - 145 mmol/L    Potassium 4.9 3.5 - 5.1 mmol/L    Chloride 106 95 - 110 mmol/L    CO2 25 23 - 29 mmol/L    Glucose 100 70 - 110 mg/dL    BUN, Bld 12 8 - 23 mg/dL    Creatinine 0.9 0.5 - 1.4 mg/dL    Calcium 7.8 (L) 8.7 - 10.5 mg/dL    Total Protein 5.3 (L) 6.0 - 8.4 g/dL    Albumin 2.1 (L) 3.5 - 5.2 g/dL    Total Bilirubin 0.4 0.1 - 1.0 mg/dL    Alkaline Phosphatase 72 55 - 135 U/L    AST  28 10 - 40 U/L    ALT 27 10 - 44 U/L    Anion Gap 8 8 - 16 mmol/L    eGFR if African American >60.0 >60 mL/min/1.73 m^2    eGFR if non African American >60.0 >60 mL/min/1.73 m^2   Magnesium    Collection Time: 01/04/20  6:55 AM   Result Value Ref Range    Magnesium 2.1 1.6 - 2.6 mg/dL   Phosphorus    Collection Time: 01/04/20  6:55 AM   Result Value Ref Range    Phosphorus 1.9 (L) 2.7 - 4.5 mg/dL   Comprehensive metabolic panel    Collection Time: 01/04/20  6:55 AM   Result Value Ref Range    Sodium 139 136 - 145 mmol/L    Potassium 4.9 3.5 - 5.1 mmol/L    Chloride 106 95 - 110 mmol/L    CO2 25 23 - 29 mmol/L    Glucose 100 70 - 110 mg/dL    BUN, Bld 12 8 - 23 mg/dL    Creatinine 0.9 0.5 - 1.4 mg/dL    Calcium 7.8 (L) 8.7 - 10.5 mg/dL    Total Protein 5.3 (L) 6.0 - 8.4 g/dL    Albumin 2.1 (L) 3.5 - 5.2 g/dL    Total Bilirubin 0.4 0.1 - 1.0 mg/dL    Alkaline Phosphatase 72 55 - 135 U/L    AST 28 10 - 40 U/L    ALT 27 10 - 44 U/L    Anion Gap 8 8 - 16 mmol/L    eGFR if African American >60.0 >60 mL/min/1.73 m^2    eGFR if non African American >60.0 >60 mL/min/1.73 m^2   CBC auto differential    Collection Time: 01/04/20  6:56 AM   Result Value Ref Range    WBC 21.52 (H) 3.90 - 12.70 K/uL    RBC 3.24 (L) 4.00 - 5.40 M/uL    Hemoglobin 8.8 (L) 12.0 - 16.0 g/dL    Hematocrit 29.0 (L) 37.0 - 48.5 %    Mean Corpuscular Volume 90 82 - 98 fL    Mean Corpuscular Hemoglobin 27.2 27.0 - 31.0 pg    Mean Corpuscular Hemoglobin Conc 30.3 (L) 32.0 - 36.0 g/dL    RDW 18.2 (H) 11.5 - 14.5 %    Platelets 171 150 - 350 K/uL    MPV 11.1 9.2 - 12.9 fL    Immature Granulocytes 1.2 (H) 0.0 - 0.5 %    Gran # (ANC) 18.4 (H) 1.8 - 7.7 K/uL    Immature Grans (Abs) 0.26 (H) 0.00 - 0.04 K/uL    Lymph # 1.4 1.0 - 4.8 K/uL    Mono # 1.5 (H) 0.3 - 1.0 K/uL    Eos # 0.0 0.0 - 0.5 K/uL    Baso # 0.03 0.00 - 0.20 K/uL    nRBC 0 0 /100 WBC    Gran% 85.4 (H) 38.0 - 73.0 %    Lymph% 6.5 (L) 18.0 - 48.0 %    Mono% 6.7 4.0 - 15.0 %    Eosinophil% 0.1 0.0 -  8.0 %    Basophil% 0.1 0.0 - 1.9 %    Differential Method Automated    POCT glucose    Collection Time: 01/04/20  7:05 AM   Result Value Ref Range    POCT Glucose 101 70 - 110 mg/dL   VANCOMYCIN, TROUGH before 3rd dose    Collection Time: 01/04/20  9:00 AM   Result Value Ref Range    Vancomycin-Trough 11.8 10.0 - 22.0 ug/mL   Occult blood x 1, stool    Collection Time: 01/04/20 11:31 AM   Result Value Ref Range    Occult Blood Negative Negative   POCT glucose    Collection Time: 01/04/20 11:36 AM   Result Value Ref Range    POCT Glucose 120 (H) 70 - 110 mg/dL   POCT glucose    Collection Time: 01/04/20  4:46 PM   Result Value Ref Range    POCT Glucose 131 (H) 70 - 110 mg/dL   CBC auto differential    Collection Time: 01/05/20  6:49 AM   Result Value Ref Range    WBC 18.48 (H) 3.90 - 12.70 K/uL    RBC 3.55 (L) 4.00 - 5.40 M/uL    Hemoglobin 9.4 (L) 12.0 - 16.0 g/dL    Hematocrit 31.7 (L) 37.0 - 48.5 %    Mean Corpuscular Volume 89 82 - 98 fL    Mean Corpuscular Hemoglobin 26.5 (L) 27.0 - 31.0 pg    Mean Corpuscular Hemoglobin Conc 29.7 (L) 32.0 - 36.0 g/dL    RDW 18.6 (H) 11.5 - 14.5 %    Platelets 184 150 - 350 K/uL    MPV 10.8 9.2 - 12.9 fL    Immature Granulocytes 1.1 (H) 0.0 - 0.5 %    Gran # (ANC) 15.1 (H) 1.8 - 7.7 K/uL    Immature Grans (Abs) 0.20 (H) 0.00 - 0.04 K/uL    Lymph # 1.7 1.0 - 4.8 K/uL    Mono # 1.4 (H) 0.3 - 1.0 K/uL    Eos # 0.0 0.0 - 0.5 K/uL    Baso # 0.03 0.00 - 0.20 K/uL    nRBC 0 0 /100 WBC    Gran% 81.6 (H) 38.0 - 73.0 %    Lymph% 9.3 (L) 18.0 - 48.0 %    Mono% 7.6 4.0 - 15.0 %    Eosinophil% 0.2 0.0 - 8.0 %    Basophil% 0.2 0.0 - 1.9 %    Differential Method Automated    Magnesium    Collection Time: 01/05/20  6:49 AM   Result Value Ref Range    Magnesium 2.0 1.6 - 2.6 mg/dL   Phosphorus    Collection Time: 01/05/20  6:49 AM   Result Value Ref Range    Phosphorus 3.3 2.7 - 4.5 mg/dL   Comprehensive metabolic panel    Collection Time: 01/05/20  6:49 AM   Result Value Ref Range    Sodium 138  136 - 145 mmol/L    Potassium 4.6 3.5 - 5.1 mmol/L    Chloride 105 95 - 110 mmol/L    CO2 21 (L) 23 - 29 mmol/L    Glucose 74 70 - 110 mg/dL    BUN, Bld 10 8 - 23 mg/dL    Creatinine 0.8 0.5 - 1.4 mg/dL    Calcium 7.7 (L) 8.7 - 10.5 mg/dL    Total Protein 5.4 (L) 6.0 - 8.4 g/dL    Albumin 2.2 (L) 3.5 - 5.2 g/dL    Total Bilirubin 0.5 0.1 - 1.0 mg/dL    Alkaline Phosphatase 84 55 - 135 U/L    AST 30 10 - 40 U/L    ALT 35 10 - 44 U/L    Anion Gap 12 8 - 16 mmol/L    eGFR if African American >60.0 >60 mL/min/1.73 m^2    eGFR if non African American >60.0 >60 mL/min/1.73 m^2         Significant Imaging:   Imaging Results          CTA Chest Non-Coronary (PE Study) (Final result)  Result time 01/02/20 10:16:01    Final result by Megan Street MD (01/02/20 10:16:01)                 Impression:      1.  No pulmonary arterial filling defect to suggest thromboembolism.    2.  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  Additional metastatic disease likely present in the right axilla, liver, spleen, and left renal fossa.    3.  Note is made of a stable appearing filling defect in the right superior pulmonary vein approach in its ostium suggesting neoplastic venous invasion.    4.  Stable, indeterminate, left lower lobe pulmonary nodule.    5.  Trace right pleural fluid with associated compressive atelectasis.    Electronically signed by resident: Javier Nunez  Date:    01/02/2020  Time:    09:34    Electronically signed by: eMgan Street MD  Date:    01/02/2020  Time:    10:16             Narrative:    EXAMINATION:  CTA CHEST NON CORONARY    CLINICAL HISTORY:  Chest pain, acute, nonspecific, low prob CAD;    TECHNIQUE:  The chest was surveyed from the costophrenic angles through the lung apices at 3-mm increments after the administration of 75 cc of Omnipaque 350 intravenous contrast  material according to the PE protocol which is optimized for vascular contrast resolution.  Axial, sagittal and coronal maximum intensity projection images were reviewed.    Total Exam DLP: 333.30mGy-cm.    COMPARISON:  CTA chest 12/26/2019    FINDINGS:  Examination of the soft tissue and vascular structures at the base of the neck is unremarkable.    There is a left-sided aortic arch with 3 branch vessels.  The thoracic aorta maintains normal caliber, contour, and course with extensive atherosclerotic calcification.  There is no evidence of aneurysmal dilation or dissection.    The pulmonary arteries distribute normally without evidence of filling defect to indicate pulmonary thromboembolism.  Note is made of stable, attenuated appearance of the middle lobe segmental/subsegmental pulmonary arteries likely secondary to adjacent mass.  There are four pulmonary veins.  Again identified is a filling defect in the right superior pulmonary vein near its ostium consistent with venous invasion by the adjacent neoplastic process.  Systemic and pulmonary venoatrial connections remain concordant.    The heart is not enlarged and there is no evidence of pericardial effusion.    There is a stable 2.3 cm nodular soft tissue density in the right axilla likely representing lymphadenopathy.  Subcarinal and right hilar soft tissue density appears stable and likely represents a combination of mass invasion/lymphadenopathy.    The esophagus maintains a normal course.    Limited images of the upper abdomen obtained during the course of this dedicated thoracic CT multiple hepatic hypodensities measuring up to 2.3 cm, a 3.3 cm splenic hypodensity, and partially visualized 7 cm nodular opacity in the left renal fossa.    The osseous structures demonstrate degenerative joint disease without aggressive marrow replacement process or acute fracture.    The trachea and proximal airways are patent.  Middle lobe segmental/subsegmental airways  are mostly obstructed.    The lungs demonstrate a 12.5 x 4.5 cm middle lobe mass extending into the right hilum and subcarinal mediastinum with significant postobstructive consolidation.  There is stable subsegmental consolidative opacity in the anterior segment of the right upper lobe, as well as compressive atelectasis in the right lung base.  A 1.1 cm nodular opacity remains present in the anterior basal segment of the left lower lobe.  There is underlying apical predominant centrilobular and paraseptal emphysema.  Trace right pleural fluid is present.  No pneumothorax.                               X-Ray Chest AP Portable (Final result)  Result time 01/02/20 07:41:48    Final result by Jae Bolden MD (01/02/20 07:41:48)                 Impression:      Continued abnormal chest radiograph, but no significant detrimental interval change, allowing for differences in patient positioning, since 12/26/2019.      Electronically signed by: Jae Bolden MD  Date:    01/02/2020  Time:    07:41             Narrative:    EXAMINATION:  XR CHEST AP PORTABLE    CLINICAL HISTORY:  Sepsis;    COMPARISON:  Comparison is made to the chest radiograph of 12/26/2019.  Reference is also made to a subsequent thoracic CT examination of that same date.    FINDINGS:  Opacity in the inferior hemothorax on the right side with obliteration of the right cardiac border and extending to the inferior aspect of the right hilum is again observed.  This was documented on the CT examination referenced above to primarily relate to a mass opacity in the right middle lobe.  This opacity appears essentially unchanged since the previous exam.  Heart size remains normal.  The left lung and the mid and upper lung zones on the right side appear stable, with no significant superimposed airspace consolidation or volume loss.  No pleural fluid of any substantial volume is seen on either side.  No pneumothorax.  Calcification in the wall of the aortic arch is  again incidentally observed.                                    Assessment/Plan:      * Postobstructive pneumonia   68 yo WF w/ hx of COPD, HTN, HLD, PVD (was on ASA and plavix, recently held in preparation for biopsy), MDD, hypothyroidism (post-ablation), and allergic rhinitis who presents to [facility] [place] ED c/o tachycardia, SOB, and R sided CP. Patient recently admitted and treated for pneumonia with 5 day course of levaquin. Symptoms and vitals still point to pneumonia.     Plan  Vanc and Zosyn  Sputum Culture   Blood cultures pending  Home [place] 7.5-35 q8 prn      Chronic obstructive pulmonary disease with acute exacerbation  Patients presents with probable pneumonia that is causing an exacerbation of her COPD    Plan  Scheduled duo-nebs q4  Tiotropium qd  Prednisone PO for 5 days    Mass of middle lobe of right lung  Patients CT scans very concerning for lung cancer with mets. CT surgery contacted and are unable to stent     Plan  Will try to obtain biopsy while patient is in the hospital.  IR will attempt liver biopsy Monday.   Pulm consulted, waiting on liver biopsy  Pallative care consulted for goals of care      Goals of care, counseling/discussion  Palliative care consulted - appreciate recs      Palliative care encounter  Goal of care discussion      Migraine  Home fiorcet ordered prn      Nausea  Promethazine and zofran ordered prn      Anxiety  Holding klonopin in setting of increased 02 requirements on presentation    GERD (gastroesophageal reflux disease)  Famotidine 20 mg daily      Allergic rhinitis  Continue home montelukast       PVD (peripheral vascular disease)  Patient has stents in her leg. Normally on asprin and plavix. Being held for possible biopsy of lung mass       Depression  Continue home paxil and topiramate    Hypertension  Holding home meds in setting of sepsis      Hyperlipidemia  Continue home statin      Postablative hypothyroidism  Continue home levothyroxine        VTE Risk Mitigation  (From admission, onward)         Ordered     heparin (porcine) injection 5,000 Units  Every 8 hours      01/05/20 1024     Place sequential compression device  Until discontinued      01/02/20 1150     IP VTE HIGH RISK PATIENT  Once      01/02/20 1150                      Andrea Crowell MD  Department of Hospital Medicine   [facility]

## 2020-01-05 NOTE — ASSESSMENT & PLAN NOTE
Patients CT scans very concerning for lung cancer with mets. CT surgery contacted and are unable to stent     Plan  Will try to obtain biopsy while patient is in the hospital.  IR will attempt liver biopsy Monday.   Pulm consulted, waiting on liver biopsy  Pallative care consulted for goals of care

## 2020-01-05 NOTE — SUBJECTIVE & OBJECTIVE
Interval History: NAEON. Patient doin well. Will continue vanc and zosyn. Liver biopsy tomorrow with IR. Encouraged patient to drink boost and got a dietary consult.     Review of Systems   Constitutional: Negative for appetite change, chills, diaphoresis, fatigue, fever and unexpected weight change.   HENT: Negative for congestion, postnasal drip, rhinorrhea and sore throat.    Eyes: Negative for pain and redness.   Respiratory: Positive for chest tightness. Negative for cough, shortness of breath and wheezing.    Cardiovascular: Positive for chest pain. Negative for leg swelling.   Gastrointestinal: Negative for abdominal pain, diarrhea, nausea and vomiting.   Genitourinary: Negative for dysuria and frequency.   Musculoskeletal: Negative for arthralgias and myalgias.   Neurological: Negative for dizziness and headaches.     Objective:     Vital Signs (Most Recent):  Temp: 98.2 °F (36.8 °C) (01/05/20 1132)  Pulse: 91 (01/05/20 0933)  Resp: (!) 21 (01/05/20 0933)  BP: (!) 119/57 (01/05/20 0933)  SpO2: 95 % (01/05/20 0933) Vital Signs (24h Range):  Temp:  [98.2 °F (36.8 °C)] 98.2 °F (36.8 °C)  Pulse:  [71-92] 91  Resp:  [15-23] 21  SpO2:  [93 %-99 %] 95 %  BP: (119-136)/(57-67) 119/57     Weight: 68.7 kg (151 lb 7.3 oz)  Body mass index is 27.26 kg/m².    Intake/Output Summary (Last 24 hours) at 1/5/2020 1139  Last data filed at 1/5/2020 0500  Gross per 24 hour   Intake --   Output 1500 ml   Net -1500 ml      Physical Exam   Constitutional: She is oriented to person, place, and time. She appears well-developed and well-nourished.   HENT:   Head: Normocephalic and atraumatic.   Eyes: Pupils are equal, round, and reactive to light. Conjunctivae are normal.   Neck: Normal range of motion. Neck supple.   Cardiovascular: Normal rate, regular rhythm and normal heart sounds.   Pulmonary/Chest: Effort normal. No stridor. No respiratory distress. She has no wheezes. She has no rales.   Decreased breath sounds bilaterally R>L.  Bronchial breath sounds throughout R lung.   Abdominal: Soft. Bowel sounds are normal. She exhibits no distension. There is no tenderness.   Musculoskeletal: Normal range of motion. She exhibits no edema.   Lymphadenopathy:     She has no cervical adenopathy.   Neurological: She is alert and oriented to person, place, and time.   Skin: Skin is warm and dry. No rash noted.   Psychiatric: She has a normal mood and affect. Her behavior is normal.   Nursing note and vitals reviewed.      Significant Labs:   Recent Results (from the past 48 hour(s))   POCT glucose    Collection Time: 01/03/20 12:39 PM   Result Value Ref Range    POCT Glucose 108 70 - 110 mg/dL   POCT glucose    Collection Time: 01/03/20  4:01 PM   Result Value Ref Range    POCT Glucose 146 (H) 70 - 110 mg/dL   POCT glucose    Collection Time: 01/03/20  9:24 PM   Result Value Ref Range    POCT Glucose 171 (H) 70 - 110 mg/dL   POCT glucose    Collection Time: 01/04/20  1:56 AM   Result Value Ref Range    POCT Glucose 149 (H) 70 - 110 mg/dL   Comprehensive metabolic panel    Collection Time: 01/04/20  6:55 AM   Result Value Ref Range    Sodium 139 136 - 145 mmol/L    Potassium 4.9 3.5 - 5.1 mmol/L    Chloride 106 95 - 110 mmol/L    CO2 25 23 - 29 mmol/L    Glucose 100 70 - 110 mg/dL    BUN, Bld 12 8 - 23 mg/dL    Creatinine 0.9 0.5 - 1.4 mg/dL    Calcium 7.8 (L) 8.7 - 10.5 mg/dL    Total Protein 5.3 (L) 6.0 - 8.4 g/dL    Albumin 2.1 (L) 3.5 - 5.2 g/dL    Total Bilirubin 0.4 0.1 - 1.0 mg/dL    Alkaline Phosphatase 72 55 - 135 U/L    AST 28 10 - 40 U/L    ALT 27 10 - 44 U/L    Anion Gap 8 8 - 16 mmol/L    eGFR if African American >60.0 >60 mL/min/1.73 m^2    eGFR if non African American >60.0 >60 mL/min/1.73 m^2   Magnesium    Collection Time: 01/04/20  6:55 AM   Result Value Ref Range    Magnesium 2.1 1.6 - 2.6 mg/dL   Phosphorus    Collection Time: 01/04/20  6:55 AM   Result Value Ref Range    Phosphorus 1.9 (L) 2.7 - 4.5 mg/dL   Comprehensive metabolic  panel    Collection Time: 01/04/20  6:55 AM   Result Value Ref Range    Sodium 139 136 - 145 mmol/L    Potassium 4.9 3.5 - 5.1 mmol/L    Chloride 106 95 - 110 mmol/L    CO2 25 23 - 29 mmol/L    Glucose 100 70 - 110 mg/dL    BUN, Bld 12 8 - 23 mg/dL    Creatinine 0.9 0.5 - 1.4 mg/dL    Calcium 7.8 (L) 8.7 - 10.5 mg/dL    Total Protein 5.3 (L) 6.0 - 8.4 g/dL    Albumin 2.1 (L) 3.5 - 5.2 g/dL    Total Bilirubin 0.4 0.1 - 1.0 mg/dL    Alkaline Phosphatase 72 55 - 135 U/L    AST 28 10 - 40 U/L    ALT 27 10 - 44 U/L    Anion Gap 8 8 - 16 mmol/L    eGFR if African American >60.0 >60 mL/min/1.73 m^2    eGFR if non African American >60.0 >60 mL/min/1.73 m^2   CBC auto differential    Collection Time: 01/04/20  6:56 AM   Result Value Ref Range    WBC 21.52 (H) 3.90 - 12.70 K/uL    RBC 3.24 (L) 4.00 - 5.40 M/uL    Hemoglobin 8.8 (L) 12.0 - 16.0 g/dL    Hematocrit 29.0 (L) 37.0 - 48.5 %    Mean Corpuscular Volume 90 82 - 98 fL    Mean Corpuscular Hemoglobin 27.2 27.0 - 31.0 pg    Mean Corpuscular Hemoglobin Conc 30.3 (L) 32.0 - 36.0 g/dL    RDW 18.2 (H) 11.5 - 14.5 %    Platelets 171 150 - 350 K/uL    MPV 11.1 9.2 - 12.9 fL    Immature Granulocytes 1.2 (H) 0.0 - 0.5 %    Gran # (ANC) 18.4 (H) 1.8 - 7.7 K/uL    Immature Grans (Abs) 0.26 (H) 0.00 - 0.04 K/uL    Lymph # 1.4 1.0 - 4.8 K/uL    Mono # 1.5 (H) 0.3 - 1.0 K/uL    Eos # 0.0 0.0 - 0.5 K/uL    Baso # 0.03 0.00 - 0.20 K/uL    nRBC 0 0 /100 WBC    Gran% 85.4 (H) 38.0 - 73.0 %    Lymph% 6.5 (L) 18.0 - 48.0 %    Mono% 6.7 4.0 - 15.0 %    Eosinophil% 0.1 0.0 - 8.0 %    Basophil% 0.1 0.0 - 1.9 %    Differential Method Automated    POCT glucose    Collection Time: 01/04/20  7:05 AM   Result Value Ref Range    POCT Glucose 101 70 - 110 mg/dL   VANCOMYCIN, TROUGH before 3rd dose    Collection Time: 01/04/20  9:00 AM   Result Value Ref Range    Vancomycin-Trough 11.8 10.0 - 22.0 ug/mL   Occult blood x 1, stool    Collection Time: 01/04/20 11:31 AM   Result Value Ref Range    Occult  Blood Negative Negative   POCT glucose    Collection Time: 01/04/20 11:36 AM   Result Value Ref Range    POCT Glucose 120 (H) 70 - 110 mg/dL   POCT glucose    Collection Time: 01/04/20  4:46 PM   Result Value Ref Range    POCT Glucose 131 (H) 70 - 110 mg/dL   CBC auto differential    Collection Time: 01/05/20  6:49 AM   Result Value Ref Range    WBC 18.48 (H) 3.90 - 12.70 K/uL    RBC 3.55 (L) 4.00 - 5.40 M/uL    Hemoglobin 9.4 (L) 12.0 - 16.0 g/dL    Hematocrit 31.7 (L) 37.0 - 48.5 %    Mean Corpuscular Volume 89 82 - 98 fL    Mean Corpuscular Hemoglobin 26.5 (L) 27.0 - 31.0 pg    Mean Corpuscular Hemoglobin Conc 29.7 (L) 32.0 - 36.0 g/dL    RDW 18.6 (H) 11.5 - 14.5 %    Platelets 184 150 - 350 K/uL    MPV 10.8 9.2 - 12.9 fL    Immature Granulocytes 1.1 (H) 0.0 - 0.5 %    Gran # (ANC) 15.1 (H) 1.8 - 7.7 K/uL    Immature Grans (Abs) 0.20 (H) 0.00 - 0.04 K/uL    Lymph # 1.7 1.0 - 4.8 K/uL    Mono # 1.4 (H) 0.3 - 1.0 K/uL    Eos # 0.0 0.0 - 0.5 K/uL    Baso # 0.03 0.00 - 0.20 K/uL    nRBC 0 0 /100 WBC    Gran% 81.6 (H) 38.0 - 73.0 %    Lymph% 9.3 (L) 18.0 - 48.0 %    Mono% 7.6 4.0 - 15.0 %    Eosinophil% 0.2 0.0 - 8.0 %    Basophil% 0.2 0.0 - 1.9 %    Differential Method Automated    Magnesium    Collection Time: 01/05/20  6:49 AM   Result Value Ref Range    Magnesium 2.0 1.6 - 2.6 mg/dL   Phosphorus    Collection Time: 01/05/20  6:49 AM   Result Value Ref Range    Phosphorus 3.3 2.7 - 4.5 mg/dL   Comprehensive metabolic panel    Collection Time: 01/05/20  6:49 AM   Result Value Ref Range    Sodium 138 136 - 145 mmol/L    Potassium 4.6 3.5 - 5.1 mmol/L    Chloride 105 95 - 110 mmol/L    CO2 21 (L) 23 - 29 mmol/L    Glucose 74 70 - 110 mg/dL    BUN, Bld 10 8 - 23 mg/dL    Creatinine 0.8 0.5 - 1.4 mg/dL    Calcium 7.7 (L) 8.7 - 10.5 mg/dL    Total Protein 5.4 (L) 6.0 - 8.4 g/dL    Albumin 2.2 (L) 3.5 - 5.2 g/dL    Total Bilirubin 0.5 0.1 - 1.0 mg/dL    Alkaline Phosphatase 84 55 - 135 U/L    AST 30 10 - 40 U/L    ALT 35  10 - 44 U/L    Anion Gap 12 8 - 16 mmol/L    eGFR if African American >60.0 >60 mL/min/1.73 m^2    eGFR if non African American >60.0 >60 mL/min/1.73 m^2         Significant Imaging:   Imaging Results          CTA Chest Non-Coronary (PE Study) (Final result)  Result time 01/02/20 10:16:01    Final result by Megan Street MD (01/02/20 10:16:01)                 Impression:      1.  No pulmonary arterial filling defect to suggest thromboembolism.    2.  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  Additional metastatic disease likely present in the right axilla, liver, spleen, and left renal fossa.    3.  Note is made of a stable appearing filling defect in the right superior pulmonary vein approach in its ostium suggesting neoplastic venous invasion.    4.  Stable, indeterminate, left lower lobe pulmonary nodule.    5.  Trace right pleural fluid with associated compressive atelectasis.    Electronically signed by resident: Javier Nunez  Date:    01/02/2020  Time:    09:34    Electronically signed by: Megan Street MD  Date:    01/02/2020  Time:    10:16             Narrative:    EXAMINATION:  CTA CHEST NON CORONARY    CLINICAL HISTORY:  Chest pain, acute, nonspecific, low prob CAD;    TECHNIQUE:  The chest was surveyed from the costophrenic angles through the lung apices at 3-mm increments after the administration of 75 cc of Omnipaque 350 intravenous contrast material according to the PE protocol which is optimized for vascular contrast resolution.  Axial, sagittal and coronal maximum intensity projection images were reviewed.    Total Exam DLP: 333.30mGy-cm.    COMPARISON:  CTA chest 12/26/2019    FINDINGS:  Examination of the soft tissue and vascular structures at the base of the neck is unremarkable.    There is a left-sided aortic arch with 3 branch vessels.  The thoracic  aorta maintains normal caliber, contour, and course with extensive atherosclerotic calcification.  There is no evidence of aneurysmal dilation or dissection.    The pulmonary arteries distribute normally without evidence of filling defect to indicate pulmonary thromboembolism.  Note is made of stable, attenuated appearance of the middle lobe segmental/subsegmental pulmonary arteries likely secondary to adjacent mass.  There are four pulmonary veins.  Again identified is a filling defect in the right superior pulmonary vein near its ostium consistent with venous invasion by the adjacent neoplastic process.  Systemic and pulmonary venoatrial connections remain concordant.    The heart is not enlarged and there is no evidence of pericardial effusion.    There is a stable 2.3 cm nodular soft tissue density in the right axilla likely representing lymphadenopathy.  Subcarinal and right hilar soft tissue density appears stable and likely represents a combination of mass invasion/lymphadenopathy.    The esophagus maintains a normal course.    Limited images of the upper abdomen obtained during the course of this dedicated thoracic CT multiple hepatic hypodensities measuring up to 2.3 cm, a 3.3 cm splenic hypodensity, and partially visualized 7 cm nodular opacity in the left renal fossa.    The osseous structures demonstrate degenerative joint disease without aggressive marrow replacement process or acute fracture.    The trachea and proximal airways are patent.  Middle lobe segmental/subsegmental airways are mostly obstructed.    The lungs demonstrate a 12.5 x 4.5 cm middle lobe mass extending into the right hilum and subcarinal mediastinum with significant postobstructive consolidation.  There is stable subsegmental consolidative opacity in the anterior segment of the right upper lobe, as well as compressive atelectasis in the right lung base.  A 1.1 cm nodular opacity remains present in the anterior basal segment of the  left lower lobe.  There is underlying apical predominant centrilobular and paraseptal emphysema.  Trace right pleural fluid is present.  No pneumothorax.                               X-Ray Chest AP Portable (Final result)  Result time 01/02/20 07:41:48    Final result by Jae Bolden MD (01/02/20 07:41:48)                 Impression:      Continued abnormal chest radiograph, but no significant detrimental interval change, allowing for differences in patient positioning, since 12/26/2019.      Electronically signed by: Jae Bolden MD  Date:    01/02/2020  Time:    07:41             Narrative:    EXAMINATION:  XR CHEST AP PORTABLE    CLINICAL HISTORY:  Sepsis;    COMPARISON:  Comparison is made to the chest radiograph of 12/26/2019.  Reference is also made to a subsequent thoracic CT examination of that same date.    FINDINGS:  Opacity in the inferior hemothorax on the right side with obliteration of the right cardiac border and extending to the inferior aspect of the right hilum is again observed.  This was documented on the CT examination referenced above to primarily relate to a mass opacity in the right middle lobe.  This opacity appears essentially unchanged since the previous exam.  Heart size remains normal.  The left lung and the mid and upper lung zones on the right side appear stable, with no significant superimposed airspace consolidation or volume loss.  No pleural fluid of any substantial volume is seen on either side.  No pneumothorax.  Calcification in the wall of the aortic arch is again incidentally observed.

## 2020-01-06 LAB
ALBUMIN SERPL BCP-MCNC: 2.2 G/DL (ref 3.5–5.2)
ALP SERPL-CCNC: 113 U/L (ref 55–135)
ALT SERPL W/O P-5'-P-CCNC: 31 U/L (ref 10–44)
ANION GAP SERPL CALC-SCNC: 14 MMOL/L (ref 8–16)
AST SERPL-CCNC: 26 U/L (ref 10–40)
BASOPHILS # BLD AUTO: 0.02 K/UL (ref 0–0.2)
BASOPHILS NFR BLD: 0.1 % (ref 0–1.9)
BILIRUB SERPL-MCNC: 0.6 MG/DL (ref 0.1–1)
BUN SERPL-MCNC: 9 MG/DL (ref 8–23)
CALCIUM SERPL-MCNC: 8.1 MG/DL (ref 8.7–10.5)
CHLORIDE SERPL-SCNC: 104 MMOL/L (ref 95–110)
CO2 SERPL-SCNC: 19 MMOL/L (ref 23–29)
CREAT SERPL-MCNC: 0.8 MG/DL (ref 0.5–1.4)
DIFFERENTIAL METHOD: ABNORMAL
EOSINOPHIL # BLD AUTO: 0.1 K/UL (ref 0–0.5)
EOSINOPHIL NFR BLD: 0.4 % (ref 0–8)
ERYTHROCYTE [DISTWIDTH] IN BLOOD BY AUTOMATED COUNT: 18.5 % (ref 11.5–14.5)
EST. GFR  (AFRICAN AMERICAN): >60 ML/MIN/1.73 M^2
EST. GFR  (NON AFRICAN AMERICAN): >60 ML/MIN/1.73 M^2
GLUCOSE SERPL-MCNC: 66 MG/DL (ref 70–110)
HCT VFR BLD AUTO: 31.6 % (ref 37–48.5)
HGB BLD-MCNC: 9.4 G/DL (ref 12–16)
IMM GRANULOCYTES # BLD AUTO: 0.25 K/UL (ref 0–0.04)
IMM GRANULOCYTES NFR BLD AUTO: 1.3 % (ref 0–0.5)
LYMPHOCYTES # BLD AUTO: 2 K/UL (ref 1–4.8)
LYMPHOCYTES NFR BLD: 10 % (ref 18–48)
MAGNESIUM SERPL-MCNC: 2 MG/DL (ref 1.6–2.6)
MCH RBC QN AUTO: 26.3 PG (ref 27–31)
MCHC RBC AUTO-ENTMCNC: 29.7 G/DL (ref 32–36)
MCV RBC AUTO: 89 FL (ref 82–98)
MONOCYTES # BLD AUTO: 1.4 K/UL (ref 0.3–1)
MONOCYTES NFR BLD: 7 % (ref 4–15)
NEUTROPHILS # BLD AUTO: 16.2 K/UL (ref 1.8–7.7)
NEUTROPHILS NFR BLD: 81.2 % (ref 38–73)
NRBC BLD-RTO: 0 /100 WBC
PHOSPHATE SERPL-MCNC: 3.1 MG/DL (ref 2.7–4.5)
PLATELET # BLD AUTO: 202 K/UL (ref 150–350)
PMV BLD AUTO: 11.1 FL (ref 9.2–12.9)
POCT GLUCOSE: 70 MG/DL (ref 70–110)
POCT GLUCOSE: 74 MG/DL (ref 70–110)
POTASSIUM SERPL-SCNC: 4.4 MMOL/L (ref 3.5–5.1)
PROT SERPL-MCNC: 5.5 G/DL (ref 6–8.4)
RBC # BLD AUTO: 3.57 M/UL (ref 4–5.4)
SODIUM SERPL-SCNC: 137 MMOL/L (ref 136–145)
WBC # BLD AUTO: 19.96 K/UL (ref 3.9–12.7)

## 2020-01-06 PROCEDURE — 88342 IMHCHEM/IMCYTCHM 1ST ANTB: CPT | Mod: 91 | Performed by: PATHOLOGY

## 2020-01-06 PROCEDURE — 88307 PR  SURG PATH,LEVEL V: ICD-10-PCS | Mod: 26,,, | Performed by: PATHOLOGY

## 2020-01-06 PROCEDURE — 85025 COMPLETE CBC W/AUTO DIFF WBC: CPT

## 2020-01-06 PROCEDURE — 88307 TISSUE EXAM BY PATHOLOGIST: CPT | Performed by: PATHOLOGY

## 2020-01-06 PROCEDURE — 94640 AIRWAY INHALATION TREATMENT: CPT

## 2020-01-06 PROCEDURE — 94664 DEMO&/EVAL PT USE INHALER: CPT

## 2020-01-06 PROCEDURE — 88333 PATH CONSLTJ SURG CYTO XM 1: CPT | Performed by: PATHOLOGY

## 2020-01-06 PROCEDURE — 88342 CHG IMMUNOCYTOCHEMISTRY: ICD-10-PCS | Mod: 26,,, | Performed by: PATHOLOGY

## 2020-01-06 PROCEDURE — 97802 MEDICAL NUTRITION INDIV IN: CPT

## 2020-01-06 PROCEDURE — 88341 IMHCHEM/IMCYTCHM EA ADD ANTB: CPT | Mod: 59 | Performed by: PATHOLOGY

## 2020-01-06 PROCEDURE — 25000003 PHARM REV CODE 250: Performed by: STUDENT IN AN ORGANIZED HEALTH CARE EDUCATION/TRAINING PROGRAM

## 2020-01-06 PROCEDURE — 84100 ASSAY OF PHOSPHORUS: CPT

## 2020-01-06 PROCEDURE — 11000001 HC ACUTE MED/SURG PRIVATE ROOM

## 2020-01-06 PROCEDURE — 80053 COMPREHEN METABOLIC PANEL: CPT

## 2020-01-06 PROCEDURE — 88333 PR  INTRAOPERATIVE CYTO PATH CONSULT, INITIAL SITE: ICD-10-PCS | Mod: 26,,, | Performed by: PATHOLOGY

## 2020-01-06 PROCEDURE — 99497 PR ADVNCD CARE PLAN 30 MIN: ICD-10-PCS | Mod: ,,, | Performed by: INTERNAL MEDICINE

## 2020-01-06 PROCEDURE — 27000221 HC OXYGEN, UP TO 24 HOURS

## 2020-01-06 PROCEDURE — 99232 SBSQ HOSP IP/OBS MODERATE 35: CPT | Mod: GC,,, | Performed by: INTERNAL MEDICINE

## 2020-01-06 PROCEDURE — 63600175 PHARM REV CODE 636 W HCPCS: Performed by: RADIOLOGY

## 2020-01-06 PROCEDURE — 88342 IMHCHEM/IMCYTCHM 1ST ANTB: CPT | Performed by: PATHOLOGY

## 2020-01-06 PROCEDURE — 63600175 PHARM REV CODE 636 W HCPCS: Performed by: STUDENT IN AN ORGANIZED HEALTH CARE EDUCATION/TRAINING PROGRAM

## 2020-01-06 PROCEDURE — 99900035 HC TECH TIME PER 15 MIN (STAT)

## 2020-01-06 PROCEDURE — 88307 TISSUE EXAM BY PATHOLOGIST: CPT | Mod: 26,,, | Performed by: PATHOLOGY

## 2020-01-06 PROCEDURE — 25000003 PHARM REV CODE 250

## 2020-01-06 PROCEDURE — 88341 IMHCHEM/IMCYTCHM EA ADD ANTB: CPT | Mod: 26,,, | Performed by: PATHOLOGY

## 2020-01-06 PROCEDURE — 94668 MNPJ CHEST WALL SBSQ: CPT

## 2020-01-06 PROCEDURE — 83735 ASSAY OF MAGNESIUM: CPT

## 2020-01-06 PROCEDURE — 99232 PR SUBSEQUENT HOSPITAL CARE,LEVL II: ICD-10-PCS | Mod: GC,,, | Performed by: INTERNAL MEDICINE

## 2020-01-06 PROCEDURE — 99497 ADVNCD CARE PLAN 30 MIN: CPT | Mod: ,,, | Performed by: INTERNAL MEDICINE

## 2020-01-06 PROCEDURE — 94761 N-INVAS EAR/PLS OXIMETRY MLT: CPT

## 2020-01-06 PROCEDURE — 88342 IMHCHEM/IMCYTCHM 1ST ANTB: CPT | Mod: 26,,, | Performed by: PATHOLOGY

## 2020-01-06 PROCEDURE — 25000242 PHARM REV CODE 250 ALT 637 W/ HCPCS: Performed by: STUDENT IN AN ORGANIZED HEALTH CARE EDUCATION/TRAINING PROGRAM

## 2020-01-06 PROCEDURE — 36415 COLL VENOUS BLD VENIPUNCTURE: CPT

## 2020-01-06 PROCEDURE — 88333 PATH CONSLTJ SURG CYTO XM 1: CPT | Mod: 26,,, | Performed by: PATHOLOGY

## 2020-01-06 PROCEDURE — 88341 PR IHC OR ICC EACH ADD'L SINGLE ANTIBODY  STAINPR: ICD-10-PCS | Mod: 26,,, | Performed by: PATHOLOGY

## 2020-01-06 RX ORDER — FENTANYL CITRATE 50 UG/ML
INJECTION, SOLUTION INTRAMUSCULAR; INTRAVENOUS CODE/TRAUMA/SEDATION MEDICATION
Status: COMPLETED | OUTPATIENT
Start: 2020-01-06 | End: 2020-01-06

## 2020-01-06 RX ORDER — MIDAZOLAM HYDROCHLORIDE 1 MG/ML
INJECTION INTRAMUSCULAR; INTRAVENOUS CODE/TRAUMA/SEDATION MEDICATION
Status: COMPLETED | OUTPATIENT
Start: 2020-01-06 | End: 2020-01-06

## 2020-01-06 RX ADMIN — FENTANYL CITRATE 50 MCG: 50 INJECTION, SOLUTION INTRAMUSCULAR; INTRAVENOUS at 04:01

## 2020-01-06 RX ADMIN — ATORVASTATIN CALCIUM 40 MG: 20 TABLET, FILM COATED ORAL at 08:01

## 2020-01-06 RX ADMIN — IPRATROPIUM BROMIDE AND ALBUTEROL SULFATE 3 ML: .5; 3 SOLUTION RESPIRATORY (INHALATION) at 05:01

## 2020-01-06 RX ADMIN — PIPERACILLIN AND TAZOBACTAM 4.5 G: 4; .5 INJECTION, POWDER, FOR SOLUTION INTRAVENOUS at 05:01

## 2020-01-06 RX ADMIN — MIDAZOLAM HYDROCHLORIDE 1 MG: 1 INJECTION, SOLUTION INTRAMUSCULAR; INTRAVENOUS at 04:01

## 2020-01-06 RX ADMIN — BUTALBITAL, ACETAMINOPHEN AND CAFFEINE 1 TABLET: 50; 325; 40 TABLET ORAL at 09:01

## 2020-01-06 RX ADMIN — MIDAZOLAM HYDROCHLORIDE 0.5 MG: 1 INJECTION, SOLUTION INTRAMUSCULAR; INTRAVENOUS at 04:01

## 2020-01-06 RX ADMIN — MONTELUKAST 10 MG: 10 TABLET, FILM COATED ORAL at 09:01

## 2020-01-06 RX ADMIN — IPRATROPIUM BROMIDE AND ALBUTEROL SULFATE 3 ML: .5; 3 SOLUTION RESPIRATORY (INHALATION) at 11:01

## 2020-01-06 RX ADMIN — TIOTROPIUM BROMIDE 18 MCG: 18 CAPSULE ORAL; RESPIRATORY (INHALATION) at 09:01

## 2020-01-06 RX ADMIN — PIPERACILLIN AND TAZOBACTAM 4.5 G: 4; .5 INJECTION, POWDER, FOR SOLUTION INTRAVENOUS at 08:01

## 2020-01-06 RX ADMIN — DEXTROSE 125 ML: 10 SOLUTION INTRAVENOUS at 09:01

## 2020-01-06 RX ADMIN — FENTANYL CITRATE 25 MCG: 50 INJECTION, SOLUTION INTRAMUSCULAR; INTRAVENOUS at 04:01

## 2020-01-06 RX ADMIN — HYDROCODONE BITARTRATE AND ACETAMINOPHEN 1 TABLET: 7.5; 325 TABLET ORAL at 11:01

## 2020-01-06 RX ADMIN — IPRATROPIUM BROMIDE AND ALBUTEROL SULFATE 3 ML: .5; 3 SOLUTION RESPIRATORY (INHALATION) at 07:01

## 2020-01-06 RX ADMIN — FAMOTIDINE 20 MG: 20 TABLET ORAL at 09:01

## 2020-01-06 RX ADMIN — IPRATROPIUM BROMIDE AND ALBUTEROL SULFATE 3 ML: .5; 3 SOLUTION RESPIRATORY (INHALATION) at 01:01

## 2020-01-06 NOTE — H&P
Inpatient Radiology Pre-procedure Note    History of Present Illness:  Sharita Castañeda is a 69 y.o. female with COPD, HTN, PVD, hypothyroidism, newly discovered lung mass and liver lesions who presents for liver mass biopsy.    Admission H&P reviewed.  Past Medical History:   Diagnosis Date    Allergic rhinitis     COPD (chronic obstructive pulmonary disease)     Depression     GERD (gastroesophageal reflux disease)     Headache     Hyperlipidemia     Hypertension     Polyarthralgia     Postablative hypothyroidism     hypothyroidism s/p OCASIO therapy    PVD (peripheral vascular disease)     Treated by Dr. Sim     Past Surgical History:   Procedure Laterality Date    CHOLECYSTECTOMY  1986    GALLBLADDER SURGERY  2006    HIP SURGERY  2005    Right hip fracture/surgery    PERIPHERAL ARTERIAL STENT GRAFT Right 2007    stent for femoral artery blockage       Review of Systems:   As documented in primary team H&P    Home Meds:   Prior to Admission medications    Medication Sig Start Date End Date Taking? Authorizing Provider   alendronate (FOSAMAX) 70 MG tablet TAKE 1 TABLET(70 MG) BY MOUTH EVERY 7 DAYS  Patient taking differently: every Monday.  8/17/19  Yes Wallace Deng MD   amLODIPine (NORVASC) 5 MG tablet Take 1 tablet (5 mg total) by mouth once daily. 10/12/19  Yes Wallace Deng MD   aspirin (ECOTRIN) 81 MG EC tablet Take 1 tablet (81 mg total) by mouth once daily. 12/28/19  Yes Carlita Clay MD   atorvastatin (LIPITOR) 40 MG tablet TAKE 1 TABLET(40 MG) BY MOUTH EVERY DAY 9/16/19  Yes Wallace Deng MD   butalbital-acetaminophen-caffeine -40 mg (FIORICET, ESGIC) -40 mg per tablet Take 1 tablet by mouth every 12 (twelve) hours as needed for Pain or Headaches. 3/1/19  Yes Wallace Deng MD   calcium-vitamin D3 (CALCIUM 500 + D) 500 mg(1,250mg) -200 unit per tablet Take 1 tablet by mouth 2 (two) times daily with meals.  Patient taking differently: Take 1 tablet by mouth once daily.  7/6/19  Yes Wallace Deng MD    clopidogrel (PLAVIX) 75 mg tablet Take 1 tablet (75 mg total) by mouth once daily. 12/28/19  Yes Carlita Clay MD   hydrocodone-acetaminophen 7.5-325mg (NORCO) 7.5-325 mg per tablet Take 1/2 to 1 tablet by mouth 3 times daily as needed for pain   Yes Ryanne Chu MD   levothyroxine (SYNTHROID) 100 MCG tablet Take 1 tablet (100 mcg total) by mouth before breakfast. 9/16/19 9/15/20 Yes Wallace Deng MD   losartan (COZAAR) 25 MG tablet Take 1 tablet (25 mg total) by mouth once daily. 7/6/19  Yes Wallace Deng MD   montelukast (SINGULAIR) 10 mg tablet TAKE 1 TABLET(10 MG) BY MOUTH EVERY DAY 7/6/19  Yes Wallace Deng MD   umeclidinium (INCRUSE ELLIPTA) 62.5 mcg/actuation DsDv Inhale 1 each into the lungs once daily. Controller 12/27/19  Yes Joni Matthew MD   albuterol (PROAIR HFA) 90 mcg/actuation inhaler Inhale 2 puffs into the lungs every 6 (six) hours as needed for Wheezing. 4/15/17   Wallace Deng MD     Scheduled Meds:    albuterol-ipratropium  3 mL Nebulization Q4H    atorvastatin  40 mg Oral QHS    famotidine  20 mg Oral Daily    heparin (porcine)  5,000 Units Subcutaneous Q8H    levothyroxine  100 mcg Oral Before breakfast    montelukast  10 mg Oral Daily    paroxetine  20 mg Oral QAM    piperacillin-tazobactam (ZOSYN) IVPB  4.5 g Intravenous Q8H    predniSONE  40 mg Oral Daily    tiotropium  1 capsule Inhalation Daily    topiramate  25 mg Oral BID     Continuous Infusions:   PRN Meds:benzonatate, butalbital-acetaminophen-caffeine -40 mg, Dextrose 10% Bolus, Dextrose 10% Bolus, glucagon (human recombinant), glucose, glucose, HYDROcodone-acetaminophen, ondansetron, promethazine, sodium chloride 0.9%  Anticoagulants/Antiplatelets: aspirin and plavix    Allergies: Review of patient's allergies indicates:  No Known Allergies  Sedation Hx: have not been any systemic reactions    Labs:  Recent Labs   Lab 01/03/20  1045   INR 1.0       Recent Labs   Lab 01/06/20  0657   WBC 19.96*   HGB 9.4*   HCT  31.6*   MCV 89         Recent Labs   Lab 01/06/20  0657   GLU 66*      K 4.4      CO2 19*   BUN 9   CREATININE 0.8   CALCIUM 8.1*   MG 2.0   ALT 31   AST 26   ALBUMIN 2.2*   BILITOT 0.6     Vitals:  Temp: 97.8 °F (36.6 °C) (01/06/20 1215)  Pulse: 80 (01/06/20 1500)  Resp: 18 (01/06/20 1215)  BP: (Abnormal) 162/67 (01/06/20 1215)  SpO2: (Abnormal) 94 % (01/06/20 1215)     Physical Exam:  ASA: III  Mallampati: II    General: no acute distress  Mental Status: alert and oriented to person, place and time  HEENT: normocephalic, atraumatic  Chest: unlabored breathing  Heart: regular heart rate  Abdomen: nondistended  Extremity: moves all extremities    Plan: liver mass biopsy   Sedation Plan: tamela Prcie, PGY-3

## 2020-01-06 NOTE — PLAN OF CARE
Patient remained in stable condition through shift. Remained free of falls and other injuries. Able to reposition self independently. PRN norco given for pain. Per orders, patient remained NPO since midnight. Bed in locked and lowest position, call light in reach, all questions answered, declines any further needs at this time. Will continue to monitor.

## 2020-01-06 NOTE — ASSESSMENT & PLAN NOTE
70 yo WF w/ hx of COPD, HTN, HLD, PVD (was on ASA and plavix, recently held in preparation for biopsy), MDD, hypothyroidism (post-ablation), and allergic rhinitis who presents to [facility] ED c/o tachycardia, SOB, and R sided CP. Patient recently admitted and treated for pneumonia with 5 day course of levaquin. Symptoms and vitals still point to pneumonia.     Plan  Zosyn  Sputum Culture   Blood cultures negative  Home [place] 7.5-35 q8 prn

## 2020-01-06 NOTE — ASSESSMENT & PLAN NOTE
Patients presents with probable pneumonia that is causing an exacerbation of her COPD    Plan  Scheduled duo-nebs q4  Tiotropium qd  Prednisone PO for 5 days. Ends 1/6/20

## 2020-01-06 NOTE — SUBJECTIVE & OBJECTIVE
Interval History: NAEON. Patient is doing well today. Requiring 2 liters of oxygen. Going for liver biopsy today. Antibiotics deescalated to just rocephin. Plan to monitor patient over night and discharge patient tomorrow. Setting up home oxygen and nebulizer    Review of Systems   Constitutional: Negative for appetite change, chills, diaphoresis, fatigue, fever and unexpected weight change.   HENT: Negative for congestion, postnasal drip, rhinorrhea and sore throat.    Eyes: Negative for pain and redness.   Respiratory: Positive for chest tightness. Negative for cough, shortness of breath and wheezing.    Cardiovascular: Positive for chest pain. Negative for leg swelling.   Gastrointestinal: Negative for abdominal pain, diarrhea, nausea and vomiting.   Genitourinary: Negative for dysuria and frequency.   Musculoskeletal: Negative for arthralgias and myalgias.   Neurological: Negative for dizziness and headaches.     Objective:     Vital Signs (Most Recent):  Temp: 97.8 °F (36.6 °C) (01/06/20 1215)  Pulse: 102 (01/06/20 1215)  Resp: 18 (01/06/20 1215)  BP: (!) 162/67 (01/06/20 1215)  SpO2: (!) 94 % (01/06/20 1215) Vital Signs (24h Range):  Temp:  [97.8 °F (36.6 °C)-98.1 °F (36.7 °C)] 97.8 °F (36.6 °C)  Pulse:  [] 102  Resp:  [13-20] 18  SpO2:  [87 %-99 %] 94 %  BP: (104-162)/(50-70) 162/67     Weight: 67 kg (147 lb 11.3 oz)  Body mass index is 26.59 kg/m².    Intake/Output Summary (Last 24 hours) at 1/6/2020 1351  Last data filed at 1/6/2020 0545  Gross per 24 hour   Intake 1100 ml   Output 1600 ml   Net -500 ml      Physical Exam   Constitutional: She is oriented to person, place, and time. She appears well-developed and well-nourished.   HENT:   Head: Normocephalic and atraumatic.   Eyes: Pupils are equal, round, and reactive to light. Conjunctivae are normal.   Neck: Normal range of motion. Neck supple.   Cardiovascular: Normal rate, regular rhythm and normal heart sounds.   Pulmonary/Chest: Effort normal.  No stridor. No respiratory distress. She has no wheezes. She has no rales.   Decreased breath sounds bilaterally R>L. Bronchial breath sounds throughout R lung. On 2 liters nasal canula   Abdominal: Soft. Bowel sounds are normal. She exhibits no distension. There is no tenderness.   Musculoskeletal: Normal range of motion. She exhibits no edema.   Lymphadenopathy:     She has no cervical adenopathy.   Neurological: She is alert and oriented to person, place, and time.   Skin: Skin is warm and dry. No rash noted.   Psychiatric: She has a normal mood and affect. Her behavior is normal.   Nursing note and vitals reviewed.      Significant Labs:   Recent Results (from the past 48 hour(s))   POCT glucose    Collection Time: 01/04/20  4:46 PM   Result Value Ref Range    POCT Glucose 131 (H) 70 - 110 mg/dL   CBC auto differential    Collection Time: 01/05/20  6:49 AM   Result Value Ref Range    WBC 18.48 (H) 3.90 - 12.70 K/uL    RBC 3.55 (L) 4.00 - 5.40 M/uL    Hemoglobin 9.4 (L) 12.0 - 16.0 g/dL    Hematocrit 31.7 (L) 37.0 - 48.5 %    Mean Corpuscular Volume 89 82 - 98 fL    Mean Corpuscular Hemoglobin 26.5 (L) 27.0 - 31.0 pg    Mean Corpuscular Hemoglobin Conc 29.7 (L) 32.0 - 36.0 g/dL    RDW 18.6 (H) 11.5 - 14.5 %    Platelets 184 150 - 350 K/uL    MPV 10.8 9.2 - 12.9 fL    Immature Granulocytes 1.1 (H) 0.0 - 0.5 %    Gran # (ANC) 15.1 (H) 1.8 - 7.7 K/uL    Immature Grans (Abs) 0.20 (H) 0.00 - 0.04 K/uL    Lymph # 1.7 1.0 - 4.8 K/uL    Mono # 1.4 (H) 0.3 - 1.0 K/uL    Eos # 0.0 0.0 - 0.5 K/uL    Baso # 0.03 0.00 - 0.20 K/uL    nRBC 0 0 /100 WBC    Gran% 81.6 (H) 38.0 - 73.0 %    Lymph% 9.3 (L) 18.0 - 48.0 %    Mono% 7.6 4.0 - 15.0 %    Eosinophil% 0.2 0.0 - 8.0 %    Basophil% 0.2 0.0 - 1.9 %    Differential Method Automated    Magnesium    Collection Time: 01/05/20  6:49 AM   Result Value Ref Range    Magnesium 2.0 1.6 - 2.6 mg/dL   Phosphorus    Collection Time: 01/05/20  6:49 AM   Result Value Ref Range    Phosphorus  3.3 2.7 - 4.5 mg/dL   Comprehensive metabolic panel    Collection Time: 01/05/20  6:49 AM   Result Value Ref Range    Sodium 138 136 - 145 mmol/L    Potassium 4.6 3.5 - 5.1 mmol/L    Chloride 105 95 - 110 mmol/L    CO2 21 (L) 23 - 29 mmol/L    Glucose 74 70 - 110 mg/dL    BUN, Bld 10 8 - 23 mg/dL    Creatinine 0.8 0.5 - 1.4 mg/dL    Calcium 7.7 (L) 8.7 - 10.5 mg/dL    Total Protein 5.4 (L) 6.0 - 8.4 g/dL    Albumin 2.2 (L) 3.5 - 5.2 g/dL    Total Bilirubin 0.5 0.1 - 1.0 mg/dL    Alkaline Phosphatase 84 55 - 135 U/L    AST 30 10 - 40 U/L    ALT 35 10 - 44 U/L    Anion Gap 12 8 - 16 mmol/L    eGFR if African American >60.0 >60 mL/min/1.73 m^2    eGFR if non African American >60.0 >60 mL/min/1.73 m^2   POCT glucose    Collection Time: 01/05/20 10:11 PM   Result Value Ref Range    POCT Glucose 79 70 - 110 mg/dL   CBC auto differential    Collection Time: 01/06/20  6:57 AM   Result Value Ref Range    WBC 19.96 (H) 3.90 - 12.70 K/uL    RBC 3.57 (L) 4.00 - 5.40 M/uL    Hemoglobin 9.4 (L) 12.0 - 16.0 g/dL    Hematocrit 31.6 (L) 37.0 - 48.5 %    Mean Corpuscular Volume 89 82 - 98 fL    Mean Corpuscular Hemoglobin 26.3 (L) 27.0 - 31.0 pg    Mean Corpuscular Hemoglobin Conc 29.7 (L) 32.0 - 36.0 g/dL    RDW 18.5 (H) 11.5 - 14.5 %    Platelets 202 150 - 350 K/uL    MPV 11.1 9.2 - 12.9 fL    Immature Granulocytes 1.3 (H) 0.0 - 0.5 %    Gran # (ANC) 16.2 (H) 1.8 - 7.7 K/uL    Immature Grans (Abs) 0.25 (H) 0.00 - 0.04 K/uL    Lymph # 2.0 1.0 - 4.8 K/uL    Mono # 1.4 (H) 0.3 - 1.0 K/uL    Eos # 0.1 0.0 - 0.5 K/uL    Baso # 0.02 0.00 - 0.20 K/uL    nRBC 0 0 /100 WBC    Gran% 81.2 (H) 38.0 - 73.0 %    Lymph% 10.0 (L) 18.0 - 48.0 %    Mono% 7.0 4.0 - 15.0 %    Eosinophil% 0.4 0.0 - 8.0 %    Basophil% 0.1 0.0 - 1.9 %    Differential Method Automated    Magnesium    Collection Time: 01/06/20  6:57 AM   Result Value Ref Range    Magnesium 2.0 1.6 - 2.6 mg/dL   Phosphorus    Collection Time: 01/06/20  6:57 AM   Result Value Ref Range     Phosphorus 3.1 2.7 - 4.5 mg/dL   Comprehensive metabolic panel    Collection Time: 01/06/20  6:57 AM   Result Value Ref Range    Sodium 137 136 - 145 mmol/L    Potassium 4.4 3.5 - 5.1 mmol/L    Chloride 104 95 - 110 mmol/L    CO2 19 (L) 23 - 29 mmol/L    Glucose 66 (L) 70 - 110 mg/dL    BUN, Bld 9 8 - 23 mg/dL    Creatinine 0.8 0.5 - 1.4 mg/dL    Calcium 8.1 (L) 8.7 - 10.5 mg/dL    Total Protein 5.5 (L) 6.0 - 8.4 g/dL    Albumin 2.2 (L) 3.5 - 5.2 g/dL    Total Bilirubin 0.6 0.1 - 1.0 mg/dL    Alkaline Phosphatase 113 55 - 135 U/L    AST 26 10 - 40 U/L    ALT 31 10 - 44 U/L    Anion Gap 14 8 - 16 mmol/L    eGFR if African American >60.0 >60 mL/min/1.73 m^2    eGFR if non African American >60.0 >60 mL/min/1.73 m^2   POCT glucose    Collection Time: 01/06/20  8:51 AM   Result Value Ref Range    POCT Glucose 70 70 - 110 mg/dL   POCT glucose    Collection Time: 01/06/20 10:39 AM   Result Value Ref Range    POCT Glucose 74 70 - 110 mg/dL         Significant Imaging:   Imaging Results          CTA Chest Non-Coronary (PE Study) (Final result)  Result time 01/02/20 10:16:01    Final result by Megan Street MD (01/02/20 10:16:01)                 Impression:      1.  No pulmonary arterial filling defect to suggest thromboembolism.    2.  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  Additional metastatic disease likely present in the right axilla, liver, spleen, and left renal fossa.    3.  Note is made of a stable appearing filling defect in the right superior pulmonary vein approach in its ostium suggesting neoplastic venous invasion.    4.  Stable, indeterminate, left lower lobe pulmonary nodule.    5.  Trace right pleural fluid with associated compressive atelectasis.    Electronically signed by resident: Javier Nunez  Date:    01/02/2020  Time:    09:34    Electronically signed by: Megan  MD Yenifer  Date:    01/02/2020  Time:    10:16             Narrative:    EXAMINATION:  CTA CHEST NON CORONARY    CLINICAL HISTORY:  Chest pain, acute, nonspecific, low prob CAD;    TECHNIQUE:  The chest was surveyed from the costophrenic angles through the lung apices at 3-mm increments after the administration of 75 cc of Omnipaque 350 intravenous contrast material according to the PE protocol which is optimized for vascular contrast resolution.  Axial, sagittal and coronal maximum intensity projection images were reviewed.    Total Exam DLP: 333.30mGy-cm.    COMPARISON:  CTA chest 12/26/2019    FINDINGS:  Examination of the soft tissue and vascular structures at the base of the neck is unremarkable.    There is a left-sided aortic arch with 3 branch vessels.  The thoracic aorta maintains normal caliber, contour, and course with extensive atherosclerotic calcification.  There is no evidence of aneurysmal dilation or dissection.    The pulmonary arteries distribute normally without evidence of filling defect to indicate pulmonary thromboembolism.  Note is made of stable, attenuated appearance of the middle lobe segmental/subsegmental pulmonary arteries likely secondary to adjacent mass.  There are four pulmonary veins.  Again identified is a filling defect in the right superior pulmonary vein near its ostium consistent with venous invasion by the adjacent neoplastic process.  Systemic and pulmonary venoatrial connections remain concordant.    The heart is not enlarged and there is no evidence of pericardial effusion.    There is a stable 2.3 cm nodular soft tissue density in the right axilla likely representing lymphadenopathy.  Subcarinal and right hilar soft tissue density appears stable and likely represents a combination of mass invasion/lymphadenopathy.    The esophagus maintains a normal course.    Limited images of the upper abdomen obtained during the course of this dedicated thoracic CT multiple hepatic  hypodensities measuring up to 2.3 cm, a 3.3 cm splenic hypodensity, and partially visualized 7 cm nodular opacity in the left renal fossa.    The osseous structures demonstrate degenerative joint disease without aggressive marrow replacement process or acute fracture.    The trachea and proximal airways are patent.  Middle lobe segmental/subsegmental airways are mostly obstructed.    The lungs demonstrate a 12.5 x 4.5 cm middle lobe mass extending into the right hilum and subcarinal mediastinum with significant postobstructive consolidation.  There is stable subsegmental consolidative opacity in the anterior segment of the right upper lobe, as well as compressive atelectasis in the right lung base.  A 1.1 cm nodular opacity remains present in the anterior basal segment of the left lower lobe.  There is underlying apical predominant centrilobular and paraseptal emphysema.  Trace right pleural fluid is present.  No pneumothorax.                               X-Ray Chest AP Portable (Final result)  Result time 01/02/20 07:41:48    Final result by Jae Bolden MD (01/02/20 07:41:48)                 Impression:      Continued abnormal chest radiograph, but no significant detrimental interval change, allowing for differences in patient positioning, since 12/26/2019.      Electronically signed by: Jae Bolden MD  Date:    01/02/2020  Time:    07:41             Narrative:    EXAMINATION:  XR CHEST AP PORTABLE    CLINICAL HISTORY:  Sepsis;    COMPARISON:  Comparison is made to the chest radiograph of 12/26/2019.  Reference is also made to a subsequent thoracic CT examination of that same date.    FINDINGS:  Opacity in the inferior hemothorax on the right side with obliteration of the right cardiac border and extending to the inferior aspect of the right hilum is again observed.  This was documented on the CT examination referenced above to primarily relate to a mass opacity in the right middle lobe.  This opacity appears  essentially unchanged since the previous exam.  Heart size remains normal.  The left lung and the mid and upper lung zones on the right side appear stable, with no significant superimposed airspace consolidation or volume loss.  No pleural fluid of any substantial volume is seen on either side.  No pneumothorax.  Calcification in the wall of the aortic arch is again incidentally observed.

## 2020-01-06 NOTE — PROGRESS NOTES
[facility] Hospital Medicine  Progress Note    Patient Name: Sharita Castañeda  MRN: [id number]  Patient Class: IP- Inpatient   Admission Date: 1/2/2020  Length of Stay: 4 days  Attending Physician: Ana Fajardo MD  Primary Care Provider: Wallace Deng MD    [facility] Medicine Team: [facility] MED 2 Andrea Crowell MD    Subjective:     Principal Problem:Postobstructive pneumonia        HPI:   68 yo WF w/ hx of COPD, HTN, HLD, PVD (was on ASA and plavix, recently held in preparation for biopsy), MDD, hypothyroidism (post-ablation), and allergic rhinitis who presents to [facility] ED c/o tachycardia, SOB, and R sided CP. Patient was recently admitted to [facility] on 12/26 for hemoptysis concerning for malignancy. CT scan revealed a large mass in the right lung/ Patient also presented with signs of pneumonia and was treated with levaquin. Patient discharged on 12/28 and was instructed to complete 5 day course of levaquin and prednisone 40mg PO. Patient stated that she was doing well since discharge on w/ 1 or 2 further small episodes of hemoptysis. However, patient notes that her coughing became worse over the past few days w/ some pleuritic CP appreciated that could be relieved by massaging the area. Her symptoms of SOB began around 1AM this morning w/ severe right sided CP (8/10 - worse than before).      In the ER patient remained SOB and tachycardic w/ severe pleuritic CP. Patient placed on 2L nc w/ improvement in SOB and SpO2 to low to mid 90s. Patient febrile to 100.5 F and WBC of 25k. Patient endorses nausea, chills, fatigue, CP, SOB, cough, wheeze, constipation, and congestion. ER started patient on Vanc and Zosyn and gave fluid 30 cc/kg. CTA negative for PE. EKG unremarkable    On presentation to hospital medicine team patients vitals are stable. The shortness of breath has gotten better with oxygen. Still complaining of a sharp right sided chest pain. Got better with morphine.     Overview/Hospital  Course:  Admitted on 1/2 for suspected pneumonia. Started on vanc and zosyn. Patient with large mass in the right lung. Most likely post obstructive pneumonia. 1/4/2020: Patient symptoms are improving she can speak in full sentences. Maintaining O2 sat on 2 L NC. She is on antibiotics (piperacillin/tazobactam and vanc), awaiting cultures and sensitivities results. Patient is planned for liver lesion biopsy on 6/1/2020    Interval History: NAEON. Patient is doing well today. Requiring 2 liters of oxygen. Going for liver biopsy today. Antibiotics deescalated to just rocephin. Plan to monitor patient over night and discharge patient tomorrow. Setting up home oxygen and nebulizer    Review of Systems   Constitutional: Negative for appetite change, chills, diaphoresis, fatigue, fever and unexpected weight change.   HENT: Negative for congestion, postnasal drip, rhinorrhea and sore throat.    Eyes: Negative for pain and redness.   Respiratory: Positive for chest tightness. Negative for cough, shortness of breath and wheezing.    Cardiovascular: Positive for chest pain. Negative for leg swelling.   Gastrointestinal: Negative for abdominal pain, diarrhea, nausea and vomiting.   Genitourinary: Negative for dysuria and frequency.   Musculoskeletal: Negative for arthralgias and myalgias.   Neurological: Negative for dizziness and headaches.     Objective:     Vital Signs (Most Recent):  Temp: 97.8 °F (36.6 °C) (01/06/20 1215)  Pulse: 102 (01/06/20 1215)  Resp: 18 (01/06/20 1215)  BP: (!) 162/67 (01/06/20 1215)  SpO2: (!) 94 % (01/06/20 1215) Vital Signs (24h Range):  Temp:  [97.8 °F (36.6 °C)-98.1 °F (36.7 °C)] 97.8 °F (36.6 °C)  Pulse:  [] 102  Resp:  [13-20] 18  SpO2:  [87 %-99 %] 94 %  BP: (104-162)/(50-70) 162/67     Weight: 67 kg (147 lb 11.3 oz)  Body mass index is 26.59 kg/m².    Intake/Output Summary (Last 24 hours) at 1/6/2020 1351  Last data filed at 1/6/2020 0545  Gross per 24 hour   Intake 1100 ml   Output  1600 ml   Net -500 ml      Physical Exam   Constitutional: She is oriented to person, place, and time. She appears well-developed and well-nourished.   HENT:   Head: Normocephalic and atraumatic.   Eyes: Pupils are equal, round, and reactive to light. Conjunctivae are normal.   Neck: Normal range of motion. Neck supple.   Cardiovascular: Normal rate, regular rhythm and normal heart sounds.   Pulmonary/Chest: Effort normal. No stridor. No respiratory distress. She has no wheezes. She has no rales.   Decreased breath sounds bilaterally R>L. Bronchial breath sounds throughout R lung. On 2 liters nasal canula   Abdominal: Soft. Bowel sounds are normal. She exhibits no distension. There is no tenderness.   Musculoskeletal: Normal range of motion. She exhibits no edema.   Lymphadenopathy:     She has no cervical adenopathy.   Neurological: She is alert and oriented to person, place, and time.   Skin: Skin is warm and dry. No rash noted.   Psychiatric: She has a normal mood and affect. Her behavior is normal.   Nursing note and vitals reviewed.      Significant Labs:   Recent Results (from the past 48 hour(s))   POCT glucose    Collection Time: 01/04/20  4:46 PM   Result Value Ref Range    POCT Glucose 131 (H) 70 - 110 mg/dL   CBC auto differential    Collection Time: 01/05/20  6:49 AM   Result Value Ref Range    WBC 18.48 (H) 3.90 - 12.70 K/uL    RBC 3.55 (L) 4.00 - 5.40 M/uL    Hemoglobin 9.4 (L) 12.0 - 16.0 g/dL    Hematocrit 31.7 (L) 37.0 - 48.5 %    Mean Corpuscular Volume 89 82 - 98 fL    Mean Corpuscular Hemoglobin 26.5 (L) 27.0 - 31.0 pg    Mean Corpuscular Hemoglobin Conc 29.7 (L) 32.0 - 36.0 g/dL    RDW 18.6 (H) 11.5 - 14.5 %    Platelets 184 150 - 350 K/uL    MPV 10.8 9.2 - 12.9 fL    Immature Granulocytes 1.1 (H) 0.0 - 0.5 %    Gran # (ANC) 15.1 (H) 1.8 - 7.7 K/uL    Immature Grans (Abs) 0.20 (H) 0.00 - 0.04 K/uL    Lymph # 1.7 1.0 - 4.8 K/uL    Mono # 1.4 (H) 0.3 - 1.0 K/uL    Eos # 0.0 0.0 - 0.5 K/uL    Baso  # 0.03 0.00 - 0.20 K/uL    nRBC 0 0 /100 WBC    Gran% 81.6 (H) 38.0 - 73.0 %    Lymph% 9.3 (L) 18.0 - 48.0 %    Mono% 7.6 4.0 - 15.0 %    Eosinophil% 0.2 0.0 - 8.0 %    Basophil% 0.2 0.0 - 1.9 %    Differential Method Automated    Magnesium    Collection Time: 01/05/20  6:49 AM   Result Value Ref Range    Magnesium 2.0 1.6 - 2.6 mg/dL   Phosphorus    Collection Time: 01/05/20  6:49 AM   Result Value Ref Range    Phosphorus 3.3 2.7 - 4.5 mg/dL   Comprehensive metabolic panel    Collection Time: 01/05/20  6:49 AM   Result Value Ref Range    Sodium 138 136 - 145 mmol/L    Potassium 4.6 3.5 - 5.1 mmol/L    Chloride 105 95 - 110 mmol/L    CO2 21 (L) 23 - 29 mmol/L    Glucose 74 70 - 110 mg/dL    BUN, Bld 10 8 - 23 mg/dL    Creatinine 0.8 0.5 - 1.4 mg/dL    Calcium 7.7 (L) 8.7 - 10.5 mg/dL    Total Protein 5.4 (L) 6.0 - 8.4 g/dL    Albumin 2.2 (L) 3.5 - 5.2 g/dL    Total Bilirubin 0.5 0.1 - 1.0 mg/dL    Alkaline Phosphatase 84 55 - 135 U/L    AST 30 10 - 40 U/L    ALT 35 10 - 44 U/L    Anion Gap 12 8 - 16 mmol/L    eGFR if African American >60.0 >60 mL/min/1.73 m^2    eGFR if non African American >60.0 >60 mL/min/1.73 m^2   POCT glucose    Collection Time: 01/05/20 10:11 PM   Result Value Ref Range    POCT Glucose 79 70 - 110 mg/dL   CBC auto differential    Collection Time: 01/06/20  6:57 AM   Result Value Ref Range    WBC 19.96 (H) 3.90 - 12.70 K/uL    RBC 3.57 (L) 4.00 - 5.40 M/uL    Hemoglobin 9.4 (L) 12.0 - 16.0 g/dL    Hematocrit 31.6 (L) 37.0 - 48.5 %    Mean Corpuscular Volume 89 82 - 98 fL    Mean Corpuscular Hemoglobin 26.3 (L) 27.0 - 31.0 pg    Mean Corpuscular Hemoglobin Conc 29.7 (L) 32.0 - 36.0 g/dL    RDW 18.5 (H) 11.5 - 14.5 %    Platelets 202 150 - 350 K/uL    MPV 11.1 9.2 - 12.9 fL    Immature Granulocytes 1.3 (H) 0.0 - 0.5 %    Gran # (ANC) 16.2 (H) 1.8 - 7.7 K/uL    Immature Grans (Abs) 0.25 (H) 0.00 - 0.04 K/uL    Lymph # 2.0 1.0 - 4.8 K/uL    Mono # 1.4 (H) 0.3 - 1.0 K/uL    Eos # 0.1 0.0 - 0.5 K/uL     Baso # 0.02 0.00 - 0.20 K/uL    nRBC 0 0 /100 WBC    Gran% 81.2 (H) 38.0 - 73.0 %    Lymph% 10.0 (L) 18.0 - 48.0 %    Mono% 7.0 4.0 - 15.0 %    Eosinophil% 0.4 0.0 - 8.0 %    Basophil% 0.1 0.0 - 1.9 %    Differential Method Automated    Magnesium    Collection Time: 01/06/20  6:57 AM   Result Value Ref Range    Magnesium 2.0 1.6 - 2.6 mg/dL   Phosphorus    Collection Time: 01/06/20  6:57 AM   Result Value Ref Range    Phosphorus 3.1 2.7 - 4.5 mg/dL   Comprehensive metabolic panel    Collection Time: 01/06/20  6:57 AM   Result Value Ref Range    Sodium 137 136 - 145 mmol/L    Potassium 4.4 3.5 - 5.1 mmol/L    Chloride 104 95 - 110 mmol/L    CO2 19 (L) 23 - 29 mmol/L    Glucose 66 (L) 70 - 110 mg/dL    BUN, Bld 9 8 - 23 mg/dL    Creatinine 0.8 0.5 - 1.4 mg/dL    Calcium 8.1 (L) 8.7 - 10.5 mg/dL    Total Protein 5.5 (L) 6.0 - 8.4 g/dL    Albumin 2.2 (L) 3.5 - 5.2 g/dL    Total Bilirubin 0.6 0.1 - 1.0 mg/dL    Alkaline Phosphatase 113 55 - 135 U/L    AST 26 10 - 40 U/L    ALT 31 10 - 44 U/L    Anion Gap 14 8 - 16 mmol/L    eGFR if African American >60.0 >60 mL/min/1.73 m^2    eGFR if non African American >60.0 >60 mL/min/1.73 m^2   POCT glucose    Collection Time: 01/06/20  8:51 AM   Result Value Ref Range    POCT Glucose 70 70 - 110 mg/dL   POCT glucose    Collection Time: 01/06/20 10:39 AM   Result Value Ref Range    POCT Glucose 74 70 - 110 mg/dL         Significant Imaging:   Imaging Results          CTA Chest Non-Coronary (PE Study) (Final result)  Result time 01/02/20 10:16:01    Final result by Megan Street MD (01/02/20 10:16:01)                 Impression:      1.  No pulmonary arterial filling defect to suggest thromboembolism.    2.  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  Additional metastatic disease likely present in the right axilla, liver, spleen, and left renal  fossa.    3.  Note is made of a stable appearing filling defect in the right superior pulmonary vein approach in its ostium suggesting neoplastic venous invasion.    4.  Stable, indeterminate, left lower lobe pulmonary nodule.    5.  Trace right pleural fluid with associated compressive atelectasis.    Electronically signed by resident: Javier Nunez  Date:    01/02/2020  Time:    09:34    Electronically signed by: Megan Street MD  Date:    01/02/2020  Time:    10:16             Narrative:    EXAMINATION:  CTA CHEST NON CORONARY    CLINICAL HISTORY:  Chest pain, acute, nonspecific, low prob CAD;    TECHNIQUE:  The chest was surveyed from the costophrenic angles through the lung apices at 3-mm increments after the administration of 75 cc of Omnipaque 350 intravenous contrast material according to the PE protocol which is optimized for vascular contrast resolution.  Axial, sagittal and coronal maximum intensity projection images were reviewed.    Total Exam DLP: 333.30mGy-cm.    COMPARISON:  CTA chest 12/26/2019    FINDINGS:  Examination of the soft tissue and vascular structures at the base of the neck is unremarkable.    There is a left-sided aortic arch with 3 branch vessels.  The thoracic aorta maintains normal caliber, contour, and course with extensive atherosclerotic calcification.  There is no evidence of aneurysmal dilation or dissection.    The pulmonary arteries distribute normally without evidence of filling defect to indicate pulmonary thromboembolism.  Note is made of stable, attenuated appearance of the middle lobe segmental/subsegmental pulmonary arteries likely secondary to adjacent mass.  There are four pulmonary veins.  Again identified is a filling defect in the right superior pulmonary vein near its ostium consistent with venous invasion by the adjacent neoplastic process.  Systemic and pulmonary venoatrial connections remain concordant.    The heart is not enlarged and there is no evidence of  pericardial effusion.    There is a stable 2.3 cm nodular soft tissue density in the right axilla likely representing lymphadenopathy.  Subcarinal and right hilar soft tissue density appears stable and likely represents a combination of mass invasion/lymphadenopathy.    The esophagus maintains a normal course.    Limited images of the upper abdomen obtained during the course of this dedicated thoracic CT multiple hepatic hypodensities measuring up to 2.3 cm, a 3.3 cm splenic hypodensity, and partially visualized 7 cm nodular opacity in the left renal fossa.    The osseous structures demonstrate degenerative joint disease without aggressive marrow replacement process or acute fracture.    The trachea and proximal airways are patent.  Middle lobe segmental/subsegmental airways are mostly obstructed.    The lungs demonstrate a 12.5 x 4.5 cm middle lobe mass extending into the right hilum and subcarinal mediastinum with significant postobstructive consolidation.  There is stable subsegmental consolidative opacity in the anterior segment of the right upper lobe, as well as compressive atelectasis in the right lung base.  A 1.1 cm nodular opacity remains present in the anterior basal segment of the left lower lobe.  There is underlying apical predominant centrilobular and paraseptal emphysema.  Trace right pleural fluid is present.  No pneumothorax.                               X-Ray Chest AP Portable (Final result)  Result time 01/02/20 07:41:48    Final result by Jae Bolden MD (01/02/20 07:41:48)                 Impression:      Continued abnormal chest radiograph, but no significant detrimental interval change, allowing for differences in patient positioning, since 12/26/2019.      Electronically signed by: Jae Bolden MD  Date:    01/02/2020  Time:    07:41             Narrative:    EXAMINATION:  XR CHEST AP PORTABLE    CLINICAL HISTORY:  Sepsis;    COMPARISON:  Comparison is made to the chest radiograph of  12/26/2019.  Reference is also made to a subsequent thoracic CT examination of that same date.    FINDINGS:  Opacity in the inferior hemothorax on the right side with obliteration of the right cardiac border and extending to the inferior aspect of the right hilum is again observed.  This was documented on the CT examination referenced above to primarily relate to a mass opacity in the right middle lobe.  This opacity appears essentially unchanged since the previous exam.  Heart size remains normal.  The left lung and the mid and upper lung zones on the right side appear stable, with no significant superimposed airspace consolidation or volume loss.  No pleural fluid of any substantial volume is seen on either side.  No pneumothorax.  Calcification in the wall of the aortic arch is again incidentally observed.                                    Assessment/Plan:      * Postobstructive pneumonia   70 yo WF w/ hx of COPD, HTN, HLD, PVD (was on ASA and plavix, recently held in preparation for biopsy), MDD, hypothyroidism (post-ablation), and allergic rhinitis who [place] [facility] ED c/o tachycardia, SOB, and R sided CP. Patient recently admitted and treated for pneumonia with 5 day course of levaquin. Symptoms and vitals still point to pneumonia.     Plan  Zosyn  Sputum Culture   Blood cultures negative  Home [place] 7.5-35 q8 prn      Chronic obstructive pulmonary disease with acute exacerbation  Patients presents with probable pneumonia that is causing an exacerbation of her COPD    Plan  Scheduled duo-nebs q4  Tiotropium qd  Prednisone PO for 5 days. Ends 1/6/20    Mass of middle lobe of right lung  Patients CT scans very concerning for lung cancer with mets. CT surgery contacted and are unable to stent     Plan  Will try to obtain biopsy while patient is in the hospital.  IR will attempt liver biopsy Monday.   Pulm consulted, waiting on liver biopsy  Pallative care consulted for goals of care      Goals of care,  counseling/discussion  Palliative care consulted - neno recs      Palliative care encounter  Goal of care discussion      Migraine  Home fiorcet ordered prn      Nausea  Promethazine and zofran ordered prn      Anxiety  Holding klonopin in setting of increased 02 requirements on presentation    GERD (gastroesophageal reflux disease)  Famotidine 20 mg daily      Allergic rhinitis  Continue home montelukast       PVD (peripheral vascular disease)  Patient has stents in her leg. Normally on asprin and plavix. Being held for possible biopsy of lung mass       Depression  Continue home paxil and topiramate    Hypertension  Holding home meds in setting of sepsis      Hyperlipidemia  Continue home statin      Postablative hypothyroidism  Continue home levothyroxine        VTE Risk Mitigation (From admission, onward)         Ordered     heparin (porcine) injection 5,000 Units  Every 8 hours      01/05/20 1024     Place sequential compression device  Until discontinued      01/02/20 1150     IP VTE HIGH RISK PATIENT  Once      01/02/20 1150                      Andrea Crowell MD  Department of Hospital Medicine   [facility]

## 2020-01-06 NOTE — CONSULTS
"[facility]  Adult Nutrition  Progress Note    SUMMARY       Recommendations    1. Recommend cardiac diet as tolerated.   2. Add Boost to optimize nutritonal intake.   3. RD to monitor and follow-up.    Goals: Pt to recieve nutrition by RD follow-up.  Nutrition Goal Status: new    Reason for Assessment    Reason For Assessment: consult  Diagnosis: other (see comments)(pneumonia )  Relevant Medical History: COPD; HTN; PVD; Hypothyroidism; GERD; Hyperlipidemia  Interdisciplinary Rounds: did not attend  General Information Comments: Pt reports small appetite with no recent changes. She states she "doesn't eat that much," around 1-2 meals per day and limits high-carbohydrate foods. She denies nausea, vomitting, constipation, and diarrhea. She reports  lb. NFPE completed 1/6, patient with age appropriate wasting.    Nutrition Discharge Planning: Adequate PO intake    Nutrition Risk Screen    Nutrition Risk Screen: no indicators present    Nutrition/Diet History    Patient Reported Diet/Restrictions/Preferences: general  Spiritual, Cultural Beliefs, Spiritism Practices, Values that Affect Care: no    Anthropometrics    Temp: 97.8 °F (36.6 °C)  Height Method: Stated  Height: 5' 2.5" (158.8 cm)  Height (inches): 62.5 in  Weight Method: Bed Scale  Weight: 67 kg (147 lb 11.3 oz)  Weight (lb): 147.71 lb  Ideal Body Weight (IBW), Female: 112.5 lb  % Ideal Body Weight, Female (lb): 134.63 %  BMI (Calculated): 26.6  BMI Grade: 25 - 29.9 - overweight       Lab/Procedures/Meds    Pertinent Labs Reviewed: reviewed  Pertinent Labs Comments: Calcium 7.8; Phos 1.9; Total pro 5.3  Pertinent Medications Reviewed: reviewed  Pertinent Medications Comments: atorvastatin; famotidine; heparin; levothyroxine; topiramate      Estimated/Assessed Needs    Weight Used For Calorie Calculations: 67 kg (147 lb 11.3 oz)  Energy Calorie Requirements (kcal): 1445 kcal(Avery x 1.25)  Energy Need Method: Avery-St " Stephanie  Protein Requirements: 67-80 g/day(1-1.2 g/kg)  Weight Used For Protein Calculations: 67 kg (147 lb 11.3 oz)  Fluid Requirements (mL): 1 ml/kcal or per MD  Estimated Fluid Requirement Method: RDA Method  RDA Method (mL): 1445         Nutrition Prescription Ordered    Current Diet Order: NPO    Evaluation of Received Nutrient/Fluid Intake    I/O: +.67 since admit  Energy Calories Required: not meeting needs  Comments: LBM 1/4  Tolerance: tolerating      Nutrition Risk    Level of Risk/Frequency of Follow-up: high     Assessment and Plan    Nutrition Problem  Increased nutrient needs     Related to (etiology):  COPD and suspected lung cancer     Signs and Symptoms (as evidenced by):   Low appetite, likely not eating enough to meet needs    Interventions:  Collaboration of care with providers    Nutrition Diagnosis Status:   New    Monitor and Evaluation    Food and Nutrient Intake: energy intake, food and beverage intake  Knowledge/Beliefs/Attitudes: food and nutrition knowledge/skill, beliefs and attitudes  Physical Activity and Function: nutrition-related ADLs and IADLs  Anthropometric Measurements: weight, weight change, body mass index  Biochemical Data, Medical Tests and Procedures: electrolyte and renal panel, gastrointestinal profile, glucose/endocrine profile, inflammatory profile, lipid profile  Nutrition-Focused Physical Findings: overall appearance     Malnutrition Assessment    Scientology Region (Muscle Loss): moderate depletion  Clavicle Bone Region (Muscle Loss): well nourished  Clavicle and Acromion Bone Region (Muscle Loss): well nourished  Scapular Bone Region (Muscle Loss): well nourished  Dorsal Hand (Muscle Loss): mild depletion  Patellar Region (Muscle Loss): mild depletion  Posterior Calf Region (Muscle Loss): well nourished                 Nutrition Follow-Up    RD Follow-up?: Yes    Anjelica Ashraf  Dietetic Intern

## 2020-01-06 NOTE — PROGRESS NOTES
Therapy with Vancomycin complete and consult discontinued by provider.  Pharmacy will sign off, please re-consult as needed.    Thank you,  Yasmine Penn, PharmD  PGY-1 Resident  [id number]

## 2020-01-06 NOTE — PLAN OF CARE
Liver biopsy completed. NAD noted.  Dressing applied to right abd, CDI.  Specimens sent with pathology. PT to be transferred to [facility]. Report given at bedside. Report called to primary nurse.

## 2020-01-06 NOTE — PROCEDURES
Radiology Post-Procedure Note    Pre Op Diagnosis: Liver mass    Post Op Diagnosis: Same    Procedure: Liver mass biopsy    Procedure performed by: Demian Valdivia MD    Written Informed Consent Obtained: Yes  Specimen Removed: YES 5 x 18 gauge cores  Estimated Blood Loss: Minimal    Findings:   Under US guidance, a 17/18 gauge biopsy system was used to obtain 5 cores of a left hepatic lobe mass. No complications.    Patient tolerated procedure well.    Demian Valdivia M.D.  Diagnostic and Interventional Radiologist  Department of Radiology  Pager: [phone]

## 2020-01-06 NOTE — PLAN OF CARE
Recommendations     1. Recommend cardiac diet as tolerated.   2. Add Boost to optimize nutritonal intake.   3. RD to monitor and follow-up.     Goals: Pt to recieve nutrition by RD follow-up.  Nutrition Goal Status: new

## 2020-01-07 LAB
ALBUMIN SERPL BCP-MCNC: 2.1 G/DL (ref 3.5–5.2)
ALP SERPL-CCNC: 99 U/L (ref 55–135)
ALT SERPL W/O P-5'-P-CCNC: 31 U/L (ref 10–44)
ANION GAP SERPL CALC-SCNC: 8 MMOL/L (ref 8–16)
AST SERPL-CCNC: 23 U/L (ref 10–40)
BACTERIA BLD CULT: NORMAL
BACTERIA BLD CULT: NORMAL
BASOPHILS # BLD AUTO: 0.04 K/UL (ref 0–0.2)
BASOPHILS NFR BLD: 0.2 % (ref 0–1.9)
BILIRUB SERPL-MCNC: 0.6 MG/DL (ref 0.1–1)
BUN SERPL-MCNC: 10 MG/DL (ref 8–23)
CALCIUM SERPL-MCNC: 8 MG/DL (ref 8.7–10.5)
CHLORIDE SERPL-SCNC: 101 MMOL/L (ref 95–110)
CO2 SERPL-SCNC: 28 MMOL/L (ref 23–29)
CREAT SERPL-MCNC: 0.9 MG/DL (ref 0.5–1.4)
DIFFERENTIAL METHOD: ABNORMAL
EOSINOPHIL # BLD AUTO: 0.3 K/UL (ref 0–0.5)
EOSINOPHIL NFR BLD: 1.4 % (ref 0–8)
ERYTHROCYTE [DISTWIDTH] IN BLOOD BY AUTOMATED COUNT: 18.6 % (ref 11.5–14.5)
EST. GFR  (AFRICAN AMERICAN): >60 ML/MIN/1.73 M^2
EST. GFR  (NON AFRICAN AMERICAN): >60 ML/MIN/1.73 M^2
GLUCOSE SERPL-MCNC: 91 MG/DL (ref 70–110)
HCT VFR BLD AUTO: 34 % (ref 37–48.5)
HGB BLD-MCNC: 10.4 G/DL (ref 12–16)
IMM GRANULOCYTES # BLD AUTO: 0.19 K/UL (ref 0–0.04)
IMM GRANULOCYTES NFR BLD AUTO: 0.9 % (ref 0–0.5)
LIPASE SERPL-CCNC: 34 U/L (ref 4–60)
LYMPHOCYTES # BLD AUTO: 2.3 K/UL (ref 1–4.8)
LYMPHOCYTES NFR BLD: 10.7 % (ref 18–48)
MAGNESIUM SERPL-MCNC: 2.1 MG/DL (ref 1.6–2.6)
MCH RBC QN AUTO: 26.4 PG (ref 27–31)
MCHC RBC AUTO-ENTMCNC: 30.6 G/DL (ref 32–36)
MCV RBC AUTO: 86 FL (ref 82–98)
MONOCYTES # BLD AUTO: 1.4 K/UL (ref 0.3–1)
MONOCYTES NFR BLD: 6.3 % (ref 4–15)
NEUTROPHILS # BLD AUTO: 17.4 K/UL (ref 1.8–7.7)
NEUTROPHILS NFR BLD: 80.5 % (ref 38–73)
NRBC BLD-RTO: 0 /100 WBC
PHOSPHATE SERPL-MCNC: 3.5 MG/DL (ref 2.7–4.5)
PLATELET # BLD AUTO: 185 K/UL (ref 150–350)
PMV BLD AUTO: 10.4 FL (ref 9.2–12.9)
POCT GLUCOSE: 107 MG/DL (ref 70–110)
POCT GLUCOSE: 120 MG/DL (ref 70–110)
POCT GLUCOSE: 71 MG/DL (ref 70–110)
POTASSIUM SERPL-SCNC: 4.9 MMOL/L (ref 3.5–5.1)
PROT SERPL-MCNC: 5.2 G/DL (ref 6–8.4)
RBC # BLD AUTO: 3.94 M/UL (ref 4–5.4)
SODIUM SERPL-SCNC: 137 MMOL/L (ref 136–145)
WBC # BLD AUTO: 21.58 K/UL (ref 3.9–12.7)

## 2020-01-07 PROCEDURE — 63600175 PHARM REV CODE 636 W HCPCS: Performed by: STUDENT IN AN ORGANIZED HEALTH CARE EDUCATION/TRAINING PROGRAM

## 2020-01-07 PROCEDURE — 99232 SBSQ HOSP IP/OBS MODERATE 35: CPT | Mod: GC,,, | Performed by: INTERNAL MEDICINE

## 2020-01-07 PROCEDURE — 97530 THERAPEUTIC ACTIVITIES: CPT

## 2020-01-07 PROCEDURE — 27000221 HC OXYGEN, UP TO 24 HOURS

## 2020-01-07 PROCEDURE — 84100 ASSAY OF PHOSPHORUS: CPT

## 2020-01-07 PROCEDURE — 11000001 HC ACUTE MED/SURG PRIVATE ROOM

## 2020-01-07 PROCEDURE — 94761 N-INVAS EAR/PLS OXIMETRY MLT: CPT

## 2020-01-07 PROCEDURE — 25000003 PHARM REV CODE 250: Performed by: STUDENT IN AN ORGANIZED HEALTH CARE EDUCATION/TRAINING PROGRAM

## 2020-01-07 PROCEDURE — 25000242 PHARM REV CODE 250 ALT 637 W/ HCPCS: Performed by: STUDENT IN AN ORGANIZED HEALTH CARE EDUCATION/TRAINING PROGRAM

## 2020-01-07 PROCEDURE — 94664 DEMO&/EVAL PT USE INHALER: CPT

## 2020-01-07 PROCEDURE — 27000646 HC AEROBIKA DEVICE

## 2020-01-07 PROCEDURE — 94640 AIRWAY INHALATION TREATMENT: CPT

## 2020-01-07 PROCEDURE — 97116 GAIT TRAINING THERAPY: CPT

## 2020-01-07 PROCEDURE — 94799 UNLISTED PULMONARY SVC/PX: CPT

## 2020-01-07 PROCEDURE — 83690 ASSAY OF LIPASE: CPT

## 2020-01-07 PROCEDURE — 36415 COLL VENOUS BLD VENIPUNCTURE: CPT

## 2020-01-07 PROCEDURE — 83735 ASSAY OF MAGNESIUM: CPT

## 2020-01-07 PROCEDURE — 99900035 HC TECH TIME PER 15 MIN (STAT)

## 2020-01-07 PROCEDURE — 85025 COMPLETE CBC W/AUTO DIFF WBC: CPT

## 2020-01-07 PROCEDURE — 99232 PR SUBSEQUENT HOSPITAL CARE,LEVL II: ICD-10-PCS | Mod: GC,,, | Performed by: INTERNAL MEDICINE

## 2020-01-07 PROCEDURE — 80053 COMPREHEN METABOLIC PANEL: CPT

## 2020-01-07 PROCEDURE — 25000003 PHARM REV CODE 250

## 2020-01-07 RX ORDER — POLYETHYLENE GLYCOL 3350 17 G/17G
17 POWDER, FOR SOLUTION ORAL DAILY
Status: DISCONTINUED | OUTPATIENT
Start: 2020-01-07 | End: 2020-01-08 | Stop reason: HOSPADM

## 2020-01-07 RX ORDER — MELOXICAM 7.5 MG/1
7.5 TABLET ORAL ONCE
Status: COMPLETED | OUTPATIENT
Start: 2020-01-07 | End: 2020-01-07

## 2020-01-07 RX ORDER — METHOCARBAMOL 500 MG/1
500 TABLET, FILM COATED ORAL ONCE
Status: COMPLETED | OUTPATIENT
Start: 2020-01-07 | End: 2020-01-07

## 2020-01-07 RX ORDER — DOXYCYCLINE HYCLATE 100 MG
100 TABLET ORAL EVERY 12 HOURS
Status: DISCONTINUED | OUTPATIENT
Start: 2020-01-08 | End: 2020-01-08 | Stop reason: HOSPADM

## 2020-01-07 RX ORDER — POLYETHYLENE GLYCOL 3350 17 G/17G
17 POWDER, FOR SOLUTION ORAL ONCE
Status: DISCONTINUED | OUTPATIENT
Start: 2020-01-07 | End: 2020-01-07

## 2020-01-07 RX ADMIN — ONDANSETRON 4 MG: 2 INJECTION INTRAMUSCULAR; INTRAVENOUS at 07:01

## 2020-01-07 RX ADMIN — POLYETHYLENE GLYCOL (3350) 17 G: 17 POWDER, FOR SOLUTION ORAL at 01:01

## 2020-01-07 RX ADMIN — PAROXETINE 20 MG: 10 TABLET, FILM COATED ORAL at 08:01

## 2020-01-07 RX ADMIN — PROMETHAZINE HYDROCHLORIDE 12.5 MG: 12.5 TABLET ORAL at 02:01

## 2020-01-07 RX ADMIN — IPRATROPIUM BROMIDE AND ALBUTEROL SULFATE 3 ML: .5; 3 SOLUTION RESPIRATORY (INHALATION) at 12:01

## 2020-01-07 RX ADMIN — TIOTROPIUM BROMIDE 18 MCG: 18 CAPSULE ORAL; RESPIRATORY (INHALATION) at 09:01

## 2020-01-07 RX ADMIN — ATORVASTATIN CALCIUM 40 MG: 20 TABLET, FILM COATED ORAL at 08:01

## 2020-01-07 RX ADMIN — IPRATROPIUM BROMIDE AND ALBUTEROL SULFATE 3 ML: .5; 3 SOLUTION RESPIRATORY (INHALATION) at 07:01

## 2020-01-07 RX ADMIN — BUTALBITAL, ACETAMINOPHEN AND CAFFEINE 1 TABLET: 50; 325; 40 TABLET ORAL at 08:01

## 2020-01-07 RX ADMIN — IPRATROPIUM BROMIDE AND ALBUTEROL SULFATE 3 ML: .5; 3 SOLUTION RESPIRATORY (INHALATION) at 03:01

## 2020-01-07 RX ADMIN — DEXTROSE 125 ML: 10 SOLUTION INTRAVENOUS at 05:01

## 2020-01-07 RX ADMIN — METHOCARBAMOL TABLETS 500 MG: 500 TABLET, COATED ORAL at 10:01

## 2020-01-07 RX ADMIN — PIPERACILLIN AND TAZOBACTAM 4.5 G: 4; .5 INJECTION, POWDER, FOR SOLUTION INTRAVENOUS at 05:01

## 2020-01-07 RX ADMIN — HEPARIN SODIUM 5000 UNITS: 5000 INJECTION, SOLUTION INTRAVENOUS; SUBCUTANEOUS at 10:01

## 2020-01-07 RX ADMIN — PIPERACILLIN AND TAZOBACTAM 4.5 G: 4; .5 INJECTION, POWDER, FOR SOLUTION INTRAVENOUS at 01:01

## 2020-01-07 RX ADMIN — IPRATROPIUM BROMIDE AND ALBUTEROL SULFATE 3 ML: .5; 3 SOLUTION RESPIRATORY (INHALATION) at 11:01

## 2020-01-07 RX ADMIN — HYDROCODONE BITARTRATE AND ACETAMINOPHEN 1 TABLET: 7.5; 325 TABLET ORAL at 07:01

## 2020-01-07 RX ADMIN — FAMOTIDINE 20 MG: 20 TABLET ORAL at 09:01

## 2020-01-07 RX ADMIN — PIPERACILLIN AND TAZOBACTAM 4.5 G: 4; .5 INJECTION, POWDER, FOR SOLUTION INTRAVENOUS at 09:01

## 2020-01-07 RX ADMIN — MONTELUKAST 10 MG: 10 TABLET, FILM COATED ORAL at 08:01

## 2020-01-07 RX ADMIN — IPRATROPIUM BROMIDE AND ALBUTEROL SULFATE 3 ML: .5; 3 SOLUTION RESPIRATORY (INHALATION) at 08:01

## 2020-01-07 RX ADMIN — LEVOTHYROXINE SODIUM 100 MCG: 100 TABLET ORAL at 05:01

## 2020-01-07 RX ADMIN — MELOXICAM 7.5 MG: 7.5 TABLET ORAL at 10:01

## 2020-01-07 NOTE — PT/OT/SLP PROGRESS
"Physical Therapy Treatment    Patient Name:  Sharita Castañeda   MRN:  [id number]    Recommendations:     Discharge Recommendations:  home health PT   Discharge Equipment Recommendations: bath bench   Barriers to discharge: None    Assessment:     Sharita Castañeda is a 69 y.o. female admitted with a medical diagnosis of Postobstructive pneumonia.  She presents with the following impairments/functional limitations:  weakness, impaired functional mobilty, decreased safety awareness, impaired cardiopulmonary response to activity, impaired endurance, gait instability, decreased lower extremity function, impaired balance. Assessed static standing balance, patient with no loss of balance with altered KIAH. Patient continues to become short of breath with minimal gait and activity, with activity on 3L O2, patient's O2 sats 90-93%. Patient with decreased safety awareness regarding dynamic balance with gait, and with activity pacing- patient requiring cuing for frequent rest breaks. Patient would benefit from HHPT to address decreased activity tolerance and balance impairments.    Rehab Prognosis: Good; patient would benefit from acute skilled PT services to address these deficits and reach maximum level of function.    Recent Surgery: * No surgery found *      Plan:     During this hospitalization, patient to be seen 3 x/week to address the identified rehab impairments via gait training, therapeutic activities, therapeutic exercises, neuromuscular re-education and progress toward the following goals:    · Plan of Care Expires:  01/30/20    Subjective     Chief Complaint: complained of mild stomach discomfort  Patient/Family Comments/goals: patient motivated to ambulate with therapy and increase activity tolerance, "am I really doing a good job?"  Pain/Comfort:  · Pain Rating 1: 0/10      Objective:     Communicated with RN prior to session.  Patient found HOB elevated with peripheral IV, oxygen(3L O2) upon PT entry to room.     General " Precautions: Standard, fall, respiratory   Orthopedic Precautions:N/A   Braces: N/A     Functional Mobility:    Bed Mobility  Rolling to R: supervision assistance  Supine to Sit:  contact guard assist   Transfers Sit to Stand:  contact guard assist RW, cues for hand placement on bed and RW   Gait  Gait Distance: 14 + 6 ft with hand held assist, 2 people for safety, 3L O2  Assistance Level: minimum assistance  Description: Narrow KIAH, step-to gait pattern leading with R, decreased sofia and step length, high guard of upper extremity, patient reaching out for external support, kyphotic posture.     Gait training: cues for navigation and sequencing, cues for PLB throughout gait, cues for pacing and frequent rest breaks.         AM-PAC 6 CLICK MOBILITY  Turning over in bed (including adjusting bedclothes, sheets and blankets)?: 4  Sitting down on and standing up from a chair with arms (e.g., wheelchair, bedside commode, etc.): 3  Moving from lying on back to sitting on the side of the bed?: 3  Moving to and from a bed to a chair (including a wheelchair)?: 3  Need to walk in hospital room?: 3  Climbing 3-5 steps with a railing?: 1  Basic Mobility Total Score: 17       Therapeutic Activities and Exercises:   The patient is safe to ambulate with RN using RW, whiteboard updated.   Ambulated to bathroom, patient urinated and performed pericare in sitting without assist.   Static standing balance assessment to improve anticipatory balance, assess risk of falls:   -Narrow KIAH, eyes open, no UE support, 10 sec, no increased postural sway  -Tandem stance R leading 10 sec, assist from RW to attain position, increased postural sway  -Tandem stance L leading 6 sec, assist from RW to attain position, increased postural sway, loss of balance- reached out for RW for support    Patient educated on:   -Pursed lip breathing technique  -Energy conservation, importance of rest breaks  -Importance of calling for assist to mobilize while  in the hospital  -PT recommendation of using RW at home on discharge  -Patient encouraged to sit up in chair throughout day for cardiovascular benefits    Patient left HOB elevated with all lines intact and call button in reach..    GOALS:   Multidisciplinary Problems     Physical Therapy Goals        Problem: Physical Therapy Goal    Goal Priority Disciplines Outcome Goal Variances Interventions   Physical Therapy Goal     PT, PT/OT Ongoing, Progressing     Description:  Goals to be met by: 20     Patient will increase functional independence with mobility by performin. Bed to chair transfer with Stand-by Assistance using Rolling Walker.  2. Gait  x 150 feet with Stand-by Assistance using Rolling Walker.   3. Ascend/Descend 6 inch curb step with Stand-by Assistance using Rolling Walker.  4. Lower extremity exercise program x 20 reps per handout, with assistance as needed.  5. Patient will be able to walk 20 ft with RW with contact guard assist with horizontal/vertical head turns and no loss of balance.                        Time Tracking:     PT Received On: 20  PT Start Time: 1138     PT Stop Time: 1208  PT Total Time (min): 30 min     Billable Minutes: Gait Training 20 and Therapeutic Activity 10    Treatment Type: Treatment  PT/PTA: PT     PTA Visit Number: 0     Annie Newsome, PT  2020

## 2020-01-07 NOTE — PLAN OF CARE
[facility]    HOME HEALTH ORDERS  FACE TO FACE ENCOUNTER    Patient Name: Sharita Castañeda  YOB: 1950    PCP: Wallace Deng MD   PCP Address: [address] [place] [address] / [facility]  PCP Phone Number: [phone]  PCP Fax: [phone]    Encounter Date: 01/07/2020    Admit to Home Health    Diagnoses:  Active Hospital Problems    Diagnosis  POA    *Postobstructive pneumonia [J18.9]  Yes     Priority: 1 - High    Chronic obstructive pulmonary disease with acute exacerbation [J44.1]  Yes     Priority: 2     Mass of middle lobe of right lung [R91.8]  Yes     Priority: 3     Anxiety [F41.9]  Yes    Nausea [R11.0]  Yes    Migraine [G43.909]  Yes    Palliative care encounter [Z51.5]  Not Applicable    Advanced care planning/counseling discussion [Z71.89]  Not Applicable    Goals of care, counseling/discussion [Z71.89]  Not Applicable    Acute hypoxemic respiratory failure [J96.01]  Yes    Hypertension [I10]  Yes    Postablative hypothyroidism [E89.0]  Yes     hypothyroidism s/p OCASIO therapy      Depression [F32.9]  Yes    GERD (gastroesophageal reflux disease) [K21.9]  Yes    PVD (peripheral vascular disease) [I73.9]  Yes     Femoral artery blockage - being followed by Dr. Gaudencio Sim      Hyperlipidemia [E78.5]  Yes    Allergic rhinitis [J30.9]  Yes      Resolved Hospital Problems   No resolved problems to display.       Future Appointments   Date Time Provider Department Center   1/8/2020 11:30 AM [facility]ard Dolan MD [facility]           I have seen and examined this patient face to face today. My clinical findings that support the need for the home health skilled services and home bound status are the following:  Weakness/numbness causing balance and gait disturbance due to COPD Exacerbation making it taxing to leave home.    Allergies:Review of patient's allergies indicates:  No Known Allergies    Diet: regular diet    Activities: activity as  tolerated    Nursing:   SN to complete comprehensive assessment including routine vital signs. Instruct on disease process and s/s of complications to report to MD. Review/verify medication list sent home with the patient at time of discharge  and instruct patient/caregiver as needed. Frequency may be adjusted depending on start of care date.    Notify MD if SBP > 160 or < 90; DBP > 90 or < 50; HR > 120 or < 50; Temp > 101; Other:         CONSULTS:    Physical Therapy to evaluate and treat. Evaluate for home safety and equipment needs; Establish/upgrade home exercise program. Perform / instruct on therapeutic exercises, gait training, transfer training, and Range of Motion.  Occupational Therapy to evaluate and treat. Evaluate home environment for safety and equipment needs. Perform/Instruct on transfers, ADL training, ROM, and therapeutic exercises.    MISCELLANEOUS CARE:  Home Oxygen:  Oxygen at 3 L/min nasal canula to be used, range 1-5L:  Continuously. and Assess oxygen saturation via pulse oximeter as needed for increase in SOB.    Goal O2 saturation 88-90%    WOUND CARE ORDERS  n/a      Medications: Review discharge medications with patient and family and provide education.      Current Discharge Medication List      CONTINUE these medications which have NOT CHANGED    Details   alendronate (FOSAMAX) 70 MG tablet TAKE 1 TABLET(70 MG) BY MOUTH EVERY 7 DAYS  Qty: 12 tablet, Refills: 0    Associated Diagnoses: Osteoporosis without current pathological fracture, unspecified osteoporosis type      amLODIPine (NORVASC) 5 MG tablet Take 1 tablet (5 mg total) by mouth once daily.  Qty: 90 tablet, Refills: 1    Associated Diagnoses: Benign hypertension without congestive heart failure      aspirin (ECOTRIN) 81 MG EC tablet Take 1 tablet (81 mg total) by mouth once daily.  Qty: 90 tablet, Refills: 3    Comments: HOLD until after lung biopsy  Associated Diagnoses: PVD (peripheral vascular disease)      atorvastatin  (LIPITOR) 40 MG tablet TAKE 1 TABLET(40 MG) BY MOUTH EVERY DAY  Qty: 90 tablet, Refills: 3    Associated Diagnoses: Hyperlipidemia, unspecified hyperlipidemia type      butalbital-acetaminophen-caffeine -40 mg (FIORICET, ESGIC) -40 mg per tablet Take 1 tablet by mouth every 12 (twelve) hours as needed for Pain or Headaches.  Qty: 30 tablet, Refills: 1    Associated Diagnoses: Headache, unspecified headache type      calcium-vitamin D3 (CALCIUM 500 + D) 500 mg(1,250mg) -200 unit per tablet Take 1 tablet by mouth 2 (two) times daily with meals.  Qty: 180 tablet, Refills: 3    Associated Diagnoses: Osteoporosis without current pathological fracture, unspecified osteoporosis type      clopidogrel (PLAVIX) 75 mg tablet Take 1 tablet (75 mg total) by mouth once daily.    Comments: HOLD until after lung biopsy      hydrocodone-acetaminophen 7.5-325mg (NORCO) 7.5-325 mg per tablet Take 1/2 to 1 tablet by mouth 3 times daily as needed for pain      levothyroxine (SYNTHROID) 100 MCG tablet Take 1 tablet (100 mcg total) by mouth before breakfast.  Qty: 90 tablet, Refills: 3    Associated Diagnoses: Hypothyroidism, unspecified type      losartan (COZAAR) 25 MG tablet Take 1 tablet (25 mg total) by mouth once daily.  Qty: 90 tablet, Refills: 1    Comments: D/C lisinopril  Associated Diagnoses: Benign hypertension without congestive heart failure      montelukast (SINGULAIR) 10 mg tablet TAKE 1 TABLET(10 MG) BY MOUTH EVERY DAY  Qty: 90 tablet, Refills: 3    Associated Diagnoses: Acute allergic rhinitis      umeclidinium (INCRUSE ELLIPTA) 62.5 mcg/actuation DsDv Inhale 1 each into the lungs once daily. Controller  Qty: 30 each, Refills: 2      albuterol (PROAIR HFA) 90 mcg/actuation inhaler Inhale 2 puffs into the lungs every 6 (six) hours as needed for Wheezing.  Qty: 1 Inhaler, Refills: 5    Associated Diagnoses: Simple chronic bronchitis         STOP taking these medications       paroxetine (PAXIL) 20 MG tablet  Comments:   Reason for Stopping:         ranitidine (ZANTAC) 150 MG tablet Comments:   Reason for Stopping:         topiramate (TOPAMAX) 25 MG tablet Comments:   Reason for Stopping:               I certify that this patient is confined to her home and needs physical therapy and occupational therapy.

## 2020-01-07 NOTE — PROGRESS NOTES
[facility] Hospital Medicine  Progress Note    Patient Name: Sharita Castañeda  MRN: [id number]  Patient Class: IP- Inpatient   Admission Date: 1/2/2020  Length of Stay: 5 days  Attending Physician: Ana Fajardo MD  Primary Care Provider: Wallace Deng MD    [facility] Medicine Team: [facility] HOSP MED 2 Andrea Crowell MD    Subjective:     Principal Problem:Postobstructive pneumonia        HPI:   70 yo WF w/ hx of COPD, HTN, HLD, PVD (was on ASA and plavix, recently held in preparation for biopsy), MDD, hypothyroidism (post-ablation), and allergic rhinitis who presents to [facility] ED c/o tachycardia, SOB, and R sided CP. Patient was recently admitted to [facility] on 12/26 for hemoptysis concerning for malignancy. CT scan revealed a large mass in the right lung/ Patient also presented with signs of pneumonia and was treated with levaquin. Patient discharged on 12/28 and was instructed to complete 5 day course of levaquin and prednisone 40mg PO. Patient stated that she was doing well since discharge on w/ 1 or 2 further small episodes of hemoptysis. However, patient notes that her coughing became worse over the past few days w/ some pleuritic CP appreciated that could be relieved by massaging the area. Her symptoms of SOB began around 1AM this morning w/ severe right sided CP (8/10 - worse than before).      In the ER patient remained SOB and tachycardic w/ severe pleuritic CP. Patient placed on 2L nc w/ improvement in SOB and SpO2 to low to mid 90s. Patient febrile to 100.5 F and WBC of 25k. Patient endorses nausea, chills, fatigue, CP, SOB, cough, wheeze, constipation, and congestion. ER started patient on Vanc and Zosyn and gave fluid 30 cc/kg. CTA negative for PE. EKG unremarkable    On presentation to hospital medicine team patients vitals are stable. The shortness of breath has gotten better with oxygen. Still complaining of a sharp right sided chest pain. Got better with morphine.     Overview/Hospital  Course:  Admitted on 1/2 for suspected pneumonia. Started on vanc and zosyn. Patient with large mass in the right lung. Most likely post obstructive pneumonia.   1/4/2020: Patient symptoms are improving she can speak in full sentences. Maintaining O2 sat on 2 L NC. She is on antibiotics (piperacillin/tazobactam and vanc), awaiting cultures and sensitivities results.    1/6/20 Patient doing well and still maintaning sats on 2l. Liver biopsy done today. Vanc dced and only on zosyn  1/7/20 Patient having some abdominal pain. She felt better after a bowel movement but still complaining of some pain. U/S liver being performed to ensure no bleeding. She is also having pain in her right leg after the 6 minute walk test    Interval History: NAEON. Patient went for liver biopsy yesterday. She is complaining of abdominal pain. It got better after bowel movement but is still having pain. Liver U/S ordered to ensure no bleeding        Review of Systems   Constitutional: Negative for appetite change, chills, diaphoresis, fatigue, fever and unexpected weight change.   HENT: Negative for congestion, postnasal drip, rhinorrhea and sore throat.    Eyes: Negative for pain and redness.   Respiratory: Positive for chest tightness. Negative for cough, shortness of breath and wheezing.    Cardiovascular: Positive for chest pain. Negative for leg swelling.   Gastrointestinal: Negative for abdominal pain, diarrhea, nausea and vomiting.   Genitourinary: Negative for dysuria and frequency.   Musculoskeletal: Negative for arthralgias and myalgias.   Neurological: Negative for dizziness and headaches.     Objective:     Vital Signs (Most Recent):  Temp: 97.6 °F (36.4 °C) (01/07/20 1446)  Pulse: 76 (01/07/20 1446)  Resp: 19 (01/07/20 1446)  BP: (!) 145/69 (01/07/20 1446)  SpO2: (!) 90 % (01/07/20 1446) Vital Signs (24h Range):  Temp:  [97.3 °F (36.3 °C)-98.9 °F (37.2 °C)] 97.6 °F (36.4 °C)  Pulse:  [] 76  Resp:  [16-24] 19  SpO2:  [83  %-95 %] 90 %  BP: (126-168)/(59-80) 145/69     Weight: 67 kg (147 lb 11.3 oz)  Body mass index is 26.59 kg/m².    Intake/Output Summary (Last 24 hours) at 1/7/2020 1502  Last data filed at 1/7/2020 0957  Gross per 24 hour   Intake 120 ml   Output 1500 ml   Net -1380 ml      Physical Exam   Constitutional: She is oriented to person, place, and time. She appears well-developed and well-nourished.   HENT:   Head: Normocephalic and atraumatic.   Eyes: Pupils are equal, round, and reactive to light. Conjunctivae are normal.   Neck: Normal range of motion. Neck supple.   Cardiovascular: Normal rate, regular rhythm and normal heart sounds.   Pulmonary/Chest: Effort normal. No stridor. No respiratory distress. She has no wheezes. She has no rales.   Decreased breath sounds bilaterally R>L. Bronchial breath sounds throughout R lung. On 2 liters nasal canula   Abdominal: Soft. Bowel sounds are normal. She exhibits no distension. There is no tenderness.   Musculoskeletal: Normal range of motion. She exhibits no edema.   Lymphadenopathy:     She has no cervical adenopathy.   Neurological: She is alert and oriented to person, place, and time.   Skin: Skin is warm and dry. No rash noted.   Psychiatric: She has a normal mood and affect. Her behavior is normal.   Nursing note and vitals reviewed.      Significant Labs:   Recent Results (from the past 48 hour(s))   POCT glucose    Collection Time: 01/05/20 10:11 PM   Result Value Ref Range    POCT Glucose 79 70 - 110 mg/dL   CBC auto differential    Collection Time: 01/06/20  6:57 AM   Result Value Ref Range    WBC 19.96 (H) 3.90 - 12.70 K/uL    RBC 3.57 (L) 4.00 - 5.40 M/uL    Hemoglobin 9.4 (L) 12.0 - 16.0 g/dL    Hematocrit 31.6 (L) 37.0 - 48.5 %    Mean Corpuscular Volume 89 82 - 98 fL    Mean Corpuscular Hemoglobin 26.3 (L) 27.0 - 31.0 pg    Mean Corpuscular Hemoglobin Conc 29.7 (L) 32.0 - 36.0 g/dL    RDW 18.5 (H) 11.5 - 14.5 %    Platelets 202 150 - 350 K/uL    MPV 11.1 9.2 -  12.9 fL    Immature Granulocytes 1.3 (H) 0.0 - 0.5 %    Gran # (ANC) 16.2 (H) 1.8 - 7.7 K/uL    Immature Grans (Abs) 0.25 (H) 0.00 - 0.04 K/uL    Lymph # 2.0 1.0 - 4.8 K/uL    Mono # 1.4 (H) 0.3 - 1.0 K/uL    Eos # 0.1 0.0 - 0.5 K/uL    Baso # 0.02 0.00 - 0.20 K/uL    nRBC 0 0 /100 WBC    Gran% 81.2 (H) 38.0 - 73.0 %    Lymph% 10.0 (L) 18.0 - 48.0 %    Mono% 7.0 4.0 - 15.0 %    Eosinophil% 0.4 0.0 - 8.0 %    Basophil% 0.1 0.0 - 1.9 %    Differential Method Automated    Magnesium    Collection Time: 01/06/20  6:57 AM   Result Value Ref Range    Magnesium 2.0 1.6 - 2.6 mg/dL   Phosphorus    Collection Time: 01/06/20  6:57 AM   Result Value Ref Range    Phosphorus 3.1 2.7 - 4.5 mg/dL   Comprehensive metabolic panel    Collection Time: 01/06/20  6:57 AM   Result Value Ref Range    Sodium 137 136 - 145 mmol/L    Potassium 4.4 3.5 - 5.1 mmol/L    Chloride 104 95 - 110 mmol/L    CO2 19 (L) 23 - 29 mmol/L    Glucose 66 (L) 70 - 110 mg/dL    BUN, Bld 9 8 - 23 mg/dL    Creatinine 0.8 0.5 - 1.4 mg/dL    Calcium 8.1 (L) 8.7 - 10.5 mg/dL    Total Protein 5.5 (L) 6.0 - 8.4 g/dL    Albumin 2.2 (L) 3.5 - 5.2 g/dL    Total Bilirubin 0.6 0.1 - 1.0 mg/dL    Alkaline Phosphatase 113 55 - 135 U/L    AST 26 10 - 40 U/L    ALT 31 10 - 44 U/L    Anion Gap 14 8 - 16 mmol/L    eGFR if African American >60.0 >60 mL/min/1.73 m^2    eGFR if non African American >60.0 >60 mL/min/1.73 m^2   POCT glucose    Collection Time: 01/06/20  8:51 AM   Result Value Ref Range    POCT Glucose 70 70 - 110 mg/dL   POCT glucose    Collection Time: 01/06/20 10:39 AM   Result Value Ref Range    POCT Glucose 74 70 - 110 mg/dL   POCT glucose    Collection Time: 01/07/20  5:40 AM   Result Value Ref Range    POCT Glucose 71 70 - 110 mg/dL   CBC auto differential    Collection Time: 01/07/20  6:26 AM   Result Value Ref Range    WBC 21.58 (H) 3.90 - 12.70 K/uL    RBC 3.94 (L) 4.00 - 5.40 M/uL    Hemoglobin 10.4 (L) 12.0 - 16.0 g/dL    Hematocrit 34.0 (L) 37.0 - 48.5 %     Mean Corpuscular Volume 86 82 - 98 fL    Mean Corpuscular Hemoglobin 26.4 (L) 27.0 - 31.0 pg    Mean Corpuscular Hemoglobin Conc 30.6 (L) 32.0 - 36.0 g/dL    RDW 18.6 (H) 11.5 - 14.5 %    Platelets 185 150 - 350 K/uL    MPV 10.4 9.2 - 12.9 fL    Immature Granulocytes 0.9 (H) 0.0 - 0.5 %    Gran # (ANC) 17.4 (H) 1.8 - 7.7 K/uL    Immature Grans (Abs) 0.19 (H) 0.00 - 0.04 K/uL    Lymph # 2.3 1.0 - 4.8 K/uL    Mono # 1.4 (H) 0.3 - 1.0 K/uL    Eos # 0.3 0.0 - 0.5 K/uL    Baso # 0.04 0.00 - 0.20 K/uL    nRBC 0 0 /100 WBC    Gran% 80.5 (H) 38.0 - 73.0 %    Lymph% 10.7 (L) 18.0 - 48.0 %    Mono% 6.3 4.0 - 15.0 %    Eosinophil% 1.4 0.0 - 8.0 %    Basophil% 0.2 0.0 - 1.9 %    Differential Method Automated    Magnesium    Collection Time: 01/07/20  6:26 AM   Result Value Ref Range    Magnesium 2.1 1.6 - 2.6 mg/dL   Phosphorus    Collection Time: 01/07/20  6:26 AM   Result Value Ref Range    Phosphorus 3.5 2.7 - 4.5 mg/dL   Comprehensive metabolic panel    Collection Time: 01/07/20  6:26 AM   Result Value Ref Range    Sodium 137 136 - 145 mmol/L    Potassium 4.9 3.5 - 5.1 mmol/L    Chloride 101 95 - 110 mmol/L    CO2 28 23 - 29 mmol/L    Glucose 91 70 - 110 mg/dL    BUN, Bld 10 8 - 23 mg/dL    Creatinine 0.9 0.5 - 1.4 mg/dL    Calcium 8.0 (L) 8.7 - 10.5 mg/dL    Total Protein 5.2 (L) 6.0 - 8.4 g/dL    Albumin 2.1 (L) 3.5 - 5.2 g/dL    Total Bilirubin 0.6 0.1 - 1.0 mg/dL    Alkaline Phosphatase 99 55 - 135 U/L    AST 23 10 - 40 U/L    ALT 31 10 - 44 U/L    Anion Gap 8 8 - 16 mmol/L    eGFR if African American >60.0 >60 mL/min/1.73 m^2    eGFR if non African American >60.0 >60 mL/min/1.73 m^2   POCT glucose    Collection Time: 01/07/20 12:37 PM   Result Value Ref Range    POCT Glucose 107 70 - 110 mg/dL   Lipase    Collection Time: 01/07/20  2:01 PM   Result Value Ref Range    Lipase 34 4 - 60 U/L         Significant Imaging:   Imaging Results          CTA Chest Non-Coronary (PE Study) (Final result)  Result time 01/02/20  10:16:01    Final result by Megan Street MD (01/02/20 10:16:01)                 Impression:      1.  No pulmonary arterial filling defect to suggest thromboembolism.    2.  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  Additional metastatic disease likely present in the right axilla, liver, spleen, and left renal fossa.    3.  Note is made of a stable appearing filling defect in the right superior pulmonary vein approach in its ostium suggesting neoplastic venous invasion.    4.  Stable, indeterminate, left lower lobe pulmonary nodule.    5.  Trace right pleural fluid with associated compressive atelectasis.    Electronically signed by resident: Javier Nunez  Date:    01/02/2020  Time:    09:34    Electronically signed by: Megan Street MD  Date:    01/02/2020  Time:    10:16             Narrative:    EXAMINATION:  CTA CHEST NON CORONARY    CLINICAL HISTORY:  Chest pain, acute, nonspecific, low prob CAD;    TECHNIQUE:  The chest was surveyed from the costophrenic angles through the lung apices at 3-mm increments after the administration of 75 cc of Omnipaque 350 intravenous contrast material according to the PE protocol which is optimized for vascular contrast resolution.  Axial, sagittal and coronal maximum intensity projection images were reviewed.    Total Exam DLP: 333.30mGy-cm.    COMPARISON:  CTA chest 12/26/2019    FINDINGS:  Examination of the soft tissue and vascular structures at the base of the neck is unremarkable.    There is a left-sided aortic arch with 3 branch vessels.  The thoracic aorta maintains normal caliber, contour, and course with extensive atherosclerotic calcification.  There is no evidence of aneurysmal dilation or dissection.    The pulmonary arteries distribute normally without evidence of filling defect to indicate pulmonary thromboembolism.  Note  is made of stable, attenuated appearance of the middle lobe segmental/subsegmental pulmonary arteries likely secondary to adjacent mass.  There are four pulmonary veins.  Again identified is a filling defect in the right superior pulmonary vein near its ostium consistent with venous invasion by the adjacent neoplastic process.  Systemic and pulmonary venoatrial connections remain concordant.    The heart is not enlarged and there is no evidence of pericardial effusion.    There is a stable 2.3 cm nodular soft tissue density in the right axilla likely representing lymphadenopathy.  Subcarinal and right hilar soft tissue density appears stable and likely represents a combination of mass invasion/lymphadenopathy.    The esophagus maintains a normal course.    Limited images of the upper abdomen obtained during the course of this dedicated thoracic CT multiple hepatic hypodensities measuring up to 2.3 cm, a 3.3 cm splenic hypodensity, and partially visualized 7 cm nodular opacity in the left renal fossa.    The osseous structures demonstrate degenerative joint disease without aggressive marrow replacement process or acute fracture.    The trachea and proximal airways are patent.  Middle lobe segmental/subsegmental airways are mostly obstructed.    The lungs demonstrate a 12.5 x 4.5 cm middle lobe mass extending into the right hilum and subcarinal mediastinum with significant postobstructive consolidation.  There is stable subsegmental consolidative opacity in the anterior segment of the right upper lobe, as well as compressive atelectasis in the right lung base.  A 1.1 cm nodular opacity remains present in the anterior basal segment of the left lower lobe.  There is underlying apical predominant centrilobular and paraseptal emphysema.  Trace right pleural fluid is present.  No pneumothorax.                               X-Ray Chest AP Portable (Final result)  Result time 01/02/20 07:41:48    Final result by Jae Bolden,  MD (01/02/20 07:41:48)                 Impression:      Continued abnormal chest radiograph, but no significant detrimental interval change, allowing for differences in patient positioning, since 12/26/2019.      Electronically signed by: Jae Bolden MD  Date:    01/02/2020  Time:    07:41             Narrative:    EXAMINATION:  XR CHEST AP PORTABLE    CLINICAL HISTORY:  Sepsis;    COMPARISON:  Comparison is made to the chest radiograph of 12/26/2019.  Reference is also made to a subsequent thoracic CT examination of that same date.    FINDINGS:  Opacity in the inferior hemothorax on the right side with obliteration of the right cardiac border and extending to the inferior aspect of the right hilum is again observed.  This was documented on the CT examination referenced above to primarily relate to a mass opacity in the right middle lobe.  This opacity appears essentially unchanged since the previous exam.  Heart size remains normal.  The left lung and the mid and upper lung zones on the right side appear stable, with no significant superimposed airspace consolidation or volume loss.  No pleural fluid of any substantial volume is seen on either side.  No pneumothorax.  Calcification in the wall of the aortic arch is again incidentally observed.                                    Assessment/Plan:      * Postobstructive pneumonia   70 yo WF w/ hx of COPD, HTN, HLD, PVD (was on ASA and plavix, recently held in preparation for biopsy), MDD, hypothyroidism (post-ablation), and allergic rhinitis who presents to [facility] ED c/o tachycardia, SOB, and R sided CP. Patient recently admitted and treated for pneumonia with 5 day course of levaquin. Symptoms and vitals still point to pneumonia.     Plan  Zosyn descalated to doxy  Sputum Culture   Blood cultures negative  Home Lyman 7.5-35 q8 prn      Chronic obstructive pulmonary disease with acute exacerbation  Patients presents with probable pneumonia that is causing an exacerbation  of her COPD    Plan  Scheduled duo-nebs q4  Tiotropium qd  Prednisone PO for 5 days. Ends 1/6/20    Mass of middle lobe of right lung  Patients CT scans very concerning for lung cancer with mets. CT surgery contacted and are unable to stent     Plan  Will try to obtain biopsy while patient is in the hospital.  Liver biopsy done 1/6. Abdominal pain 1/7 so getting liver U/S  Pulm consulted, waiting on liver biopsy  Pallative care consulted for goals of care      Goals of care, counseling/discussion  Palliative care consulted - neno recs      Palliative care encounter  Goal of care discussion      Migraine  Home fiorcet ordered prn      Nausea  Promethazine and zofran ordered prn      Anxiety  Holding klonopin in setting of increased 02 requirements on presentation    GERD (gastroesophageal reflux disease)  Famotidine 20 mg daily      Allergic rhinitis  Continue home montelukast       PVD (peripheral vascular disease)  Patient has stents in her leg. Normally on asprin and plavix. Being held for possible biopsy of lung mass       Depression  Continue home paxil and topiramate    Hypertension  Holding home meds in setting of sepsis      Hyperlipidemia  Continue home statin      Postablative hypothyroidism  Continue home levothyroxine        VTE Risk Mitigation (From admission, onward)         Ordered     heparin (porcine) injection 5,000 Units  Every 8 hours      01/05/20 1024     Place sequential compression device  Until discontinued      01/02/20 1150     IP VTE HIGH RISK PATIENT  Once      01/02/20 1150                      Andrea Crowell MD  Department of Hospital Medicine   [facility]

## 2020-01-07 NOTE — SUBJECTIVE & OBJECTIVE
Interval History: NAEON. Patient went for liver biopsy yesterday. She is complaining of abdominal pain. It got better after bowel movement but is still having pain. Liver U/S ordered to ensure no bleeding        Review of Systems   Constitutional: Negative for appetite change, chills, diaphoresis, fatigue, fever and unexpected weight change.   HENT: Negative for congestion, postnasal drip, rhinorrhea and sore throat.    Eyes: Negative for pain and redness.   Respiratory: Positive for chest tightness. Negative for cough, shortness of breath and wheezing.    Cardiovascular: Positive for chest pain. Negative for leg swelling.   Gastrointestinal: Negative for abdominal pain, diarrhea, nausea and vomiting.   Genitourinary: Negative for dysuria and frequency.   Musculoskeletal: Negative for arthralgias and myalgias.   Neurological: Negative for dizziness and headaches.     Objective:     Vital Signs (Most Recent):  Temp: 97.6 °F (36.4 °C) (01/07/20 1446)  Pulse: 76 (01/07/20 1446)  Resp: 19 (01/07/20 1446)  BP: (!) 145/69 (01/07/20 1446)  SpO2: (!) 90 % (01/07/20 1446) Vital Signs (24h Range):  Temp:  [97.3 °F (36.3 °C)-98.9 °F (37.2 °C)] 97.6 °F (36.4 °C)  Pulse:  [] 76  Resp:  [16-24] 19  SpO2:  [83 %-95 %] 90 %  BP: (126-168)/(59-80) 145/69     Weight: 67 kg (147 lb 11.3 oz)  Body mass index is 26.59 kg/m².    Intake/Output Summary (Last 24 hours) at 1/7/2020 1502  Last data filed at 1/7/2020 0957  Gross per 24 hour   Intake 120 ml   Output 1500 ml   Net -1380 ml      Physical Exam   Constitutional: She is oriented to person, place, and time. She appears well-developed and well-nourished.   HENT:   Head: Normocephalic and atraumatic.   Eyes: Pupils are equal, round, and reactive to light. Conjunctivae are normal.   Neck: Normal range of motion. Neck supple.   Cardiovascular: Normal rate, regular rhythm and normal heart sounds.   Pulmonary/Chest: Effort normal. No stridor. No respiratory distress. She has no  wheezes. She has no rales.   Decreased breath sounds bilaterally R>L. Bronchial breath sounds throughout R lung. On 2 liters nasal canula   Abdominal: Soft. Bowel sounds are normal. She exhibits no distension. There is no tenderness.   Musculoskeletal: Normal range of motion. She exhibits no edema.   Lymphadenopathy:     She has no cervical adenopathy.   Neurological: She is alert and oriented to person, place, and time.   Skin: Skin is warm and dry. No rash noted.   Psychiatric: She has a normal mood and affect. Her behavior is normal.   Nursing note and vitals reviewed.      Significant Labs:   Recent Results (from the past 48 hour(s))   POCT glucose    Collection Time: 01/05/20 10:11 PM   Result Value Ref Range    POCT Glucose 79 70 - 110 mg/dL   CBC auto differential    Collection Time: 01/06/20  6:57 AM   Result Value Ref Range    WBC 19.96 (H) 3.90 - 12.70 K/uL    RBC 3.57 (L) 4.00 - 5.40 M/uL    Hemoglobin 9.4 (L) 12.0 - 16.0 g/dL    Hematocrit 31.6 (L) 37.0 - 48.5 %    Mean Corpuscular Volume 89 82 - 98 fL    Mean Corpuscular Hemoglobin 26.3 (L) 27.0 - 31.0 pg    Mean Corpuscular Hemoglobin Conc 29.7 (L) 32.0 - 36.0 g/dL    RDW 18.5 (H) 11.5 - 14.5 %    Platelets 202 150 - 350 K/uL    MPV 11.1 9.2 - 12.9 fL    Immature Granulocytes 1.3 (H) 0.0 - 0.5 %    Gran # (ANC) 16.2 (H) 1.8 - 7.7 K/uL    Immature Grans (Abs) 0.25 (H) 0.00 - 0.04 K/uL    Lymph # 2.0 1.0 - 4.8 K/uL    Mono # 1.4 (H) 0.3 - 1.0 K/uL    Eos # 0.1 0.0 - 0.5 K/uL    Baso # 0.02 0.00 - 0.20 K/uL    nRBC 0 0 /100 WBC    Gran% 81.2 (H) 38.0 - 73.0 %    Lymph% 10.0 (L) 18.0 - 48.0 %    Mono% 7.0 4.0 - 15.0 %    Eosinophil% 0.4 0.0 - 8.0 %    Basophil% 0.1 0.0 - 1.9 %    Differential Method Automated    Magnesium    Collection Time: 01/06/20  6:57 AM   Result Value Ref Range    Magnesium 2.0 1.6 - 2.6 mg/dL   Phosphorus    Collection Time: 01/06/20  6:57 AM   Result Value Ref Range    Phosphorus 3.1 2.7 - 4.5 mg/dL   Comprehensive metabolic panel     Collection Time: 01/06/20  6:57 AM   Result Value Ref Range    Sodium 137 136 - 145 mmol/L    Potassium 4.4 3.5 - 5.1 mmol/L    Chloride 104 95 - 110 mmol/L    CO2 19 (L) 23 - 29 mmol/L    Glucose 66 (L) 70 - 110 mg/dL    BUN, Bld 9 8 - 23 mg/dL    Creatinine 0.8 0.5 - 1.4 mg/dL    Calcium 8.1 (L) 8.7 - 10.5 mg/dL    Total Protein 5.5 (L) 6.0 - 8.4 g/dL    Albumin 2.2 (L) 3.5 - 5.2 g/dL    Total Bilirubin 0.6 0.1 - 1.0 mg/dL    Alkaline Phosphatase 113 55 - 135 U/L    AST 26 10 - 40 U/L    ALT 31 10 - 44 U/L    Anion Gap 14 8 - 16 mmol/L    eGFR if African American >60.0 >60 mL/min/1.73 m^2    eGFR if non African American >60.0 >60 mL/min/1.73 m^2   POCT glucose    Collection Time: 01/06/20  8:51 AM   Result Value Ref Range    POCT Glucose 70 70 - 110 mg/dL   POCT glucose    Collection Time: 01/06/20 10:39 AM   Result Value Ref Range    POCT Glucose 74 70 - 110 mg/dL   POCT glucose    Collection Time: 01/07/20  5:40 AM   Result Value Ref Range    POCT Glucose 71 70 - 110 mg/dL   CBC auto differential    Collection Time: 01/07/20  6:26 AM   Result Value Ref Range    WBC 21.58 (H) 3.90 - 12.70 K/uL    RBC 3.94 (L) 4.00 - 5.40 M/uL    Hemoglobin 10.4 (L) 12.0 - 16.0 g/dL    Hematocrit 34.0 (L) 37.0 - 48.5 %    Mean Corpuscular Volume 86 82 - 98 fL    Mean Corpuscular Hemoglobin 26.4 (L) 27.0 - 31.0 pg    Mean Corpuscular Hemoglobin Conc 30.6 (L) 32.0 - 36.0 g/dL    RDW 18.6 (H) 11.5 - 14.5 %    Platelets 185 150 - 350 K/uL    MPV 10.4 9.2 - 12.9 fL    Immature Granulocytes 0.9 (H) 0.0 - 0.5 %    Gran # (ANC) 17.4 (H) 1.8 - 7.7 K/uL    Immature Grans (Abs) 0.19 (H) 0.00 - 0.04 K/uL    Lymph # 2.3 1.0 - 4.8 K/uL    Mono # 1.4 (H) 0.3 - 1.0 K/uL    Eos # 0.3 0.0 - 0.5 K/uL    Baso # 0.04 0.00 - 0.20 K/uL    nRBC 0 0 /100 WBC    Gran% 80.5 (H) 38.0 - 73.0 %    Lymph% 10.7 (L) 18.0 - 48.0 %    Mono% 6.3 4.0 - 15.0 %    Eosinophil% 1.4 0.0 - 8.0 %    Basophil% 0.2 0.0 - 1.9 %    Differential Method Automated    Magnesium     Collection Time: 01/07/20  6:26 AM   Result Value Ref Range    Magnesium 2.1 1.6 - 2.6 mg/dL   Phosphorus    Collection Time: 01/07/20  6:26 AM   Result Value Ref Range    Phosphorus 3.5 2.7 - 4.5 mg/dL   Comprehensive metabolic panel    Collection Time: 01/07/20  6:26 AM   Result Value Ref Range    Sodium 137 136 - 145 mmol/L    Potassium 4.9 3.5 - 5.1 mmol/L    Chloride 101 95 - 110 mmol/L    CO2 28 23 - 29 mmol/L    Glucose 91 70 - 110 mg/dL    BUN, Bld 10 8 - 23 mg/dL    Creatinine 0.9 0.5 - 1.4 mg/dL    Calcium 8.0 (L) 8.7 - 10.5 mg/dL    Total Protein 5.2 (L) 6.0 - 8.4 g/dL    Albumin 2.1 (L) 3.5 - 5.2 g/dL    Total Bilirubin 0.6 0.1 - 1.0 mg/dL    Alkaline Phosphatase 99 55 - 135 U/L    AST 23 10 - 40 U/L    ALT 31 10 - 44 U/L    Anion Gap 8 8 - 16 mmol/L    eGFR if African American >60.0 >60 mL/min/1.73 m^2    eGFR if non African American >60.0 >60 mL/min/1.73 m^2   POCT glucose    Collection Time: 01/07/20 12:37 PM   Result Value Ref Range    POCT Glucose 107 70 - 110 mg/dL   Lipase    Collection Time: 01/07/20  2:01 PM   Result Value Ref Range    Lipase 34 4 - 60 U/L         Significant Imaging:   Imaging Results          CTA Chest Non-Coronary (PE Study) (Final result)  Result time 01/02/20 10:16:01    Final result by Megan Street MD (01/02/20 10:16:01)                 Impression:      1.  No pulmonary arterial filling defect to suggest thromboembolism.    2.  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  Additional metastatic disease likely present in the right axilla, liver, spleen, and left renal fossa.    3.  Note is made of a stable appearing filling defect in the right superior pulmonary vein approach in its ostium suggesting neoplastic venous invasion.    4.  Stable, indeterminate, left lower lobe pulmonary nodule.    5.  Trace right pleural fluid with associated  compressive atelectasis.    Electronically signed by resident: Javier Nunez  Date:    01/02/2020  Time:    09:34    Electronically signed by: Megan Street MD  Date:    01/02/2020  Time:    10:16             Narrative:    EXAMINATION:  CTA CHEST NON CORONARY    CLINICAL HISTORY:  Chest pain, acute, nonspecific, low prob CAD;    TECHNIQUE:  The chest was surveyed from the costophrenic angles through the lung apices at 3-mm increments after the administration of 75 cc of Omnipaque 350 intravenous contrast material according to the PE protocol which is optimized for vascular contrast resolution.  Axial, sagittal and coronal maximum intensity projection images were reviewed.    Total Exam DLP: 333.30mGy-cm.    COMPARISON:  CTA chest 12/26/2019    FINDINGS:  Examination of the soft tissue and vascular structures at the base of the neck is unremarkable.    There is a left-sided aortic arch with 3 branch vessels.  The thoracic aorta maintains normal caliber, contour, and course with extensive atherosclerotic calcification.  There is no evidence of aneurysmal dilation or dissection.    The pulmonary arteries distribute normally without evidence of filling defect to indicate pulmonary thromboembolism.  Note is made of stable, attenuated appearance of the middle lobe segmental/subsegmental pulmonary arteries likely secondary to adjacent mass.  There are four pulmonary veins.  Again identified is a filling defect in the right superior pulmonary vein near its ostium consistent with venous invasion by the adjacent neoplastic process.  Systemic and pulmonary venoatrial connections remain concordant.    The heart is not enlarged and there is no evidence of pericardial effusion.    There is a stable 2.3 cm nodular soft tissue density in the right axilla likely representing lymphadenopathy.  Subcarinal and right hilar soft tissue density appears stable and likely represents a combination of mass invasion/lymphadenopathy.    The  esophagus maintains a normal course.    Limited images of the upper abdomen obtained during the course of this dedicated thoracic CT multiple hepatic hypodensities measuring up to 2.3 cm, a 3.3 cm splenic hypodensity, and partially visualized 7 cm nodular opacity in the left renal fossa.    The osseous structures demonstrate degenerative joint disease without aggressive marrow replacement process or acute fracture.    The trachea and proximal airways are patent.  Middle lobe segmental/subsegmental airways are mostly obstructed.    The lungs demonstrate a 12.5 x 4.5 cm middle lobe mass extending into the right hilum and subcarinal mediastinum with significant postobstructive consolidation.  There is stable subsegmental consolidative opacity in the anterior segment of the right upper lobe, as well as compressive atelectasis in the right lung base.  A 1.1 cm nodular opacity remains present in the anterior basal segment of the left lower lobe.  There is underlying apical predominant centrilobular and paraseptal emphysema.  Trace right pleural fluid is present.  No pneumothorax.                               X-Ray Chest AP Portable (Final result)  Result time 01/02/20 07:41:48    Final result by Jae Bolden MD (01/02/20 07:41:48)                 Impression:      Continued abnormal chest radiograph, but no significant detrimental interval change, allowing for differences in patient positioning, since 12/26/2019.      Electronically signed by: Jae Bolden MD  Date:    01/02/2020  Time:    07:41             Narrative:    EXAMINATION:  XR CHEST AP PORTABLE    CLINICAL HISTORY:  Sepsis;    COMPARISON:  Comparison is made to the chest radiograph of 12/26/2019.  Reference is also made to a subsequent thoracic CT examination of that same date.    FINDINGS:  Opacity in the inferior hemothorax on the right side with obliteration of the right cardiac border and extending to the inferior aspect of the right hilum is again observed.   This was documented on the CT examination referenced above to primarily relate to a mass opacity in the right middle lobe.  This opacity appears essentially unchanged since the previous exam.  Heart size remains normal.  The left lung and the mid and upper lung zones on the right side appear stable, with no significant superimposed airspace consolidation or volume loss.  No pleural fluid of any substantial volume is seen on either side.  No pneumothorax.  Calcification in the wall of the aortic arch is again incidentally observed.

## 2020-01-07 NOTE — PHYSICIAN QUERY
PT Name: Sharita Castañeda  MR #: [id number]    Physician Query Form -Systemic Infectious Process Clarification     CDS: Jovon Giraldo RN CCDS               Contact information: [email or web address] or [phone]  This form is a permanent document in the medical record.     Query Date: January 7, 2020     By submitting this query, we are merely seeking further clarification of documentation. Please utilize your independent clinical judgment when addressing the question(s) below.    The Medical record contains the following:     Indicators   Supporting Clinical Findings   Location in Medical Record     x HR RR BP Temp T 100.5, , /60 & 98/58, RR 16-31, O2 sat 84% on 4.5L O2 1/2/2020 Vitals      x Lactic Acid             Procalcitonin  1/2/2020 07:05 1/2/2020 11:40   Lactate, [place] 3.1 (H) 1.5    Lab values     x WBC                Bands                     CRP  1/2/2020 07:05   WBC 25.03 (H)    Lab value    Culture(s)      AMS, Confusion, LOC, etc.       x Organ Dysfunction / Failure acute hypoxic resp failure  1/3/2020 Hospital Medicine progress notes Andrea Crowell MD/Arnold Banegas MD     x Bacteremia or Sepsis / Septic she presented dyspneic and hypoxic and hypotensive, concerning for sepsis. LA notably improved after sepsis treatment.   1/2/2020 H&P Andrea Crowell MD/Julio Sagastume MD     x Known or Suspected Source of Infection documented Postobstructive pneumonia 1/3/2020 Hospital Medicine progress notes Andrea Crowell MD/Arnold Banegas MD    (Failed) Outpatient Treatment       x Medication piperacillin-tazobactam 4.5 g in sodium chloride 0.9% 100 mL IVPB q8h  vancomycin 2 g in 0.9% sodium chloride 500 mL IVPB  sodium chloride 0.9% bolus 2,013 mL  sodium chloride 0.9% bolus 500 mL  vancomycin in dextrose 5 % 1 gram/250 mL IVPB daily  vancomycin 1.25 g in dextrose 5% 250 mL IVPB x 1 1/2/20 - Current Medications  1/2/2020 Medications      1/3-1/4/2020 Medication  1/5/2020  Medication    Treatment      Other       Provider, please specify diagnosis or diagnoses associated with above clinical findings.      [ X  ] Sepsis due to pneumonia   [   ] Severe Sepsis with Acute Organ Dysfunction/Failure (please specify  organ dysfunction/failure): ___________________   [   ] Sepsis ruled out after careful study   [   ] Other: __________________________________   [  ]  Clinically Undetermined         Please document in your progress notes daily for the duration of treatment until resolved and include in your discharge summary.

## 2020-01-07 NOTE — ASSESSMENT & PLAN NOTE
70 yo WF w/ hx of COPD, HTN, HLD, PVD (was on ASA and plavix, recently held in preparation for biopsy), MDD, hypothyroidism (post-ablation), and allergic rhinitis who presents to [facility] ED c/o tachycardia, SOB, and R sided CP. Patient recently admitted and treated for pneumonia with 5 day course of levaquin. Symptoms and vitals still point to pneumonia.     Plan  Zosyn descalated to doxy  Sputum Culture   Blood cultures negative  Home [place] 7.5-35 q8 prn

## 2020-01-07 NOTE — ASSESSMENT & PLAN NOTE
Patients CT scans very concerning for lung cancer with mets. CT surgery contacted and are unable to stent     Plan  Will try to obtain biopsy while patient is in the hospital.  Liver biopsy done 1/6. Abdominal pain 1/7 so getting liver U/S  Pulm consulted, waiting on liver biopsy  Pallative care consulted for goals of care

## 2020-01-07 NOTE — PLAN OF CARE
Fall precaution maintained this shift call bell in reach bed in low position. No complaint over night. Prn pain med given as directed. Iv abx given. Vs stable

## 2020-01-07 NOTE — PLAN OF CARE
Problem: Physical Therapy Goal  Goal: Physical Therapy Goal  Description  Goals to be met by: 20     Patient will increase functional independence with mobility by performin. Bed to chair transfer with Stand-by Assistance using Rolling Walker.  2. Gait  x 150 feet with Stand-by Assistance using Rolling Walker.   3. Ascend/Descend 6 inch curb step with Stand-by Assistance using Rolling Walker.  4. Lower extremity exercise program x 20 reps per handout, with assistance as needed.  5. Patient will be able to walk 20 ft with RW with contact guard assist with horizontal/vertical head turns and no loss of balance.       Outcome: Ongoing, Progressing   Updated goals to reflect patient's current level of mobility. Continue with plan of care.   Annie Newsome, PT  2020

## 2020-01-07 NOTE — PROGRESS NOTES
Home Oxygen Evaluation    Date Performed: 2020    1) Patient's Home O2 Sat on room air, while at rest: 89%      If O2 sats on room air at rest are 88% or below, patient qualifies. No additional testing needed. Document N/A in steps 2 and 3. If 89% or above, complete steps 2.      2) Patient's O2 Sat on room air while exercisin%        If O2 sats on room air while exercising remain 89% or above patient does not qualify, no further testing needed Document N/A in step 3. If O2 sats on room air while exercising are 88% or below, continue to step 3.      3) Patient's O2 Sat while exercising on O2: 90% at 3 LPM         (Must show improvement from #2 for patients to qualify)    If O2 sats improve on oxygen, patient qualifies for portable oxygen. If not, the patient does not qualify.

## 2020-01-07 NOTE — PLAN OF CARE
ANDRESSA faxed referral to [facility] via  for review. ANDRESSA will continue to follow.      01/07/20 1313   Post-Acute Status   Post-Acute Authorization Home Health/Hospice   Home Health/Hospice Status Referrals Sent     4:03 PM   Patient was accepted by [facility].     Rima Matias LMSW   - [facility]  Ext. [phone]

## 2020-01-07 NOTE — PLAN OF CARE
Patient had no falls or injuries during shift. Patients O2 increased to 2L NC to maintain SpO2 >88%. Liver biopsy completed. Vital signs stable. Bed locked and at lowest position to floor. Call bell and other personal items within patients reach. Will continue to monitor.

## 2020-01-08 VITALS
HEART RATE: 90 BPM | HEIGHT: 63 IN | WEIGHT: 147.69 LBS | DIASTOLIC BLOOD PRESSURE: 77 MMHG | OXYGEN SATURATION: 92 % | BODY MASS INDEX: 26.17 KG/M2 | RESPIRATION RATE: 17 BRPM | SYSTOLIC BLOOD PRESSURE: 167 MMHG | TEMPERATURE: 98 F

## 2020-01-08 LAB
ALBUMIN SERPL BCP-MCNC: 2.1 G/DL (ref 3.5–5.2)
ALP SERPL-CCNC: 92 U/L (ref 55–135)
ALT SERPL W/O P-5'-P-CCNC: 25 U/L (ref 10–44)
ANION GAP SERPL CALC-SCNC: 8 MMOL/L (ref 8–16)
AST SERPL-CCNC: 22 U/L (ref 10–40)
BASOPHILS # BLD AUTO: 0.04 K/UL (ref 0–0.2)
BASOPHILS NFR BLD: 0.2 % (ref 0–1.9)
BILIRUB SERPL-MCNC: 0.6 MG/DL (ref 0.1–1)
BUN SERPL-MCNC: 12 MG/DL (ref 8–23)
CALCIUM SERPL-MCNC: 7.9 MG/DL (ref 8.7–10.5)
CHLORIDE SERPL-SCNC: 101 MMOL/L (ref 95–110)
CO2 SERPL-SCNC: 27 MMOL/L (ref 23–29)
CREAT SERPL-MCNC: 0.8 MG/DL (ref 0.5–1.4)
DIFFERENTIAL METHOD: ABNORMAL
EOSINOPHIL # BLD AUTO: 0.3 K/UL (ref 0–0.5)
EOSINOPHIL NFR BLD: 1.6 % (ref 0–8)
ERYTHROCYTE [DISTWIDTH] IN BLOOD BY AUTOMATED COUNT: 18.8 % (ref 11.5–14.5)
EST. GFR  (AFRICAN AMERICAN): >60 ML/MIN/1.73 M^2
EST. GFR  (NON AFRICAN AMERICAN): >60 ML/MIN/1.73 M^2
GLUCOSE SERPL-MCNC: 81 MG/DL (ref 70–110)
HCT VFR BLD AUTO: 32.7 % (ref 37–48.5)
HGB BLD-MCNC: 10.3 G/DL (ref 12–16)
IMM GRANULOCYTES # BLD AUTO: 0.19 K/UL (ref 0–0.04)
IMM GRANULOCYTES NFR BLD AUTO: 1 % (ref 0–0.5)
LYMPHOCYTES # BLD AUTO: 2.3 K/UL (ref 1–4.8)
LYMPHOCYTES NFR BLD: 11.7 % (ref 18–48)
MAGNESIUM SERPL-MCNC: 1.9 MG/DL (ref 1.6–2.6)
MCH RBC QN AUTO: 26.8 PG (ref 27–31)
MCHC RBC AUTO-ENTMCNC: 31.5 G/DL (ref 32–36)
MCV RBC AUTO: 85 FL (ref 82–98)
MONOCYTES # BLD AUTO: 1.2 K/UL (ref 0.3–1)
MONOCYTES NFR BLD: 6.3 % (ref 4–15)
NEUTROPHILS # BLD AUTO: 15.6 K/UL (ref 1.8–7.7)
NEUTROPHILS NFR BLD: 79.2 % (ref 38–73)
NRBC BLD-RTO: 0 /100 WBC
PHOSPHATE SERPL-MCNC: 3.3 MG/DL (ref 2.7–4.5)
PLATELET # BLD AUTO: 164 K/UL (ref 150–350)
PMV BLD AUTO: 10.3 FL (ref 9.2–12.9)
POTASSIUM SERPL-SCNC: 4.5 MMOL/L (ref 3.5–5.1)
PROT SERPL-MCNC: 5.1 G/DL (ref 6–8.4)
RBC # BLD AUTO: 3.84 M/UL (ref 4–5.4)
SODIUM SERPL-SCNC: 136 MMOL/L (ref 136–145)
WBC # BLD AUTO: 19.67 K/UL (ref 3.9–12.7)

## 2020-01-08 PROCEDURE — 94664 DEMO&/EVAL PT USE INHALER: CPT

## 2020-01-08 PROCEDURE — 99233 SBSQ HOSP IP/OBS HIGH 50: CPT | Mod: ,,, | Performed by: CLINICAL NURSE SPECIALIST

## 2020-01-08 PROCEDURE — 25000003 PHARM REV CODE 250: Performed by: STUDENT IN AN ORGANIZED HEALTH CARE EDUCATION/TRAINING PROGRAM

## 2020-01-08 PROCEDURE — 99233 PR SUBSEQUENT HOSPITAL CARE,LEVL III: ICD-10-PCS | Mod: ,,, | Performed by: CLINICAL NURSE SPECIALIST

## 2020-01-08 PROCEDURE — 94799 UNLISTED PULMONARY SVC/PX: CPT

## 2020-01-08 PROCEDURE — 25000242 PHARM REV CODE 250 ALT 637 W/ HCPCS: Performed by: STUDENT IN AN ORGANIZED HEALTH CARE EDUCATION/TRAINING PROGRAM

## 2020-01-08 PROCEDURE — 27000221 HC OXYGEN, UP TO 24 HOURS

## 2020-01-08 PROCEDURE — 83735 ASSAY OF MAGNESIUM: CPT

## 2020-01-08 PROCEDURE — 99900035 HC TECH TIME PER 15 MIN (STAT)

## 2020-01-08 PROCEDURE — 84100 ASSAY OF PHOSPHORUS: CPT

## 2020-01-08 PROCEDURE — 85025 COMPLETE CBC W/AUTO DIFF WBC: CPT

## 2020-01-08 PROCEDURE — 99239 HOSP IP/OBS DSCHRG MGMT >30: CPT | Mod: GC,,, | Performed by: INTERNAL MEDICINE

## 2020-01-08 PROCEDURE — 94640 AIRWAY INHALATION TREATMENT: CPT

## 2020-01-08 PROCEDURE — 94761 N-INVAS EAR/PLS OXIMETRY MLT: CPT

## 2020-01-08 PROCEDURE — 99239 PR HOSPITAL DISCHARGE DAY,>30 MIN: ICD-10-PCS | Mod: GC,,, | Performed by: INTERNAL MEDICINE

## 2020-01-08 PROCEDURE — 36415 COLL VENOUS BLD VENIPUNCTURE: CPT

## 2020-01-08 PROCEDURE — 80053 COMPREHEN METABOLIC PANEL: CPT

## 2020-01-08 RX ORDER — HYDROCODONE BITARTRATE AND ACETAMINOPHEN 7.5; 325 MG/1; MG/1
1 TABLET ORAL EVERY 8 HOURS PRN
Qty: 21 TABLET | Refills: 0 | Status: SHIPPED | OUTPATIENT
Start: 2020-01-08 | End: 2020-01-08 | Stop reason: SDUPTHER

## 2020-01-08 RX ORDER — DOXYCYCLINE HYCLATE 100 MG
100 TABLET ORAL EVERY 12 HOURS
Qty: 1 TABLET | Refills: 0 | Status: SHIPPED | OUTPATIENT
Start: 2020-01-08 | End: 2020-01-08

## 2020-01-08 RX ORDER — IPRATROPIUM BROMIDE AND ALBUTEROL SULFATE 2.5; .5 MG/3ML; MG/3ML
SOLUTION RESPIRATORY (INHALATION)
Qty: 540 ML | Refills: 1 | Status: ON HOLD | OUTPATIENT
Start: 2020-01-08 | End: 2020-01-15 | Stop reason: HOSPADM

## 2020-01-08 RX ORDER — BENZONATATE 100 MG/1
100 CAPSULE ORAL 3 TIMES DAILY PRN
Qty: 30 CAPSULE | Refills: 0 | Status: SHIPPED | OUTPATIENT
Start: 2020-01-08 | End: 2020-01-08

## 2020-01-08 RX ORDER — MELOXICAM 7.5 MG/1
7.5 TABLET ORAL ONCE
Status: COMPLETED | OUTPATIENT
Start: 2020-01-08 | End: 2020-01-08

## 2020-01-08 RX ORDER — IPRATROPIUM BROMIDE AND ALBUTEROL SULFATE 2.5; .5 MG/3ML; MG/3ML
3 SOLUTION RESPIRATORY (INHALATION) EVERY 4 HOURS
Qty: 1080 ML | Refills: 1 | Status: SHIPPED | OUTPATIENT
Start: 2020-01-08 | End: 2020-01-08

## 2020-01-08 RX ORDER — HYDROCODONE BITARTRATE AND ACETAMINOPHEN 7.5; 325 MG/1; MG/1
1 TABLET ORAL EVERY 8 HOURS PRN
Qty: 21 TABLET | Refills: 0 | Status: ON HOLD | OUTPATIENT
Start: 2020-01-08 | End: 2020-01-15 | Stop reason: HOSPADM

## 2020-01-08 RX ORDER — METHOCARBAMOL 500 MG/1
500 TABLET, FILM COATED ORAL ONCE
Status: COMPLETED | OUTPATIENT
Start: 2020-01-08 | End: 2020-01-08

## 2020-01-08 RX ORDER — DOXYCYCLINE HYCLATE 100 MG
100 TABLET ORAL EVERY 12 HOURS
Qty: 1 TABLET | Refills: 0 | Status: ON HOLD | OUTPATIENT
Start: 2020-01-08 | End: 2020-01-15 | Stop reason: HOSPADM

## 2020-01-08 RX ORDER — BENZONATATE 100 MG/1
100 CAPSULE ORAL 3 TIMES DAILY PRN
Qty: 30 CAPSULE | Refills: 0 | Status: ON HOLD | OUTPATIENT
Start: 2020-01-08 | End: 2020-01-15 | Stop reason: HOSPADM

## 2020-01-08 RX ORDER — IPRATROPIUM BROMIDE AND ALBUTEROL SULFATE 2.5; .5 MG/3ML; MG/3ML
3 SOLUTION RESPIRATORY (INHALATION) EVERY 4 HOURS
Qty: 540 ML | Refills: 1 | Status: SHIPPED | OUTPATIENT
Start: 2020-01-08 | End: 2020-01-08

## 2020-01-08 RX ADMIN — IPRATROPIUM BROMIDE AND ALBUTEROL SULFATE 3 ML: .5; 3 SOLUTION RESPIRATORY (INHALATION) at 12:01

## 2020-01-08 RX ADMIN — FAMOTIDINE 20 MG: 20 TABLET ORAL at 09:01

## 2020-01-08 RX ADMIN — MELOXICAM 7.5 MG: 7.5 TABLET ORAL at 03:01

## 2020-01-08 RX ADMIN — HYDROCODONE BITARTRATE AND ACETAMINOPHEN 1 TABLET: 7.5; 325 TABLET ORAL at 04:01

## 2020-01-08 RX ADMIN — LEVOTHYROXINE SODIUM 100 MCG: 100 TABLET ORAL at 05:01

## 2020-01-08 RX ADMIN — TIOTROPIUM BROMIDE 18 MCG: 18 CAPSULE ORAL; RESPIRATORY (INHALATION) at 09:01

## 2020-01-08 RX ADMIN — METHOCARBAMOL TABLETS 500 MG: 500 TABLET, COATED ORAL at 03:01

## 2020-01-08 RX ADMIN — MONTELUKAST 10 MG: 10 TABLET, FILM COATED ORAL at 09:01

## 2020-01-08 RX ADMIN — DOXYCYCLINE HYCLATE 100 MG: 100 TABLET, COATED ORAL at 09:01

## 2020-01-08 RX ADMIN — IPRATROPIUM BROMIDE AND ALBUTEROL SULFATE 3 ML: .5; 3 SOLUTION RESPIRATORY (INHALATION) at 07:01

## 2020-01-08 RX ADMIN — PAROXETINE 20 MG: 10 TABLET, FILM COATED ORAL at 06:01

## 2020-01-08 RX ADMIN — IPRATROPIUM BROMIDE AND ALBUTEROL SULFATE 3 ML: .5; 3 SOLUTION RESPIRATORY (INHALATION) at 03:01

## 2020-01-08 NOTE — DISCHARGE SUMMARY
[facility] Hospital Medicine  Discharge Summary      Patient Name: Sharita Castañeda  MRN: [id number]  Admission Date: 1/2/2020  Hospital Length of Stay: 6 days  Discharge Date and Time:  01/08/2020 4:02 PM  Attending Physician: Odalis att. providers found   Discharging Provider: Andrea Crowell MD  Primary Care Provider: Wallace Deng MD  [facility] Medicine Team: [facility] HOSP MED 2 Andrea Crowell MD    HPI:    68 yo WF w/ hx of COPD, HTN, HLD, PVD (was on ASA and plavix, recently held in preparation for biopsy), MDD, hypothyroidism (post-ablation), and allergic rhinitis who presents to [facility] ED c/o tachycardia, SOB, and R sided CP. Patient was recently admitted to [facility] on 12/26 for hemoptysis concerning for malignancy. CT scan revealed a large mass in the right lung/ Patient also presented with signs of pneumonia and was treated with levaquin. Patient discharged on 12/28 and was instructed to complete 5 day course of levaquin and prednisone 40mg PO. Patient stated that she was doing well since discharge on w/ 1 or 2 further small episodes of hemoptysis. However, patient notes that her coughing became worse over the past few days w/ some pleuritic CP appreciated that could be relieved by massaging the area. Her symptoms of SOB began around 1AM this morning w/ severe right sided CP (8/10 - worse than before).      In the ER patient remained SOB and tachycardic w/ severe pleuritic CP. Patient placed on 2L nc w/ improvement in SOB and SpO2 to low to mid 90s. Patient febrile to 100.5 F and WBC of 25k. Patient endorses nausea, chills, fatigue, CP, SOB, cough, wheeze, constipation, and congestion. ER started patient on Vanc and Zosyn and gave fluid 30 cc/kg. CTA negative for PE. EKG unremarkable    On presentation to hospital medicine team patients vitals are stable. The shortness of breath has gotten better with oxygen. Still complaining of a sharp right sided chest pain. Got better with morphine.     * No surgery found  *      Hospital Course:   Admitted on 1/2 for suspected pneumonia. Started on vanc and zosyn. Patient with large mass in the right lung. Most likely post obstructive pneumonia.   1/4/2020: Patient symptoms are improving she can speak in full sentences. Maintaining O2 sat on 2 L NC. She is on antibiotics (piperacillin/tazobactam and vanc), awaiting cultures and sensitivities results.    1/6/20 Patient doing well and still maintaning sats on 2l. Liver biopsy done today. Vanc dced and only on zosyn  1/7/20 Patient having some abdominal pain. She felt better after a bowel movement but still complaining of some pain. U/S liver being performed to ensure no bleeding. She is also having pain in her right leg after the 6 minute walk test  1/8/20 Patient is doing much better today. She is safe to be discharged. She will followup with pulm. She is being dced with home oxygen and nebulizer machine.      Consults:   Consults (From admission, onward)        Status Ordering Provider     Inpatient consult to Interventional Radiology  Once     Provider:  (Not yet assigned)    Completed DASH JOSEPH     Inpatient consult to Palliative Care  Once     Provider:  (Not yet assigned)    Completed JANAE OBRIEN     Inpatient consult to Pulmonology  Once     Provider:  (Not yet assigned)    Completed DASH JOSEPH     Inpatient consult to Registered Dietitian/Nutritionist  Once     Provider:  (Not yet assigned)    Completed DASH JOSEPH     Pharmacy to dose Vancomycin consult  Once     Provider:  (Not yet assigned)    Completed DASH JOSEPH          No new Assessment & Plan notes have been filed under this hospital service since the last note was generated.  Service: Hospital Medicine    Final Active Diagnoses:    Diagnosis Date Noted POA    PRINCIPAL PROBLEM:  Postobstructive pneumonia [J18.9] 01/02/2020 Yes    Chronic obstructive pulmonary disease with acute exacerbation [J44.1]  Yes    Mass of middle lobe of right  "lung [R91.8] 12/26/2019 Yes    Anxiety [F41.9] 01/02/2020 Yes    Nausea [R11.0] 01/02/2020 Yes    Migraine [G43.909] 01/02/2020 Yes    Palliative care encounter [Z51.5] 01/02/2020 Not Applicable    Advanced care planning/counseling discussion [Z71.89] 01/02/2020 Not Applicable    Goals of care, counseling/discussion [Z71.89] 01/02/2020 Not Applicable    Acute hypoxemic respiratory failure [J96.01] 12/26/2019 Yes    Hypertension [I10]  Yes    Postablative hypothyroidism [E89.0]  Yes    Depression [F32.9]  Yes    GERD (gastroesophageal reflux disease) [K21.9]  Yes    PVD (peripheral vascular disease) [I73.9]  Yes    Hyperlipidemia [E78.5]  Yes    Allergic rhinitis [J30.9]  Yes      Problems Resolved During this Admission:       Discharged Condition: fair    Disposition: Home or Self Care    Follow Up:    Patient Instructions:      NEBULIZER FOR HOME USE     Order Specific Question Answer Comments   Height: 5' 2.5" (1.588 m)    Weight: 67 kg (147 lb 11.3 oz)    Does patient have medical equipment at home? walker, rolling    Does patient have medical equipment at home? wheelchair    Does patient have medical equipment at home? bedside commode    Does patient have medical equipment at home? cane, straight    Does patient have medical equipment at home? rollator    Length of need (1-99 months): 6    Vendor: [facility]    Expected Date of Delivery: 1/6/2020      NEBULIZER FOR HOME USE     Order Specific Question Answer Comments   Height: 5' 2.5" (1.588 m)    Weight: 67 kg (147 lb 11.3 oz)    Does patient have medical equipment at home? walker, rolling    Does patient have medical equipment at home? wheelchair    Does patient have medical equipment at home? bedside commode    Does patient have medical equipment at home? cannunu straight    Does patient have medical equipment at home? rollator    Length of need (1-99 months): 6    Vendor: [facility]    Expected Date of Delivery: 1/6/2020      OXYGEN FOR HOME " "USE     Order Specific Question Answer Comments   Liter Flow 3    Duration Continuous    Qualifying SpO2: 83    Testing done at: Exercise/Activity    Route nasal cannula    Device home concentrator with portable unit    Length of need (in months):  6   Patient condition with qualifying saturation COPD    Height: 5' 2.5" (1.588 m)    Weight: 67 kg (147 lb 11.3 oz)    Does patient have medical equipment at home? walker, rolling    Does patient have medical equipment at home? wheelchair    Does patient have medical equipment at home? bedside commode    Does patient have medical equipment at home? cane, straight    Does patient have medical equipment at home? rollator    Alternative treatment measures have been tried or considered and deemed clinically ineffective. Yes    Vendor: [facility]    Expected Date of Delivery: 1/7/2020      Ambulatory Referral to Pulmonology   Referral Priority: Routine Referral Type: Consultation   Referral Reason: Specialty Services Required   Requested Specialty: Pulmonary Disease   Number of Visits Requested: 1     Diet Adult Regular     Notify your health care provider if you experience any of the following:  temperature >100.4     Notify your health care provider if you experience any of the following:  persistent nausea and vomiting or diarrhea     Notify your health care provider if you experience any of the following:  severe uncontrolled pain     Notify your health care provider if you experience any of the following:  redness, tenderness, or signs of infection (pain, swelling, redness, odor or green/yellow discharge around incision site)     Notify your health care provider if you experience any of the following:  persistent dizziness, light-headedness, or visual disturbances     Notify your health care provider if you experience any of the following:  increased confusion or weakness     Notify your health care provider if you experience any of the following:  difficulty breathing " or increased cough     Activity as tolerated       Significant Diagnostic Studies:   Recent Results (from the past 48 hour(s))   POCT glucose    Collection Time: 01/07/20  5:40 AM   Result Value Ref Range    POCT Glucose 71 70 - 110 mg/dL   CBC auto differential    Collection Time: 01/07/20  6:26 AM   Result Value Ref Range    WBC 21.58 (H) 3.90 - 12.70 K/uL    RBC 3.94 (L) 4.00 - 5.40 M/uL    Hemoglobin 10.4 (L) 12.0 - 16.0 g/dL    Hematocrit 34.0 (L) 37.0 - 48.5 %    Mean Corpuscular Volume 86 82 - 98 fL    Mean Corpuscular Hemoglobin 26.4 (L) 27.0 - 31.0 pg    Mean Corpuscular Hemoglobin Conc 30.6 (L) 32.0 - 36.0 g/dL    RDW 18.6 (H) 11.5 - 14.5 %    Platelets 185 150 - 350 K/uL    MPV 10.4 9.2 - 12.9 fL    Immature Granulocytes 0.9 (H) 0.0 - 0.5 %    Gran # (ANC) 17.4 (H) 1.8 - 7.7 K/uL    Immature Grans (Abs) 0.19 (H) 0.00 - 0.04 K/uL    Lymph # 2.3 1.0 - 4.8 K/uL    Mono # 1.4 (H) 0.3 - 1.0 K/uL    Eos # 0.3 0.0 - 0.5 K/uL    Baso # 0.04 0.00 - 0.20 K/uL    nRBC 0 0 /100 WBC    Gran% 80.5 (H) 38.0 - 73.0 %    Lymph% 10.7 (L) 18.0 - 48.0 %    Mono% 6.3 4.0 - 15.0 %    Eosinophil% 1.4 0.0 - 8.0 %    Basophil% 0.2 0.0 - 1.9 %    Differential Method Automated    Magnesium    Collection Time: 01/07/20  6:26 AM   Result Value Ref Range    Magnesium 2.1 1.6 - 2.6 mg/dL   Phosphorus    Collection Time: 01/07/20  6:26 AM   Result Value Ref Range    Phosphorus 3.5 2.7 - 4.5 mg/dL   Comprehensive metabolic panel    Collection Time: 01/07/20  6:26 AM   Result Value Ref Range    Sodium 137 136 - 145 mmol/L    Potassium 4.9 3.5 - 5.1 mmol/L    Chloride 101 95 - 110 mmol/L    CO2 28 23 - 29 mmol/L    Glucose 91 70 - 110 mg/dL    BUN, Bld 10 8 - 23 mg/dL    Creatinine 0.9 0.5 - 1.4 mg/dL    Calcium 8.0 (L) 8.7 - 10.5 mg/dL    Total Protein 5.2 (L) 6.0 - 8.4 g/dL    Albumin 2.1 (L) 3.5 - 5.2 g/dL    Total Bilirubin 0.6 0.1 - 1.0 mg/dL    Alkaline Phosphatase 99 55 - 135 U/L    AST 23 10 - 40 U/L    ALT 31 10 - 44 U/L    Anion  Gap 8 8 - 16 mmol/L    eGFR if African American >60.0 >60 mL/min/1.73 m^2    eGFR if non African American >60.0 >60 mL/min/1.73 m^2   POCT glucose    Collection Time: 01/07/20 12:37 PM   Result Value Ref Range    POCT Glucose 107 70 - 110 mg/dL   Lipase    Collection Time: 01/07/20  2:01 PM   Result Value Ref Range    Lipase 34 4 - 60 U/L   POCT glucose    Collection Time: 01/07/20  8:23 PM   Result Value Ref Range    POCT Glucose 120 (H) 70 - 110 mg/dL   CBC auto differential    Collection Time: 01/08/20  6:50 AM   Result Value Ref Range    WBC 19.67 (H) 3.90 - 12.70 K/uL    RBC 3.84 (L) 4.00 - 5.40 M/uL    Hemoglobin 10.3 (L) 12.0 - 16.0 g/dL    Hematocrit 32.7 (L) 37.0 - 48.5 %    Mean Corpuscular Volume 85 82 - 98 fL    Mean Corpuscular Hemoglobin 26.8 (L) 27.0 - 31.0 pg    Mean Corpuscular Hemoglobin Conc 31.5 (L) 32.0 - 36.0 g/dL    RDW 18.8 (H) 11.5 - 14.5 %    Platelets 164 150 - 350 K/uL    MPV 10.3 9.2 - 12.9 fL    Immature Granulocytes 1.0 (H) 0.0 - 0.5 %    Gran # (ANC) 15.6 (H) 1.8 - 7.7 K/uL    Immature Grans (Abs) 0.19 (H) 0.00 - 0.04 K/uL    Lymph # 2.3 1.0 - 4.8 K/uL    Mono # 1.2 (H) 0.3 - 1.0 K/uL    Eos # 0.3 0.0 - 0.5 K/uL    Baso # 0.04 0.00 - 0.20 K/uL    nRBC 0 0 /100 WBC    Gran% 79.2 (H) 38.0 - 73.0 %    Lymph% 11.7 (L) 18.0 - 48.0 %    Mono% 6.3 4.0 - 15.0 %    Eosinophil% 1.6 0.0 - 8.0 %    Basophil% 0.2 0.0 - 1.9 %    Differential Method Automated    Magnesium    Collection Time: 01/08/20  6:50 AM   Result Value Ref Range    Magnesium 1.9 1.6 - 2.6 mg/dL   Phosphorus    Collection Time: 01/08/20  6:50 AM   Result Value Ref Range    Phosphorus 3.3 2.7 - 4.5 mg/dL   Comprehensive metabolic panel    Collection Time: 01/08/20  6:50 AM   Result Value Ref Range    Sodium 136 136 - 145 mmol/L    Potassium 4.5 3.5 - 5.1 mmol/L    Chloride 101 95 - 110 mmol/L    CO2 27 23 - 29 mmol/L    Glucose 81 70 - 110 mg/dL    BUN, Bld 12 8 - 23 mg/dL    Creatinine 0.8 0.5 - 1.4 mg/dL    Calcium 7.9 (L) 8.7  - 10.5 mg/dL    Total Protein 5.1 (L) 6.0 - 8.4 g/dL    Albumin 2.1 (L) 3.5 - 5.2 g/dL    Total Bilirubin 0.6 0.1 - 1.0 mg/dL    Alkaline Phosphatase 92 55 - 135 U/L    AST 22 10 - 40 U/L    ALT 25 10 - 44 U/L    Anion Gap 8 8 - 16 mmol/L    eGFR if African American >60.0 >60 mL/min/1.73 m^2    eGFR if non African American >60.0 >60 mL/min/1.73 m^2         Pending Diagnostic Studies:     Procedure Component Value Units Date/Time    Radiology-guided Core Biopsy with Path Adequacy [[id number]] Collected:  01/06/20 1634    Order Status:  Sent Lab Status:  In process Updated:  01/06/20 1710         Medications:  Reconciled Home Medications:      Medication List      START taking these medications    albuterol-ipratropium 2.5 mg-0.5 mg/3 mL nebulizer solution  Commonly known as:  DUO-NEB  Use 3 mL inhaled every 4 hours as needed for shortness of breath or wheezing (ICD-10-CM: J44.1)     benzonatate 100 MG capsule  Commonly known as:  TESSALON  Take 1 capsule (100 mg total) by mouth 3 (three) times daily as needed for Cough.     doxycycline 100 MG tablet  Commonly known as:  VIBRA-TABS  Take 1 tablet (100 mg total) by mouth every 12 (twelve) hours. for 1 day     HYDROcodone-acetaminophen 7.5-325 mg per tablet  Commonly known as:  NORCO  Take 1 tablet by mouth every 8 (eight) hours as needed for Pain (Pain). Take 1/2 to 1 tablet by mouth 3 times daily as needed for pain        CHANGE how you take these medications    alendronate 70 MG tablet  Commonly known as:  FOSAMAX  TAKE 1 TABLET(70 MG) BY MOUTH EVERY 7 DAYS  What changed:  See the new instructions.     calcium-vitamin D3 500 mg(1,250mg) -200 unit per tablet  Commonly known as:  Calcium 500 + D  Take 1 tablet by mouth 2 (two) times daily with meals.  What changed:  when to take this        CONTINUE taking these medications    albuterol 90 mcg/actuation inhaler  Commonly known as:  ProAir HFA  Inhale 2 puffs into the lungs every 6 (six) hours as needed for Wheezing.      amLODIPine 5 MG tablet  Commonly known as:  NORVASC  Take 1 tablet (5 mg total) by mouth once daily.     aspirin 81 MG EC tablet  Commonly known as:  ECOTRIN  Take 1 tablet (81 mg total) by mouth once daily.     atorvastatin 40 MG tablet  Commonly known as:  LIPITOR  TAKE 1 TABLET(40 MG) BY MOUTH EVERY DAY     butalbital-acetaminophen-caffeine -40 mg -40 mg per tablet  Commonly known as:  FIORICET, ESGIC  Take 1 tablet by mouth every 12 (twelve) hours as needed for Pain or Headaches.     clopidogrel 75 mg tablet  Commonly known as:  PLAVIX  Take 1 tablet (75 mg total) by mouth once daily.     Incruse Ellipta 62.5 mcg/actuation Dsdv  Generic drug:  umeclidinium  Inhale 1 each into the lungs once daily. Controller     levothyroxine 100 MCG tablet  Commonly known as:  SYNTHROID  Take 1 tablet (100 mcg total) by mouth before breakfast.     losartan 25 MG tablet  Commonly known as:  COZAAR  Take 1 tablet (25 mg total) by mouth once daily.     montelukast 10 mg tablet  Commonly known as:  SINGULAIR  TAKE 1 TABLET(10 MG) BY MOUTH EVERY DAY            Indwelling Lines/Drains at time of discharge:   Lines/Drains/Airways     None                 Time spent on the discharge of patient: 45 minutes  Patient was seen and examined on the date of discharge and determined to be suitable for discharge.         Andrea Crowell MD  Department of Hospital Medicine  [facility]

## 2020-01-08 NOTE — PLAN OF CARE
Patient discharged home with [facility], Home O2 and Nebulizer provided by [facility].    Future Appointments   Date Time Provider Department Center   1/14/2020  8:00 AM PALLIATIVE AND SUPPORT CARE [facility] PAL MED [address]   1/15/2020  8:00 AM Wallace Deng MD [facility]   1/16/2020  9:00 AM Elmer Lancaster MD [facility] PULMSVC [address]        01/08/20 1602   Final Note   Assessment Type Final Discharge Note   Anticipated Discharge Disposition Home-Health   Right Care Referral Info   Post Acute Recommendation Home-care   Referral Type Home Health   Facility Name [facility]

## 2020-01-08 NOTE — ASSESSMENT & PLAN NOTE
Palliative medicine follow up to goals of care. Palliative medicine APRN and [place] [facility] met with Ms. Castañeda at bedside.      Impression:   is a 68 yo lady admitted with shortness of breath and tachycardia.  Recent diagnosis of lung and liver mass. Tissue diagnosis pending.  She is awake, alert and oriented to person, place, time and situation.  No complaints of pain or shortness of breath. Reports having some abdominal pain early in AM but is no resolved after taking pain medications  No acute distress     Advance Care Planning   - no advanced directives received  - Advanced care planning education - How to start the conversation provided.  On follow up she still has not designated HPOA.    - Next of kin for medical decisions are her adult children Shruti Castañeda [phone] and Leon Castañeda   - full code per primary team. Ms. Castañeda is in agreement.  She states she would choose to have every chance to recover from an significant event.  She also states she would not choose to have long term life support if it would cause suffering and she would never be herself again.         Goals of Care  -palliative medicine building rapport with patient.  She is receptive to continued palliative medicine.   - Ms. Castañeda states understanding this mass is most likely a malignancy. She appears to be coping well with this information.  - Most important to Ms. Castañeda is to have a definitive diagnosis. After having the biopsy her goal is to receive available treatment - undetermined at this time.   -Her goal is to be live  long enough to meet her great granddaughter - May 2020.      Plan/Recommendation:  - Would benefit from continued information and education regarding treatment plan  - Amenable to home palliative care.  Palliative care will facilitate referral to [facility].  [facility] will contact patient to schedule home visit.    - Patient is amenable to following up in [facility] outpatient palliative medicine clinic.   Primary team please order the referral.   Ms. Castañeda has been scheduled for 1/14/2020 at 8 AM.  Out patient clinic is located on [address] clinic    Primary team aware of the above conversation and recommendation.

## 2020-01-08 NOTE — PLAN OF CARE
Fall precaution maintained this shift call bell in reach bed in low position. Instructed pt to call for assistance. Vs stable. No acute distress noted. Pain meds control with prescribed medication. Iv abx given

## 2020-01-08 NOTE — SUBJECTIVE & OBJECTIVE
Interval History:     Past Medical History:   Diagnosis Date    Allergic rhinitis     COPD (chronic obstructive pulmonary disease)     Depression     GERD (gastroesophageal reflux disease)     Headache     Hyperlipidemia     Hypertension     Polyarthralgia     Postablative hypothyroidism     hypothyroidism s/p OCASIO therapy    PVD (peripheral vascular disease)     Treated by Dr. Sim       Past Surgical History:   Procedure Laterality Date    CHOLECYSTECTOMY  1986    GALLBLADDER SURGERY  2006    HIP SURGERY  2005    Right hip fracture/surgery    PERIPHERAL ARTERIAL STENT GRAFT Right 2007    stent for femoral artery blockage       Review of patient's allergies indicates:  No Known Allergies    Medications:  Continuous Infusions:  Scheduled Meds:   albuterol-ipratropium  3 mL Nebulization Q4H    atorvastatin  40 mg Oral QHS    doxycycline  100 mg Oral Q12H    famotidine  20 mg Oral Daily    heparin (porcine)  5,000 Units Subcutaneous Q8H    levothyroxine  100 mcg Oral Before breakfast    montelukast  10 mg Oral Daily    paroxetine  20 mg Oral QAM    polyethylene glycol  17 g Oral Daily    tiotropium  1 capsule Inhalation Daily    topiramate  25 mg Oral BID     PRN Meds:benzonatate, butalbital-acetaminophen-caffeine -40 mg, Dextrose 10% Bolus, Dextrose 10% Bolus, glucagon (human recombinant), glucose, glucose, HYDROcodone-acetaminophen, ondansetron, promethazine, sodium chloride 0.9%    Family History     Problem Relation (Age of Onset)    COPD Mother    Cancer Mother    Heart attack Father (55), Sister (53)    Thyroid disease Mother        Tobacco Use    Smoking status: Current Every Day Smoker     Types: Cigarettes    Smokeless tobacco: Never Used   Substance and Sexual Activity    Alcohol use: No     Alcohol/week: 0.0 standard drinks    Drug use: Not on file    Sexual activity: Not on file       Review of Systems  Interval History:     Past Medical History:   Diagnosis Date     Allergic rhinitis     COPD (chronic obstructive pulmonary disease)     Depression     GERD (gastroesophageal reflux disease)     Headache     Hyperlipidemia     Hypertension     Polyarthralgia     Postablative hypothyroidism     hypothyroidism s/p OCASIO therapy    PVD (peripheral vascular disease)     Treated by Dr. Sim       Past Surgical History:   Procedure Laterality Date    CHOLECYSTECTOMY  1986    GALLBLADDER SURGERY  2006    HIP SURGERY  2005    Right hip fracture/surgery    PERIPHERAL ARTERIAL STENT GRAFT Right 2007    stent for femoral artery blockage       Review of patient's allergies indicates:  No Known Allergies    Medications:  Continuous Infusions:  Scheduled Meds:   albuterol-ipratropium  3 mL Nebulization Q4H    atorvastatin  40 mg Oral QHS    doxycycline  100 mg Oral Q12H    famotidine  20 mg Oral Daily    heparin (porcine)  5,000 Units Subcutaneous Q8H    levothyroxine  100 mcg Oral Before breakfast    montelukast  10 mg Oral Daily    paroxetine  20 mg Oral QAM    polyethylene glycol  17 g Oral Daily    tiotropium  1 capsule Inhalation Daily    topiramate  25 mg Oral BID     PRN Meds:benzonatate, butalbital-acetaminophen-caffeine -40 mg, Dextrose 10% Bolus, Dextrose 10% Bolus, glucagon (human recombinant), glucose, glucose, HYDROcodone-acetaminophen, ondansetron, promethazine, sodium chloride 0.9%    Family History     Problem Relation (Age of Onset)    COPD Mother    Cancer Mother    Heart attack Father (55), Sister (53)    Thyroid disease Mother        Tobacco Use    Smoking status: Current Every Day Smoker     Types: Cigarettes    Smokeless tobacco: Never Used   Substance and Sexual Activity    Alcohol use: No     Alcohol/week: 0.0 standard drinks    Drug use: Not on file    Sexual activity: Not on file       Review of Systems  Objective:     Vital Signs (Most Recent):  Temp: 97.4 °F (36.3 °C) (01/08/20 1132)  Pulse: 64 (01/08/20 1214)  Resp: 16 (01/08/20  1214)  BP: 124/60 (01/08/20 1132)  SpO2: (!) 93 % (01/08/20 1214) Vital Signs (24h Range):  Temp:  [97.4 °F (36.3 °C)-98 °F (36.7 °C)] 97.4 °F (36.3 °C)  Pulse:  [] 64  Resp:  [16-24] 16  SpO2:  [85 %-94 %] 93 %  BP: (124-162)/(60-76) 124/60     Weight: 67 kg (147 lb 11.3 oz)  Body mass index is 26.59 kg/m².    Review of Symptoms  Symptom Assessment (ESAS 0-10 scale)   ESAS 0 1 2 3 4 5 6 7 8 9 10   Pain x             Dyspnea x             Anxiety x             Nausea x             Depression  x             Anorexia    x          Fatigue x             Insomnia x             Restlessness  x             Agitation x             CAM / Delirium __ --  ___+   Constipation     __ --  ___+   Diarrhea           __ --  ___+  Bowel Management Plan (BMP): No    Comments: reports no pain or shortness of breath at this time.  Able to have long conversation and does not appear to have dyspnea     Pain Assessment: reports no pain     OME in 24 hours: 0    Performance Status: 60    ECOG Performance Status Grade: 1 - Ambulates, capable of light work    Physical Exam    Significant Labs: All pertinent labs within the past 24 hours have been reviewed.  CBC:   Recent Labs   Lab 01/08/20  0650   WBC 19.67*   HGB 10.3*   HCT 32.7*   MCV 85        BMP:  Recent Labs   Lab 01/08/20  0650   GLU 81      K 4.5      CO2 27   BUN 12   CREATININE 0.8   CALCIUM 7.9*   MG 1.9     LFT:  Lab Results   Component Value Date    AST 22 01/08/2020    ALKPHOS 92 01/08/2020    BILITOT 0.6 01/08/2020     Albumin:   Albumin   Date Value Ref Range Status   01/08/2020 2.1 (L) 3.5 - 5.2 g/dL Final     Protein:   Total Protein   Date Value Ref Range Status   01/08/2020 5.1 (L) 6.0 - 8.4 g/dL Final     Lactic acid:   Lab Results   Component Value Date    LACTATE 1.5 01/02/2020    LACTATE 3.1 (H) 01/02/2020       Significant Imaging: I have reviewed all pertinent imaging results/findings within the past 24 hours.    Advance Care Planning    Advanced Directives::  Living Will: No  LaPOST: No  Do Not Resuscitate Status: No  Medical Power of : No    Decision-Making Capacity: Patient answered questions       Living Arrangements: Lives alone    Psychosocial/Cultural:  , two adult children, three grandchildren, one great grandchild and one on the way. Worked as a  and at [employer].    Spiritual:     F- Christi and Belief:  Raised Denominational and identifies as a non Judaism Roman Catholic     I - Importance:   .  C - Community:     A - Address in Care: amenable to  visits and would receive anointing of the sick.   Objective:     Vital Signs (Most Recent):  Temp: 97.4 °F (36.3 °C) (01/08/20 1132)  Pulse: 64 (01/08/20 1214)  Resp: 16 (01/08/20 1214)  BP: 124/60 (01/08/20 1132)  SpO2: (!) 93 % (01/08/20 1214) Vital Signs (24h Range):  Temp:  [97.4 °F (36.3 °C)-98 °F (36.7 °C)] 97.4 °F (36.3 °C)  Pulse:  [] 64  Resp:  [16-24] 16  SpO2:  [85 %-94 %] 93 %  BP: (124-162)/(60-76) 124/60     Weight: 67 kg (147 lb 11.3 oz)  Body mass index is 26.59 kg/m².    Review of Symptoms  Symptom Assessment (ESAS 0-10 scale)   ESAS 0 1 2 3 4 5 6 7 8 9 10   Pain x             Dyspnea x             Anxiety x             Nausea x             Depression  x             Anorexia    x          Fatigue x             Insomnia x             Restlessness  x             Agitation x             CAM / Delirium __ --  ___+   Constipation     __ --  ___+   Diarrhea           __ --  ___+  Bowel Management Plan (BMP): No    Comments: reports no pain or shortness of breath at this time.  Able to have long conversation and does not appear to have dyspnea     Pain Assessment: reports no pain     OME in 24 hours: 0    Performance Status: 60    ECOG Performance Status Grade: 1 - Ambulates, capable of light work    Physical Exam    Significant Labs: All pertinent labs within the past 24 hours have been reviewed.  CBC:   Recent Labs   Lab 01/08/20  0650    WBC 19.67*   HGB 10.3*   HCT 32.7*   MCV 85        BMP:  Recent Labs   Lab 01/08/20  0650   GLU 81      K 4.5      CO2 27   BUN 12   CREATININE 0.8   CALCIUM 7.9*   MG 1.9     LFT:  Lab Results   Component Value Date    AST 22 01/08/2020    ALKPHOS 92 01/08/2020    BILITOT 0.6 01/08/2020     Albumin:   Albumin   Date Value Ref Range Status   01/08/2020 2.1 (L) 3.5 - 5.2 g/dL Final     Protein:   Total Protein   Date Value Ref Range Status   01/08/2020 5.1 (L) 6.0 - 8.4 g/dL Final     Lactic acid:   Lab Results   Component Value Date    LACTATE 1.5 01/02/2020    LACTATE 3.1 (H) 01/02/2020       Significant Imaging: I have reviewed all pertinent imaging results/findings within the past 24 hours.    Advance Care Planning   Advanced Directives::  Living Will: No  LaPOST: No  Do Not Resuscitate Status: No  Medical Power of : No    Decision-Making Capacity: Patient answered questions       Living Arrangements: Lives alone    Psychosocial/Cultural:  , two adult children, three grandchildren, one great grandchild and one on the way. Worked as a  and at [employer].    Spiritual:     F- Christi and Belief:  Raised Bahai and identifies as a non Taoism Yazidi     I - Importance:   .  C - Community:     A - Address in Care: amenable to  visits and would receive anointing of the sick.

## 2020-01-08 NOTE — PROGRESS NOTES
Paged IM2 twice to request either meloxicam or robaxin per pt request for her c/o pain in feet.  Pt asking for med and still not available.  Will page again to place request.

## 2020-01-08 NOTE — PROGRESS NOTES
[facility]  Palliative Medicine  Progress Note    Patient Name: Sharita Castañeda  MRN: [id number]  Admission Date: 1/2/2020  Hospital Length of Stay: 6 days  Code Status: Full Code   Attending Provider: Ana Fajardo MD  Consulting Provider: NIKOLAS Garces  Primary Care Physician: Wallace Deng MD  Principal Problem:Postobstructive pneumonia    Patient information was obtained from patient.      Assessment/Plan:     Palliative care encounter  Palliative medicine follow up to goals of care. Palliative medicine APRN and [place] [facility] met with Ms. Castañeda at bedside.      Impression:   is a 68 yo lady admitted with shortness of breath and tachycardia.  Recent diagnosis of lung and liver mass. Tissue diagnosis pending.  She is awake, alert and oriented to person, place, time and situation.  No complaints of pain or shortness of breath. Reports having some abdominal pain early in AM but is no resolved after taking pain medications  No acute distress     Advance Care Planning   - no advanced directives received  - Advanced care planning education - How to start the conversation provided.  On follow up she still has not designated HPOA.    - Next of kin for medical decisions are her adult children Shruti Castañeda [phone] and Leon Castañeda   - full code per primary team. Ms. Castañeda is in agreement.  She states she would choose to have every chance to recover from an significant event.  She also states she would not choose to have long term life support if it would cause suffering and she would never be herself again.        Goals of Care  -palliative medicine building rapport with patient.  She is receptive to continued palliative medicine.   - Ms. Castañeda states understanding this mass is most likely a malignancy. She appears to be coping well with this information.  - Most important to Ms. Castañeda is to have a definitive diagnosis. After having the biopsy her goal is to receive available treatment  - undetermined at this time.   -Her goal is to be live  long enough to meet her great granddaughter - May 2020.      Plan/Recommendation:  - Would benefit from continued information and education regarding treatment plan  - Amenable to home palliative care.  Palliative care will facilitate referral to [facility].  [facility] will contact patient to schedule home visit.    - Patient is amenable to following up [place] [facility] [place].  Primary team please order the referral.   Ms. Castañeda has been scheduled for 1/14/2020 at 8 AM.  Out patient clinic is located on [facility] 9 floor clinic    Primary team aware of the above conversation and recommendation.           I will sign off. Please contact us if you have any additional questions.    Subjective:     Chief Complaint:   Chief Complaint   Patient presents with    Tachycardia     Endorsing generalized weakness sicne yesterday, Pt stated to feel her heart race. Per EMS EKG showed sinus tachycardia vent rate 130s. Recent dx of liver and lung cancer. Not currently on chemo or radiation.        HPI:   HPI obtained from chart review:     Ms. Castañeda is a  68 yo lady with PMH of:  COPD, HTN, HLD, PVD, MDD, hypothyroidism (post-ablation), and allergic rhinitis. She [place] [facility] [place] with c/o tachycardia, SOB, and R sided CP. Recently admitted to [facility] on 12/26 for hemoptysis concerning for malignancy.     CT imaging concerning for primary lung cancer with mets to liver. Pulmonary was consulted for biopsy,  primary lesion near the PA, IR referral placed for biopsy and scheduled  Outpatient with follow up in pulmonary clinic.  Discharged on 12/28 with course of levaquine and prednisone.     Patient reports doing well since discharge with only 1 or 2 further small episodes of hemoptysis.   Coughing became worse and she had some  pleuritic CP. Her symptoms of SOB began around 1AM this morning w/ severe right sided CP (8/10 - worse than before). Patient reports  an SpO2 in the 70s and 80s at home w/ tachycardia to 130s.     On arrival, placed on 2L nc w/ improvement in SOB and SpO2 to low to mid 90s. Patient febrile to 100.5 F and WBC of 25k. Patient also complaints of  nausea, chills, fatigue, CP, SOB, cough, wheeze, constipation, and congestion. No reports of:  headache, vomiting, fever, palp, abd pain, hematochezia, dysuria, hematuria, light headed ness, dizziness, weakness, sore throat, recent travel, or recent illness.     Palliative medicine consulted for goals of care.     Hospital Course:  No notes on file    Interval History:     Past Medical History:   Diagnosis Date    Allergic rhinitis     COPD (chronic obstructive pulmonary disease)     Depression     GERD (gastroesophageal reflux disease)     Headache     Hyperlipidemia     Hypertension     Polyarthralgia     Postablative hypothyroidism     hypothyroidism s/p OCASIO therapy    PVD (peripheral vascular disease)     Treated by Dr. Sim       Past Surgical History:   Procedure Laterality Date    CHOLECYSTECTOMY  1986    GALLBLADDER SURGERY  2006    HIP SURGERY  2005    Right hip fracture/surgery    PERIPHERAL ARTERIAL STENT GRAFT Right 2007    stent for femoral artery blockage       Review of patient's allergies indicates:  No Known Allergies    Medications:  Continuous Infusions:  Scheduled Meds:   albuterol-ipratropium  3 mL Nebulization Q4H    atorvastatin  40 mg Oral QHS    doxycycline  100 mg Oral Q12H    famotidine  20 mg Oral Daily    heparin (porcine)  5,000 Units Subcutaneous Q8H    levothyroxine  100 mcg Oral Before breakfast    montelukast  10 mg Oral Daily    paroxetine  20 mg Oral QAM    polyethylene glycol  17 g Oral Daily    tiotropium  1 capsule Inhalation Daily    topiramate  25 mg Oral BID     PRN Meds:benzonatate, butalbital-acetaminophen-caffeine -40 mg, Dextrose 10% Bolus, Dextrose 10% Bolus, glucagon (human recombinant), glucose, glucose,  HYDROcodone-acetaminophen, ondansetron, promethazine, sodium chloride 0.9%    Family History     Problem Relation (Age of Onset)    COPD Mother    Cancer Mother    Heart attack Father (55), Sister (53)    Thyroid disease Mother        Tobacco Use    Smoking status: Current Every Day Smoker     Types: Cigarettes    Smokeless tobacco: Never Used   Substance and Sexual Activity    Alcohol use: No     Alcohol/week: 0.0 standard drinks    Drug use: Not on file    Sexual activity: Not on file       Review of Systems  Interval History:     Past Medical History:   Diagnosis Date    Allergic rhinitis     COPD (chronic obstructive pulmonary disease)     Depression     GERD (gastroesophageal reflux disease)     Headache     Hyperlipidemia     Hypertension     Polyarthralgia     Postablative hypothyroidism     hypothyroidism s/p OCASIO therapy    PVD (peripheral vascular disease)     Treated by Dr. Sim       Past Surgical History:   Procedure Laterality Date    CHOLECYSTECTOMY  1986    GALLBLADDER SURGERY  2006    HIP SURGERY  2005    Right hip fracture/surgery    PERIPHERAL ARTERIAL STENT GRAFT Right 2007    stent for femoral artery blockage       Review of patient's allergies indicates:  No Known Allergies    Medications:  Continuous Infusions:  Scheduled Meds:   albuterol-ipratropium  3 mL Nebulization Q4H    atorvastatin  40 mg Oral QHS    doxycycline  100 mg Oral Q12H    famotidine  20 mg Oral Daily    heparin (porcine)  5,000 Units Subcutaneous Q8H    levothyroxine  100 mcg Oral Before breakfast    montelukast  10 mg Oral Daily    paroxetine  20 mg Oral QAM    polyethylene glycol  17 g Oral Daily    tiotropium  1 capsule Inhalation Daily    topiramate  25 mg Oral BID     PRN Meds:benzonatate, butalbital-acetaminophen-caffeine -40 mg, Dextrose 10% Bolus, Dextrose 10% Bolus, glucagon (human recombinant), glucose, glucose, HYDROcodone-acetaminophen, ondansetron, promethazine, sodium  chloride 0.9%    Family History     Problem Relation (Age of Onset)    COPD Mother    Cancer Mother    Heart attack Father (55), Sister (53)    Thyroid disease Mother        Tobacco Use    Smoking status: Current Every Day Smoker     Types: Cigarettes    Smokeless tobacco: Never Used   Substance and Sexual Activity    Alcohol use: No     Alcohol/week: 0.0 standard drinks    Drug use: Not on file    Sexual activity: Not on file       Review of Systems  Objective:     Vital Signs (Most Recent):  Temp: 97.4 °F (36.3 °C) (01/08/20 1132)  Pulse: 64 (01/08/20 1214)  Resp: 16 (01/08/20 1214)  BP: 124/60 (01/08/20 1132)  SpO2: (!) 93 % (01/08/20 1214) Vital Signs (24h Range):  Temp:  [97.4 °F (36.3 °C)-98 °F (36.7 °C)] 97.4 °F (36.3 °C)  Pulse:  [] 64  Resp:  [16-24] 16  SpO2:  [85 %-94 %] 93 %  BP: (124-162)/(60-76) 124/60     Weight: 67 kg (147 lb 11.3 oz)  Body mass index is 26.59 kg/m².    Review of Symptoms  Symptom Assessment (ESAS 0-10 scale)   ESAS 0 1 2 3 4 5 6 7 8 9 10   Pain x             Dyspnea x             Anxiety x             Nausea x             Depression  x             Anorexia    x          Fatigue x             Insomnia x             Restlessness  x             Agitation x             CAM / Delirium __ --  ___+   Constipation     __ --  ___+   Diarrhea           __ --  ___+  Bowel Management Plan (BMP): No    Comments: reports no pain or shortness of breath at this time.  Able to have long conversation and does not appear to have dyspnea     Pain Assessment: reports no pain     OME in 24 hours: 0    Performance Status: 60    ECOG Performance Status Grade: 1 - Ambulates, capable of light work    Physical Exam    Significant Labs: All pertinent labs within the past 24 hours have been reviewed.  CBC:   Recent Labs   Lab 01/08/20  0650   WBC 19.67*   HGB 10.3*   HCT 32.7*   MCV 85        BMP:  Recent Labs   Lab 01/08/20  0650   GLU 81      K 4.5      CO2 27   BUN 12    CREATININE 0.8   CALCIUM 7.9*   MG 1.9     LFT:  Lab Results   Component Value Date    AST 22 01/08/2020    ALKPHOS 92 01/08/2020    BILITOT 0.6 01/08/2020     Albumin:   Albumin   Date Value Ref Range Status   01/08/2020 2.1 (L) 3.5 - 5.2 g/dL Final     Protein:   Total Protein   Date Value Ref Range Status   01/08/2020 5.1 (L) 6.0 - 8.4 g/dL Final     Lactic acid:   Lab Results   Component Value Date    LACTATE 1.5 01/02/2020    LACTATE 3.1 (H) 01/02/2020       Significant Imaging: I have reviewed all pertinent imaging results/findings within the past 24 hours.    Advance Care Planning   Advanced Directives::  Living Will: No  LaPOST: No  Do Not Resuscitate Status: No  Medical Power of : No    Decision-Making Capacity: Patient answered questions       Living Arrangements: Lives alone    Psychosocial/Cultural:  , two adult children, three grandchildren, one great grandchild and one on the way. Worked as a  and at [employer].    Spiritual:     F- Christi and Belief:  Raised Orthodox and identifies as a non Tenriism Congregational     I - Importance:   .  C - Community:     A - Address in Care: amenable to  visits and would receive anointing of the sick.   Objective:     Vital Signs (Most Recent):  Temp: 97.4 °F (36.3 °C) (01/08/20 1132)  Pulse: 64 (01/08/20 1214)  Resp: 16 (01/08/20 1214)  BP: 124/60 (01/08/20 1132)  SpO2: (!) 93 % (01/08/20 1214) Vital Signs (24h Range):  Temp:  [97.4 °F (36.3 °C)-98 °F (36.7 °C)] 97.4 °F (36.3 °C)  Pulse:  [] 64  Resp:  [16-24] 16  SpO2:  [85 %-94 %] 93 %  BP: (124-162)/(60-76) 124/60     Weight: 67 kg (147 lb 11.3 oz)  Body mass index is 26.59 kg/m².    Review of Symptoms  Symptom Assessment (ESAS 0-10 scale)   ESAS 0 1 2 3 4 5 6 7 8 9 10   Pain x             Dyspnea x             Anxiety x             Nausea x             Depression  x             Anorexia    x          Fatigue x             Insomnia x             Restlessness   x             Agitation x             CAM / Delirium __ --  ___+   Constipation     __ --  ___+   Diarrhea           __ --  ___+  Bowel Management Plan (BMP): No    Comments: reports no pain or shortness of breath at this time.  Able to have long conversation and does not appear to have dyspnea     Pain Assessment: reports no pain     OME in 24 hours: 0    Performance Status: 60    ECOG Performance Status Grade: 1 - Ambulates, capable of light work    Physical Exam    Significant Labs: All pertinent labs within the past 24 hours have been reviewed.  CBC:   Recent Labs   Lab 01/08/20  0650   WBC 19.67*   HGB 10.3*   HCT 32.7*   MCV 85        BMP:  Recent Labs   Lab 01/08/20  0650   GLU 81      K 4.5      CO2 27   BUN 12   CREATININE 0.8   CALCIUM 7.9*   MG 1.9     LFT:  Lab Results   Component Value Date    AST 22 01/08/2020    ALKPHOS 92 01/08/2020    BILITOT 0.6 01/08/2020     Albumin:   Albumin   Date Value Ref Range Status   01/08/2020 2.1 (L) 3.5 - 5.2 g/dL Final     Protein:   Total Protein   Date Value Ref Range Status   01/08/2020 5.1 (L) 6.0 - 8.4 g/dL Final     Lactic acid:   Lab Results   Component Value Date    LACTATE 1.5 01/02/2020    LACTATE 3.1 (H) 01/02/2020       Significant Imaging: I have reviewed all pertinent imaging results/findings within the past 24 hours.    Advance Care Planning   Advanced Directives::  Living Will: No  LaPOST: No  Do Not Resuscitate Status: No  Medical Power of : No    Decision-Making Capacity: Patient answered questions       Living Arrangements: Lives alone    Psychosocial/Cultural:  , two adult children, three grandchildren, one great grandchild and one on the way. Worked as a  and at [employer].    Spiritual:     F- Christi and Belief:  Raised Mormonism and identifies as a non Hoahaoism Denominational     I - Importance:   .  C - Community:     A - Address in Care: amenable to  visits and would receive  anointing of the sick.       > 50% of 35  min visit spent in chart review, face to face discussion of goals of care,  symptom assessment, coordination of care and emotional support.    Cherise Montelongo, CNS  Palliative Medicine  [facility]

## 2020-01-09 ENCOUNTER — HOSPITAL ENCOUNTER (INPATIENT)
Facility: HOSPITAL | Age: 70
LOS: 6 days | Discharge: HOSPICE | DRG: 271 | End: 2020-01-15
Attending: EMERGENCY MEDICINE | Admitting: INTERNAL MEDICINE
Payer: MEDICARE

## 2020-01-09 DIAGNOSIS — R09.89 WEAK PULSE: ICD-10-CM

## 2020-01-09 DIAGNOSIS — J42 CHRONIC BRONCHITIS, UNSPECIFIED CHRONIC BRONCHITIS TYPE: ICD-10-CM

## 2020-01-09 DIAGNOSIS — R07.9 CHEST PAIN: ICD-10-CM

## 2020-01-09 DIAGNOSIS — Z51.5 PALLIATIVE CARE ENCOUNTER: ICD-10-CM

## 2020-01-09 DIAGNOSIS — I73.9 PVD (PERIPHERAL VASCULAR DISEASE): ICD-10-CM

## 2020-01-09 DIAGNOSIS — M79.604 ACUTE PAIN OF RIGHT LOWER EXTREMITY: ICD-10-CM

## 2020-01-09 DIAGNOSIS — Z71.89 GOALS OF CARE, COUNSELING/DISCUSSION: ICD-10-CM

## 2020-01-09 DIAGNOSIS — I99.8 ISCHEMIC FOOT: Primary | ICD-10-CM

## 2020-01-09 DIAGNOSIS — M79.671 FOOT PAIN, RIGHT: ICD-10-CM

## 2020-01-09 DIAGNOSIS — R52 PAIN: ICD-10-CM

## 2020-01-09 DIAGNOSIS — Z66 DNR (DO NOT RESUSCITATE): ICD-10-CM

## 2020-01-09 DIAGNOSIS — R09.02 HYPOXIA: ICD-10-CM

## 2020-01-09 DIAGNOSIS — R00.0 TACHYCARDIA: ICD-10-CM

## 2020-01-09 DIAGNOSIS — M79.604 RIGHT LEG PAIN: ICD-10-CM

## 2020-01-09 PROBLEM — E03.9 HYPOTHYROIDISM: Status: ACTIVE | Noted: 2020-01-09

## 2020-01-09 PROBLEM — J44.9 COPD (CHRONIC OBSTRUCTIVE PULMONARY DISEASE): Status: ACTIVE | Noted: 2019-12-26

## 2020-01-09 LAB
ALBUMIN SERPL BCP-MCNC: 2.4 G/DL (ref 3.5–5.2)
ALP SERPL-CCNC: 96 U/L (ref 55–135)
ALT SERPL W/O P-5'-P-CCNC: 30 U/L (ref 10–44)
ANION GAP SERPL CALC-SCNC: 8 MMOL/L (ref 8–16)
APTT BLDCRRT: 21.4 SEC (ref 21–32)
AST SERPL-CCNC: 43 U/L (ref 10–40)
BASOPHILS # BLD AUTO: 0.02 K/UL (ref 0–0.2)
BASOPHILS NFR BLD: 0.1 % (ref 0–1.9)
BILIRUB SERPL-MCNC: 0.6 MG/DL (ref 0.1–1)
BUN SERPL-MCNC: 12 MG/DL (ref 8–23)
CALCIUM SERPL-MCNC: 8.6 MG/DL (ref 8.7–10.5)
CHLORIDE SERPL-SCNC: 98 MMOL/L (ref 95–110)
CO2 SERPL-SCNC: 28 MMOL/L (ref 23–29)
CREAT SERPL-MCNC: 0.8 MG/DL (ref 0.5–1.4)
CRP SERPL-MCNC: 98.9 MG/L (ref 0–8.2)
DIFFERENTIAL METHOD: ABNORMAL
EOSINOPHIL # BLD AUTO: 0.2 K/UL (ref 0–0.5)
EOSINOPHIL NFR BLD: 1.1 % (ref 0–8)
ERYTHROCYTE [DISTWIDTH] IN BLOOD BY AUTOMATED COUNT: 18.9 % (ref 11.5–14.5)
ERYTHROCYTE [SEDIMENTATION RATE] IN BLOOD BY WESTERGREN METHOD: 32 MM/HR (ref 0–36)
EST. GFR  (AFRICAN AMERICAN): >60 ML/MIN/1.73 M^2
EST. GFR  (NON AFRICAN AMERICAN): >60 ML/MIN/1.73 M^2
GLUCOSE SERPL-MCNC: 86 MG/DL (ref 70–110)
HCT VFR BLD AUTO: 31.6 % (ref 37–48.5)
HGB BLD-MCNC: 9.8 G/DL (ref 12–16)
IMM GRANULOCYTES # BLD AUTO: 0.17 K/UL (ref 0–0.04)
IMM GRANULOCYTES NFR BLD AUTO: 0.9 % (ref 0–0.5)
INR PPP: 1 (ref 0.8–1.2)
LACTATE SERPL-SCNC: 1.4 MMOL/L (ref 0.5–2.2)
LYMPHOCYTES # BLD AUTO: 2.2 K/UL (ref 1–4.8)
LYMPHOCYTES NFR BLD: 11.5 % (ref 18–48)
MCH RBC QN AUTO: 27 PG (ref 27–31)
MCHC RBC AUTO-ENTMCNC: 31 G/DL (ref 32–36)
MCV RBC AUTO: 87 FL (ref 82–98)
MONOCYTES # BLD AUTO: 1.2 K/UL (ref 0.3–1)
MONOCYTES NFR BLD: 6.5 % (ref 4–15)
NEUTROPHILS # BLD AUTO: 15.1 K/UL (ref 1.8–7.7)
NEUTROPHILS NFR BLD: 79.9 % (ref 38–73)
NRBC BLD-RTO: 0 /100 WBC
PLATELET # BLD AUTO: 170 K/UL (ref 150–350)
PMV BLD AUTO: 10.6 FL (ref 9.2–12.9)
POTASSIUM SERPL-SCNC: 4.7 MMOL/L (ref 3.5–5.1)
PROCALCITONIN SERPL IA-MCNC: 0.31 NG/ML
PROT SERPL-MCNC: 5.7 G/DL (ref 6–8.4)
PROTHROMBIN TIME: 10.2 SEC (ref 9–12.5)
RBC # BLD AUTO: 3.63 M/UL (ref 4–5.4)
SODIUM SERPL-SCNC: 134 MMOL/L (ref 136–145)
WBC # BLD AUTO: 18.89 K/UL (ref 3.9–12.7)

## 2020-01-09 PROCEDURE — 84145 PROCALCITONIN (PCT): CPT

## 2020-01-09 PROCEDURE — 80053 COMPREHEN METABOLIC PANEL: CPT

## 2020-01-09 PROCEDURE — 85652 RBC SED RATE AUTOMATED: CPT

## 2020-01-09 PROCEDURE — 99223 1ST HOSP IP/OBS HIGH 75: CPT | Mod: GC,,, | Performed by: SURGERY

## 2020-01-09 PROCEDURE — 27000221 HC OXYGEN, UP TO 24 HOURS

## 2020-01-09 PROCEDURE — 12000002 HC ACUTE/MED SURGE SEMI-PRIVATE ROOM

## 2020-01-09 PROCEDURE — 63600175 PHARM REV CODE 636 W HCPCS: Performed by: EMERGENCY MEDICINE

## 2020-01-09 PROCEDURE — 96374 THER/PROPH/DIAG INJ IV PUSH: CPT

## 2020-01-09 PROCEDURE — 94761 N-INVAS EAR/PLS OXIMETRY MLT: CPT

## 2020-01-09 PROCEDURE — 25500020 PHARM REV CODE 255: Performed by: INTERNAL MEDICINE

## 2020-01-09 PROCEDURE — 96375 TX/PRO/DX INJ NEW DRUG ADDON: CPT

## 2020-01-09 PROCEDURE — 25000242 PHARM REV CODE 250 ALT 637 W/ HCPCS: Performed by: STUDENT IN AN ORGANIZED HEALTH CARE EDUCATION/TRAINING PROGRAM

## 2020-01-09 PROCEDURE — 83605 ASSAY OF LACTIC ACID: CPT

## 2020-01-09 PROCEDURE — 63600175 PHARM REV CODE 636 W HCPCS: Performed by: PHYSICIAN ASSISTANT

## 2020-01-09 PROCEDURE — 99291 CRITICAL CARE FIRST HOUR: CPT | Mod: 25

## 2020-01-09 PROCEDURE — 63600175 PHARM REV CODE 636 W HCPCS: Performed by: HOSPITALIST

## 2020-01-09 PROCEDURE — 99291 PR CRITICAL CARE, E/M 30-74 MINUTES: ICD-10-PCS | Mod: ,,, | Performed by: EMERGENCY MEDICINE

## 2020-01-09 PROCEDURE — 99223 PR INITIAL HOSPITAL CARE,LEVL III: ICD-10-PCS | Mod: ,,, | Performed by: HOSPITALIST

## 2020-01-09 PROCEDURE — 94640 AIRWAY INHALATION TREATMENT: CPT

## 2020-01-09 PROCEDURE — 86140 C-REACTIVE PROTEIN: CPT

## 2020-01-09 PROCEDURE — 85610 PROTHROMBIN TIME: CPT

## 2020-01-09 PROCEDURE — 99291 CRITICAL CARE FIRST HOUR: CPT | Mod: ,,, | Performed by: EMERGENCY MEDICINE

## 2020-01-09 PROCEDURE — 85730 THROMBOPLASTIN TIME PARTIAL: CPT

## 2020-01-09 PROCEDURE — 99223 1ST HOSP IP/OBS HIGH 75: CPT | Mod: ,,, | Performed by: HOSPITALIST

## 2020-01-09 PROCEDURE — 85025 COMPLETE CBC W/AUTO DIFF WBC: CPT

## 2020-01-09 PROCEDURE — 99223 PR INITIAL HOSPITAL CARE,LEVL III: ICD-10-PCS | Mod: GC,,, | Performed by: SURGERY

## 2020-01-09 RX ORDER — HEPARIN SODIUM,PORCINE/D5W 25000/250
18 INTRAVENOUS SOLUTION INTRAVENOUS CONTINUOUS
Status: DISCONTINUED | OUTPATIENT
Start: 2020-01-09 | End: 2020-01-11

## 2020-01-09 RX ORDER — IBUPROFEN 200 MG
16 TABLET ORAL
Status: DISCONTINUED | OUTPATIENT
Start: 2020-01-09 | End: 2020-01-15 | Stop reason: HOSPADM

## 2020-01-09 RX ORDER — MORPHINE SULFATE 4 MG/ML
8 INJECTION, SOLUTION INTRAMUSCULAR; INTRAVENOUS EVERY 4 HOURS PRN
Status: DISCONTINUED | OUTPATIENT
Start: 2020-01-09 | End: 2020-01-10

## 2020-01-09 RX ORDER — FLUTICASONE FUROATE AND VILANTEROL 200; 25 UG/1; UG/1
1 POWDER RESPIRATORY (INHALATION) DAILY
Status: DISCONTINUED | OUTPATIENT
Start: 2020-01-10 | End: 2020-01-15 | Stop reason: HOSPADM

## 2020-01-09 RX ORDER — BENZONATATE 100 MG/1
100 CAPSULE ORAL 3 TIMES DAILY PRN
Status: DISCONTINUED | OUTPATIENT
Start: 2020-01-09 | End: 2020-01-15 | Stop reason: HOSPADM

## 2020-01-09 RX ORDER — ATORVASTATIN CALCIUM 20 MG/1
40 TABLET, FILM COATED ORAL DAILY
Status: DISCONTINUED | OUTPATIENT
Start: 2020-01-10 | End: 2020-01-15 | Stop reason: HOSPADM

## 2020-01-09 RX ORDER — ACETAMINOPHEN 500 MG
1000 TABLET ORAL EVERY 6 HOURS PRN
Status: DISCONTINUED | OUTPATIENT
Start: 2020-01-09 | End: 2020-01-11

## 2020-01-09 RX ORDER — HYDROCODONE BITARTRATE AND ACETAMINOPHEN 7.5; 325 MG/1; MG/1
1 TABLET ORAL EVERY 6 HOURS PRN
Status: DISCONTINUED | OUTPATIENT
Start: 2020-01-09 | End: 2020-01-09

## 2020-01-09 RX ORDER — SODIUM CHLORIDE 0.9 % (FLUSH) 0.9 %
10 SYRINGE (ML) INJECTION
Status: DISCONTINUED | OUTPATIENT
Start: 2020-01-09 | End: 2020-01-11

## 2020-01-09 RX ORDER — IPRATROPIUM BROMIDE AND ALBUTEROL SULFATE 2.5; .5 MG/3ML; MG/3ML
3 SOLUTION RESPIRATORY (INHALATION) EVERY 4 HOURS PRN
Status: DISCONTINUED | OUTPATIENT
Start: 2020-01-09 | End: 2020-01-15 | Stop reason: HOSPADM

## 2020-01-09 RX ORDER — CLOPIDOGREL BISULFATE 75 MG/1
75 TABLET ORAL DAILY
Status: DISCONTINUED | OUTPATIENT
Start: 2020-01-10 | End: 2020-01-15 | Stop reason: HOSPADM

## 2020-01-09 RX ORDER — FERROUS SULFATE, DRIED 160(50) MG
1 TABLET, EXTENDED RELEASE ORAL DAILY
Status: DISCONTINUED | OUTPATIENT
Start: 2020-01-10 | End: 2020-01-15 | Stop reason: HOSPADM

## 2020-01-09 RX ORDER — LOSARTAN POTASSIUM 25 MG/1
25 TABLET ORAL DAILY
Status: DISCONTINUED | OUTPATIENT
Start: 2020-01-10 | End: 2020-01-14

## 2020-01-09 RX ORDER — ASPIRIN 81 MG/1
81 TABLET ORAL DAILY
Status: DISCONTINUED | OUTPATIENT
Start: 2020-01-10 | End: 2020-01-11

## 2020-01-09 RX ORDER — IPRATROPIUM BROMIDE AND ALBUTEROL SULFATE 2.5; .5 MG/3ML; MG/3ML
3 SOLUTION RESPIRATORY (INHALATION) EVERY 6 HOURS PRN
Status: DISCONTINUED | OUTPATIENT
Start: 2020-01-09 | End: 2020-01-09

## 2020-01-09 RX ORDER — LEVOTHYROXINE SODIUM 100 UG/1
100 TABLET ORAL
Status: DISCONTINUED | OUTPATIENT
Start: 2020-01-10 | End: 2020-01-15 | Stop reason: HOSPADM

## 2020-01-09 RX ORDER — IPRATROPIUM BROMIDE AND ALBUTEROL SULFATE 2.5; .5 MG/3ML; MG/3ML
3 SOLUTION RESPIRATORY (INHALATION) ONCE
Status: COMPLETED | OUTPATIENT
Start: 2020-01-09 | End: 2020-01-09

## 2020-01-09 RX ORDER — ONDANSETRON 2 MG/ML
4 INJECTION INTRAMUSCULAR; INTRAVENOUS
Status: COMPLETED | OUTPATIENT
Start: 2020-01-09 | End: 2020-01-09

## 2020-01-09 RX ORDER — MORPHINE SULFATE 4 MG/ML
4 INJECTION, SOLUTION INTRAMUSCULAR; INTRAVENOUS EVERY 4 HOURS PRN
Status: DISCONTINUED | OUTPATIENT
Start: 2020-01-09 | End: 2020-01-10

## 2020-01-09 RX ORDER — IBUPROFEN 200 MG
24 TABLET ORAL
Status: DISCONTINUED | OUTPATIENT
Start: 2020-01-09 | End: 2020-01-15 | Stop reason: HOSPADM

## 2020-01-09 RX ORDER — GLUCAGON 1 MG
1 KIT INJECTION
Status: DISCONTINUED | OUTPATIENT
Start: 2020-01-09 | End: 2020-01-15 | Stop reason: HOSPADM

## 2020-01-09 RX ORDER — ACETAMINOPHEN 500 MG
1000 TABLET ORAL EVERY 6 HOURS PRN
Status: DISCONTINUED | OUTPATIENT
Start: 2020-01-09 | End: 2020-01-09

## 2020-01-09 RX ORDER — MONTELUKAST SODIUM 10 MG/1
10 TABLET ORAL DAILY
Status: DISCONTINUED | OUTPATIENT
Start: 2020-01-10 | End: 2020-01-15 | Stop reason: HOSPADM

## 2020-01-09 RX ORDER — MORPHINE SULFATE 4 MG/ML
4 INJECTION, SOLUTION INTRAMUSCULAR; INTRAVENOUS
Status: COMPLETED | OUTPATIENT
Start: 2020-01-09 | End: 2020-01-09

## 2020-01-09 RX ORDER — AMLODIPINE BESYLATE 5 MG/1
5 TABLET ORAL DAILY
Status: DISCONTINUED | OUTPATIENT
Start: 2020-01-10 | End: 2020-01-14

## 2020-01-09 RX ADMIN — MORPHINE SULFATE 4 MG: 4 INJECTION, SOLUTION INTRAMUSCULAR; INTRAVENOUS at 10:01

## 2020-01-09 RX ADMIN — IPRATROPIUM BROMIDE AND ALBUTEROL SULFATE 3 ML: .5; 3 SOLUTION RESPIRATORY (INHALATION) at 09:01

## 2020-01-09 RX ADMIN — HEPARIN SODIUM 18 UNITS/KG/HR: 10000 INJECTION, SOLUTION INTRAVENOUS at 08:01

## 2020-01-09 RX ADMIN — MORPHINE SULFATE 4 MG: 4 INJECTION, SOLUTION INTRAMUSCULAR; INTRAVENOUS at 05:01

## 2020-01-09 RX ADMIN — ONDANSETRON 4 MG: 2 INJECTION INTRAMUSCULAR; INTRAVENOUS at 05:01

## 2020-01-09 RX ADMIN — IOHEXOL 125 ML: 350 INJECTION, SOLUTION INTRAVENOUS at 09:01

## 2020-01-09 NOTE — ED PROVIDER NOTES
Encounter Date: 1/9/2020       History     Chief Complaint   Patient presents with    Foot Pain     right foot pain and swelling     Patient 69-year-old female with past medical history of COPD, HLD, HTN, PUD, polyarthralgia, PVD with peripheral arterial stent to right leg who presents with sudden acute onset of pain to her right foot 2 days ago while hospitalized for pneumonia.  Pain is constant, severe, and a persistent ache.  Patient states she let the nurse know about the pain and was discharged yesterday from this hospital.  Patient has been able to ambulate at home due to the severe pain. Her foot was noted to be purple and blue at the toes most noticeably at toes 4 and 5.  Her foot was noted to be dusky today as well so patient presented to the ER.  Patient reports she is a former smoker and quit several weeks ago when she was admitted for pneumonia.  She was recently discharged on home oxygen.  She is denying any chest pain, shortness of breath. She states she has never had pain like this previously.  She denies any injury to her foot.    Patient states she had the peripheral arterial stent to her right leg possibly bilateral legs, done at  many years ago        Review of patient's allergies indicates:  No Known Allergies  Past Medical History:   Diagnosis Date    Allergic rhinitis     COPD (chronic obstructive pulmonary disease)     Depression     GERD (gastroesophageal reflux disease)     Headache     Hyperlipidemia     Hypertension     Pneumonia     Polyarthralgia     Postablative hypothyroidism     hypothyroidism s/p OCASIO therapy    PVD (peripheral vascular disease)     Treated by Dr. Sim     Past Surgical History:   Procedure Laterality Date    CHOLECYSTECTOMY  1986    GALLBLADDER SURGERY  2006    HIP SURGERY  2005    Right hip fracture/surgery    PERIPHERAL ARTERIAL STENT GRAFT Right 2007    stent for femoral artery blockage     Family History   Problem Relation Age of Onset     COPD Mother     Thyroid disease Mother         thyroidectomy    Cancer Mother         lung    Heart attack Father 55        [facility] -     Heart attack Sister 53        [facility]     Social History     Tobacco Use    Smoking status: Current Every Day Smoker     Types: Cigarettes    Smokeless tobacco: Never Used    Tobacco comment: None since Dec 26   Substance Use Topics    Alcohol use: No     Alcohol/week: 0.0 standard drinks    Drug use: Never     Review of Systems   Constitutional: Negative for chills and fever.   HENT: Negative for sore throat.    Eyes: Negative for visual disturbance.   Respiratory: Positive for cough.    Cardiovascular: Negative for chest pain, palpitations and leg swelling.   Gastrointestinal: Negative for abdominal pain.   Endocrine: Negative for polyuria.   Genitourinary: Negative for dysuria.   Musculoskeletal: Negative for back pain.   Skin: Positive for color change.   Allergic/Immunologic: Negative for immunocompromised state.   Neurological: Positive for numbness. Negative for headaches.   Hematological: Does not bruise/bleed easily.   Psychiatric/Behavioral: Negative for confusion.       Physical Exam     Initial Vitals [20 1621]   BP Pulse Resp Temp SpO2   138/70 74 18 98.7 °F (37.1 °C) (!) 94 %      MAP       --         Physical Exam    Nursing note and vitals reviewed.  Constitutional: She appears well-developed and well-nourished. She is not diaphoretic. No distress.   HENT:   Head: Normocephalic and atraumatic.   Eyes: Conjunctivae and EOM are normal.   Neck: Normal range of motion. Neck supple.   Cardiovascular: Normal rate and regular rhythm.   No palpable or dopplerable DP or PT pulses in Right foot   Pulmonary/Chest: No respiratory distress.   Nasal cannula O2 in place   Abdominal: Soft. She exhibits no distension. There is no tenderness. There is no rebound.   Musculoskeletal: Normal range of motion. She exhibits tenderness (to right foot and lower leg). She exhibits  no edema.   Right foot appears dusky and cool, toes 4-5 very cyanotic appearing   Neurological: She is alert and oriented to person, place, and time.   Skin: Skin is warm and dry. No rash noted. No erythema.   Psychiatric: She has a normal mood and affect. Her behavior is normal. Judgment and thought content normal.         ED Course   Procedures  Labs Reviewed   CBC W/ AUTO DIFFERENTIAL - Abnormal; Notable for the following components:       Result Value    WBC 18.89 (*)     RBC 3.63 (*)     Hemoglobin 9.8 (*)     Hematocrit 31.6 (*)     Mean Corpuscular Hemoglobin Conc 31.0 (*)     RDW 18.9 (*)     Immature Granulocytes 0.9 (*)     Gran # (ANC) 15.1 (*)     Immature Grans (Abs) 0.17 (*)     Mono # 1.2 (*)     Gran% 79.9 (*)     Lymph% 11.5 (*)     All other components within normal limits   COMPREHENSIVE METABOLIC PANEL - Abnormal; Notable for the following components:    Sodium 134 (*)     Calcium 8.6 (*)     Total Protein 5.7 (*)     Albumin 2.4 (*)     AST 43 (*)     All other components within normal limits   PROCALCITONIN - Abnormal; Notable for the following components:    Procalcitonin 0.31 (*)     All other components within normal limits    Narrative:     ADD ON ESR [id number] PER SARAAVNAN VALERIO MD  19:50  01/09/2020   ADD-ON PCAL #[id number] PER SARAVANAN VALERIO MD 19:51  01/09/2020   ADD-ON CRP #[id number] PER SARAVANAN VALERIO MD 20:18  01/09/2020    C-REACTIVE PROTEIN - Abnormal; Notable for the following components:    CRP 98.9 (*)     All other components within normal limits    Narrative:     ADD ON ESR [id number] PER SARAVANAN VALERIO MD  19:50  01/09/2020   ADD-ON PCAL #[id number] PER SARAVANAN VALERIO MD 19:51  01/09/2020   ADD-ON CRP #[id number] PER SARAVANAN VALERIO MD 20:18  01/09/2020    APTT   PROTIME-INR   APTT   PROTIME-INR   SEDIMENTATION RATE   C-REACTIVE PROTEIN   PROCALCITONIN   LACTIC ACID, PLASMA   SEDIMENTATION RATE    Narrative:     ADD ON ESR [id number] PER SARAVANAN VALERIO MD  19:50   01/09/2020   ADD-ON [facility] #[id number] PER SARAVANAN VALERIO MD 19:51  01/09/2020         ECG Results    None       Imaging Results           CTA Runoff ABD PEL Bilat Lower Ext (Final result)  Result time 01/09/20 22:55:58   Procedure changed from CTA Abdomen and Pelvis     Final result by Gagandeep Sellers MD (01/09/20 22:55:58)                 Impression:      Extensive atherosclerosis of the abdominal aorta and its branches.  Near complete thrombosis of the proximal right common iliac artery distal to presumed aortoiliac stent graft with restoration of flow in the distal common iliac and femoral arteries.  At least partial occlusion of the right deep femoral artery and occlusion of the right popliteal artery with diminished 2 vessel runoff to the right lower extremity.  Vascular surgery evaluation is recommended.    Severe narrowing of the left common iliac artery with patent but diminutive three-vessel runoff to the left lower extremity.    Large right middle lobe pulmonary mass with extensive metastatic disease within the abdomen and soft tissues including the left axilla, liver, spleen, left adrenal gland, back and lower extremity musculature, and numerous peritoneal implants.  Suggest correlation with percutaneous sampling for tissue diagnosis.    Severe stenosis and intermittent occlusion of the right renal artery with atrophic right kidney and multiple areas of decreased enhancement suggestive of renal infarcts.  Suggest attention on follow-up to exclude renal masses.    Severe narrowing versus occlusion of the CARLOS.    Additional findings as above.    This report was flagged in Epic as abnormal.    COMMUNICATION  This critical result was discovered/received at 22:00 on 01/09/2020.  The critical information above was relayed directly by Dr. Drew to Dr. Sagastume with [facility] medicine at 22:20 on 01/09/2020.    Electronically signed by resident: Zan Drew MD  Date:    01/09/2020  Time:    21:22    Electronically  signed by: Gagandeep Sellers MD  Date:    01/09/2020  Time:    22:55             Narrative:    EXAMINATION:  CTA RUNOFF ABD PEL BILAT LOWER EXT    CLINICAL HISTORY:  AV replace, percutaneous planning, aorta/iliofemoral;    TECHNIQUE:  CT angiogram of the abdomen/pelvis with lower extremity runoff using 125 mL of  Omnipaque 350 IV contrast.  Oral contrast was not administered.  Multiplanar reconstructions performed including MIP reformats.  Delayed phase images also obtained through the bilateral lower extremities.    COMPARISON:  CTA chest 01/02/2020.  Bilateral lower extremity arterial ultrasound 01/09/2020.    FINDINGS:  Lower chest:    Redemonstration of large right middle lobe mass extending to the right hilum.  Stable filling defect within the superior right pulmonary vein extending to its ostium suggestive of venous invasion.  1.1 cm pulmonary nodule in the left lower lobe.  Small right pleural effusion, slightly increased since recent exam dated 01/02/2020.  Left axillary 2.5 cm soft tissue mass (series 3, image 1).    Abdomen:    The liver demonstrate multiple hypoattenuating masses with subtle peripheral enhancement, with the largest measuring 4 cm in the right hepatic lobe (series 3, image 87).  7.9 cm left adrenal heterogenous mass (series 3, image 73, closely approximated to the adjacent spleen, superior pole of the left kidney, and pancreatic tail.  4.7 cm hypodense peripherally enhancing splenic lesion.  Numerous hypodense peripherally enhancing soft tissue masses of variable sizes are noted within the bilateral lower extremity musculature, abdominal wall muscles, bilateral psoas muscles and gluteal muscles.  The largest mass is in the right gluteal region and measures approximately 5.3 cm in size (series 3, image 161).  Abnormal soft tissue masses are present in both lower extremities, left side greater than right.  Soft tissue lesion at the anterolateral aspect of the right lower thigh is in close  approximation to the femoral shaft which makes osseous invasion difficult to entirely exclude.  This mass measures up to 4 cm in maximum axial dimension (series 3, image 295).  A similar lesion is closely approximated to the left femoral shaft (series 3, image 284).  Multiple peritoneal deposits with the largest measuring 3.1 cm inferior to the 1st part of the duodenum (series 3, image 96).  These lesions are concerning for diffuse metastatic disease.    The gallbladder is surgically absent.  The common bile duct is dilated measuring up to 1.4 cm.  The pancreas appear unremarkable with no evidence of pancreatic ductal dilatation.  The right adrenal gland is unremarkable.    The right kidney is atrophic and demonstrate heterogenous enhancement with areas of significant hypoattenuation and no surrounding inflammatory change.  Findings are likely related to right renal infarcts.  Suggest attention on follow-up to exclude renal masses.    The left kidney is slightly small in size with homogeneous enhancement.  There is a subcentimeter hypodensity which may represent a cyst.  No hydronephrosis or hydroureter.  No nephrolithiasis.  Bladder is well distended and appear unremarkable.    Multiple calcified fibroids.    No evidence of bowel obstruction or inflammation.  No free intraperitoneal air or free fluid.    Abdominopelvic Vasculature:    Extensive calcific and noncalcific atherosclerotic plaque of the abdominal aorta and its branches.  No aortic aneurysm.  Significant calcific atherosclerotic plaque at the distal abdominal aorta at the bifurcation with linear intraluminal foci which could reflect graft material.  The proximal right common iliac artery is not opacified which could be due to thrombosis.  There is opacification of the remaining right common iliac artery however to severely narrowed.  Severe narrowing of the left common iliac artery which remains patent along its course.    The celiac artery and SMA are  patent.  Non opacification of the CARLOS which could be due to severe narrowing versus occlusion.  The left renal arteries patent with significant calcified plaque at its origin.  Severe stenosis and intermittent occlusion of the right renal artery.    Right lower extremity:    The right common femoral artery, superficial and deep femoral arteries are small in caliber.  The deep femoral artery appears to be at least partially occluded.  The proximal popliteal artery is patent with loss of contrast opacification along its course distally.  Diminished 2 vessel runoff to the right lower extremity with intermittent stenosis.  Right peroneal artery may be occluded.    Left lower extremity:    The left common femoral artery, superficial and deep femoral arteries are patent with small caliber.  The left popliteal, anterior and posterior tibial arteries are patent.  Left peroneal artery is patent but diminutive.    Bones: Bones are diffusely demineralized.  No displaced fracture.  Right femoral hardware.  Abnormal appearance of the iliac bones which could be due to diffuse demineralization.                               US Lower Extremity Veins Right (Final result)  Result time 01/09/20 19:58:46    Final result by Jens Chaudhari MD (01/09/20 19:58:46)                 Impression:      No evidence of deep venous thrombosis in the right lower extremity.    Electronically signed by resident: Zan Drew MD  Date:    01/09/2020  Time:    19:45    Electronically signed by: Jens Chaudhari  Date:    01/09/2020  Time:    19:58             Narrative:    EXAMINATION:  US LOWER EXTREMITY VEINS RIGHT    CLINICAL HISTORY:  Pain in right leg    TECHNIQUE:  Duplex and color flow Doppler evaluation and graded compression of the right lower extremity veins was performed.    COMPARISON:  None    FINDINGS:  Right thigh veins: The common femoral, femoral, popliteal, upper greater saphenous, and deep femoral veins are patent and free of thrombus.  The veins are normally compressible and have normal phasic flow and augmentation response.    Right calf veins: The visualized calf veins are patent.    Contralateral CFV: The contralateral (left) common femoral vein is patent and free of thrombus.    Miscellaneous: None                                US Lower Extrem Arteries Bilat with ADELA (xpd) (Final result)  Result time 01/09/20 20:02:32    Final result by Jens Chaudhari MD (01/09/20 20:02:32)                 Impression:      Diffuse slow flow and monophasic waveforms throughout the right lower extremity arteries suggestive of proximal inflow stenosis.    The right peroneal artery was not visualized.    Bilateral ankle brachial indices are within normal limits.    Left Baker's cyst.    This report was flagged in Epic as abnormal.    Electronically signed by resident: Zan Drew MD  Date:    01/09/2020  Time:    19:35    Electronically signed by: Jens Chaudhari  Date:    01/09/2020  Time:    20:02             Narrative:    EXAMINATION:  US ARTERIAL LOWER EXTREMITY BILAT WITH ADELA (XPD)    CLINICAL HISTORY:  right leg pain, cool to touch, slight discoloration. non palpable pulses;    TECHNIQUE:  Ultrasound arterial lower extremity bilateral with ADELA.    COMPARISON:  None    FINDINGS:  Right lower extremity.    CFA : 68 cm/sec, monophasic    Deep femoral artery: 24 centimeter/seconds, monophasic.    Prox SFA: 48 cm/sec, monophasic    Mid SFA : 52 cm/sec, monophasic    Dist SFA : 43 cm/sec, monophasic    Proximal pop A: : 19 cm/sec, monophasic    Distal popliteal artery: 29 centimeter/second, monophasic.    EZ: 8 cm/sec, monophasic    The peroneal artery was not visualized.    PTA: 9 cm/sec, monophasic    Diffuse monophasic waveforms in the right lower extremity indicating slow flow and proximal inflow stenosis.    Right ADELA: 1.1    Left lower extremity    CFA: 137 cm/sec, triphasic    Deep femoral artery: 118 centimeter/second, triphasic.    Prox SFA: 125 cm/sec,  triphasic    Mid SFA: 127 cm/sec, triphasic    Dist SFA: 86 cm/sec, triphasic    Proximal pop A: 78 cm/sec, triphasic    Distal popliteal artery: 83 centimeter/seconds, triphasic.    EZ: 84 cm/sec, monophasic    Peroneal A: 67 cm/sec, monophasic    PTA: 103 cm/sec, monophasic    Left ADELA: 1.0    Left Baker's cyst measuring 3.6 x 1 x 2.4 cm.                                 Medical Decision Making:   History:   Old Medical Records: I decided to obtain old medical records.  Initial Assessment:   The patient was cold painful right foot that started 2 days ago and has persisted  Patient reports now not able ambulate due to significant pain  Has a history of peripheral arterial disease with a stent her right femoral artery  Was off of her Plavix for liver biopsy while hospitalized, discharged from this hospital yesterday  Differential Diagnosis:   Ischemia due to arterial clot, clotted stent, vascular spasm  Clinical Tests:   Lab Tests: Ordered and Reviewed  Radiological Study: Ordered and Reviewed  Medical Tests: Ordered and Reviewed  ED Management:  Discussed with vascular surgery emergently and they recommend heparin drip, ultrasound arterial Doppler lower extremities  Admission to Hospital Medicine  Other:   I have discussed this case with another health care provider.       <> Summary of the Discussion: Vascular surgery              Attending Attestation:         Attending Critical Care:   Critical Care Times:   ==============================================================  · Total Critical Care Time - exclusive of procedural time: 35 minutes.  ==============================================================  Critical care was time spent personally by me on the following activities: obtaining history from patient or relative, examination of patient, review of old charts, ordering lab, x-rays, and/or EKG, development of treatment plan with patient or relative, ordering and performing treatments and interventions,  evaluation of patient's response to treatment, re-evaluation of patient's conition and discussion with consultants.   Critical Care Condition: potentially life-threatening               ED Course as of Jan 11 1018   Thu Jan 09, 2020   1806 Discussed with vascular surgery he recommends starting patient on heparin drip, with PTT goal of 60-80.  He also recommends getting the ultrasound arterial Doppler of her right leg  At this time does not recommend CTA with runoff, waiting to talk with the staff who was in the ER  Would like patient be admitted back to the hospitalist service that she was discharged from yesterday and they will continue on as a consulting service    [GK]      ED Course User Index  [GK] Maddie Jin MD                Clinical Impression:       ICD-10-CM ICD-9-CM   1. Ischemic foot I99.8 459.9   2. Right leg pain M79.604 729.5   3. Weak pulse R09.89 785.9   4. Foot pain, right M79.671 729.5   5. Chest pain R07.9 786.50   6. Tachycardia R00.0 785.0   7. Hypoxia R09.02 799.02   8. Acute pain of right lower extremity M79.604 729.5                             Maddie Jin MD  01/11/20 1019

## 2020-01-09 NOTE — ED NOTES
RN and PA at bedside in attempt to obtain doppler pulses, marked DP and Pedal pulses LLE, reported stents in RLE Easily dopplerable RLE  popliteal.

## 2020-01-09 NOTE — ED TRIAGE NOTES
Patient states right foot cold and pain ful x 2 days, states discharged from hospital yesterday for SOB. Easily dopplerable LLE, RLE color pale, toes cool to touch, difficulty doppler RLE.

## 2020-01-09 NOTE — PROVIDER PROGRESS NOTES - EMERGENCY DEPT.
Encounter Date: 1/9/2020    ED Physician Progress Notes        Physician Note:   Patient is a 69-year-old female with a significant past medical history including PVD, COPD on supplemental O2, recent diagnosis of lung and liver cancer with recent biopsies during last ED visit presents to the ED for evaluation of right foot pain and leg pain. Patient reports that she was taken off of the Plavix and aspirin for the biopsy for the last 6 days.  She restarted the medications today.  On exam patient appears to have cool right foot.  Tenderness on palpation. Dusky appearing.  Unable to Doppler the right PT and DP.  Was able to obtain dopplerable pulses to the left lower extremity.    Initial lab work and imaging was placed.  Discussed case with vascular surgery who is currently at bedside evaluating patient.  Awaiting further recommendation.      I initially evaluated this patient and ordered workup while in intake.  The patient will receive a full evaluation in an ED pod when space is available.  All results from tests ordered in intake will not be followed by the intake team, including myself. All results will be followed by the ED Pod team.

## 2020-01-09 NOTE — ED NOTES
Last food at 0810, meds with water at 1520. Plavix and aspirin today, had been off meds x 6 days while in hospital, restarted today.

## 2020-01-09 NOTE — ED NOTES
Patient identifiers verified and correct for Ms Castañeda  C/C: Pain to RLE SEE NN  APPEARANCE: awake and alert in NAD.  SKIN: warm, dry and intact. No breakdown or bruising. Dressing in tact RUQ abdomen  MUSCULOSKELETAL: Patient moving all extremities spontaneously, no obvious swelling or deformities noted. Ambulates with max assist  RESPIRATORY: Denies shortness of breath.Respirations unlabored.  O2 at 3 LPm Nc  CARDIAC: Denies CP, ; no peripheral edema foot color pale, cool to touch,min cap refill, unable  to obtain pulses.   ABDOMEN: S/ND/NT, Denies nausea  : voids spontaneously, denies difficulty  Neurologic: AAO x 4; follows commands equal strength in all extremities; denies numbness/tingling. Denies dizziness Positive gen weakness

## 2020-01-10 PROBLEM — R10.9 ABDOMINAL PAIN: Status: ACTIVE | Noted: 2020-01-10

## 2020-01-10 LAB
ALBUMIN SERPL BCP-MCNC: 2.3 G/DL (ref 3.5–5.2)
ALP SERPL-CCNC: 124 U/L (ref 55–135)
ALT SERPL W/O P-5'-P-CCNC: 29 U/L (ref 10–44)
ANION GAP SERPL CALC-SCNC: 11 MMOL/L (ref 8–16)
APTT BLDCRRT: 63.2 SEC (ref 21–32)
APTT BLDCRRT: 78.7 SEC (ref 21–32)
APTT BLDCRRT: 83.4 SEC (ref 21–32)
AST SERPL-CCNC: 44 U/L (ref 10–40)
BASOPHILS # BLD AUTO: 0.04 K/UL (ref 0–0.2)
BASOPHILS NFR BLD: 0.2 % (ref 0–1.9)
BILIRUB SERPL-MCNC: 0.6 MG/DL (ref 0.1–1)
BUN SERPL-MCNC: 10 MG/DL (ref 8–23)
CALCIUM SERPL-MCNC: 8.6 MG/DL (ref 8.7–10.5)
CHLORIDE SERPL-SCNC: 97 MMOL/L (ref 95–110)
CO2 SERPL-SCNC: 27 MMOL/L (ref 23–29)
CREAT SERPL-MCNC: 0.8 MG/DL (ref 0.5–1.4)
DIFFERENTIAL METHOD: ABNORMAL
EOSINOPHIL # BLD AUTO: 0.3 K/UL (ref 0–0.5)
EOSINOPHIL NFR BLD: 1.6 % (ref 0–8)
ERYTHROCYTE [DISTWIDTH] IN BLOOD BY AUTOMATED COUNT: 19.4 % (ref 11.5–14.5)
EST. GFR  (AFRICAN AMERICAN): >60 ML/MIN/1.73 M^2
EST. GFR  (NON AFRICAN AMERICAN): >60 ML/MIN/1.73 M^2
GLUCOSE SERPL-MCNC: 60 MG/DL (ref 70–110)
HCT VFR BLD AUTO: 35 % (ref 37–48.5)
HGB BLD-MCNC: 10.7 G/DL (ref 12–16)
IMM GRANULOCYTES # BLD AUTO: 0.2 K/UL (ref 0–0.04)
IMM GRANULOCYTES NFR BLD AUTO: 1 % (ref 0–0.5)
INR PPP: 1 (ref 0.8–1.2)
LYMPHOCYTES # BLD AUTO: 2.1 K/UL (ref 1–4.8)
LYMPHOCYTES NFR BLD: 10.4 % (ref 18–48)
MAGNESIUM SERPL-MCNC: 2.1 MG/DL (ref 1.6–2.6)
MCH RBC QN AUTO: 26.9 PG (ref 27–31)
MCHC RBC AUTO-ENTMCNC: 30.6 G/DL (ref 32–36)
MCV RBC AUTO: 88 FL (ref 82–98)
MONOCYTES # BLD AUTO: 1.3 K/UL (ref 0.3–1)
MONOCYTES NFR BLD: 6.8 % (ref 4–15)
NEUTROPHILS # BLD AUTO: 15.8 K/UL (ref 1.8–7.7)
NEUTROPHILS NFR BLD: 80 % (ref 38–73)
NRBC BLD-RTO: 0 /100 WBC
PHOSPHATE SERPL-MCNC: 3.1 MG/DL (ref 2.7–4.5)
PLATELET # BLD AUTO: 202 K/UL (ref 150–350)
PMV BLD AUTO: 11.5 FL (ref 9.2–12.9)
POTASSIUM SERPL-SCNC: 4.4 MMOL/L (ref 3.5–5.1)
PROT SERPL-MCNC: 5.6 G/DL (ref 6–8.4)
PROTHROMBIN TIME: 10.4 SEC (ref 9–12.5)
RBC # BLD AUTO: 3.98 M/UL (ref 4–5.4)
SODIUM SERPL-SCNC: 135 MMOL/L (ref 136–145)
WBC # BLD AUTO: 19.72 K/UL (ref 3.9–12.7)

## 2020-01-10 PROCEDURE — 84100 ASSAY OF PHOSPHORUS: CPT

## 2020-01-10 PROCEDURE — 63600175 PHARM REV CODE 636 W HCPCS: Performed by: EMERGENCY MEDICINE

## 2020-01-10 PROCEDURE — 36415 COLL VENOUS BLD VENIPUNCTURE: CPT

## 2020-01-10 PROCEDURE — 85610 PROTHROMBIN TIME: CPT

## 2020-01-10 PROCEDURE — 93010 EKG 12-LEAD: ICD-10-PCS | Mod: ,,, | Performed by: INTERNAL MEDICINE

## 2020-01-10 PROCEDURE — 99233 PR SUBSEQUENT HOSPITAL CARE,LEVL III: ICD-10-PCS | Mod: GC,,, | Performed by: INTERNAL MEDICINE

## 2020-01-10 PROCEDURE — 27000221 HC OXYGEN, UP TO 24 HOURS

## 2020-01-10 PROCEDURE — 85730 THROMBOPLASTIN TIME PARTIAL: CPT

## 2020-01-10 PROCEDURE — 25000003 PHARM REV CODE 250: Performed by: STUDENT IN AN ORGANIZED HEALTH CARE EDUCATION/TRAINING PROGRAM

## 2020-01-10 PROCEDURE — 93005 ELECTROCARDIOGRAM TRACING: CPT

## 2020-01-10 PROCEDURE — 99233 SBSQ HOSP IP/OBS HIGH 50: CPT | Mod: GC,,, | Performed by: INTERNAL MEDICINE

## 2020-01-10 PROCEDURE — 93010 ELECTROCARDIOGRAM REPORT: CPT | Mod: ,,, | Performed by: INTERNAL MEDICINE

## 2020-01-10 PROCEDURE — 85025 COMPLETE CBC W/AUTO DIFF WBC: CPT

## 2020-01-10 PROCEDURE — 63600175 PHARM REV CODE 636 W HCPCS: Performed by: STUDENT IN AN ORGANIZED HEALTH CARE EDUCATION/TRAINING PROGRAM

## 2020-01-10 PROCEDURE — 94761 N-INVAS EAR/PLS OXIMETRY MLT: CPT

## 2020-01-10 PROCEDURE — 85730 THROMBOPLASTIN TIME PARTIAL: CPT | Mod: 91

## 2020-01-10 PROCEDURE — 83735 ASSAY OF MAGNESIUM: CPT

## 2020-01-10 PROCEDURE — 63600175 PHARM REV CODE 636 W HCPCS: Performed by: HOSPITALIST

## 2020-01-10 PROCEDURE — 25500020 PHARM REV CODE 255: Performed by: INTERNAL MEDICINE

## 2020-01-10 PROCEDURE — 94640 AIRWAY INHALATION TREATMENT: CPT

## 2020-01-10 PROCEDURE — 12000002 HC ACUTE/MED SURGE SEMI-PRIVATE ROOM

## 2020-01-10 PROCEDURE — 25000242 PHARM REV CODE 250 ALT 637 W/ HCPCS: Performed by: STUDENT IN AN ORGANIZED HEALTH CARE EDUCATION/TRAINING PROGRAM

## 2020-01-10 PROCEDURE — 80053 COMPREHEN METABOLIC PANEL: CPT

## 2020-01-10 RX ORDER — MORPHINE SULFATE 4 MG/ML
8 INJECTION, SOLUTION INTRAMUSCULAR; INTRAVENOUS EVERY 4 HOURS PRN
Status: DISCONTINUED | OUTPATIENT
Start: 2020-01-10 | End: 2020-01-10

## 2020-01-10 RX ORDER — HYDROMORPHONE HYDROCHLORIDE 1 MG/ML
1 INJECTION, SOLUTION INTRAMUSCULAR; INTRAVENOUS; SUBCUTANEOUS EVERY 4 HOURS PRN
Status: DISCONTINUED | OUTPATIENT
Start: 2020-01-10 | End: 2020-01-10

## 2020-01-10 RX ORDER — MORPHINE SULFATE 2 MG/ML
4 INJECTION, SOLUTION INTRAMUSCULAR; INTRAVENOUS EVERY 4 HOURS PRN
Status: DISCONTINUED | OUTPATIENT
Start: 2020-01-10 | End: 2020-01-11

## 2020-01-10 RX ORDER — HYDROMORPHONE HYDROCHLORIDE 1 MG/ML
0.5 INJECTION, SOLUTION INTRAMUSCULAR; INTRAVENOUS; SUBCUTANEOUS EVERY 4 HOURS PRN
Status: DISCONTINUED | OUTPATIENT
Start: 2020-01-10 | End: 2020-01-14

## 2020-01-10 RX ADMIN — MORPHINE SULFATE 4 MG: 2 INJECTION, SOLUTION INTRAMUSCULAR; INTRAVENOUS at 11:01

## 2020-01-10 RX ADMIN — MORPHINE SULFATE 4 MG: 2 INJECTION, SOLUTION INTRAMUSCULAR; INTRAVENOUS at 06:01

## 2020-01-10 RX ADMIN — ASPIRIN 81 MG: 81 TABLET, COATED ORAL at 10:01

## 2020-01-10 RX ADMIN — MORPHINE SULFATE 4 MG: 4 INJECTION, SOLUTION INTRAMUSCULAR; INTRAVENOUS at 08:01

## 2020-01-10 RX ADMIN — HEPARIN SODIUM 18 UNITS/KG/HR: 10000 INJECTION, SOLUTION INTRAVENOUS at 02:01

## 2020-01-10 RX ADMIN — ATORVASTATIN CALCIUM 40 MG: 20 TABLET, FILM COATED ORAL at 10:01

## 2020-01-10 RX ADMIN — HYDROMORPHONE HYDROCHLORIDE 0.5 MG: 1 INJECTION, SOLUTION INTRAMUSCULAR; INTRAVENOUS; SUBCUTANEOUS at 02:01

## 2020-01-10 RX ADMIN — OYSTER SHELL CALCIUM WITH VITAMIN D 1 TABLET: 500; 200 TABLET, FILM COATED ORAL at 10:01

## 2020-01-10 RX ADMIN — MORPHINE SULFATE 4 MG: 4 INJECTION, SOLUTION INTRAMUSCULAR; INTRAVENOUS at 03:01

## 2020-01-10 RX ADMIN — HYDROMORPHONE HYDROCHLORIDE 1 MG: 1 INJECTION, SOLUTION INTRAMUSCULAR; INTRAVENOUS; SUBCUTANEOUS at 10:01

## 2020-01-10 RX ADMIN — LOSARTAN POTASSIUM 25 MG: 25 TABLET ORAL at 10:01

## 2020-01-10 RX ADMIN — HYDROMORPHONE HYDROCHLORIDE 0.5 MG: 1 INJECTION, SOLUTION INTRAMUSCULAR; INTRAVENOUS; SUBCUTANEOUS at 08:01

## 2020-01-10 RX ADMIN — CLOPIDOGREL BISULFATE 75 MG: 75 TABLET ORAL at 10:01

## 2020-01-10 RX ADMIN — FLUTICASONE FUROATE AND VILANTEROL TRIFENATATE 1 PUFF: 200; 25 POWDER RESPIRATORY (INHALATION) at 09:01

## 2020-01-10 RX ADMIN — AMLODIPINE BESYLATE 5 MG: 5 TABLET ORAL at 10:01

## 2020-01-10 RX ADMIN — IOHEXOL 50 ML: 350 INJECTION, SOLUTION INTRAVENOUS at 01:01

## 2020-01-10 RX ADMIN — IPRATROPIUM BROMIDE AND ALBUTEROL SULFATE 3 ML: .5; 3 SOLUTION RESPIRATORY (INHALATION) at 08:01

## 2020-01-10 RX ADMIN — LEVOTHYROXINE SODIUM 100 MCG: 100 TABLET ORAL at 06:01

## 2020-01-10 RX ADMIN — MONTELUKAST 10 MG: 10 TABLET, FILM COATED ORAL at 10:01

## 2020-01-10 NOTE — ED NOTES
Telemetry Verification   Patient placed on Telemetry Box  Verified with War Room  Box # [id number]   Monitor Tech Kathy   Rate 70   Rhythm NSR

## 2020-01-10 NOTE — ASSESSMENT & PLAN NOTE
Patient with hx of PAD s/p right femoral stent (2006) presenting with cold ischemic foot x 2 days. Patient reports acute onset pain and coldness to R foot since previous hospitalization (1/2 -1/8) one day prior to discharge. Patient's plavix/ASA were held at that time for a liver biopsy (1/6), which was resumed upon discharge. Of note, patient has newly discovered lung and liver lesions concerning for cancer.    - CTA showing evidence of a thrombus just distal to previously placed iliac stent as well as complete occlusion in distal popliteal vessel.  - Venous U/S did not demonstrate any DVT in RLE    Plan:  - Vascular surgery consulted. Planning for OR 1/11  - Heparin drip since 1/9. In therapeutic range  - continue home ASA/plavix  - NPO at midnight  - neurovascular checks q4h  - morphine 4mg q4h PRN for moderate pain and .5mg dilaudid for severe

## 2020-01-10 NOTE — ASSESSMENT & PLAN NOTE
Patient with hx of PAD s/p right femoral stent (2006) presenting with cold ischemic foot x 2 days. Patient reports acute onset pain and coldness to R foot since previous hospitalization (1/2 -1/8) one day prior to discharge. Patient's plavix/ASA were held at that time for a liver biopsy (1/6), which was resumed upon discharge. Of note, patient has newly discovered lung and liver lesions concerning for cancer.    Plan:  - Vascular surgery consulted and following, apprec recs  - Venous U/S did not demonstrate any DVT in RLE  - U/S arterial doppler - diffuse slow flow throughout RLE arteries suggestive of proximal inflow stenosis. Bilateral ADELA WNL.  - heparin bolus + infusion with PTT goal 60-80  - continue home ASA/plavix  - NPO at midnight, in case vascular surgery decides to intervene  - neurovascular checks q4h  - morphine 4mg q4h PRN for moderate pain and 8mg for severe pain (de-escalate as tolerated)

## 2020-01-10 NOTE — PLAN OF CARE
Fair hours. Pain controlled with current meds. Had converted to new onset afib on tele monitor. Ekg was done, but had converted back to sr,after pain med given.  Heparin gtt  infusing, awaiting latest aptt to monitor levels. Lab need to draw, was contacted. Vss. Safety maintained.

## 2020-01-10 NOTE — ASSESSMENT & PLAN NOTE
Patients CT scans very concerning for lung cancer with mets. Liver biopsy taken 1/6. Still waiting for results

## 2020-01-10 NOTE — PLAN OF CARE
01/10/20 1442   Post-Acute Status   Post-Acute Authorization Placement   Post-Acute Placement Status Referrals Sent     Patient expected to discharge with home health when medically ready. SW will continue to follow up.    Flakita Pacheco LMSW  [facility]   [phone]

## 2020-01-10 NOTE — CONSULTS
[facility]  Vascular Surgery  Consult Note    Inpatient consult to Vascular Surgery  Consult performed by: Everardo Harrington MD  Consult ordered by: Kristi Clemente PA-C  Reason for consult: Cold right foot        Subjective:     Chief Complaint/Reason for Admission: Cold right foot    History of Present Illness: Pt is a 70 yo F with history of HTN, PAD s/p right femoral artery stent in 2006(?), COPD on home O2, and tobacco abuse, as well as recently discovered liver and lung lesions concerning for cancer who presents to ED today complaining of right foot coldness and pain for past 3 days. This started 2 days prior to her discharge yesterday during which time her plavix was being held for a liver bx. Her plavix and ASA were restarted yesterday after she was discharged, and she took them both this morning. She reports her right foot and ankle are cold no matter what she does, and it's becoming more painful. She was first diagnosed with PAD when she developed a non-healing right foot wound in 2006, and eventually required stent placement to revascularize her foot. She denies any wound issues since then, and is usually ambulatory enough to perform her own grocery shopping without any discomfort. She was admitted most recently after presenting with hemoptysis and eventually discovering her new lung and liver masses. She also was diagnosed with post-obstructive pneumonia.    Today she reports she can feel the base of her foot, and she is able to move her ankle and toes.      (Not in a hospital admission)    Review of patient's allergies indicates:  No Known Allergies    Past Medical History:   Diagnosis Date    Allergic rhinitis     COPD (chronic obstructive pulmonary disease)     Depression     GERD (gastroesophageal reflux disease)     Headache     Hyperlipidemia     Hypertension     Pneumonia     Polyarthralgia     Postablative hypothyroidism     hypothyroidism s/p OCASIO therapy    PVD  (peripheral vascular disease)     Treated by Dr. Sim     Past Surgical History:   Procedure Laterality Date    CHOLECYSTECTOMY  1986    GALLBLADDER SURGERY  2006    HIP SURGERY  2005    Right hip fracture/surgery    PERIPHERAL ARTERIAL STENT GRAFT Right 2007    stent for femoral artery blockage     Family History     Problem Relation (Age of Onset)    COPD Mother    Cancer Mother    Heart attack Father (55), Sister (53)    Thyroid disease Mother        Tobacco Use    Smoking status: Current Every Day Smoker     Types: Cigarettes    Smokeless tobacco: Never Used    Tobacco comment: None since Dec 26   Substance and Sexual Activity    Alcohol use: No     Alcohol/week: 0.0 standard drinks    Drug use: Never    Sexual activity: Not on file     Review of Systems   All other systems reviewed and are negative.    Objective:     Vital Signs (Most Recent):  Temp: 97.8 °F (36.6 °C) (01/09/20 1717)  Pulse: 71 (01/09/20 1742)  Resp: 20 (01/09/20 1738)  BP: (!) 151/78 (01/09/20 1717)  SpO2: 95 % (01/09/20 1742) Vital Signs (24h Range):  Temp:  [97.8 °F (36.6 °C)-98.7 °F (37.1 °C)] 97.8 °F (36.6 °C)  Pulse:  [70-75] 71  Resp:  [18-20] 20  SpO2:  [94 %-100 %] 95 %  BP: (138-151)/(70-78) 151/78     Weight: 66.7 kg (147 lb)  Body mass index is 26.89 kg/m².    Physical Exam   Constitutional: She is oriented to person, place, and time. She appears well-developed and well-nourished. No distress.   HENT:   Head: Normocephalic and atraumatic.   Eyes: Pupils are equal, round, and reactive to light. EOM are normal.   Neck: Normal range of motion. Neck supple.   Cardiovascular: Normal rate and regular rhythm.   Right distal foot cold and blue, chronic wound scar present  No DP or PT signals in her right foot  Biphasic right popliteal  Triphasic right femoral  Biphasic left DP and PT  2+ palpable left femoral   Pulmonary/Chest: Effort normal. No respiratory distress.   On 3L O2   Abdominal: Soft. She exhibits no distension. Abd  bruising from recent DVT prophylaxis. Small bandage from recent liver biopsy.  Musculoskeletal: Normal range of motion.   Neurological: She is alert and oriented to person, place, and time.   Skin: Skin is warm and dry. She is not diaphoretic.   Psychiatric: She has a normal mood and affect. Her behavior is normal. Judgment and thought content normal.   Nursing note and vitals reviewed.      Significant Labs:  CBC:   Recent Labs   Lab 01/09/20  1711   WBC 18.89*   RBC 3.63*   HGB 9.8*   HCT 31.6*      MCV 87   MCH 27.0   MCHC 31.0*     CMP:   Recent Labs   Lab 01/09/20  1711   GLU 86   CALCIUM 8.6*   ALBUMIN 2.4*   PROT 5.7*   *   K 4.7   CO2 28   CL 98   BUN 12   CREATININE 0.8   ALKPHOS 96   ALT 30   AST 43*   BILITOT 0.6       Significant Diagnostics:  I have reviewed all pertinent imaging results/findings within the past 24 hours.    Assessment/Plan:     Acute pain of right lower extremity  Patient is 68 yo F with hx of PAD and right femoral stent who presents with a cold ischemic foot for past 3 days    Would recommend heparin gtt - goal PTT 60-80  Recommend RLE Arterial US  Will evaluate pt with staff soon  Will continue to follow  Please call with any questions or concerns        Thank you for your consult. I will follow-up with patient. Please contact us if you have any additional questions.    Everardo Harrington MD  Vascular Surgery  [facility]

## 2020-01-10 NOTE — ASSESSMENT & PLAN NOTE
Patient is 68 yo F with hx of PAD and right femoral stent who presents with a cold ischemic foot for past 3 days    Would recommend heparin gtt - goal PTT 60-80  Recommend RLE Arterial US  Will evaluate pt with staff soon  Will continue to follow  Please call with any questions or concerns

## 2020-01-10 NOTE — ASSESSMENT & PLAN NOTE
Patient with abdominal pain out of proportion to exam. Concerned about possible intestinal ischemia    Plan  CTA abdomen and pelvis 1/10

## 2020-01-10 NOTE — SUBJECTIVE & OBJECTIVE
Medications Prior to Admission   Medication Sig Dispense Refill Last Dose    amLODIPine (NORVASC) 5 MG tablet Take 1 tablet (5 mg total) by mouth once daily. 90 tablet 1 1/9/2020    aspirin (ECOTRIN) 81 MG EC tablet Take 1 tablet (81 mg total) by mouth once daily. 90 tablet 3 1/9/2020    atorvastatin (LIPITOR) 40 MG tablet TAKE 1 TABLET(40 MG) BY MOUTH EVERY DAY 90 tablet 3 1/9/2020    benzonatate (TESSALON) 100 MG capsule Take 1 capsule (100 mg total) by mouth 3 (three) times daily as needed for Cough. 30 capsule 0 1/9/2020    clopidogrel (PLAVIX) 75 mg tablet Take 1 tablet (75 mg total) by mouth once daily.   1/9/2020    HYDROcodone-acetaminophen (NORCO) 7.5-325 mg per tablet Take 1 tablet by mouth every 8 (eight) hours as needed for Pain (Pain). Take 1/2 to 1 tablet by mouth 3 times daily as needed for pain 21 tablet 0 1/9/2020    levothyroxine (SYNTHROID) 100 MCG tablet Take 1 tablet (100 mcg total) by mouth before breakfast. 90 tablet 3 1/9/2020    losartan (COZAAR) 25 MG tablet Take 1 tablet (25 mg total) by mouth once daily. 90 tablet 1 1/9/2020    montelukast (SINGULAIR) 10 mg tablet TAKE 1 TABLET(10 MG) BY MOUTH EVERY DAY 90 tablet 3 1/9/2020    albuterol (PROAIR HFA) 90 mcg/actuation inhaler Inhale 2 puffs into the lungs every 6 (six) hours as needed for Wheezing. 1 Inhaler 5     albuterol-ipratropium (DUO-NEB) 2.5 mg-0.5 mg/3 mL nebulizer solution Use 3 mL inhaled every 4 hours as needed for shortness of breath or wheezing (ICD-10-CM: J44.1) 540 mL 1     alendronate (FOSAMAX) 70 MG tablet TAKE 1 TABLET(70 MG) BY MOUTH EVERY 7 DAYS (Patient taking differently: every Monday. ) 12 tablet 0     butalbital-acetaminophen-caffeine -40 mg (FIORICET, ESGIC) -40 mg per tablet Take 1 tablet by mouth every 12 (twelve) hours as needed for Pain or Headaches. 30 tablet 1 Unknown    calcium-vitamin D3 (CALCIUM 500 + D) 500 mg(1,250mg) -200 unit per tablet Take 1 tablet by mouth 2 (two) times  daily with meals. (Patient taking differently: Take 1 tablet by mouth once daily. ) 180 tablet 3     [] doxycycline (VIBRA-TABS) 100 MG tablet Take 1 tablet (100 mg total) by mouth every 12 (twelve) hours. for 1 day 1 tablet 0 Unknown    umeclidinium (INCRUSE ELLIPTA) 62.5 mcg/actuation DsDv Inhale 1 each into the lungs once daily. Controller 30 each 2        Review of patient's allergies indicates:  No Known Allergies    Past Medical History:   Diagnosis Date    Allergic rhinitis     COPD (chronic obstructive pulmonary disease)     Depression     GERD (gastroesophageal reflux disease)     Headache     Hyperlipidemia     Hypertension     Pneumonia     Polyarthralgia     Postablative hypothyroidism     hypothyroidism s/p OCASIO therapy    PVD (peripheral vascular disease)     Treated by Dr. Sim     Past Surgical History:   Procedure Laterality Date    CHOLECYSTECTOMY  1986    GALLBLADDER SURGERY  2006    HIP SURGERY  2005    Right hip fracture/surgery    PERIPHERAL ARTERIAL STENT GRAFT Right 2007    stent for femoral artery blockage     Family History     Problem Relation (Age of Onset)    COPD Mother    Cancer Mother    Heart attack Father (55), Sister (53)    Thyroid disease Mother        Tobacco Use    Smoking status: Current Every Day Smoker     Types: Cigarettes    Smokeless tobacco: Never Used    Tobacco comment: None since Dec 26   Substance and Sexual Activity    Alcohol use: No     Alcohol/week: 0.0 standard drinks    Drug use: Never    Sexual activity: Not on file     Review of Systems   All other systems reviewed and are negative.    Objective:     Vital Signs (Most Recent):  Temp: 97.1 °F (36.2 °C) (01/10/20 0730)  Pulse: 79 (01/10/20 0730)  Resp: 20 (01/10/20 0730)  BP: 131/61 (01/10/20 0730)  SpO2: (!) 84 % (01/10/20 0730) Vital Signs (24h Range):  Temp:  [97.1 °F (36.2 °C)-98.7 °F (37.1 °C)] 97.1 °F (36.2 °C)  Pulse:  [70-87] 79  Resp:  [18-20] 20  SpO2:  [84 %-100 %] 84  %  BP: (131-151)/(61-78) 131/61     Weight: 62.1 kg (136 lb 14.5 oz)  Body mass index is 25.04 kg/m².    Physical Exam   Constitutional: She is oriented to person, place, and time. She appears well-developed and well-nourished. No distress.   HENT:   Head: Normocephalic and atraumatic.   Eyes: Pupils are equal, round, and reactive to light. EOM are normal.   Neck: Normal range of motion. Neck supple.   Cardiovascular: Normal rate and regular rhythm.   Right distal foot cold and blue, chronic wound scar present  No DP or PT signals in her right foot  Biphasic right popliteal  Triphasic right femoral  Biphasic left DP and PT  2+ palpable left femoral   Pulmonary/Chest: Effort normal. No respiratory distress.   On 3L O2   Abdominal: Soft. She exhibits no distension.   Musculoskeletal: Normal range of motion.   Sensation and motor function intact in BLE, however decreased sensation in right toes    Neurological: She is alert and oriented to person, place, and time.   Skin: Skin is warm and dry. She is not diaphoretic.   Psychiatric: She has a normal mood and affect. Her behavior is normal. Judgment and thought content normal.   Nursing note and vitals reviewed.      Significant Labs:  CBC:   Recent Labs   Lab 01/10/20  0152   WBC 19.72*   RBC 3.98*   HGB 10.7*   HCT 35.0*      MCV 88   MCH 26.9*   MCHC 30.6*     CMP:   Recent Labs   Lab 01/10/20  0152   GLU 60*   CALCIUM 8.6*   ALBUMIN 2.3*   PROT 5.6*   *   K 4.4   CO2 27   CL 97   BUN 10   CREATININE 0.8   ALKPHOS 124   ALT 29   AST 44*   BILITOT 0.6       Significant Diagnostics:  I have reviewed all pertinent imaging results/findings within the past 24 hours.

## 2020-01-10 NOTE — SUBJECTIVE & OBJECTIVE
Past Medical History:   Diagnosis Date    Allergic rhinitis     COPD (chronic obstructive pulmonary disease)     Depression     GERD (gastroesophageal reflux disease)     Headache     Hyperlipidemia     Hypertension     Pneumonia     Polyarthralgia     Postablative hypothyroidism     hypothyroidism s/p OCASIO therapy    PVD (peripheral vascular disease)     Treated by Dr. Sim       Past Surgical History:   Procedure Laterality Date    CHOLECYSTECTOMY  1986    GALLBLADDER SURGERY  2006    HIP SURGERY  2005    Right hip fracture/surgery    PERIPHERAL ARTERIAL STENT GRAFT Right 2007    stent for femoral artery blockage       Review of patient's allergies indicates:  No Known Allergies    No current facility-administered medications on file prior to encounter.      Current Outpatient Medications on File Prior to Encounter   Medication Sig    amLODIPine (NORVASC) 5 MG tablet Take 1 tablet (5 mg total) by mouth once daily.    aspirin (ECOTRIN) 81 MG EC tablet Take 1 tablet (81 mg total) by mouth once daily.    atorvastatin (LIPITOR) 40 MG tablet TAKE 1 TABLET(40 MG) BY MOUTH EVERY DAY    benzonatate (TESSALON) 100 MG capsule Take 1 capsule (100 mg total) by mouth 3 (three) times daily as needed for Cough.    clopidogrel (PLAVIX) 75 mg tablet Take 1 tablet (75 mg total) by mouth once daily.    HYDROcodone-acetaminophen (NORCO) 7.5-325 mg per tablet Take 1 tablet by mouth every 8 (eight) hours as needed for Pain (Pain). Take 1/2 to 1 tablet by mouth 3 times daily as needed for pain    levothyroxine (SYNTHROID) 100 MCG tablet Take 1 tablet (100 mcg total) by mouth before breakfast.    losartan (COZAAR) 25 MG tablet Take 1 tablet (25 mg total) by mouth once daily.    montelukast (SINGULAIR) 10 mg tablet TAKE 1 TABLET(10 MG) BY MOUTH EVERY DAY    albuterol (PROAIR HFA) 90 mcg/actuation inhaler Inhale 2 puffs into the lungs every 6 (six) hours as needed for Wheezing.    albuterol-ipratropium (DUO-NEB)  2.5 mg-0.5 mg/3 mL nebulizer solution Use 3 mL inhaled every 4 hours as needed for shortness of breath or wheezing (ICD-10-CM: J44.1)    alendronate (FOSAMAX) 70 MG tablet TAKE 1 TABLET(70 MG) BY MOUTH EVERY 7 DAYS (Patient taking differently: every Monday. )    butalbital-acetaminophen-caffeine -40 mg (FIORICET, ESGIC) -40 mg per tablet Take 1 tablet by mouth every 12 (twelve) hours as needed for Pain or Headaches.    calcium-vitamin D3 (CALCIUM 500 + D) 500 mg(1,250mg) -200 unit per tablet Take 1 tablet by mouth 2 (two) times daily with meals. (Patient taking differently: Take 1 tablet by mouth once daily. )    doxycycline (VIBRA-TABS) 100 MG tablet Take 1 tablet (100 mg total) by mouth every 12 (twelve) hours. for 1 day    umeclidinium (INCRUSE ELLIPTA) 62.5 mcg/actuation DsDv Inhale 1 each into the lungs once daily. Controller     Family History     Problem Relation (Age of Onset)    COPD Mother    Cancer Mother    Heart attack Father (55), Sister (53)    Thyroid disease Mother        Tobacco Use    Smoking status: Current Every Day Smoker     Types: Cigarettes    Smokeless tobacco: Never Used    Tobacco comment: None since Dec 26   Substance and Sexual Activity    Alcohol use: No     Alcohol/week: 0.0 standard drinks    Drug use: Never    Sexual activity: Not on file     Review of Systems   Constitutional: Positive for activity change and chills. Negative for appetite change, fatigue, fever and unexpected weight change.   HENT: Negative for congestion, rhinorrhea, sore throat and trouble swallowing.    Eyes: Negative for visual disturbance.   Respiratory: Negative for cough, shortness of breath and wheezing.    Cardiovascular: Negative for chest pain and leg swelling.   Gastrointestinal: Negative for abdominal distention, abdominal pain, constipation, diarrhea, nausea and vomiting.   Genitourinary: Negative for difficulty urinating, dysuria and hematuria.   Musculoskeletal: Positive for  arthralgias and back pain. Negative for joint swelling, neck pain and neck stiffness.   Skin: Positive for color change (bluish colored toes in RLE) and wound (chronic healed wound on R dorsal foot). Negative for rash.   Neurological: Positive for weakness and numbness (R foot). Negative for dizziness, syncope and headaches.     Objective:     Vital Signs (Most Recent):  Temp: 97.8 °F (36.6 °C) (01/09/20 1717)  Pulse: 71 (01/09/20 1742)  Resp: 20 (01/09/20 1738)  BP: (!) 146/63 (01/09/20 2002)  SpO2: 95 % (01/09/20 1742) Vital Signs (24h Range):  Temp:  [97.8 °F (36.6 °C)-98.7 °F (37.1 °C)] 97.8 °F (36.6 °C)  Pulse:  [70-75] 71  Resp:  [18-20] 20  SpO2:  [94 %-100 %] 95 %  BP: (138-151)/(63-78) 146/63     Weight: 66.7 kg (147 lb)  Body mass index is 26.89 kg/m².    Physical Exam   Constitutional: She is oriented to person, place, and time. She appears well-developed and well-nourished. No distress.   HENT:   Head: Normocephalic and atraumatic.   Mouth/Throat: Oropharynx is clear and moist.   Eyes: Pupils are equal, round, and reactive to light. Conjunctivae and EOM are normal. No scleral icterus.   Neck: Normal range of motion. Neck supple. No JVD present. No tracheal deviation present.   Cardiovascular: Normal rate, regular rhythm, normal heart sounds and intact distal pulses.   No murmur heard.  Pulmonary/Chest: Effort normal. No respiratory distress. She has wheezes (minimal wheezing in upper lungs R > L). She has no rales.   Abdominal: Soft. Bowel sounds are normal. She exhibits no distension and no mass. There is no tenderness. There is no guarding.   Musculoskeletal: She exhibits no edema, tenderness or deformity.   Right foot cool to touch up to ankles.   Pallor of the foot and toes. Blue discoloration to dorsal foot with chronic healed wound present.  Nonpalpable dorsalis pedis and posterior tibial pulses  Sensation intact to the R proximal base of foot and proximal ankle   Neurological: She is alert and  oriented to person, place, and time.   Skin: Skin is dry. No rash noted. She is not diaphoretic. No erythema.   Psychiatric: She has a normal mood and affect. Her behavior is normal. Judgment and thought content normal.          Significant Labs:   CBC:   Recent Labs   Lab 01/08/20  0650 01/09/20  1711   WBC 19.67* 18.89*   HGB 10.3* 9.8*   HCT 32.7* 31.6*    170     CMP:   Recent Labs   Lab 01/08/20  0650 01/09/20  1711    134*   K 4.5 4.7    98   CO2 27 28   GLU 81 86   BUN 12 12   CREATININE 0.8 0.8   CALCIUM 7.9* 8.6*   PROT 5.1* 5.7*   ALBUMIN 2.1* 2.4*   BILITOT 0.6 0.6   ALKPHOS 92 96   AST 22 43*   ALT 25 30   ANIONGAP 8 8   EGFRNONAA >60.0 >60.0     All pertinent labs within the past 24 hours have been reviewed.    Significant Imaging: I have reviewed all pertinent imaging results/findings within the past 24 hours.

## 2020-01-10 NOTE — NURSING
Received admit from er via stretcher 70 y/o wf with dx of rle pain. Aaox4, no distress noted. Heparin gtt in progress. Will continue to monitor.

## 2020-01-10 NOTE — NURSING
aptt is 63.2  Is within range according to the normagram. next aptt is due at 1100. Am nurse sukhi is aware.

## 2020-01-10 NOTE — PROGRESS NOTES
[facility] Hospital Medicine  Progress Note    Patient Name: Sharita Castañeda  MRN: [id number]  Patient Class: IP- Inpatient   Admission Date: 1/9/2020  Length of Stay: 1 days  Attending Physician: Ana Fajardo MD  Primary Care Provider: Wallace Deng MD    [facility] Medicine Team: [facility]OSP MED 2 Andrea Crowell MD    Subjective:     Principal Problem:Acute pain of right lower extremity        HPI:  Ms. Sharita Castañeda is a 68 yo F with hx of HTN, HLD, COPD (on home O2 at 2-3L), PUD, polyarthralgia, and PVD s/p R femoral artery stent (placed in 2006 at [facility]) who presents with acute onset of pain to her right foot starting 2 days ago while hospitalized for pneumonia. Patient was recently admitted 1/2- 1/8 for acute hypoxic resp failure and suspected post obstructive PNA. She was admitted before that on 12/26-12/28 for hemoptysis and CTA was concerning for primary lung malignancy with metastasis to the liver. During this most recent admission, her pneumonia was treated with vanc/zosyn then switched to doxycycline (end date: 1/9). IR performed liver biopsy on 1/6 and patient was discharged with outpatient follow-ups with pulmonology and oncology. Patient first reported foot pain while in the hospital the day prior to discharge (during which time her plavix had been held for her liver bx). Her plavix/ASA were restarted upon discharge and she took them both this morning 1/9. She reports her right foot to be cold and progressively more painful. The pain is constant, severe, and a persistent ache. She has not been able to ambulate without assistance at home 2/2 severe pain. Her foot was noted to be purple and blue at the toes, most noticeably at toes 4 and 5. She has not been able to warm up her foot despite using a heated blanket. It looked to be dusky today so patient re-presented to the ER. She is able to feel the base of her foot and able to move her ankle and toes. Patient is a former smoker and quit several  weeks ago when she was admitted for pneumonia. She denies any CP or SOB. No fevers/chills, HA, confusion, abdominal pain, n/v/d, leg swelling.       Overview/Hospital Course:  No notes on file    Interval History: Patient readmitted last night with signs of ischemia of right lower extremity. She is now having abdominal pain out of proportion to exam. Will order a CTA abdomen and pelvis. She is in therapeutic range with heparin drip. States she has regained more movement in her toes than earlier. Foot is still cold to the touch with no palpable pulses. Vascular following along. Planning to take to OR tomorrow.     Review of Systems  Objective:     Vital Signs (Most Recent):  Temp: 96.8 °F (36 °C) (01/10/20 1136)  Pulse: 70 (01/10/20 1448)  Resp: 20 (01/10/20 0921)  BP: (!) 112/53 (01/10/20 1448)  SpO2: (!) 94 % (01/10/20 1136) Vital Signs (24h Range):  Temp:  [96.8 °F (36 °C)-98.7 °F (37.1 °C)] 96.8 °F (36 °C)  Pulse:  [70-87] 70  Resp:  [16-20] 20  SpO2:  [82 %-100 %] 94 %  BP: ()/(49-78) 112/53     Weight: 62.1 kg (136 lb 14.5 oz)  Body mass index is 25.04 kg/m².    Intake/Output Summary (Last 24 hours) at 1/10/2020 1456  Last data filed at 1/10/2020 0500  Gross per 24 hour   Intake 90.78 ml   Output --   Net 90.78 ml      Physical Exam   Constitutional: She is oriented to person, place, and time. She appears well-developed and well-nourished. No distress.   HENT:   Head: Normocephalic and atraumatic.   Mouth/Throat: Oropharynx is clear and moist.   Eyes: Pupils are equal, round, and reactive to light. Conjunctivae and EOM are normal. No scleral icterus.   Neck: Normal range of motion. Neck supple. No JVD present. No tracheal deviation present.   Cardiovascular: Normal rate, regular rhythm, normal heart sounds and intact distal pulses.   No murmur heard.  Pulmonary/Chest: Effort normal. No respiratory distress. Wheezes: minimal wheezing in upper lungs R > L. She has no rales.   On 4l nasal sandra. Patient  breathing comfortably     Decreased breath sounds throughout right lung.    Abdominal: Soft. Bowel sounds are normal. She exhibits no distension and no mass. There is no tenderness. There is no guarding.   Musculoskeletal: She exhibits no edema, tenderness or deformity.   Right foot cool to touch up to ankles.   Pallor of the foot and toes. Blue discoloration to dorsal foot with chronic healed wound present.  Nonpalpable dorsalis pedis and posterior tibial pulses  Sensation intact to the R proximal base of foot and proximal ankle.   Able to wiggle all toes and move ankle.    Neurological: She is alert and oriented to person, place, and time.   Skin: Skin is dry. No rash noted. She is not diaphoretic. No erythema.   Psychiatric: She has a normal mood and affect. Her behavior is normal. Judgment and thought content normal.       Significant Labs:   Recent Results (from the past 48 hour(s))   APTT    Collection Time: 01/09/20  5:11 PM   Result Value Ref Range    aPTT 21.4 21.0 - 32.0 sec   Protime-INR    Collection Time: 01/09/20  5:11 PM   Result Value Ref Range    Prothrombin Time 10.2 9.0 - 12.5 sec    INR 1.0 0.8 - 1.2   CBC auto differential    Collection Time: 01/09/20  5:11 PM   Result Value Ref Range    WBC 18.89 (H) 3.90 - 12.70 K/uL    RBC 3.63 (L) 4.00 - 5.40 M/uL    Hemoglobin 9.8 (L) 12.0 - 16.0 g/dL    Hematocrit 31.6 (L) 37.0 - 48.5 %    Mean Corpuscular Volume 87 82 - 98 fL    Mean Corpuscular Hemoglobin 27.0 27.0 - 31.0 pg    Mean Corpuscular Hemoglobin Conc 31.0 (L) 32.0 - 36.0 g/dL    RDW 18.9 (H) 11.5 - 14.5 %    Platelets 170 150 - 350 K/uL    MPV 10.6 9.2 - 12.9 fL    Immature Granulocytes 0.9 (H) 0.0 - 0.5 %    Gran # (ANC) 15.1 (H) 1.8 - 7.7 K/uL    Immature Grans (Abs) 0.17 (H) 0.00 - 0.04 K/uL    Lymph # 2.2 1.0 - 4.8 K/uL    Mono # 1.2 (H) 0.3 - 1.0 K/uL    Eos # 0.2 0.0 - 0.5 K/uL    Baso # 0.02 0.00 - 0.20 K/uL    nRBC 0 0 /100 WBC    Gran% 79.9 (H) 38.0 - 73.0 %    Lymph% 11.5 (L) 18.0 -  48.0 %    Mono% 6.5 4.0 - 15.0 %    Eosinophil% 1.1 0.0 - 8.0 %    Basophil% 0.1 0.0 - 1.9 %    Differential Method Automated    Comprehensive metabolic panel    Collection Time: 01/09/20  5:11 PM   Result Value Ref Range    Sodium 134 (L) 136 - 145 mmol/L    Potassium 4.7 3.5 - 5.1 mmol/L    Chloride 98 95 - 110 mmol/L    CO2 28 23 - 29 mmol/L    Glucose 86 70 - 110 mg/dL    BUN, Bld 12 8 - 23 mg/dL    Creatinine 0.8 0.5 - 1.4 mg/dL    Calcium 8.6 (L) 8.7 - 10.5 mg/dL    Total Protein 5.7 (L) 6.0 - 8.4 g/dL    Albumin 2.4 (L) 3.5 - 5.2 g/dL    Total Bilirubin 0.6 0.1 - 1.0 mg/dL    Alkaline Phosphatase 96 55 - 135 U/L    AST 43 (H) 10 - 40 U/L    ALT 30 10 - 44 U/L    Anion Gap 8 8 - 16 mmol/L    eGFR if African American >60.0 >60 mL/min/1.73 m^2    eGFR if non African American >60.0 >60 mL/min/1.73 m^2   Procalcitonin    Collection Time: 01/09/20  5:11 PM   Result Value Ref Range    Procalcitonin 0.31 (H) <0.25 ng/mL   C-reactive protein    Collection Time: 01/09/20  5:11 PM   Result Value Ref Range    CRP 98.9 (H) 0.0 - 8.2 mg/L   Sedimentation rate    Collection Time: 01/09/20  7:51 PM   Result Value Ref Range    Sed Rate 32 0 - 36 mm/Hr   Lactic acid, plasma    Collection Time: 01/09/20  8:04 PM   Result Value Ref Range    Lactate (Lactic Acid) 1.4 0.5 - 2.2 mmol/L   Comprehensive Metabolic Panel (CMP)    Collection Time: 01/10/20  1:52 AM   Result Value Ref Range    Sodium 135 (L) 136 - 145 mmol/L    Potassium 4.4 3.5 - 5.1 mmol/L    Chloride 97 95 - 110 mmol/L    CO2 27 23 - 29 mmol/L    Glucose 60 (L) 70 - 110 mg/dL    BUN, Bld 10 8 - 23 mg/dL    Creatinine 0.8 0.5 - 1.4 mg/dL    Calcium 8.6 (L) 8.7 - 10.5 mg/dL    Total Protein 5.6 (L) 6.0 - 8.4 g/dL    Albumin 2.3 (L) 3.5 - 5.2 g/dL    Total Bilirubin 0.6 0.1 - 1.0 mg/dL    Alkaline Phosphatase 124 55 - 135 U/L    AST 44 (H) 10 - 40 U/L    ALT 29 10 - 44 U/L    Anion Gap 11 8 - 16 mmol/L    eGFR if African American >60.0 >60 mL/min/1.73 m^2    eGFR if non  African American >60.0 >60 mL/min/1.73 m^2   Magnesium    Collection Time: 01/10/20  1:52 AM   Result Value Ref Range    Magnesium 2.1 1.6 - 2.6 mg/dL   Phosphorus    Collection Time: 01/10/20  1:52 AM   Result Value Ref Range    Phosphorus 3.1 2.7 - 4.5 mg/dL   CBC with Automated Differential    Collection Time: 01/10/20  1:52 AM   Result Value Ref Range    WBC 19.72 (H) 3.90 - 12.70 K/uL    RBC 3.98 (L) 4.00 - 5.40 M/uL    Hemoglobin 10.7 (L) 12.0 - 16.0 g/dL    Hematocrit 35.0 (L) 37.0 - 48.5 %    Mean Corpuscular Volume 88 82 - 98 fL    Mean Corpuscular Hemoglobin 26.9 (L) 27.0 - 31.0 pg    Mean Corpuscular Hemoglobin Conc 30.6 (L) 32.0 - 36.0 g/dL    RDW 19.4 (H) 11.5 - 14.5 %    Platelets 202 150 - 350 K/uL    MPV 11.5 9.2 - 12.9 fL    Immature Granulocytes 1.0 (H) 0.0 - 0.5 %    Gran # (ANC) 15.8 (H) 1.8 - 7.7 K/uL    Immature Grans (Abs) 0.20 (H) 0.00 - 0.04 K/uL    Lymph # 2.1 1.0 - 4.8 K/uL    Mono # 1.3 (H) 0.3 - 1.0 K/uL    Eos # 0.3 0.0 - 0.5 K/uL    Baso # 0.04 0.00 - 0.20 K/uL    nRBC 0 0 /100 WBC    Gran% 80.0 (H) 38.0 - 73.0 %    Lymph% 10.4 (L) 18.0 - 48.0 %    Mono% 6.8 4.0 - 15.0 %    Eosinophil% 1.6 0.0 - 8.0 %    Basophil% 0.2 0.0 - 1.9 %    Differential Method Automated    APTT    Collection Time: 01/10/20  1:52 AM   Result Value Ref Range    aPTT 83.4 (H) 21.0 - 32.0 sec   APTT    Collection Time: 01/10/20  5:04 AM   Result Value Ref Range    aPTT 63.2 (H) 21.0 - 32.0 sec   PT/INR    Collection Time: 01/10/20  5:04 AM   Result Value Ref Range    Prothrombin Time 10.4 9.0 - 12.5 sec    INR 1.0 0.8 - 1.2   APTT    Collection Time: 01/10/20 11:32 AM   Result Value Ref Range    aPTT 78.7 (H) 21.0 - 32.0 sec         Significant Imaging:   Imaging Results           CTA Runoff ABD PEL Bilat Lower Ext (Final result)  Result time 01/09/20 22:55:58   Procedure changed from CTA Abdomen and Pelvis     Final result by Gagandeep Sellers MD (01/09/20 22:55:58)                 Impression:      Extensive  atherosclerosis of the abdominal aorta and its branches.  Near complete thrombosis of the proximal right common iliac artery distal to presumed aortoiliac stent graft with restoration of flow in the distal common iliac and femoral arteries.  At least partial occlusion of the right deep femoral artery and occlusion of the right popliteal artery with diminished 2 vessel runoff to the right lower extremity.  Vascular surgery evaluation is recommended.    Severe narrowing of the left common iliac artery with patent but diminutive three-vessel runoff to the left lower extremity.    Large right middle lobe pulmonary mass with extensive metastatic disease within the abdomen and soft tissues including the left axilla, liver, spleen, left adrenal gland, back and lower extremity musculature, and numerous peritoneal implants.  Suggest correlation with percutaneous sampling for tissue diagnosis.    Severe stenosis and intermittent occlusion of the right renal artery with atrophic right kidney and multiple areas of decreased enhancement suggestive of renal infarcts.  Suggest attention on follow-up to exclude renal masses.    Severe narrowing versus occlusion of the CARLOS.    Additional findings as above.    This report was flagged in Epic as abnormal.    COMMUNICATION  This critical result was discovered/received at 22:00 on 01/09/2020.  The critical information above was relayed directly by Dr. Drew to Dr. Sagastume with [facility] medicine at 22:20 on 01/09/2020.    Electronically signed by resident: Zan Drew MD  Date:    01/09/2020  Time:    21:22    Electronically signed by: Gagandeep Sellers MD  Date:    01/09/2020  Time:    22:55             Narrative:    EXAMINATION:  CTA RUNOFF ABD PEL BILAT LOWER EXT    CLINICAL HISTORY:  AV replace, percutaneous planning, aorta/iliofemoral;    TECHNIQUE:  CT angiogram of the abdomen/pelvis with lower extremity runoff using 125 mL of  Omnipaque 350 IV contrast.  Oral contrast was not  administered.  Multiplanar reconstructions performed including MIP reformats.  Delayed phase images also obtained through the bilateral lower extremities.    COMPARISON:  CTA chest 01/02/2020.  Bilateral lower extremity arterial ultrasound 01/09/2020.    FINDINGS:  Lower chest:    Redemonstration of large right middle lobe mass extending to the right hilum.  Stable filling defect within the superior right pulmonary vein extending to its ostium suggestive of venous invasion.  1.1 cm pulmonary nodule in the left lower lobe.  Small right pleural effusion, slightly increased since recent exam dated 01/02/2020.  Left axillary 2.5 cm soft tissue mass (series 3, image 1).    Abdomen:    The liver demonstrate multiple hypoattenuating masses with subtle peripheral enhancement, with the largest measuring 4 cm in the right hepatic lobe (series 3, image 87).  7.9 cm left adrenal heterogenous mass (series 3, image 73, closely approximated to the adjacent spleen, superior pole of the left kidney, and pancreatic tail.  4.7 cm hypodense peripherally enhancing splenic lesion.  Numerous hypodense peripherally enhancing soft tissue masses of variable sizes are noted within the bilateral lower extremity musculature, abdominal wall muscles, bilateral psoas muscles and gluteal muscles.  The largest mass is in the right gluteal region and measures approximately 5.3 cm in size (series 3, image 161).  Abnormal soft tissue masses are present in both lower extremities, left side greater than right.  Soft tissue lesion at the anterolateral aspect of the right lower thigh is in close approximation to the femoral shaft which makes osseous invasion difficult to entirely exclude.  This mass measures up to 4 cm in maximum axial dimension (series 3, image 295).  A similar lesion is closely approximated to the left femoral shaft (series 3, image 284).  Multiple peritoneal deposits with the largest measuring 3.1 cm inferior to the 1st part of the  duodenum (series 3, image 96).  These lesions are concerning for diffuse metastatic disease.    The gallbladder is surgically absent.  The common bile duct is dilated measuring up to 1.4 cm.  The pancreas appear unremarkable with no evidence of pancreatic ductal dilatation.  The right adrenal gland is unremarkable.    The right kidney is atrophic and demonstrate heterogenous enhancement with areas of significant hypoattenuation and no surrounding inflammatory change.  Findings are likely related to right renal infarcts.  Suggest attention on follow-up to exclude renal masses.    The left kidney is slightly small in size with homogeneous enhancement.  There is a subcentimeter hypodensity which may represent a cyst.  No hydronephrosis or hydroureter.  No nephrolithiasis.  Bladder is well distended and appear unremarkable.    Multiple calcified fibroids.    No evidence of bowel obstruction or inflammation.  No free intraperitoneal air or free fluid.    Abdominopelvic Vasculature:    Extensive calcific and noncalcific atherosclerotic plaque of the abdominal aorta and its branches.  No aortic aneurysm.  Significant calcific atherosclerotic plaque at the distal abdominal aorta at the bifurcation with linear intraluminal foci which could reflect graft material.  The proximal right common iliac artery is not opacified which could be due to thrombosis.  There is opacification of the remaining right common iliac artery however to severely narrowed.  Severe narrowing of the left common iliac artery which remains patent along its course.    The celiac artery and SMA are patent.  Non opacification of the CARLOS which could be due to severe narrowing versus occlusion.  The left renal arteries patent with significant calcified plaque at its origin.  Severe stenosis and intermittent occlusion of the right renal artery.    Right lower extremity:    The right common femoral artery, superficial and deep femoral arteries are small in  caliber.  The deep femoral artery appears to be at least partially occluded.  The proximal popliteal artery is patent with loss of contrast opacification along its course distally.  Diminished 2 vessel runoff to the right lower extremity with intermittent stenosis.  Right peroneal artery may be occluded.    Left lower extremity:    The left common femoral artery, superficial and deep femoral arteries are patent with small caliber.  The left popliteal, anterior and posterior tibial arteries are patent.  Left peroneal artery is patent but diminutive.    Bones: Bones are diffusely demineralized.  No displaced fracture.  Right femoral hardware.  Abnormal appearance of the iliac bones which could be due to diffuse demineralization.                               US Lower Extremity Veins Right (Final result)  Result time 01/09/20 19:58:46    Final result by Jens Chaudhari MD (01/09/20 19:58:46)                 Impression:      No evidence of deep venous thrombosis in the right lower extremity.    Electronically signed by resident: Zan Drew MD  Date:    01/09/2020  Time:    19:45    Electronically signed by: Jens Chaudhari  Date:    01/09/2020  Time:    19:58             Narrative:    EXAMINATION:  US LOWER EXTREMITY VEINS RIGHT    CLINICAL HISTORY:  Pain in right leg    TECHNIQUE:  Duplex and color flow Doppler evaluation and graded compression of the right lower extremity veins was performed.    COMPARISON:  None    FINDINGS:  Right thigh veins: The common femoral, femoral, popliteal, upper greater saphenous, and deep femoral veins are patent and free of thrombus. The veins are normally compressible and have normal phasic flow and augmentation response.    Right calf veins: The visualized calf veins are patent.    Contralateral CFV: The contralateral (left) common femoral vein is patent and free of thrombus.    Miscellaneous: None                                US Lower Extrem Arteries Bilat with ADELA (xpd) (Final  result)  Result time 01/09/20 20:02:32    Final result by Jens Chaudhari MD (01/09/20 20:02:32)                 Impression:      Diffuse slow flow and monophasic waveforms throughout the right lower extremity arteries suggestive of proximal inflow stenosis.    The right peroneal artery was not visualized.    Bilateral ankle brachial indices are within normal limits.    Left Baker's cyst.    This report was flagged in Epic as abnormal.    Electronically signed by resident: Zan Drew MD  Date:    01/09/2020  Time:    19:35    Electronically signed by: Jens Chaudhari  Date:    01/09/2020  Time:    20:02             Narrative:    EXAMINATION:  US ARTERIAL LOWER EXTREMITY BILAT WITH ADELA (XPD)    CLINICAL HISTORY:  right leg pain, cool to touch, slight discoloration. non palpable pulses;    TECHNIQUE:  Ultrasound arterial lower extremity bilateral with ADELA.    COMPARISON:  None    FINDINGS:  Right lower extremity.    CFA : 68 cm/sec, monophasic    Deep femoral artery: 24 centimeter/seconds, monophasic.    Prox SFA: 48 cm/sec, monophasic    Mid SFA : 52 cm/sec, monophasic    Dist SFA : 43 cm/sec, monophasic    Proximal pop A: : 19 cm/sec, monophasic    Distal popliteal artery: 29 centimeter/second, monophasic.    EZ: 8 cm/sec, monophasic    The peroneal artery was not visualized.    PTA: 9 cm/sec, monophasic    Diffuse monophasic waveforms in the right lower extremity indicating slow flow and proximal inflow stenosis.    Right ADELA: 1.1    Left lower extremity    CFA: 137 cm/sec, triphasic    Deep femoral artery: 118 centimeter/second, triphasic.    Prox SFA: 125 cm/sec, triphasic    Mid SFA: 127 cm/sec, triphasic    Dist SFA: 86 cm/sec, triphasic    Proximal pop A: 78 cm/sec, triphasic    Distal popliteal artery: 83 centimeter/seconds, triphasic.    EZ: 84 cm/sec, monophasic    Peroneal A: 67 cm/sec, monophasic    PTA: 103 cm/sec, monophasic    Left ADELA: 1.0    Left Baker's cyst measuring 3.6 x 1 x 2.4 cm.                                     Assessment/Plan:      * Acute pain of right lower extremity  Patient with hx of PAD s/p right femoral stent (2006) presenting with cold ischemic foot x 2 days. Patient reports acute onset pain and coldness to R foot since previous hospitalization (1/2 -1/8) one day prior to discharge. Patient's plavix/ASA were held at that time for a liver biopsy (1/6), which was resumed upon discharge. Of note, patient has newly discovered lung and liver lesions concerning for cancer.    - CTA showing evidence of a thrombus just distal to previously placed iliac stent as well as complete occlusion in distal popliteal vessel.  - Venous U/S did not demonstrate any DVT in RLE    Plan:  - Vascular surgery consulted. Planning for OR 1/11  - Heparin drip since 1/9. In therapeutic range  - continue home ASA/plavix  - NPO at midnight  - neurovascular checks q4h  - morphine 4mg q4h PRN for moderate pain and .5mg dilaudid for severe      Abdominal pain  Patient with abdominal pain out of proportion to exam. Concerned about possible intestinal ischemia    Plan  CTA abdomen and pelvis 1/10      COPD (chronic obstructive pulmonary disease)  Patient with hx of COPD (diagnosed by PCP, no PFTs on file) with newly discovered lung and liver lesions concerning for cancer. Recently discharged with home oxygen 2-3L due to hypoxia on exertion.  Home meds include: albuterol and duonebs PRN, Ellipta daily, singulair 10mg daily    - continue O2 supplementation, wean as tolerated to maintain SpO2 > 90%  - duonebs q4h PRN  - Breo daily while inpatient  - continue singulair 10mg daily    Mass of middle lobe of right lung  Patients CT scans very concerning for lung cancer with mets. Liver biopsy taken 1/6. Still waiting for results      Hypothyroidism  Last TSH 9.853 on 1/2/20  Free T4 normal 0.81    - continue home levothyroxine 100mcg daily      Goals of care, counseling/discussion  Patient met with palliative care last admission and  goals of care established      Hypertension  - continue home amlodipine 5mg daily and losartan 25mg daily      Hyperlipidemia  - continue home atorvastatin 40mg daily        VTE Risk Mitigation (From admission, onward)         Ordered     heparin 25,000 units in dextrose 5% (100 units/ml) IV bolus from bag - ADDITIONAL PRN BOLUS - 60 units/kg  As needed (PRN)     Question:  Heparin Infusion Adjustment (DO NOT MODIFY ANSWER)  Answer:  [email or web address]\Images\Pharmacy\HeparinInfusions\heparin HIGH INTENSITY nomogram for [facility] [id number]    01/09/20 1805     heparin 25,000 units in dextrose 5% (100 units/ml) IV bolus from bag - ADDITIONAL PRN BOLUS - 30 units/kg  As needed (PRN)     Question:  Heparin Infusion Adjustment (DO NOT MODIFY ANSWER)  Answer:  \\[email or web address]\Images\Pharmacy\HeparinInfusions\heparin HIGH INTENSITY nomogram for [facility] [id number]    01/09/20 1805     Reason for No Pharmacological VTE Prophylaxis  Once     Question:  Reasons:  Answer:  IV Heparin w/in 24 hrs. Pre or Post-Op    01/09/20 1921     IP VTE HIGH RISK PATIENT  Once      01/09/20 1921     heparin 25,000 units in dextrose 5% 250 mL (100 units/mL) infusion HIGH INTENSITY nomogram - [facility]  Continuous     Question:  Heparin Infusion Adjustment (DO NOT MODIFY ANSWER)  Answer:  \\[email or web address]\Images\Pharmacy\HeparinInfusions\heparin HIGH INTENSITY nomogram for [facility] [id number].pdf    01/09/20 1805                      Andrea Crowell MD  Department of Hospital Medicine   [facility]

## 2020-01-10 NOTE — H&P
[facility] Hospital Medicine  History & Physical    Patient Name: Sharita Castañeda  MRN: [id number]  Admission Date: 1/9/2020  Attending Physician: Ana Fajardo MD   Primary Care Provider: Wallace Deng MD    [facility] Medicine Team: [facility] MED 2 Lissa Sagastume MD     Patient information was obtained from patient, relative(s), past medical records and ER records.     Subjective:     Principal Problem:Acute pain of right lower extremity    Chief Complaint:   Chief Complaint   Patient presents with    Foot Pain     right foot pain and swelling        HPI: Ms. Sharita Castañeda is a 68 yo F with hx of HTN, HLD, COPD (on home O2 at 2-3L), PUD, polyarthralgia, and PVD s/p R femoral artery stent (placed in 2006 at [facility]) who presents with acute onset of pain to her right foot starting 2 days ago while hospitalized for pneumonia. Patient was recently admitted 1/2- 1/8 for acute hypoxic resp failure and suspected post obstructive PNA. She was admitted before that on 12/26-12/28 for hemoptysis and CTA was concerning for primary lung malignancy with metastasis to the liver. During this most recent admission, her pneumonia was treated with vanc/zosyn then switched to doxycycline (end date: 1/9). IR performed liver biopsy on 1/6 and patient was discharged with outpatient follow-ups with pulmonology and oncology. Patient first reported foot pain while in the hospital the day prior to discharge (during which time her plavix had been held for her liver bx). Her plavix/ASA were restarted upon discharge and she took them both this morning 1/9. She reports her right foot to be cold and progressively more painful. The pain is constant, severe, and a persistent ache. She has not been able to ambulate without assistance at home 2/2 severe pain. Her foot was noted to be purple and blue at the toes, most noticeably at toes 4 and 5. She has not been able to warm up her foot despite using a heated blanket. It looked to be dusky today so  patient re-presented to the ER. She is able to feel the base of her foot and able to move her ankle and toes. Patient is a former smoker and quit several weeks ago when she was admitted for pneumonia. She denies any CP or SOB. No fevers/chills, HA, confusion, abdominal pain, n/v/d, leg swelling.       Past Medical History:   Diagnosis Date    Allergic rhinitis     COPD (chronic obstructive pulmonary disease)     Depression     GERD (gastroesophageal reflux disease)     Headache     Hyperlipidemia     Hypertension     Pneumonia     Polyarthralgia     Postablative hypothyroidism     hypothyroidism s/p OCASIO therapy    PVD (peripheral vascular disease)     Treated by Dr. Sim       Past Surgical History:   Procedure Laterality Date    CHOLECYSTECTOMY  1986    GALLBLADDER SURGERY  2006    HIP SURGERY  2005    Right hip fracture/surgery    PERIPHERAL ARTERIAL STENT GRAFT Right 2007    stent for femoral artery blockage       Review of patient's allergies indicates:  No Known Allergies    No current facility-administered medications on file prior to encounter.      Current Outpatient Medications on File Prior to Encounter   Medication Sig    amLODIPine (NORVASC) 5 MG tablet Take 1 tablet (5 mg total) by mouth once daily.    aspirin (ECOTRIN) 81 MG EC tablet Take 1 tablet (81 mg total) by mouth once daily.    atorvastatin (LIPITOR) 40 MG tablet TAKE 1 TABLET(40 MG) BY MOUTH EVERY DAY    benzonatate (TESSALON) 100 MG capsule Take 1 capsule (100 mg total) by mouth 3 (three) times daily as needed for Cough.    clopidogrel (PLAVIX) 75 mg tablet Take 1 tablet (75 mg total) by mouth once daily.    HYDROcodone-acetaminophen (NORCO) 7.5-325 mg per tablet Take 1 tablet by mouth every 8 (eight) hours as needed for Pain (Pain). Take 1/2 to 1 tablet by mouth 3 times daily as needed for pain    levothyroxine (SYNTHROID) 100 MCG tablet Take 1 tablet (100 mcg total) by mouth before breakfast.    losartan (COZAAR) 25  MG tablet Take 1 tablet (25 mg total) by mouth once daily.    montelukast (SINGULAIR) 10 mg tablet TAKE 1 TABLET(10 MG) BY MOUTH EVERY DAY    albuterol (PROAIR HFA) 90 mcg/actuation inhaler Inhale 2 puffs into the lungs every 6 (six) hours as needed for Wheezing.    albuterol-ipratropium (DUO-NEB) 2.5 mg-0.5 mg/3 mL nebulizer solution Use 3 mL inhaled every 4 hours as needed for shortness of breath or wheezing (ICD-10-CM: J44.1)    alendronate (FOSAMAX) 70 MG tablet TAKE 1 TABLET(70 MG) BY MOUTH EVERY 7 DAYS (Patient taking differently: every Monday. )    butalbital-acetaminophen-caffeine -40 mg (FIORICET, ESGIC) -40 mg per tablet Take 1 tablet by mouth every 12 (twelve) hours as needed for Pain or Headaches.    calcium-vitamin D3 (CALCIUM 500 + D) 500 mg(1,250mg) -200 unit per tablet Take 1 tablet by mouth 2 (two) times daily with meals. (Patient taking differently: Take 1 tablet by mouth once daily. )    doxycycline (VIBRA-TABS) 100 MG tablet Take 1 tablet (100 mg total) by mouth every 12 (twelve) hours. for 1 day    umeclidinium (INCRUSE ELLIPTA) 62.5 mcg/actuation DsDv Inhale 1 each into the lungs once daily. Controller     Family History     Problem Relation (Age of Onset)    COPD Mother    Cancer Mother    Heart attack Father (55), Sister (53)    Thyroid disease Mother        Tobacco Use    Smoking status: Current Every Day Smoker     Types: Cigarettes    Smokeless tobacco: Never Used    Tobacco comment: None since Dec 26   Substance and Sexual Activity    Alcohol use: No     Alcohol/week: 0.0 standard drinks    Drug use: Never    Sexual activity: Not on file     Review of Systems   Constitutional: Positive for activity change and chills. Negative for appetite change, fatigue, fever and unexpected weight change.   HENT: Negative for congestion, rhinorrhea, sore throat and trouble swallowing.    Eyes: Negative for visual disturbance.   Respiratory: Negative for cough, shortness of  breath and wheezing.    Cardiovascular: Negative for chest pain and leg swelling.   Gastrointestinal: Negative for abdominal distention, abdominal pain, constipation, diarrhea, nausea and vomiting.   Genitourinary: Negative for difficulty urinating, dysuria and hematuria.   Musculoskeletal: Positive for arthralgias and back pain. Negative for joint swelling, neck pain and neck stiffness.   Skin: Positive for color change (bluish colored toes in RLE) and wound (chronic healed wound on R dorsal foot). Negative for rash.   Neurological: Positive for weakness and numbness (R foot). Negative for dizziness, syncope and headaches.     Objective:     Vital Signs (Most Recent):  Temp: 97.8 °F (36.6 °C) (01/09/20 1717)  Pulse: 71 (01/09/20 1742)  Resp: 20 (01/09/20 1738)  BP: (!) 146/63 (01/09/20 2002)  SpO2: 95 % (01/09/20 1742) Vital Signs (24h Range):  Temp:  [97.8 °F (36.6 °C)-98.7 °F (37.1 °C)] 97.8 °F (36.6 °C)  Pulse:  [70-75] 71  Resp:  [18-20] 20  SpO2:  [94 %-100 %] 95 %  BP: (138-151)/(63-78) 146/63     Weight: 66.7 kg (147 lb)  Body mass index is 26.89 kg/m².    Physical Exam   Constitutional: She is oriented to person, place, and time. She appears well-developed and well-nourished. No distress.   HENT:   Head: Normocephalic and atraumatic.   Mouth/Throat: Oropharynx is clear and moist.   Eyes: Pupils are equal, round, and reactive to light. Conjunctivae and EOM are normal. No scleral icterus.   Neck: Normal range of motion. Neck supple. No JVD present. No tracheal deviation present.   Cardiovascular: Normal rate, regular rhythm, normal heart sounds and intact distal pulses.   No murmur heard.  Pulmonary/Chest: Effort normal. No respiratory distress. She has wheezes (minimal wheezing in upper lungs R > L). She has no rales.   Abdominal: Soft. Bowel sounds are normal. She exhibits no distension and no mass. There is no tenderness. There is no guarding.   Musculoskeletal: She exhibits no edema, tenderness or  deformity.   Right foot cool to touch up to ankles.   Pallor of the foot and toes. Blue discoloration to dorsal foot with chronic healed wound present.  Nonpalpable dorsalis pedis and posterior tibial pulses  Sensation intact to the R proximal base of foot and proximal ankle   Neurological: She is alert and oriented to person, place, and time.   Skin: Skin is dry. No rash noted. She is not diaphoretic. No erythema.   Psychiatric: She has a normal mood and affect. Her behavior is normal. Judgment and thought content normal.          Significant Labs:   CBC:   Recent Labs   Lab 01/08/20  0650 01/09/20  1711   WBC 19.67* 18.89*   HGB 10.3* 9.8*   HCT 32.7* 31.6*    170     CMP:   Recent Labs   Lab 01/08/20  0650 01/09/20  1711    134*   K 4.5 4.7    98   CO2 27 28   GLU 81 86   BUN 12 12   CREATININE 0.8 0.8   CALCIUM 7.9* 8.6*   PROT 5.1* 5.7*   ALBUMIN 2.1* 2.4*   BILITOT 0.6 0.6   ALKPHOS 92 96   AST 22 43*   ALT 25 30   ANIONGAP 8 8   EGFRNONAA >60.0 >60.0     All pertinent labs within the past 24 hours have been reviewed.    Significant Imaging: I have reviewed all pertinent imaging results/findings within the past 24 hours.    Assessment/Plan:     * Acute pain of right lower extremity  Patient with hx of PAD s/p right femoral stent (2006) presenting with cold ischemic foot x 2 days. Patient reports acute onset pain and coldness to R foot since previous hospitalization (1/2 -1/8) one day prior to discharge. Patient's plavix/ASA were held at that time for a liver biopsy (1/6), which was resumed upon discharge. Of note, patient has newly discovered lung and liver lesions concerning for cancer.    Plan:  - Vascular surgery consulted and following, apprec recs  - Venous U/S did not demonstrate any DVT in RLE  - U/S arterial doppler - diffuse slow flow throughout RLE arteries suggestive of proximal inflow stenosis. Bilateral ADELA WNL.  - heparin bolus + infusion with PTT goal 60-80  - continue home  ASA/plavix  - NPO at midnight, in case vascular surgery decides to intervene  - neurovascular checks q4h  - morphine 4mg q4h PRN for moderate pain and 8mg for severe pain (de-escalate as tolerated)      Hypothyroidism  Last TSH 9.853 on 1/2/20  Free T4 normal 0.81    - continue home levothyroxine 100mcg daily      COPD (chronic obstructive pulmonary disease)  Patient with hx of COPD (diagnosed by PCP, no PFTs on file) with newly discovered lung and liver lesions concerning for cancer. Recently discharged with home oxygen 2-3L due to hypoxia on exertion.  Home meds include: albuterol and duonebs PRN, Ellipta daily, singulair 10mg daily    - continue O2 supplementation, wean as tolerated to maintain SpO2 > 90%  - duonebs q4h PRN  - Breo daily while inpatient  - continue singulair 10mg daily    Hypertension  - continue home amlodipine 5mg daily and losartan 25mg daily      Hyperlipidemia  - continue home atorvastatin 40mg daily        VTE Risk Mitigation (From admission, onward)         Ordered     heparin 25,000 units in dextrose 5% (100 units/ml) IV bolus from bag - ADDITIONAL PRN BOLUS - 60 units/kg  As needed (PRN)     Question:  Heparin Infusion Adjustment (DO NOT MODIFY ANSWER)  Answer:  [email or web address]\epic\Images\Pharmacy\HeparinInfusions\heparin HIGH INTENSITY nomogram for [facility] [id number].pdf    01/09/20 1805     heparin 25,000 units in dextrose 5% (100 units/ml) IV bolus from bag - ADDITIONAL PRN BOLUS - 30 units/kg  As needed (PRN)     Question:  Heparin Infusion Adjustment (DO NOT MODIFY ANSWER)  Answer:  \[email or web address]\epic\Images\Pharmacy\HeparinInfusions\heparin HIGH INTENSITY nomogram for [facility] [id number].pdf    01/09/20 1805     Reason for No Pharmacological VTE Prophylaxis  Once     Question:  Reasons:  Answer:  IV Heparin w/in 24 hrs. Pre or Post-Op    01/09/20 1921     IP VTE HIGH RISK PATIENT  Once      01/09/20 1921     heparin 25,000 units in dextrose 5% 250 mL (100 units/mL) infusion HIGH INTENSITY nomogram -  [facility]  Continuous     Question:  Heparin Infusion Adjustment (DO NOT MODIFY ANSWER)  Answer:  \\[email or web address]\epic\Images\Pharmacy\HeparinInfusions\heparin HIGH INTENSITY nomogram for [facility] [id number].pdf    01/09/20 3905                   Lissa Sagastume MD  Department of Hospital Medicine   [facility]

## 2020-01-10 NOTE — ASSESSMENT & PLAN NOTE
Patient is 70 yo F with hx of PAD and right femoral stent who presents with a cold ischemic foot for past 3 days    Would recommend heparin gtt - goal PTT 60-80, therapeutic this morning  CTA showing evidence of a thrombus just distal to previously placed iliac stent as well as complete occlusion in distal popliteal vessel. Remains stable on exam. Tentative plan for angiogram of RLE with possible stent placement either today or tomorrow    Will continue to follow, please call with any questions or concerns.

## 2020-01-10 NOTE — PROGRESS NOTES
[facility]  Vascular Surgery  Progress Note    Patient Name: Sharita Castañeda  MRN: [id number]  Admission Date: 1/9/2020  Primary Care Provider: Wallace Deng MD    Subjective:     Interval History: NAEON. CTA showing evidence of a thrombus just distal to previously placed iliac stent as well as complete occlusion in distal popliteal vessel. Pain controlled on exam this morning. Therapeutic on heparin gtt. No changes on NV exam.     Post-Op Info:  * No surgery found *         Medications Prior to Admission   Medication Sig Dispense Refill Last Dose    amLODIPine (NORVASC) 5 MG tablet Take 1 tablet (5 mg total) by mouth once daily. 90 tablet 1 1/9/2020    aspirin (ECOTRIN) 81 MG EC tablet Take 1 tablet (81 mg total) by mouth once daily. 90 tablet 3 1/9/2020    atorvastatin (LIPITOR) 40 MG tablet TAKE 1 TABLET(40 MG) BY MOUTH EVERY DAY 90 tablet 3 1/9/2020    benzonatate (TESSALON) 100 MG capsule Take 1 capsule (100 mg total) by mouth 3 (three) times daily as needed for Cough. 30 capsule 0 1/9/2020    clopidogrel (PLAVIX) 75 mg tablet Take 1 tablet (75 mg total) by mouth once daily.   1/9/2020    HYDROcodone-acetaminophen (NORCO) 7.5-325 mg per tablet Take 1 tablet by mouth every 8 (eight) hours as needed for Pain (Pain). Take 1/2 to 1 tablet by mouth 3 times daily as needed for pain 21 tablet 0 1/9/2020    levothyroxine (SYNTHROID) 100 MCG tablet Take 1 tablet (100 mcg total) by mouth before breakfast. 90 tablet 3 1/9/2020    losartan (COZAAR) 25 MG tablet Take 1 tablet (25 mg total) by mouth once daily. 90 tablet 1 1/9/2020    montelukast (SINGULAIR) 10 mg tablet TAKE 1 TABLET(10 MG) BY MOUTH EVERY DAY 90 tablet 3 1/9/2020    albuterol (PROAIR HFA) 90 mcg/actuation inhaler Inhale 2 puffs into the lungs every 6 (six) hours as needed for Wheezing. 1 Inhaler 5     albuterol-ipratropium (DUO-NEB) 2.5 mg-0.5 mg/3 mL nebulizer solution Use 3 mL inhaled every 4 hours as needed for shortness of breath or  wheezing (ICD-10-CM: J44.1) 540 mL 1     alendronate (FOSAMAX) 70 MG tablet TAKE 1 TABLET(70 MG) BY MOUTH EVERY 7 DAYS (Patient taking differently: every Monday. ) 12 tablet 0     butalbital-acetaminophen-caffeine -40 mg (FIORICET, ESGIC) -40 mg per tablet Take 1 tablet by mouth every 12 (twelve) hours as needed for Pain or Headaches. 30 tablet 1 Unknown    calcium-vitamin D3 (CALCIUM 500 + D) 500 mg(1,250mg) -200 unit per tablet Take 1 tablet by mouth 2 (two) times daily with meals. (Patient taking differently: Take 1 tablet by mouth once daily. ) 180 tablet 3     [] doxycycline (VIBRA-TABS) 100 MG tablet Take 1 tablet (100 mg total) by mouth every 12 (twelve) hours. for 1 day 1 tablet 0 Unknown    umeclidinium (INCRUSE ELLIPTA) 62.5 mcg/actuation DsDv Inhale 1 each into the lungs once daily. Controller 30 each 2        Review of patient's allergies indicates:  No Known Allergies    Past Medical History:   Diagnosis Date    Allergic rhinitis     COPD (chronic obstructive pulmonary disease)     Depression     GERD (gastroesophageal reflux disease)     Headache     Hyperlipidemia     Hypertension     Pneumonia     Polyarthralgia     Postablative hypothyroidism     hypothyroidism s/p OCASIO therapy    PVD (peripheral vascular disease)     Treated by Dr. Sim     Past Surgical History:   Procedure Laterality Date    CHOLECYSTECTOMY      GALLBLADDER SURGERY  2006    HIP SURGERY  2005    Right hip fracture/surgery    PERIPHERAL ARTERIAL STENT GRAFT Right 2007    stent for femoral artery blockage     Family History     Problem Relation (Age of Onset)    COPD Mother    Cancer Mother    Heart attack Father (55), Sister (53)    Thyroid disease Mother        Tobacco Use    Smoking status: Current Every Day Smoker     Types: Cigarettes    Smokeless tobacco: Never Used    Tobacco comment: None since Dec 26   Substance and Sexual Activity    Alcohol use: No     Alcohol/week: 0.0  standard drinks    Drug use: Never    Sexual activity: Not on file     Review of Systems   All other systems reviewed and are negative.    Objective:     Vital Signs (Most Recent):  Temp: 97.1 °F (36.2 °C) (01/10/20 0730)  Pulse: 79 (01/10/20 0730)  Resp: 20 (01/10/20 0730)  BP: 131/61 (01/10/20 0730)  SpO2: (!) 84 % (01/10/20 0730) Vital Signs (24h Range):  Temp:  [97.1 °F (36.2 °C)-98.7 °F (37.1 °C)] 97.1 °F (36.2 °C)  Pulse:  [70-87] 79  Resp:  [18-20] 20  SpO2:  [84 %-100 %] 84 %  BP: (131-151)/(61-78) 131/61     Weight: 62.1 kg (136 lb 14.5 oz)  Body mass index is 25.04 kg/m².    Physical Exam   Constitutional: She is oriented to person, place, and time. She appears well-developed and well-nourished. No distress.   HENT:   Head: Normocephalic and atraumatic.   Eyes: Pupils are equal, round, and reactive to light. EOM are normal.   Neck: Normal range of motion. Neck supple.   Cardiovascular: Normal rate and regular rhythm.   Right distal foot cold and blue, chronic wound scar present  No DP or PT signals in her right foot  Biphasic right popliteal  Triphasic right femoral  Biphasic left DP and PT  2+ palpable left femoral   Pulmonary/Chest: Effort normal. No respiratory distress.   On 3L O2   Abdominal: Soft. She exhibits no distension.   Musculoskeletal: Normal range of motion.   Sensation and motor function intact in BLE, however decreased sensation in right toes    Neurological: She is alert and oriented to person, place, and time.   Skin: Skin is warm and dry. She is not diaphoretic.   Psychiatric: She has a normal mood and affect. Her behavior is normal. Judgment and thought content normal.   Nursing note and vitals reviewed.      Significant Labs:  CBC:   Recent Labs   Lab 01/10/20  0152   WBC 19.72*   RBC 3.98*   HGB 10.7*   HCT 35.0*      MCV 88   MCH 26.9*   MCHC 30.6*     CMP:   Recent Labs   Lab 01/10/20  0152   GLU 60*   CALCIUM 8.6*   ALBUMIN 2.3*   PROT 5.6*   *   K 4.4   CO2 27   CL  97   BUN 10   CREATININE 0.8   ALKPHOS 124   ALT 29   AST 44*   BILITOT 0.6       Significant Diagnostics:  I have reviewed all pertinent imaging results/findings within the past 24 hours.    Assessment/Plan:     * Acute pain of right lower extremity  Patient is 68 yo F with hx of PAD and right femoral stent who presents with a cold ischemic foot for past 3 days    - Would recommend heparin gtt - goal PTT 60-80, therapeutic this morning  - CTA showing evidence of a thrombus just distal to previously placed iliac stent as well as complete occlusion in distal popliteal vessel. Remains stable on exam. Tentative plan for right femoral cutdown and embolectomy  - Will continue to follow, please call with any questions or concerns.        Baylee Brewer MD  Vascular Surgery  [facility]

## 2020-01-10 NOTE — SUBJECTIVE & OBJECTIVE
(Not in a hospital admission)    Review of patient's allergies indicates:  No Known Allergies    Past Medical History:   Diagnosis Date    Allergic rhinitis     COPD (chronic obstructive pulmonary disease)     Depression     GERD (gastroesophageal reflux disease)     Headache     Hyperlipidemia     Hypertension     Pneumonia     Polyarthralgia     Postablative hypothyroidism     hypothyroidism s/p OCASIO therapy    PVD (peripheral vascular disease)     Treated by Dr. Sim     Past Surgical History:   Procedure Laterality Date    CHOLECYSTECTOMY  1986    GALLBLADDER SURGERY  2006    HIP SURGERY  2005    Right hip fracture/surgery    PERIPHERAL ARTERIAL STENT GRAFT Right 2007    stent for femoral artery blockage     Family History     Problem Relation (Age of Onset)    COPD Mother    Cancer Mother    Heart attack Father (55), Sister (53)    Thyroid disease Mother        Tobacco Use    Smoking status: Current Every Day Smoker     Types: Cigarettes    Smokeless tobacco: Never Used    Tobacco comment: None since Dec 26   Substance and Sexual Activity    Alcohol use: No     Alcohol/week: 0.0 standard drinks    Drug use: Never    Sexual activity: Not on file     Review of Systems   All other systems reviewed and are negative.    Objective:     Vital Signs (Most Recent):  Temp: 97.8 °F (36.6 °C) (01/09/20 1717)  Pulse: 71 (01/09/20 1742)  Resp: 20 (01/09/20 1738)  BP: (!) 151/78 (01/09/20 1717)  SpO2: 95 % (01/09/20 1742) Vital Signs (24h Range):  Temp:  [97.8 °F (36.6 °C)-98.7 °F (37.1 °C)] 97.8 °F (36.6 °C)  Pulse:  [70-75] 71  Resp:  [18-20] 20  SpO2:  [94 %-100 %] 95 %  BP: (138-151)/(70-78) 151/78     Weight: 66.7 kg (147 lb)  Body mass index is 26.89 kg/m².    Physical Exam   Constitutional: She is oriented to person, place, and time. She appears well-developed and well-nourished. No distress.   HENT:   Head: Normocephalic and atraumatic.   Eyes: Pupils are equal, round, and reactive to light.  EOM are normal.   Neck: Normal range of motion. Neck supple.   Cardiovascular: Normal rate and regular rhythm.   Right distal foot cold and blue, chronic wound scar present  No DP or PT signals in her right foot  Biphasic right popliteal  Triphasic right femoral  Biphasic left DP and PT  2+ palpable left femoral   Pulmonary/Chest: Effort normal. No respiratory distress.   On 3L O2   Abdominal: Soft. She exhibits no distension.   Musculoskeletal: Normal range of motion.   Neurological: She is alert and oriented to person, place, and time.   Skin: Skin is warm and dry. She is not diaphoretic.   Psychiatric: She has a normal mood and affect. Her behavior is normal. Judgment and thought content normal.   Nursing note and vitals reviewed.      Significant Labs:  CBC:   Recent Labs   Lab 01/09/20  1711   WBC 18.89*   RBC 3.63*   HGB 9.8*   HCT 31.6*      MCV 87   MCH 27.0   MCHC 31.0*     CMP:   Recent Labs   Lab 01/09/20  1711   GLU 86   CALCIUM 8.6*   ALBUMIN 2.4*   PROT 5.7*   *   K 4.7   CO2 28   CL 98   BUN 12   CREATININE 0.8   ALKPHOS 96   ALT 30   AST 43*   BILITOT 0.6       Significant Diagnostics:  I have reviewed all pertinent imaging results/findings within the past 24 hours.

## 2020-01-10 NOTE — HPI
Ms. Sharita Castañeda is a 70 yo F with hx of HTN, HLD, COPD (on home O2 at 2-3L), PUD, polyarthralgia, and PVD s/p R femoral artery stent (placed in 2006 at [facility]) who presents with acute onset of pain to her right foot starting 2 days ago while hospitalized for pneumonia. Patient was recently admitted 1/2- 1/8 for acute hypoxic resp failure and suspected post obstructive PNA. She was admitted before that on 12/26-12/28 for hemoptysis and CTA was concerning for primary lung malignancy with metastasis to the liver. During this most recent admission, her pneumonia was treated with vanc/zosyn then switched to doxycycline (end date: 1/9). IR performed liver biopsy on 1/6 and patient was discharged with outpatient follow-ups with pulmonology and oncology. Patient first reported foot pain while in the hospital the day prior to discharge (during which time her plavix had been held for her liver bx). Her plavix/ASA were restarted upon discharge and she took them both this morning 1/9. She reports her right foot to be cold and progressively more painful. The pain is constant, severe, and a persistent ache. She has not been able to ambulate without assistance at home 2/2 severe pain. Her foot was noted to be purple and blue at the toes, most noticeably at toes 4 and 5. She has not been able to warm up her foot despite using a heated blanket. It looked to be dusky today so patient re-presented to the ER. She is able to feel the base of her foot and able to move her ankle and toes. Patient is a former smoker and quit several weeks ago when she was admitted for pneumonia. She denies any CP or SOB. No fevers/chills, HA, confusion, abdominal pain, n/v/d, leg swelling.

## 2020-01-10 NOTE — PLAN OF CARE
I have seen the patient, discussed the resident's history and physical, assessment and plan. I have personally interviewed and examined the patient at bedside.  In summary:    Ms. Sharita Castañeda is a 69 y.o. female with PVD s/p arterial stent of the right leg, who presents to the ED for evaluation of right foot pain that started 2 days prior to re-admission, while she was still admitted to the hospital.  Physical exam notable for a cold foot.  Check ESR, CRP, Procal, Lactic.  Vascular Surgery consulted.  Neurovascular checks q4h.  Plan for further imaging and Heparin gtt.  Continue Aspirin/Plavix for now. NPO at midnight in case of Vascular intervention.    Full H&P by team intern to follow.    Tere Reynoso MD  [facility] Medicine  Cell:  [phone]  Spectra:  [phone]

## 2020-01-10 NOTE — SUBJECTIVE & OBJECTIVE
Interval History: Patient readmitted last night with signs of ischemia of right lower extremity. She is now having abdominal pain out of proportion to exam. Will order a CTA abdomen and pelvis. She is in therapeutic range with heparin drip. States she has regained more movement in her toes than earlier. Foot is still cold to the touch with no palpable pulses. Vascular following along. Planning to take to OR tomorrow.     Review of Systems  Objective:     Vital Signs (Most Recent):  Temp: 96.8 °F (36 °C) (01/10/20 1136)  Pulse: 70 (01/10/20 1448)  Resp: 20 (01/10/20 0921)  BP: (!) 112/53 (01/10/20 1448)  SpO2: (!) 94 % (01/10/20 1136) Vital Signs (24h Range):  Temp:  [96.8 °F (36 °C)-98.7 °F (37.1 °C)] 96.8 °F (36 °C)  Pulse:  [70-87] 70  Resp:  [16-20] 20  SpO2:  [82 %-100 %] 94 %  BP: ()/(49-78) 112/53     Weight: 62.1 kg (136 lb 14.5 oz)  Body mass index is 25.04 kg/m².    Intake/Output Summary (Last 24 hours) at 1/10/2020 1456  Last data filed at 1/10/2020 0500  Gross per 24 hour   Intake 90.78 ml   Output --   Net 90.78 ml      Physical Exam   Constitutional: She is oriented to person, place, and time. She appears well-developed and well-nourished. No distress.   HENT:   Head: Normocephalic and atraumatic.   Mouth/Throat: Oropharynx is clear and moist.   Eyes: Pupils are equal, round, and reactive to light. Conjunctivae and EOM are normal. No scleral icterus.   Neck: Normal range of motion. Neck supple. No JVD present. No tracheal deviation present.   Cardiovascular: Normal rate, regular rhythm, normal heart sounds and intact distal pulses.   No murmur heard.  Pulmonary/Chest: Effort normal. No respiratory distress. Wheezes: minimal wheezing in upper lungs R > L. She has no rales.   On 4l nasal sandra. Patient breathing comfortably     Decreased breath sounds throughout right lung.    Abdominal: Soft. Bowel sounds are normal. She exhibits no distension and no mass. There is no tenderness. There is no  guarding.   Musculoskeletal: She exhibits no edema, tenderness or deformity.   Right foot cool to touch up to ankles.   Pallor of the foot and toes. Blue discoloration to dorsal foot with chronic healed wound present.  Nonpalpable dorsalis pedis and posterior tibial pulses  Sensation intact to the R proximal base of foot and proximal ankle.   Able to wiggle all toes and move ankle.    Neurological: She is alert and oriented to person, place, and time.   Skin: Skin is dry. No rash noted. She is not diaphoretic. No erythema.   Psychiatric: She has a normal mood and affect. Her behavior is normal. Judgment and thought content normal.       Significant Labs:   Recent Results (from the past 48 hour(s))   APTT    Collection Time: 01/09/20  5:11 PM   Result Value Ref Range    aPTT 21.4 21.0 - 32.0 sec   Protime-INR    Collection Time: 01/09/20  5:11 PM   Result Value Ref Range    Prothrombin Time 10.2 9.0 - 12.5 sec    INR 1.0 0.8 - 1.2   CBC auto differential    Collection Time: 01/09/20  5:11 PM   Result Value Ref Range    WBC 18.89 (H) 3.90 - 12.70 K/uL    RBC 3.63 (L) 4.00 - 5.40 M/uL    Hemoglobin 9.8 (L) 12.0 - 16.0 g/dL    Hematocrit 31.6 (L) 37.0 - 48.5 %    Mean Corpuscular Volume 87 82 - 98 fL    Mean Corpuscular Hemoglobin 27.0 27.0 - 31.0 pg    Mean Corpuscular Hemoglobin Conc 31.0 (L) 32.0 - 36.0 g/dL    RDW 18.9 (H) 11.5 - 14.5 %    Platelets 170 150 - 350 K/uL    MPV 10.6 9.2 - 12.9 fL    Immature Granulocytes 0.9 (H) 0.0 - 0.5 %    Gran # (ANC) 15.1 (H) 1.8 - 7.7 K/uL    Immature Grans (Abs) 0.17 (H) 0.00 - 0.04 K/uL    Lymph # 2.2 1.0 - 4.8 K/uL    Mono # 1.2 (H) 0.3 - 1.0 K/uL    Eos # 0.2 0.0 - 0.5 K/uL    Baso # 0.02 0.00 - 0.20 K/uL    nRBC 0 0 /100 WBC    Gran% 79.9 (H) 38.0 - 73.0 %    Lymph% 11.5 (L) 18.0 - 48.0 %    Mono% 6.5 4.0 - 15.0 %    Eosinophil% 1.1 0.0 - 8.0 %    Basophil% 0.1 0.0 - 1.9 %    Differential Method Automated    Comprehensive metabolic panel    Collection Time: 01/09/20  5:11  PM   Result Value Ref Range    Sodium 134 (L) 136 - 145 mmol/L    Potassium 4.7 3.5 - 5.1 mmol/L    Chloride 98 95 - 110 mmol/L    CO2 28 23 - 29 mmol/L    Glucose 86 70 - 110 mg/dL    BUN, Bld 12 8 - 23 mg/dL    Creatinine 0.8 0.5 - 1.4 mg/dL    Calcium 8.6 (L) 8.7 - 10.5 mg/dL    Total Protein 5.7 (L) 6.0 - 8.4 g/dL    Albumin 2.4 (L) 3.5 - 5.2 g/dL    Total Bilirubin 0.6 0.1 - 1.0 mg/dL    Alkaline Phosphatase 96 55 - 135 U/L    AST 43 (H) 10 - 40 U/L    ALT 30 10 - 44 U/L    Anion Gap 8 8 - 16 mmol/L    eGFR if African American >60.0 >60 mL/min/1.73 m^2    eGFR if non African American >60.0 >60 mL/min/1.73 m^2   Procalcitonin    Collection Time: 01/09/20  5:11 PM   Result Value Ref Range    Procalcitonin 0.31 (H) <0.25 ng/mL   C-reactive protein    Collection Time: 01/09/20  5:11 PM   Result Value Ref Range    CRP 98.9 (H) 0.0 - 8.2 mg/L   Sedimentation rate    Collection Time: 01/09/20  7:51 PM   Result Value Ref Range    Sed Rate 32 0 - 36 mm/Hr   Lactic acid, plasma    Collection Time: 01/09/20  8:04 PM   Result Value Ref Range    Lactate (Lactic Acid) 1.4 0.5 - 2.2 mmol/L   Comprehensive Metabolic Panel (CMP)    Collection Time: 01/10/20  1:52 AM   Result Value Ref Range    Sodium 135 (L) 136 - 145 mmol/L    Potassium 4.4 3.5 - 5.1 mmol/L    Chloride 97 95 - 110 mmol/L    CO2 27 23 - 29 mmol/L    Glucose 60 (L) 70 - 110 mg/dL    BUN, Bld 10 8 - 23 mg/dL    Creatinine 0.8 0.5 - 1.4 mg/dL    Calcium 8.6 (L) 8.7 - 10.5 mg/dL    Total Protein 5.6 (L) 6.0 - 8.4 g/dL    Albumin 2.3 (L) 3.5 - 5.2 g/dL    Total Bilirubin 0.6 0.1 - 1.0 mg/dL    Alkaline Phosphatase 124 55 - 135 U/L    AST 44 (H) 10 - 40 U/L    ALT 29 10 - 44 U/L    Anion Gap 11 8 - 16 mmol/L    eGFR if African American >60.0 >60 mL/min/1.73 m^2    eGFR if non African American >60.0 >60 mL/min/1.73 m^2   Magnesium    Collection Time: 01/10/20  1:52 AM   Result Value Ref Range    Magnesium 2.1 1.6 - 2.6 mg/dL   Phosphorus    Collection Time: 01/10/20   1:52 AM   Result Value Ref Range    Phosphorus 3.1 2.7 - 4.5 mg/dL   CBC with Automated Differential    Collection Time: 01/10/20  1:52 AM   Result Value Ref Range    WBC 19.72 (H) 3.90 - 12.70 K/uL    RBC 3.98 (L) 4.00 - 5.40 M/uL    Hemoglobin 10.7 (L) 12.0 - 16.0 g/dL    Hematocrit 35.0 (L) 37.0 - 48.5 %    Mean Corpuscular Volume 88 82 - 98 fL    Mean Corpuscular Hemoglobin 26.9 (L) 27.0 - 31.0 pg    Mean Corpuscular Hemoglobin Conc 30.6 (L) 32.0 - 36.0 g/dL    RDW 19.4 (H) 11.5 - 14.5 %    Platelets 202 150 - 350 K/uL    MPV 11.5 9.2 - 12.9 fL    Immature Granulocytes 1.0 (H) 0.0 - 0.5 %    Gran # (ANC) 15.8 (H) 1.8 - 7.7 K/uL    Immature Grans (Abs) 0.20 (H) 0.00 - 0.04 K/uL    Lymph # 2.1 1.0 - 4.8 K/uL    Mono # 1.3 (H) 0.3 - 1.0 K/uL    Eos # 0.3 0.0 - 0.5 K/uL    Baso # 0.04 0.00 - 0.20 K/uL    nRBC 0 0 /100 WBC    Gran% 80.0 (H) 38.0 - 73.0 %    Lymph% 10.4 (L) 18.0 - 48.0 %    Mono% 6.8 4.0 - 15.0 %    Eosinophil% 1.6 0.0 - 8.0 %    Basophil% 0.2 0.0 - 1.9 %    Differential Method Automated    APTT    Collection Time: 01/10/20  1:52 AM   Result Value Ref Range    aPTT 83.4 (H) 21.0 - 32.0 sec   APTT    Collection Time: 01/10/20  5:04 AM   Result Value Ref Range    aPTT 63.2 (H) 21.0 - 32.0 sec   PT/INR    Collection Time: 01/10/20  5:04 AM   Result Value Ref Range    Prothrombin Time 10.4 9.0 - 12.5 sec    INR 1.0 0.8 - 1.2   APTT    Collection Time: 01/10/20 11:32 AM   Result Value Ref Range    aPTT 78.7 (H) 21.0 - 32.0 sec         Significant Imaging:   Imaging Results           CTA Runoff ABD PEL Bilat Lower Ext (Final result)  Result time 01/09/20 22:55:58   Procedure changed from CTA Abdomen and Pelvis     Final result by Gagandeep Sellers MD (01/09/20 22:55:58)                 Impression:      Extensive atherosclerosis of the abdominal aorta and its branches.  Near complete thrombosis of the proximal right common iliac artery distal to presumed aortoiliac stent graft with restoration of flow in  the distal common iliac and femoral arteries.  At least partial occlusion of the right deep femoral artery and occlusion of the right popliteal artery with diminished 2 vessel runoff to the right lower extremity.  Vascular surgery evaluation is recommended.    Severe narrowing of the left common iliac artery with patent but diminutive three-vessel runoff to the left lower extremity.    Large right middle lobe pulmonary mass with extensive metastatic disease within the abdomen and soft tissues including the left axilla, liver, spleen, left adrenal gland, back and lower extremity musculature, and numerous peritoneal implants.  Suggest correlation with percutaneous sampling for tissue diagnosis.    Severe stenosis and intermittent occlusion of the right renal artery with atrophic right kidney and multiple areas of decreased enhancement suggestive of renal infarcts.  Suggest attention on follow-up to exclude renal masses.    Severe narrowing versus occlusion of the CARLOS.    Additional findings as above.    This report was flagged in Epic as abnormal.    COMMUNICATION  This critical result was discovered/received at 22:00 on 01/09/2020.  The critical information above was relayed directly by Dr. Drew to Dr. Sagastume with [facility] medicine at 22:20 on 01/09/2020.    Electronically signed by resident: Zan Drew MD  Date:    01/09/2020  Time:    21:22    Electronically signed by: Gagandeep Sellers MD  Date:    01/09/2020  Time:    22:55             Narrative:    EXAMINATION:  CTA RUNOFF ABD PEL BILAT LOWER EXT    CLINICAL HISTORY:  AV replace, percutaneous planning, aorta/iliofemoral;    TECHNIQUE:  CT angiogram of the abdomen/pelvis with lower extremity runoff using 125 mL of  Omnipaque 350 IV contrast.  Oral contrast was not administered.  Multiplanar reconstructions performed including MIP reformats.  Delayed phase images also obtained through the bilateral lower extremities.    COMPARISON:  CTA chest 01/02/2020.  Bilateral  lower extremity arterial ultrasound 01/09/2020.    FINDINGS:  Lower chest:    Redemonstration of large right middle lobe mass extending to the right hilum.  Stable filling defect within the superior right pulmonary vein extending to its ostium suggestive of venous invasion.  1.1 cm pulmonary nodule in the left lower lobe.  Small right pleural effusion, slightly increased since recent exam dated 01/02/2020.  Left axillary 2.5 cm soft tissue mass (series 3, image 1).    Abdomen:    The liver demonstrate multiple hypoattenuating masses with subtle peripheral enhancement, with the largest measuring 4 cm in the right hepatic lobe (series 3, image 87).  7.9 cm left adrenal heterogenous mass (series 3, image 73, closely approximated to the adjacent spleen, superior pole of the left kidney, and pancreatic tail.  4.7 cm hypodense peripherally enhancing splenic lesion.  Numerous hypodense peripherally enhancing soft tissue masses of variable sizes are noted within the bilateral lower extremity musculature, abdominal wall muscles, bilateral psoas muscles and gluteal muscles.  The largest mass is in the right gluteal region and measures approximately 5.3 cm in size (series 3, image 161).  Abnormal soft tissue masses are present in both lower extremities, left side greater than right.  Soft tissue lesion at the anterolateral aspect of the right lower thigh is in close approximation to the femoral shaft which makes osseous invasion difficult to entirely exclude.  This mass measures up to 4 cm in maximum axial dimension (series 3, image 295).  A similar lesion is closely approximated to the left femoral shaft (series 3, image 284).  Multiple peritoneal deposits with the largest measuring 3.1 cm inferior to the 1st part of the duodenum (series 3, image 96).  These lesions are concerning for diffuse metastatic disease.    The gallbladder is surgically absent.  The common bile duct is dilated measuring up to 1.4 cm.  The pancreas  appear unremarkable with no evidence of pancreatic ductal dilatation.  The right adrenal gland is unremarkable.    The right kidney is atrophic and demonstrate heterogenous enhancement with areas of significant hypoattenuation and no surrounding inflammatory change.  Findings are likely related to right renal infarcts.  Suggest attention on follow-up to exclude renal masses.    The left kidney is slightly small in size with homogeneous enhancement.  There is a subcentimeter hypodensity which may represent a cyst.  No hydronephrosis or hydroureter.  No nephrolithiasis.  Bladder is well distended and appear unremarkable.    Multiple calcified fibroids.    No evidence of bowel obstruction or inflammation.  No free intraperitoneal air or free fluid.    Abdominopelvic Vasculature:    Extensive calcific and noncalcific atherosclerotic plaque of the abdominal aorta and its branches.  No aortic aneurysm.  Significant calcific atherosclerotic plaque at the distal abdominal aorta at the bifurcation with linear intraluminal foci which could reflect graft material.  The proximal right common iliac artery is not opacified which could be due to thrombosis.  There is opacification of the remaining right common iliac artery however to severely narrowed.  Severe narrowing of the left common iliac artery which remains patent along its course.    The celiac artery and SMA are patent.  Non opacification of the CARLOS which could be due to severe narrowing versus occlusion.  The left renal arteries patent with significant calcified plaque at its origin.  Severe stenosis and intermittent occlusion of the right renal artery.    Right lower extremity:    The right common femoral artery, superficial and deep femoral arteries are small in caliber.  The deep femoral artery appears to be at least partially occluded.  The proximal popliteal artery is patent with loss of contrast opacification along its course distally.  Diminished 2 vessel runoff  to the right lower extremity with intermittent stenosis.  Right peroneal artery may be occluded.    Left lower extremity:    The left common femoral artery, superficial and deep femoral arteries are patent with small caliber.  The left popliteal, anterior and posterior tibial arteries are patent.  Left peroneal artery is patent but diminutive.    Bones: Bones are diffusely demineralized.  No displaced fracture.  Right femoral hardware.  Abnormal appearance of the iliac bones which could be due to diffuse demineralization.                               US Lower Extremity Veins Right (Final result)  Result time 01/09/20 19:58:46    Final result by Jens Chaudhari MD (01/09/20 19:58:46)                 Impression:      No evidence of deep venous thrombosis in the right lower extremity.    Electronically signed by resident: Zan Drew MD  Date:    01/09/2020  Time:    19:45    Electronically signed by: Jens Chaudhari  Date:    01/09/2020  Time:    19:58             Narrative:    EXAMINATION:  US LOWER EXTREMITY VEINS RIGHT    CLINICAL HISTORY:  Pain in right leg    TECHNIQUE:  Duplex and color flow Doppler evaluation and graded compression of the right lower extremity veins was performed.    COMPARISON:  None    FINDINGS:  Right thigh veins: The common femoral, femoral, popliteal, upper greater saphenous, and deep femoral veins are patent and free of thrombus. The veins are normally compressible and have normal phasic flow and augmentation response.    Right calf veins: The visualized calf veins are patent.    Contralateral CFV: The contralateral (left) common femoral vein is patent and free of thrombus.    Miscellaneous: None                                US Lower Extrem Arteries Bilat with ADELA (xpd) (Final result)  Result time 01/09/20 20:02:32    Final result by Jens Chaudhari MD (01/09/20 20:02:32)                 Impression:      Diffuse slow flow and monophasic waveforms throughout the right lower extremity  arteries suggestive of proximal inflow stenosis.    The right peroneal artery was not visualized.    Bilateral ankle brachial indices are within normal limits.    Left Baker's cyst.    This report was flagged in Epic as abnormal.    Electronically signed by resident: Zan Drew MD  Date:    01/09/2020  Time:    19:35    Electronically signed by: Jens Chaudhari  Date:    01/09/2020  Time:    20:02             Narrative:    EXAMINATION:  US ARTERIAL LOWER EXTREMITY BILAT WITH ADELA (XPD)    CLINICAL HISTORY:  right leg pain, cool to touch, slight discoloration. non palpable pulses;    TECHNIQUE:  Ultrasound arterial lower extremity bilateral with ADELA.    COMPARISON:  None    FINDINGS:  Right lower extremity.    CFA : 68 cm/sec, monophasic    Deep femoral artery: 24 centimeter/seconds, monophasic.    Prox SFA: 48 cm/sec, monophasic    Mid SFA : 52 cm/sec, monophasic    Dist SFA : 43 cm/sec, monophasic    Proximal pop A: : 19 cm/sec, monophasic    Distal popliteal artery: 29 centimeter/second, monophasic.    EZ: 8 cm/sec, monophasic    The peroneal artery was not visualized.    PTA: 9 cm/sec, monophasic    Diffuse monophasic waveforms in the right lower extremity indicating slow flow and proximal inflow stenosis.    Right ADELA: 1.1    Left lower extremity    CFA: 137 cm/sec, triphasic    Deep femoral artery: 118 centimeter/second, triphasic.    Prox SFA: 125 cm/sec, triphasic    Mid SFA: 127 cm/sec, triphasic    Dist SFA: 86 cm/sec, triphasic    Proximal pop A: 78 cm/sec, triphasic    Distal popliteal artery: 83 centimeter/seconds, triphasic.    EZ: 84 cm/sec, monophasic    Peroneal A: 67 cm/sec, monophasic    PTA: 103 cm/sec, monophasic    Left ADELA: 1.0    Left Baker's cyst measuring 3.6 x 1 x 2.4 cm.

## 2020-01-10 NOTE — HPI
Pt is a 68 yo F with history of HTN, PAD s/p right femoral artery stent in 2006(?), COPD on home O2, and tobacco abuse, as well as recently discovered liver and lung lesions concerning for cancer who presents to ED today complaining of right foot coldness and pain for past 3 days. This started 2 days prior to her discharge yesterday during which time her plavix was being held for a liver bx. Her plavix and ASA were restarted yesterday after she was discharged, and she took them both this morning. She reports her right foot and ankle are cold no matter what she does, and it's becoming more painful. She was first diagnosed with PAD when she developed a non-healing right foot wound in 2006, and eventually required stent placement to revascularize her foot. She denies any wound issues since then, and is usually ambulatory enough to perform her own grocery shopping without any discomfort. She was admitted most recently after presenting with hemoptysis and eventually discovering her new lung and liver masses. She also was diagnosed with post-obstructive pneumonia.    Today she reports she can feel the base of her foot, and she is able to move her ankle and toes.

## 2020-01-10 NOTE — HOSPITAL COURSE
Patient readmitted 1/9 with signs of ischemia of right lower extremity. Foot was cold but she still had movement. Vascular surgery immediately consulted and followed along. Heparin drip immediately started at time of admission. Took her to the OR 1/11 for R iliofemoral embolectomy, SFA interposition bypass with vein, iliac stent angioplasty on 1/11/20. She went to the SICU after surgery. Patient was there for 2 days and required blood transfusions. She regained more movement in her leg and got pulses back. Foot became warm as well. Patient stable enough for the floor on 1/14. However she was found to have resp distress and hypotension. She was able to become stable again. Oncology saw patient while she was her and determined she would not be a good candidate for chem due to her status. Palliative care consulted as well and talked to the patient and family about hospice. Ultimate decision made to transfer patient to inpatient hospice. Patient being discharged to the facility on 1/15.

## 2020-01-10 NOTE — NURSING
Call received from monitor tech stated pt was in afib with sustained rate in 150's. Pt in pain at the time. md on call notified, order given for stat ekg. Pain med given and ekg done. ekg shows sr with rate of 90. md on call came to assess pt. Pain much better. Will continue to monitor.

## 2020-01-10 NOTE — PLAN OF CARE
Patient lives at home with daughter. Current patient of [facility] set-up at time of last Hospital discharge on 1/8. Patient has home nebs and oxygen ordered at last stay. Requesting rollator be ordered at d/c. Will continue to monitor for discharge needs.     01/10/20 1411   Discharge Assessment   Assessment Type Discharge Planning Assessment   Confirmed/corrected address and phone number on facesheet? Yes   Assessment information obtained from? Patient   Expected Length of Stay (days)   (3)   Communicated expected length of stay with patient/caregiver yes   Prior to hospitilization cognitive status: Alert/Oriented   Prior to hospitalization functional status: Independent   Current cognitive status: Alert/Oriented   Current Functional Status: Independent   Facility Arrived From: Home   Lives With child(alba), adult;grandchild(alba)   Able to Return to Prior Arrangements yes   Is patient able to care for self after discharge? Yes   Who are your caregiver(s) and their phone number(s)?   (Shruti Castañeda (Daughter) [phone])   Patient's perception of discharge disposition home or selfcare   Readmission Within the Last 30 Days no previous admission in last 30 days   Patient currently being followed by outpatient case management? No   Patient currently receives any other outside agency services? No   Equipment Currently Used at Home walker, rolling;bedside commode;wheelchair;cane, straight;rollator   Do you have any problems affording any of your prescribed medications? No   Is the patient taking medications as prescribed? yes   Does the patient have transportation home? Yes   Transportation Anticipated family or friend will provide   Does the patient receive services at [facility]? No   Discharge Plan A Home with family;Home Health   Discharge Plan B Home with family;Home Health   DME Needed Upon Discharge  rollator   Patient/Family in Agreement with Plan yes   Readmission Questionnaire   Have you felt down,  depressed, or hopeless? 1

## 2020-01-10 NOTE — ASSESSMENT & PLAN NOTE
Patient with hx of COPD (diagnosed by PCP, no PFTs on file) with newly discovered lung and liver lesions concerning for cancer. Recently discharged with home oxygen 2-3L due to hypoxia on exertion.  Home meds include: albuterol and duonebs PRN, Ellipta daily, singulair 10mg daily    - continue O2 supplementation, wean as tolerated to maintain SpO2 > 90%  - duonebs q4h PRN  - Breo daily while inpatient  - continue singulair 10mg daily

## 2020-01-11 ENCOUNTER — ANESTHESIA EVENT (OUTPATIENT)
Dept: SURGERY | Facility: HOSPITAL | Age: 70
DRG: 271 | End: 2020-01-11
Payer: MEDICARE

## 2020-01-11 ENCOUNTER — ANESTHESIA (OUTPATIENT)
Dept: SURGERY | Facility: HOSPITAL | Age: 70
DRG: 271 | End: 2020-01-11
Payer: MEDICARE

## 2020-01-11 PROBLEM — I99.8 ISCHEMIC FOOT: Status: ACTIVE | Noted: 2020-01-11

## 2020-01-11 LAB
ALBUMIN SERPL BCP-MCNC: 1.7 G/DL (ref 3.5–5.2)
ALBUMIN SERPL BCP-MCNC: 1.8 G/DL (ref 3.5–5.2)
ALBUMIN SERPL BCP-MCNC: 2.1 G/DL (ref 3.5–5.2)
ALBUMIN SERPL BCP-MCNC: 2.1 G/DL (ref 3.5–5.2)
ALLENS TEST: ABNORMAL
ALP SERPL-CCNC: 175 U/L (ref 55–135)
ALP SERPL-CCNC: 183 U/L (ref 55–135)
ALP SERPL-CCNC: 194 U/L (ref 55–135)
ALP SERPL-CCNC: 197 U/L (ref 55–135)
ALT SERPL W/O P-5'-P-CCNC: 29 U/L (ref 10–44)
ALT SERPL W/O P-5'-P-CCNC: 32 U/L (ref 10–44)
ALT SERPL W/O P-5'-P-CCNC: 33 U/L (ref 10–44)
ALT SERPL W/O P-5'-P-CCNC: 34 U/L (ref 10–44)
ANION GAP SERPL CALC-SCNC: 10 MMOL/L (ref 8–16)
ANION GAP SERPL CALC-SCNC: 10 MMOL/L (ref 8–16)
ANION GAP SERPL CALC-SCNC: 6 MMOL/L (ref 8–16)
ANION GAP SERPL CALC-SCNC: 8 MMOL/L (ref 8–16)
APTT BLDCRRT: 24.3 SEC (ref 21–32)
APTT BLDCRRT: 36.3 SEC (ref 21–32)
APTT BLDCRRT: 62.7 SEC (ref 21–32)
AST SERPL-CCNC: 57 U/L (ref 10–40)
AST SERPL-CCNC: 62 U/L (ref 10–40)
AST SERPL-CCNC: 66 U/L (ref 10–40)
AST SERPL-CCNC: 89 U/L (ref 10–40)
BASOPHILS # BLD AUTO: 0.03 K/UL (ref 0–0.2)
BASOPHILS # BLD AUTO: 0.03 K/UL (ref 0–0.2)
BASOPHILS # BLD AUTO: 0.04 K/UL (ref 0–0.2)
BASOPHILS # BLD AUTO: 0.05 K/UL (ref 0–0.2)
BASOPHILS NFR BLD: 0.1 % (ref 0–1.9)
BASOPHILS NFR BLD: 0.2 % (ref 0–1.9)
BILIRUB SERPL-MCNC: 0.5 MG/DL (ref 0.1–1)
BILIRUB SERPL-MCNC: 0.5 MG/DL (ref 0.1–1)
BILIRUB SERPL-MCNC: 0.6 MG/DL (ref 0.1–1)
BILIRUB SERPL-MCNC: 0.7 MG/DL (ref 0.1–1)
BUN SERPL-MCNC: 15 MG/DL (ref 8–23)
CALCIUM SERPL-MCNC: 7.2 MG/DL (ref 8.7–10.5)
CALCIUM SERPL-MCNC: 7.5 MG/DL (ref 8.7–10.5)
CALCIUM SERPL-MCNC: 8.1 MG/DL (ref 8.7–10.5)
CALCIUM SERPL-MCNC: 8.4 MG/DL (ref 8.7–10.5)
CHLORIDE SERPL-SCNC: 101 MMOL/L (ref 95–110)
CHLORIDE SERPL-SCNC: 103 MMOL/L (ref 95–110)
CHLORIDE SERPL-SCNC: 103 MMOL/L (ref 95–110)
CHLORIDE SERPL-SCNC: 99 MMOL/L (ref 95–110)
CO2 SERPL-SCNC: 21 MMOL/L (ref 23–29)
CO2 SERPL-SCNC: 23 MMOL/L (ref 23–29)
CO2 SERPL-SCNC: 24 MMOL/L (ref 23–29)
CO2 SERPL-SCNC: 25 MMOL/L (ref 23–29)
CREAT SERPL-MCNC: 0.8 MG/DL (ref 0.5–1.4)
CREAT SERPL-MCNC: 0.9 MG/DL (ref 0.5–1.4)
CREAT SERPL-MCNC: 0.9 MG/DL (ref 0.5–1.4)
CREAT SERPL-MCNC: 1 MG/DL (ref 0.5–1.4)
DELSYS: ABNORMAL
DIFFERENTIAL METHOD: ABNORMAL
EOSINOPHIL # BLD AUTO: 0 K/UL (ref 0–0.5)
EOSINOPHIL # BLD AUTO: 0.1 K/UL (ref 0–0.5)
EOSINOPHIL # BLD AUTO: 0.2 K/UL (ref 0–0.5)
EOSINOPHIL # BLD AUTO: 0.2 K/UL (ref 0–0.5)
EOSINOPHIL NFR BLD: 0 % (ref 0–8)
EOSINOPHIL NFR BLD: 0.3 % (ref 0–8)
EOSINOPHIL NFR BLD: 0.9 % (ref 0–8)
EOSINOPHIL NFR BLD: 0.9 % (ref 0–8)
ERYTHROCYTE [DISTWIDTH] IN BLOOD BY AUTOMATED COUNT: 19.3 % (ref 11.5–14.5)
ERYTHROCYTE [DISTWIDTH] IN BLOOD BY AUTOMATED COUNT: 19.4 % (ref 11.5–14.5)
ERYTHROCYTE [DISTWIDTH] IN BLOOD BY AUTOMATED COUNT: 19.6 % (ref 11.5–14.5)
ERYTHROCYTE [DISTWIDTH] IN BLOOD BY AUTOMATED COUNT: 19.7 % (ref 11.5–14.5)
EST. GFR  (AFRICAN AMERICAN): >60 ML/MIN/1.73 M^2
EST. GFR  (NON AFRICAN AMERICAN): 57.6 ML/MIN/1.73 M^2
EST. GFR  (NON AFRICAN AMERICAN): >60 ML/MIN/1.73 M^2
FLOW: 5
FLOW: 6
FLOW: 8
GLUCOSE SERPL-MCNC: 104 MG/DL (ref 70–110)
GLUCOSE SERPL-MCNC: 118 MG/DL (ref 70–110)
GLUCOSE SERPL-MCNC: 72 MG/DL (ref 70–110)
GLUCOSE SERPL-MCNC: 83 MG/DL (ref 70–110)
HCO3 UR-SCNC: 24.8 MMOL/L (ref 24–28)
HCO3 UR-SCNC: 25.8 MMOL/L (ref 24–28)
HCO3 UR-SCNC: 27.3 MMOL/L (ref 24–28)
HCT VFR BLD AUTO: 25.9 % (ref 37–48.5)
HCT VFR BLD AUTO: 26.4 % (ref 37–48.5)
HCT VFR BLD AUTO: 30.5 % (ref 37–48.5)
HCT VFR BLD AUTO: 31.6 % (ref 37–48.5)
HGB BLD-MCNC: 8 G/DL (ref 12–16)
HGB BLD-MCNC: 8.1 G/DL (ref 12–16)
HGB BLD-MCNC: 9.2 G/DL (ref 12–16)
HGB BLD-MCNC: 9.4 G/DL (ref 12–16)
IMM GRANULOCYTES # BLD AUTO: 0.17 K/UL (ref 0–0.04)
IMM GRANULOCYTES # BLD AUTO: 0.18 K/UL (ref 0–0.04)
IMM GRANULOCYTES # BLD AUTO: 0.2 K/UL (ref 0–0.04)
IMM GRANULOCYTES # BLD AUTO: 0.32 K/UL (ref 0–0.04)
IMM GRANULOCYTES NFR BLD AUTO: 0.9 % (ref 0–0.5)
IMM GRANULOCYTES NFR BLD AUTO: 0.9 % (ref 0–0.5)
IMM GRANULOCYTES NFR BLD AUTO: 1 % (ref 0–0.5)
IMM GRANULOCYTES NFR BLD AUTO: 1.4 % (ref 0–0.5)
INR PPP: 0.9 (ref 0.8–1.2)
INR PPP: 1 (ref 0.8–1.2)
INR PPP: 1 (ref 0.8–1.2)
LACTATE SERPL-SCNC: 2.3 MMOL/L (ref 0.5–2.2)
LACTATE SERPL-SCNC: 3.7 MMOL/L (ref 0.5–2.2)
LYMPHOCYTES # BLD AUTO: 1 K/UL (ref 1–4.8)
LYMPHOCYTES # BLD AUTO: 1 K/UL (ref 1–4.8)
LYMPHOCYTES # BLD AUTO: 1.6 K/UL (ref 1–4.8)
LYMPHOCYTES # BLD AUTO: 2.2 K/UL (ref 1–4.8)
LYMPHOCYTES NFR BLD: 11.9 % (ref 18–48)
LYMPHOCYTES NFR BLD: 4.3 % (ref 18–48)
LYMPHOCYTES NFR BLD: 4.5 % (ref 18–48)
LYMPHOCYTES NFR BLD: 8.6 % (ref 18–48)
MAGNESIUM SERPL-MCNC: 2.2 MG/DL (ref 1.6–2.6)
MCH RBC QN AUTO: 26.9 PG (ref 27–31)
MCH RBC QN AUTO: 27.3 PG (ref 27–31)
MCH RBC QN AUTO: 27.3 PG (ref 27–31)
MCH RBC QN AUTO: 27.4 PG (ref 27–31)
MCHC RBC AUTO-ENTMCNC: 29.7 G/DL (ref 32–36)
MCHC RBC AUTO-ENTMCNC: 30.2 G/DL (ref 32–36)
MCHC RBC AUTO-ENTMCNC: 30.7 G/DL (ref 32–36)
MCHC RBC AUTO-ENTMCNC: 30.9 G/DL (ref 32–36)
MCV RBC AUTO: 89 FL (ref 82–98)
MCV RBC AUTO: 92 FL (ref 82–98)
MODE: ABNORMAL
MONOCYTES # BLD AUTO: 0.8 K/UL (ref 0.3–1)
MONOCYTES # BLD AUTO: 0.9 K/UL (ref 0.3–1)
MONOCYTES # BLD AUTO: 1.3 K/UL (ref 0.3–1)
MONOCYTES # BLD AUTO: 1.3 K/UL (ref 0.3–1)
MONOCYTES NFR BLD: 3.5 % (ref 4–15)
MONOCYTES NFR BLD: 4 % (ref 4–15)
MONOCYTES NFR BLD: 7.1 % (ref 4–15)
MONOCYTES NFR BLD: 7.1 % (ref 4–15)
NEUTROPHILS # BLD AUTO: 14.7 K/UL (ref 1.8–7.7)
NEUTROPHILS # BLD AUTO: 15 K/UL (ref 1.8–7.7)
NEUTROPHILS # BLD AUTO: 20 K/UL (ref 1.8–7.7)
NEUTROPHILS # BLD AUTO: 20.9 K/UL (ref 1.8–7.7)
NEUTROPHILS NFR BLD: 78.9 % (ref 38–73)
NEUTROPHILS NFR BLD: 82.3 % (ref 38–73)
NEUTROPHILS NFR BLD: 90.1 % (ref 38–73)
NEUTROPHILS NFR BLD: 90.7 % (ref 38–73)
NRBC BLD-RTO: 0 /100 WBC
PCO2 BLDA: 34.4 MMHG (ref 35–45)
PCO2 BLDA: 47.8 MMHG (ref 35–45)
PCO2 BLDA: 51.8 MMHG (ref 35–45)
PH SMN: 7.29 [PH] (ref 7.35–7.45)
PH SMN: 7.37 [PH] (ref 7.35–7.45)
PH SMN: 7.48 [PH] (ref 7.35–7.45)
PHOSPHATE SERPL-MCNC: 4.9 MG/DL (ref 2.7–4.5)
PLATELET # BLD AUTO: 187 K/UL (ref 150–350)
PLATELET # BLD AUTO: 199 K/UL (ref 150–350)
PLATELET # BLD AUTO: 207 K/UL (ref 150–350)
PLATELET # BLD AUTO: 223 K/UL (ref 150–350)
PMV BLD AUTO: 10 FL (ref 9.2–12.9)
PMV BLD AUTO: 10.3 FL (ref 9.2–12.9)
PMV BLD AUTO: 10.9 FL (ref 9.2–12.9)
PMV BLD AUTO: 11.4 FL (ref 9.2–12.9)
PO2 BLDA: 71 MMHG (ref 80–100)
PO2 BLDA: 73 MMHG (ref 80–100)
PO2 BLDA: 76 MMHG (ref 80–100)
POC BE: -2 MMOL/L
POC BE: 2 MMOL/L
POC BE: 2 MMOL/L
POC SATURATED O2: 92 % (ref 95–100)
POC SATURATED O2: 94 % (ref 95–100)
POC SATURATED O2: 96 % (ref 95–100)
POC TCO2: 26 MMOL/L (ref 23–27)
POC TCO2: 27 MMOL/L (ref 23–27)
POC TCO2: 29 MMOL/L (ref 23–27)
POCT GLUCOSE: 104 MG/DL (ref 70–110)
POTASSIUM SERPL-SCNC: 4.8 MMOL/L (ref 3.5–5.1)
POTASSIUM SERPL-SCNC: 5 MMOL/L (ref 3.5–5.1)
POTASSIUM SERPL-SCNC: 5 MMOL/L (ref 3.5–5.1)
POTASSIUM SERPL-SCNC: 5.5 MMOL/L (ref 3.5–5.1)
PROT SERPL-MCNC: 5 G/DL (ref 6–8.4)
PROT SERPL-MCNC: 5.4 G/DL (ref 6–8.4)
PROT SERPL-MCNC: 5.4 G/DL (ref 6–8.4)
PROT SERPL-MCNC: 5.6 G/DL (ref 6–8.4)
PROTHROMBIN TIME: 10 SEC (ref 9–12.5)
PROTHROMBIN TIME: 10.6 SEC (ref 9–12.5)
PROTHROMBIN TIME: 9.8 SEC (ref 9–12.5)
RBC # BLD AUTO: 2.92 M/UL (ref 4–5.4)
RBC # BLD AUTO: 2.97 M/UL (ref 4–5.4)
RBC # BLD AUTO: 3.42 M/UL (ref 4–5.4)
RBC # BLD AUTO: 3.44 M/UL (ref 4–5.4)
SAMPLE: ABNORMAL
SITE: ABNORMAL
SODIUM SERPL-SCNC: 130 MMOL/L (ref 136–145)
SODIUM SERPL-SCNC: 134 MMOL/L (ref 136–145)
SODIUM SERPL-SCNC: 134 MMOL/L (ref 136–145)
SODIUM SERPL-SCNC: 135 MMOL/L (ref 136–145)
SP02: 100
WBC # BLD AUTO: 18.22 K/UL (ref 3.9–12.7)
WBC # BLD AUTO: 18.68 K/UL (ref 3.9–12.7)
WBC # BLD AUTO: 22.24 K/UL (ref 3.9–12.7)
WBC # BLD AUTO: 23.04 K/UL (ref 3.9–12.7)

## 2020-01-11 PROCEDURE — 35256 REPAIR BLVSL VN GRF LXTR: CPT | Mod: 59,51,RT,GC | Performed by: SURGERY

## 2020-01-11 PROCEDURE — 99291 CRITICAL CARE FIRST HOUR: CPT | Mod: GC,,, | Performed by: ANESTHESIOLOGY

## 2020-01-11 PROCEDURE — 84100 ASSAY OF PHOSPHORUS: CPT

## 2020-01-11 PROCEDURE — 83605 ASSAY OF LACTIC ACID: CPT | Mod: 91

## 2020-01-11 PROCEDURE — 36415 COLL VENOUS BLD VENIPUNCTURE: CPT

## 2020-01-11 PROCEDURE — 27000221 HC OXYGEN, UP TO 24 HOURS

## 2020-01-11 PROCEDURE — 82803 BLOOD GASES ANY COMBINATION: CPT

## 2020-01-11 PROCEDURE — 80053 COMPREHEN METABOLIC PANEL: CPT | Mod: 91

## 2020-01-11 PROCEDURE — 63600175 PHARM REV CODE 636 W HCPCS: Performed by: STUDENT IN AN ORGANIZED HEALTH CARE EDUCATION/TRAINING PROGRAM

## 2020-01-11 PROCEDURE — 36000706: Performed by: SURGERY

## 2020-01-11 PROCEDURE — D9220A PRA ANESTHESIA: Mod: CRNA,,, | Performed by: REGISTERED NURSE

## 2020-01-11 PROCEDURE — 85730 THROMBOPLASTIN TIME PARTIAL: CPT

## 2020-01-11 PROCEDURE — 20000000 HC ICU ROOM

## 2020-01-11 PROCEDURE — D9220A PRA ANESTHESIA: ICD-10-PCS | Mod: ANES,,, | Performed by: ANESTHESIOLOGY

## 2020-01-11 PROCEDURE — C1725 CATH, TRANSLUMIN NON-LASER: HCPCS | Performed by: SURGERY

## 2020-01-11 PROCEDURE — 94640 AIRWAY INHALATION TREATMENT: CPT

## 2020-01-11 PROCEDURE — 93010 ELECTROCARDIOGRAM REPORT: CPT | Mod: ,,, | Performed by: INTERNAL MEDICINE

## 2020-01-11 PROCEDURE — 99291 PR CRITICAL CARE, E/M 30-74 MINUTES: ICD-10-PCS | Mod: GC,,, | Performed by: ANESTHESIOLOGY

## 2020-01-11 PROCEDURE — D9220A PRA ANESTHESIA: ICD-10-PCS | Mod: CRNA,,, | Performed by: REGISTERED NURSE

## 2020-01-11 PROCEDURE — 27201423 OPTIME MED/SURG SUP & DEVICES STERILE SUPPLY: Performed by: SURGERY

## 2020-01-11 PROCEDURE — C1887 CATHETER, GUIDING: HCPCS | Performed by: SURGERY

## 2020-01-11 PROCEDURE — 94761 N-INVAS EAR/PLS OXIMETRY MLT: CPT

## 2020-01-11 PROCEDURE — 99900035 HC TECH TIME PER 15 MIN (STAT)

## 2020-01-11 PROCEDURE — 25000003 PHARM REV CODE 250: Performed by: SURGERY

## 2020-01-11 PROCEDURE — C1894 INTRO/SHEATH, NON-LASER: HCPCS | Performed by: SURGERY

## 2020-01-11 PROCEDURE — 85610 PROTHROMBIN TIME: CPT | Mod: 91

## 2020-01-11 PROCEDURE — 36620 PR INSERT CATH,ART,PERCUT,SHORTTERM: ICD-10-PCS | Mod: 59,,, | Performed by: SURGERY

## 2020-01-11 PROCEDURE — 63600175 PHARM REV CODE 636 W HCPCS: Performed by: REGISTERED NURSE

## 2020-01-11 PROCEDURE — 27602 PR DECOMPRESS ANT/LAT+POST LEG CMPART: ICD-10-PCS | Mod: 51,RT,GC, | Performed by: SURGERY

## 2020-01-11 PROCEDURE — 37000008 HC ANESTHESIA 1ST 15 MINUTES: Performed by: SURGERY

## 2020-01-11 PROCEDURE — 93010 EKG 12-LEAD: ICD-10-PCS | Mod: ,,, | Performed by: INTERNAL MEDICINE

## 2020-01-11 PROCEDURE — 25500020 PHARM REV CODE 255: Performed by: SURGERY

## 2020-01-11 PROCEDURE — 37000009 HC ANESTHESIA EA ADD 15 MINS: Performed by: SURGERY

## 2020-01-11 PROCEDURE — 34201 PR REMV ART CLOT ILIAC-POP,LEG INCIS: ICD-10-PCS | Mod: 59,RT,GC, | Performed by: SURGERY

## 2020-01-11 PROCEDURE — 80053 COMPREHEN METABOLIC PANEL: CPT

## 2020-01-11 PROCEDURE — C1729 CATH, DRAINAGE: HCPCS | Performed by: SURGERY

## 2020-01-11 PROCEDURE — 37799 UNLISTED PX VASCULAR SURGERY: CPT

## 2020-01-11 PROCEDURE — 25000003 PHARM REV CODE 250: Performed by: NURSE ANESTHETIST, CERTIFIED REGISTERED

## 2020-01-11 PROCEDURE — 63600175 PHARM REV CODE 636 W HCPCS: Performed by: SURGERY

## 2020-01-11 PROCEDURE — 37220 PR REVASCULARIZE ILIAC ARTERY,ANGIOPLASTY, INITIAL VESSEL: CPT | Mod: 59,51,LT,GC | Performed by: SURGERY

## 2020-01-11 PROCEDURE — 25000242 PHARM REV CODE 250 ALT 637 W/ HCPCS: Performed by: STUDENT IN AN ORGANIZED HEALTH CARE EDUCATION/TRAINING PROGRAM

## 2020-01-11 PROCEDURE — 27602 DECOMPRESSION OF LOWER LEG: CPT | Mod: 51,RT,GC, | Performed by: SURGERY

## 2020-01-11 PROCEDURE — 85025 COMPLETE CBC W/AUTO DIFF WBC: CPT

## 2020-01-11 PROCEDURE — 34203 PR REMV ART CLOT LOW LEG,LEG INCIS: ICD-10-PCS | Mod: 51,RT,GC, | Performed by: SURGERY

## 2020-01-11 PROCEDURE — 25000003 PHARM REV CODE 250: Performed by: REGISTERED NURSE

## 2020-01-11 PROCEDURE — 36620 INSERTION CATHETER ARTERY: CPT | Mod: 59,,, | Performed by: SURGERY

## 2020-01-11 PROCEDURE — C1769 GUIDE WIRE: HCPCS | Performed by: SURGERY

## 2020-01-11 PROCEDURE — 85730 THROMBOPLASTIN TIME PARTIAL: CPT | Mod: 91

## 2020-01-11 PROCEDURE — 25000003 PHARM REV CODE 250: Performed by: STUDENT IN AN ORGANIZED HEALTH CARE EDUCATION/TRAINING PROGRAM

## 2020-01-11 PROCEDURE — 34201 REMOVAL OF ARTERY CLOT: CPT | Mod: 59,RT,GC, | Performed by: SURGERY

## 2020-01-11 PROCEDURE — 35256 PR REBL VES VEIN GRFT,LOW EXTREM: ICD-10-PCS | Mod: 59,51,RT,GC | Performed by: SURGERY

## 2020-01-11 PROCEDURE — 34203 REMOVAL OF LEG ARTERY CLOT: CPT | Mod: 51,RT,GC, | Performed by: SURGERY

## 2020-01-11 PROCEDURE — 93005 ELECTROCARDIOGRAM TRACING: CPT

## 2020-01-11 PROCEDURE — D9220A PRA ANESTHESIA: Mod: ANES,,, | Performed by: ANESTHESIOLOGY

## 2020-01-11 PROCEDURE — 36000707: Performed by: SURGERY

## 2020-01-11 PROCEDURE — 27201037 HC PRESSURE MONITORING SET UP

## 2020-01-11 PROCEDURE — 85610 PROTHROMBIN TIME: CPT

## 2020-01-11 PROCEDURE — 36600 WITHDRAWAL OF ARTERIAL BLOOD: CPT

## 2020-01-11 PROCEDURE — 83735 ASSAY OF MAGNESIUM: CPT

## 2020-01-11 PROCEDURE — 37220 PR REVASCULARIZE ILIAC ARTERY,ANGIOPLASTY, INITIAL VESSEL: ICD-10-PCS | Mod: 59,51,LT,GC | Performed by: SURGERY

## 2020-01-11 PROCEDURE — C1757 CATH, THROMBECTOMY/EMBOLECT: HCPCS | Performed by: SURGERY

## 2020-01-11 RX ORDER — POLYETHYLENE GLYCOL 3350 17 G/17G
17 POWDER, FOR SOLUTION ORAL DAILY
Status: DISCONTINUED | OUTPATIENT
Start: 2020-01-12 | End: 2020-01-15 | Stop reason: HOSPADM

## 2020-01-11 RX ORDER — AMOXICILLIN 250 MG
1 CAPSULE ORAL 2 TIMES DAILY
Status: DISCONTINUED | OUTPATIENT
Start: 2020-01-11 | End: 2020-01-15 | Stop reason: HOSPADM

## 2020-01-11 RX ORDER — CEFAZOLIN SODIUM 1 G/3ML
INJECTION, POWDER, FOR SOLUTION INTRAMUSCULAR; INTRAVENOUS
Status: DISCONTINUED | OUTPATIENT
Start: 2020-01-11 | End: 2020-01-11

## 2020-01-11 RX ORDER — FENTANYL CITRATE 50 UG/ML
INJECTION, SOLUTION INTRAMUSCULAR; INTRAVENOUS
Status: DISCONTINUED | OUTPATIENT
Start: 2020-01-11 | End: 2020-01-11

## 2020-01-11 RX ORDER — PROPOFOL 10 MG/ML
VIAL (ML) INTRAVENOUS
Status: DISCONTINUED | OUTPATIENT
Start: 2020-01-11 | End: 2020-01-11

## 2020-01-11 RX ORDER — ASPIRIN 81 MG/1
81 TABLET ORAL DAILY
Status: DISCONTINUED | OUTPATIENT
Start: 2020-01-11 | End: 2020-01-15 | Stop reason: HOSPADM

## 2020-01-11 RX ORDER — MAGNESIUM SULFATE HEPTAHYDRATE 40 MG/ML
2 INJECTION, SOLUTION INTRAVENOUS
Status: DISCONTINUED | OUTPATIENT
Start: 2020-01-11 | End: 2020-01-15

## 2020-01-11 RX ORDER — POTASSIUM CHLORIDE 7.45 MG/ML
40 INJECTION INTRAVENOUS
Status: DISCONTINUED | OUTPATIENT
Start: 2020-01-11 | End: 2020-01-15

## 2020-01-11 RX ORDER — OXYCODONE HYDROCHLORIDE 5 MG/1
5 TABLET ORAL EVERY 4 HOURS PRN
Status: DISCONTINUED | OUTPATIENT
Start: 2020-01-11 | End: 2020-01-11

## 2020-01-11 RX ORDER — ONDANSETRON 2 MG/ML
4 INJECTION INTRAMUSCULAR; INTRAVENOUS EVERY 8 HOURS PRN
Status: DISCONTINUED | OUTPATIENT
Start: 2020-01-11 | End: 2020-01-14

## 2020-01-11 RX ORDER — PHENYLEPHRINE HYDROCHLORIDE 10 MG/ML
INJECTION INTRAVENOUS
Status: DISCONTINUED | OUTPATIENT
Start: 2020-01-11 | End: 2020-01-11

## 2020-01-11 RX ORDER — SODIUM CHLORIDE 0.9 % (FLUSH) 0.9 %
10 SYRINGE (ML) INJECTION
Status: DISCONTINUED | OUTPATIENT
Start: 2020-01-11 | End: 2020-01-15 | Stop reason: HOSPADM

## 2020-01-11 RX ORDER — HYDROMORPHONE HYDROCHLORIDE 1 MG/ML
0.5 INJECTION, SOLUTION INTRAMUSCULAR; INTRAVENOUS; SUBCUTANEOUS EVERY 4 HOURS PRN
Status: DISCONTINUED | OUTPATIENT
Start: 2020-01-11 | End: 2020-01-11

## 2020-01-11 RX ORDER — OXYCODONE HYDROCHLORIDE 10 MG/1
10 TABLET ORAL
Status: DISCONTINUED | OUTPATIENT
Start: 2020-01-11 | End: 2020-01-14

## 2020-01-11 RX ORDER — PROTAMINE SULFATE 10 MG/ML
INJECTION, SOLUTION INTRAVENOUS
Status: DISCONTINUED | OUTPATIENT
Start: 2020-01-11 | End: 2020-01-11

## 2020-01-11 RX ORDER — HEPARIN SODIUM 10000 [USP'U]/100ML
600 INJECTION, SOLUTION INTRAVENOUS CONTINUOUS
Status: DISCONTINUED | OUTPATIENT
Start: 2020-01-11 | End: 2020-01-12

## 2020-01-11 RX ORDER — GLYCOPYRROLATE 0.2 MG/ML
INJECTION INTRAMUSCULAR; INTRAVENOUS
Status: DISCONTINUED | OUTPATIENT
Start: 2020-01-11 | End: 2020-01-11

## 2020-01-11 RX ORDER — POTASSIUM CHLORIDE 7.45 MG/ML
80 INJECTION INTRAVENOUS
Status: DISCONTINUED | OUTPATIENT
Start: 2020-01-11 | End: 2020-01-15

## 2020-01-11 RX ORDER — SODIUM CHLORIDE 0.9 % (FLUSH) 0.9 %
10 SYRINGE (ML) INJECTION
Status: DISCONTINUED | OUTPATIENT
Start: 2020-01-11 | End: 2020-01-11

## 2020-01-11 RX ORDER — HEPARIN SODIUM 1000 [USP'U]/ML
INJECTION, SOLUTION INTRAVENOUS; SUBCUTANEOUS
Status: DISCONTINUED | OUTPATIENT
Start: 2020-01-11 | End: 2020-01-11

## 2020-01-11 RX ORDER — MAGNESIUM SULFATE HEPTAHYDRATE 40 MG/ML
4 INJECTION, SOLUTION INTRAVENOUS
Status: DISCONTINUED | OUTPATIENT
Start: 2020-01-11 | End: 2020-01-15

## 2020-01-11 RX ORDER — KETAMINE HCL IN 0.9 % NACL 50 MG/5 ML
SYRINGE (ML) INTRAVENOUS
Status: DISCONTINUED | OUTPATIENT
Start: 2020-01-11 | End: 2020-01-11

## 2020-01-11 RX ORDER — KETOROLAC TROMETHAMINE 30 MG/ML
30 INJECTION, SOLUTION INTRAMUSCULAR; INTRAVENOUS ONCE
Status: COMPLETED | OUTPATIENT
Start: 2020-01-11 | End: 2020-01-11

## 2020-01-11 RX ORDER — ONDANSETRON 2 MG/ML
INJECTION INTRAMUSCULAR; INTRAVENOUS
Status: DISCONTINUED | OUTPATIENT
Start: 2020-01-11 | End: 2020-01-11

## 2020-01-11 RX ORDER — DEXAMETHASONE SODIUM PHOSPHATE 4 MG/ML
INJECTION, SOLUTION INTRA-ARTICULAR; INTRALESIONAL; INTRAMUSCULAR; INTRAVENOUS; SOFT TISSUE
Status: DISCONTINUED | OUTPATIENT
Start: 2020-01-11 | End: 2020-01-11

## 2020-01-11 RX ORDER — IPRATROPIUM BROMIDE AND ALBUTEROL SULFATE 2.5; .5 MG/3ML; MG/3ML
3 SOLUTION RESPIRATORY (INHALATION) ONCE
Status: COMPLETED | OUTPATIENT
Start: 2020-01-11 | End: 2020-01-11

## 2020-01-11 RX ORDER — SODIUM CHLORIDE 9 MG/ML
INJECTION, SOLUTION INTRAVENOUS CONTINUOUS PRN
Status: DISCONTINUED | OUTPATIENT
Start: 2020-01-11 | End: 2020-01-11

## 2020-01-11 RX ORDER — OXYCODONE HYDROCHLORIDE 5 MG/1
5 TABLET ORAL
Status: DISCONTINUED | OUTPATIENT
Start: 2020-01-11 | End: 2020-01-14

## 2020-01-11 RX ORDER — SODIUM CHLORIDE 0.9 % (FLUSH) 0.9 %
3 SYRINGE (ML) INJECTION
Status: DISCONTINUED | OUTPATIENT
Start: 2020-01-11 | End: 2020-01-11

## 2020-01-11 RX ORDER — SODIUM CHLORIDE, SODIUM LACTATE, POTASSIUM CHLORIDE, CALCIUM CHLORIDE 600; 310; 30; 20 MG/100ML; MG/100ML; MG/100ML; MG/100ML
INJECTION, SOLUTION INTRAVENOUS CONTINUOUS
Status: DISCONTINUED | OUTPATIENT
Start: 2020-01-11 | End: 2020-01-12

## 2020-01-11 RX ORDER — ROCURONIUM BROMIDE 10 MG/ML
INJECTION, SOLUTION INTRAVENOUS
Status: DISCONTINUED | OUTPATIENT
Start: 2020-01-11 | End: 2020-01-11

## 2020-01-11 RX ORDER — ACETAMINOPHEN 325 MG/1
650 TABLET ORAL EVERY 6 HOURS
Status: DISCONTINUED | OUTPATIENT
Start: 2020-01-11 | End: 2020-01-14

## 2020-01-11 RX ORDER — CEFAZOLIN SODIUM 1 G/3ML
2 INJECTION, POWDER, FOR SOLUTION INTRAMUSCULAR; INTRAVENOUS
Status: COMPLETED | OUTPATIENT
Start: 2020-01-11 | End: 2020-01-11

## 2020-01-11 RX ORDER — POTASSIUM CHLORIDE 7.45 MG/ML
60 INJECTION INTRAVENOUS
Status: DISCONTINUED | OUTPATIENT
Start: 2020-01-11 | End: 2020-01-15

## 2020-01-11 RX ORDER — MUPIROCIN 20 MG/G
1 OINTMENT TOPICAL 2 TIMES DAILY
Status: DISCONTINUED | OUTPATIENT
Start: 2020-01-11 | End: 2020-01-15 | Stop reason: HOSPADM

## 2020-01-11 RX ORDER — MIDAZOLAM HYDROCHLORIDE 1 MG/ML
INJECTION, SOLUTION INTRAMUSCULAR; INTRAVENOUS
Status: DISCONTINUED | OUTPATIENT
Start: 2020-01-11 | End: 2020-01-11

## 2020-01-11 RX ADMIN — PHENYLEPHRINE HYDROCHLORIDE 200 MCG: 10 INJECTION INTRAVENOUS at 08:01

## 2020-01-11 RX ADMIN — HEPARIN SODIUM 1000 UNITS: 1000 INJECTION, SOLUTION INTRAVENOUS; SUBCUTANEOUS at 11:01

## 2020-01-11 RX ADMIN — KETOROLAC TROMETHAMINE 30 MG: 30 INJECTION, SOLUTION INTRAMUSCULAR at 01:01

## 2020-01-11 RX ADMIN — IPRATROPIUM BROMIDE AND ALBUTEROL SULFATE 3 ML: .5; 3 SOLUTION RESPIRATORY (INHALATION) at 01:01

## 2020-01-11 RX ADMIN — HEPARIN SODIUM 600 UNITS/HR: 10000 INJECTION, SOLUTION INTRAVENOUS at 07:01

## 2020-01-11 RX ADMIN — HYDROMORPHONE HYDROCHLORIDE 0.5 MG: 1 INJECTION, SOLUTION INTRAMUSCULAR; INTRAVENOUS; SUBCUTANEOUS at 11:01

## 2020-01-11 RX ADMIN — SODIUM CHLORIDE 0.2 MCG/KG/MIN: 9 INJECTION, SOLUTION INTRAVENOUS at 08:01

## 2020-01-11 RX ADMIN — MIDAZOLAM HYDROCHLORIDE 1 MG: 1 INJECTION, SOLUTION INTRAMUSCULAR; INTRAVENOUS at 10:01

## 2020-01-11 RX ADMIN — ROCURONIUM BROMIDE 20 MG: 10 INJECTION, SOLUTION INTRAVENOUS at 09:01

## 2020-01-11 RX ADMIN — PROTAMINE SULFATE 30 MG: 10 INJECTION, SOLUTION INTRAVENOUS at 01:01

## 2020-01-11 RX ADMIN — ROCURONIUM BROMIDE 10 MG: 10 INJECTION, SOLUTION INTRAVENOUS at 10:01

## 2020-01-11 RX ADMIN — Medication 20 MG: at 08:01

## 2020-01-11 RX ADMIN — PHENYLEPHRINE HYDROCHLORIDE 100 MCG: 10 INJECTION INTRAVENOUS at 08:01

## 2020-01-11 RX ADMIN — CEFAZOLIN 2 G: 1 INJECTION, POWDER, FOR SOLUTION INTRAMUSCULAR; INTRAVENOUS at 04:01

## 2020-01-11 RX ADMIN — ONDANSETRON 4 MG: 2 INJECTION INTRAMUSCULAR; INTRAVENOUS at 01:01

## 2020-01-11 RX ADMIN — SODIUM CHLORIDE 500 ML: 0.9 INJECTION, SOLUTION INTRAVENOUS at 12:01

## 2020-01-11 RX ADMIN — ROCURONIUM BROMIDE 30 MG: 10 INJECTION, SOLUTION INTRAVENOUS at 08:01

## 2020-01-11 RX ADMIN — PHENYLEPHRINE HYDROCHLORIDE 100 MCG: 10 INJECTION INTRAVENOUS at 09:01

## 2020-01-11 RX ADMIN — CEFAZOLIN 2 G: 330 INJECTION, POWDER, FOR SOLUTION INTRAMUSCULAR; INTRAVENOUS at 12:01

## 2020-01-11 RX ADMIN — HEPARIN SODIUM 2000 UNITS: 1000 INJECTION, SOLUTION INTRAVENOUS; SUBCUTANEOUS at 12:01

## 2020-01-11 RX ADMIN — SENNOSIDES AND DOCUSATE SODIUM 1 TABLET: 8.6; 5 TABLET ORAL at 09:01

## 2020-01-11 RX ADMIN — SODIUM CHLORIDE: 0.9 INJECTION, SOLUTION INTRAVENOUS at 07:01

## 2020-01-11 RX ADMIN — SODIUM CHLORIDE, SODIUM LACTATE, POTASSIUM CHLORIDE, AND CALCIUM CHLORIDE: 600; 310; 30; 20 INJECTION, SOLUTION INTRAVENOUS at 07:01

## 2020-01-11 RX ADMIN — HEPARIN SODIUM 5000 UNITS: 1000 INJECTION, SOLUTION INTRAVENOUS; SUBCUTANEOUS at 09:01

## 2020-01-11 RX ADMIN — OXYCODONE HYDROCHLORIDE 10 MG: 10 TABLET ORAL at 09:01

## 2020-01-11 RX ADMIN — CEFAZOLIN 2 G: 1 INJECTION, POWDER, FOR SOLUTION INTRAMUSCULAR; INTRAVENOUS at 11:01

## 2020-01-11 RX ADMIN — PROPOFOL 100 MG: 10 INJECTION, EMULSION INTRAVENOUS at 08:01

## 2020-01-11 RX ADMIN — FENTANYL CITRATE 50 MCG: 50 INJECTION, SOLUTION INTRAMUSCULAR; INTRAVENOUS at 09:01

## 2020-01-11 RX ADMIN — SUGAMMADEX 200 MG: 100 INJECTION, SOLUTION INTRAVENOUS at 02:01

## 2020-01-11 RX ADMIN — ROCURONIUM BROMIDE 20 MG: 10 INJECTION, SOLUTION INTRAVENOUS at 11:01

## 2020-01-11 RX ADMIN — FENTANYL CITRATE 50 MCG: 50 INJECTION, SOLUTION INTRAMUSCULAR; INTRAVENOUS at 02:01

## 2020-01-11 RX ADMIN — DEXAMETHASONE SODIUM PHOSPHATE 4 MG: 4 INJECTION, SOLUTION INTRAMUSCULAR; INTRAVENOUS at 08:01

## 2020-01-11 RX ADMIN — CEFAZOLIN 2 G: 330 INJECTION, POWDER, FOR SOLUTION INTRAMUSCULAR; INTRAVENOUS at 08:01

## 2020-01-11 RX ADMIN — MORPHINE SULFATE 4 MG: 2 INJECTION, SOLUTION INTRAMUSCULAR; INTRAVENOUS at 04:01

## 2020-01-11 RX ADMIN — MIDAZOLAM HYDROCHLORIDE 1 MG: 1 INJECTION, SOLUTION INTRAMUSCULAR; INTRAVENOUS at 07:01

## 2020-01-11 RX ADMIN — ROCURONIUM BROMIDE 20 MG: 10 INJECTION, SOLUTION INTRAVENOUS at 01:01

## 2020-01-11 RX ADMIN — Medication 10 MG: at 12:01

## 2020-01-11 RX ADMIN — GLYCOPYRROLATE 0.2 MG: 0.2 INJECTION, SOLUTION INTRAMUSCULAR; INTRAVENOUS at 07:01

## 2020-01-11 RX ADMIN — HYDROMORPHONE HYDROCHLORIDE 0.5 MG: 1 INJECTION, SOLUTION INTRAMUSCULAR; INTRAVENOUS; SUBCUTANEOUS at 03:01

## 2020-01-11 RX ADMIN — FENTANYL CITRATE 50 MCG: 50 INJECTION, SOLUTION INTRAMUSCULAR; INTRAVENOUS at 08:01

## 2020-01-11 RX ADMIN — SODIUM CHLORIDE, SODIUM GLUCONATE, SODIUM ACETATE, POTASSIUM CHLORIDE, MAGNESIUM CHLORIDE, SODIUM PHOSPHATE, DIBASIC, AND POTASSIUM PHOSPHATE: .53; .5; .37; .037; .03; .012; .00082 INJECTION, SOLUTION INTRAVENOUS at 07:01

## 2020-01-11 RX ADMIN — HEPARIN SODIUM 1000 UNITS: 1000 INJECTION, SOLUTION INTRAVENOUS; SUBCUTANEOUS at 10:01

## 2020-01-11 RX ADMIN — ACETAMINOPHEN 650 MG: 325 TABLET ORAL at 11:01

## 2020-01-11 RX ADMIN — HEPARIN SODIUM 1000 UNITS: 1000 INJECTION, SOLUTION INTRAVENOUS; SUBCUTANEOUS at 09:01

## 2020-01-11 RX ADMIN — HEPARIN SODIUM 2000 UNITS: 1000 INJECTION, SOLUTION INTRAVENOUS; SUBCUTANEOUS at 01:01

## 2020-01-11 RX ADMIN — HYDROMORPHONE HYDROCHLORIDE 0.5 MG: 1 INJECTION, SOLUTION INTRAMUSCULAR; INTRAVENOUS; SUBCUTANEOUS at 06:01

## 2020-01-11 RX ADMIN — PHENYLEPHRINE HYDROCHLORIDE 100 MCG: 10 INJECTION INTRAVENOUS at 11:01

## 2020-01-11 RX ADMIN — MUPIROCIN 1 G: 20 OINTMENT TOPICAL at 09:01

## 2020-01-11 NOTE — PLAN OF CARE
Fair hours. Hospital med on call was called to bedside to assess pt's respiratory status and uncontrolled pain and low bp earlier this shift. Ns bolus was given, one time dose of toradol, RT tx and ABG done. Labs also was drawn. She responded well to the bolus and toradol. Slept well for about 4 hours. Heparin gtt infusing, aptt being monitored for levels. Npo maintained as ordered past mn.  Safety maintained.

## 2020-01-11 NOTE — ANESTHESIA POSTPROCEDURE EVALUATION
Anesthesia Post Evaluation    Patient: Sharita Castañeda    Procedure(s) Performed: Procedure(s) (LRB):  ANGIOGRAM, LOWER EXTREMITY (N/A)  THROMBECTOMY (Right)    Final Anesthesia Type: general    Patient location during evaluation: ICU  Patient participation: Yes- Able to Participate  Level of consciousness: awake  Post-procedure vital signs: reviewed and stable  Pain management: adequate  Airway patency: patent    PONV status at discharge: No PONV  Anesthetic complications: no      Cardiovascular status: blood pressure returned to baseline  Respiratory status: unassisted  Hydration status: euvolemic  Follow-up not needed.          Vitals Value Taken Time   /55 1/11/2020  5:02 PM   Temp 36.5 °C (97.7 °F) 1/11/2020  3:20 PM   Pulse 66 1/11/2020  5:10 PM   Resp 10 1/11/2020  5:10 PM   SpO2 99 % 1/11/2020  5:10 PM   Vitals shown include unvalidated device data.      No case tracking events are documented in the log.      Pain/Machelle Score: Pain Rating Prior to Med Admin: 9 (1/11/2020  3:31 PM)  Pain Rating Post Med Admin: 6 (1/11/2020  4:01 PM)

## 2020-01-11 NOTE — NURSING
Pt c/o severe pain to right foot and liver biopsy site. Med with morphine 4mg iv. O2 sat only up to 86 on 4lnc and goes back down to 70's. Hob up. Hospital med on call notified. Stated will come and assess.

## 2020-01-11 NOTE — ASSESSMENT & PLAN NOTE
"Patient with abdominal pain out of proportion to exam. 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    Aorto bi-iliac stent graft with notable filling defect within the distal aspect of the right common iliac stent consistent with thrombosis.  Distal right common iliac artery and branches are patent.  Appearance is similar to recent examination.    Nonvisualization of the CARLOS which is likely severely stenosed or occluded, unchanged.  SMA and celiac trunk are patent without definitive evidence of thrombosis."    Plan  Continue to monitor patients abdominal pain    "

## 2020-01-11 NOTE — SUBJECTIVE & OBJECTIVE
Interval History: Overnight patient having really bad pain in her right foot. Unfortunately blood pressures were low as well so overnight person was holding narcotics. They also trended lactates that elevated to 3.7. She was given a small 500 cc bolus and her BP responded. Patient taken to surgery this morning. Did not see the patient beforehand. Patient has been placed into the SICU after surgery. We will resume her care once she is stepped down.       Objective:     Vital Signs (Most Recent):  Temp: 96.4 °F (35.8 °C) (01/11/20 0443)  Pulse: 74 (01/11/20 0443)  Resp: 18 (01/11/20 0443)  BP: (!) 124/56 (01/11/20 0443)  SpO2: (!) 93 % (01/11/20 0443) Vital Signs (24h Range):  Temp:  [96.4 °F (35.8 °C)-98.5 °F (36.9 °C)] 96.4 °F (35.8 °C)  Pulse:  [] 74  Resp:  [18-24] 18  SpO2:  [84 %-98 %] 93 %  BP: ()/(43-62) 124/56     Weight: 62.1 kg (136 lb 14.5 oz)  Body mass index is 25.04 kg/m².    Intake/Output Summary (Last 24 hours) at 1/11/2020 1327  Last data filed at 1/11/2020 1252  Gross per 24 hour   Intake 1772.4 ml   Output 200 ml   Net 1572.4 ml      Physical Exam     Patient not seen due to being in surgery    Significant Labs:   Recent Results (from the past 48 hour(s))   APTT    Collection Time: 01/09/20  5:11 PM   Result Value Ref Range    aPTT 21.4 21.0 - 32.0 sec   Protime-INR    Collection Time: 01/09/20  5:11 PM   Result Value Ref Range    Prothrombin Time 10.2 9.0 - 12.5 sec    INR 1.0 0.8 - 1.2   CBC auto differential    Collection Time: 01/09/20  5:11 PM   Result Value Ref Range    WBC 18.89 (H) 3.90 - 12.70 K/uL    RBC 3.63 (L) 4.00 - 5.40 M/uL    Hemoglobin 9.8 (L) 12.0 - 16.0 g/dL    Hematocrit 31.6 (L) 37.0 - 48.5 %    Mean Corpuscular Volume 87 82 - 98 fL    Mean Corpuscular Hemoglobin 27.0 27.0 - 31.0 pg    Mean Corpuscular Hemoglobin Conc 31.0 (L) 32.0 - 36.0 g/dL    RDW 18.9 (H) 11.5 - 14.5 %    Platelets 170 150 - 350 K/uL    MPV 10.6 9.2 - 12.9 fL    Immature Granulocytes 0.9 (H)  0.0 - 0.5 %    Gran # (ANC) 15.1 (H) 1.8 - 7.7 K/uL    Immature Grans (Abs) 0.17 (H) 0.00 - 0.04 K/uL    Lymph # 2.2 1.0 - 4.8 K/uL    Mono # 1.2 (H) 0.3 - 1.0 K/uL    Eos # 0.2 0.0 - 0.5 K/uL    Baso # 0.02 0.00 - 0.20 K/uL    nRBC 0 0 /100 WBC    Gran% 79.9 (H) 38.0 - 73.0 %    Lymph% 11.5 (L) 18.0 - 48.0 %    Mono% 6.5 4.0 - 15.0 %    Eosinophil% 1.1 0.0 - 8.0 %    Basophil% 0.1 0.0 - 1.9 %    Differential Method Automated    Comprehensive metabolic panel    Collection Time: 01/09/20  5:11 PM   Result Value Ref Range    Sodium 134 (L) 136 - 145 mmol/L    Potassium 4.7 3.5 - 5.1 mmol/L    Chloride 98 95 - 110 mmol/L    CO2 28 23 - 29 mmol/L    Glucose 86 70 - 110 mg/dL    BUN, Bld 12 8 - 23 mg/dL    Creatinine 0.8 0.5 - 1.4 mg/dL    Calcium 8.6 (L) 8.7 - 10.5 mg/dL    Total Protein 5.7 (L) 6.0 - 8.4 g/dL    Albumin 2.4 (L) 3.5 - 5.2 g/dL    Total Bilirubin 0.6 0.1 - 1.0 mg/dL    Alkaline Phosphatase 96 55 - 135 U/L    AST 43 (H) 10 - 40 U/L    ALT 30 10 - 44 U/L    Anion Gap 8 8 - 16 mmol/L    eGFR if African American >60.0 >60 mL/min/1.73 m^2    eGFR if non African American >60.0 >60 mL/min/1.73 m^2   Procalcitonin    Collection Time: 01/09/20  5:11 PM   Result Value Ref Range    Procalcitonin 0.31 (H) <0.25 ng/mL   C-reactive protein    Collection Time: 01/09/20  5:11 PM   Result Value Ref Range    CRP 98.9 (H) 0.0 - 8.2 mg/L   Sedimentation rate    Collection Time: 01/09/20  7:51 PM   Result Value Ref Range    Sed Rate 32 0 - 36 mm/Hr   Lactic acid, plasma    Collection Time: 01/09/20  8:04 PM   Result Value Ref Range    Lactate (Lactic Acid) 1.4 0.5 - 2.2 mmol/L   Comprehensive Metabolic Panel (CMP)    Collection Time: 01/10/20  1:52 AM   Result Value Ref Range    Sodium 135 (L) 136 - 145 mmol/L    Potassium 4.4 3.5 - 5.1 mmol/L    Chloride 97 95 - 110 mmol/L    CO2 27 23 - 29 mmol/L    Glucose 60 (L) 70 - 110 mg/dL    BUN, Bld 10 8 - 23 mg/dL    Creatinine 0.8 0.5 - 1.4 mg/dL    Calcium 8.6 (L) 8.7 - 10.5  mg/dL    Total Protein 5.6 (L) 6.0 - 8.4 g/dL    Albumin 2.3 (L) 3.5 - 5.2 g/dL    Total Bilirubin 0.6 0.1 - 1.0 mg/dL    Alkaline Phosphatase 124 55 - 135 U/L    AST 44 (H) 10 - 40 U/L    ALT 29 10 - 44 U/L    Anion Gap 11 8 - 16 mmol/L    eGFR if African American >60.0 >60 mL/min/1.73 m^2    eGFR if non African American >60.0 >60 mL/min/1.73 m^2   Magnesium    Collection Time: 01/10/20  1:52 AM   Result Value Ref Range    Magnesium 2.1 1.6 - 2.6 mg/dL   Phosphorus    Collection Time: 01/10/20  1:52 AM   Result Value Ref Range    Phosphorus 3.1 2.7 - 4.5 mg/dL   CBC with Automated Differential    Collection Time: 01/10/20  1:52 AM   Result Value Ref Range    WBC 19.72 (H) 3.90 - 12.70 K/uL    RBC 3.98 (L) 4.00 - 5.40 M/uL    Hemoglobin 10.7 (L) 12.0 - 16.0 g/dL    Hematocrit 35.0 (L) 37.0 - 48.5 %    Mean Corpuscular Volume 88 82 - 98 fL    Mean Corpuscular Hemoglobin 26.9 (L) 27.0 - 31.0 pg    Mean Corpuscular Hemoglobin Conc 30.6 (L) 32.0 - 36.0 g/dL    RDW 19.4 (H) 11.5 - 14.5 %    Platelets 202 150 - 350 K/uL    MPV 11.5 9.2 - 12.9 fL    Immature Granulocytes 1.0 (H) 0.0 - 0.5 %    Gran # (ANC) 15.8 (H) 1.8 - 7.7 K/uL    Immature Grans (Abs) 0.20 (H) 0.00 - 0.04 K/uL    Lymph # 2.1 1.0 - 4.8 K/uL    Mono # 1.3 (H) 0.3 - 1.0 K/uL    Eos # 0.3 0.0 - 0.5 K/uL    Baso # 0.04 0.00 - 0.20 K/uL    nRBC 0 0 /100 WBC    Gran% 80.0 (H) 38.0 - 73.0 %    Lymph% 10.4 (L) 18.0 - 48.0 %    Mono% 6.8 4.0 - 15.0 %    Eosinophil% 1.6 0.0 - 8.0 %    Basophil% 0.2 0.0 - 1.9 %    Differential Method Automated    APTT    Collection Time: 01/10/20  1:52 AM   Result Value Ref Range    aPTT 83.4 (H) 21.0 - 32.0 sec   APTT    Collection Time: 01/10/20  5:04 AM   Result Value Ref Range    aPTT 63.2 (H) 21.0 - 32.0 sec   PT/INR    Collection Time: 01/10/20  5:04 AM   Result Value Ref Range    Prothrombin Time 10.4 9.0 - 12.5 sec    INR 1.0 0.8 - 1.2   APTT    Collection Time: 01/10/20 11:32 AM   Result Value Ref Range    aPTT 78.7 (H)  21.0 - 32.0 sec   CBC auto differential    Collection Time: 01/11/20 12:45 AM   Result Value Ref Range    WBC 18.22 (H) 3.90 - 12.70 K/uL    RBC 3.42 (L) 4.00 - 5.40 M/uL    Hemoglobin 9.2 (L) 12.0 - 16.0 g/dL    Hematocrit 30.5 (L) 37.0 - 48.5 %    Mean Corpuscular Volume 89 82 - 98 fL    Mean Corpuscular Hemoglobin 26.9 (L) 27.0 - 31.0 pg    Mean Corpuscular Hemoglobin Conc 30.2 (L) 32.0 - 36.0 g/dL    RDW 19.3 (H) 11.5 - 14.5 %    Platelets 187 150 - 350 K/uL    MPV 10.3 9.2 - 12.9 fL    Immature Granulocytes 0.9 (H) 0.0 - 0.5 %    Gran # (ANC) 15.0 (H) 1.8 - 7.7 K/uL    Immature Grans (Abs) 0.17 (H) 0.00 - 0.04 K/uL    Lymph # 1.6 1.0 - 4.8 K/uL    Mono # 1.3 (H) 0.3 - 1.0 K/uL    Eos # 0.2 0.0 - 0.5 K/uL    Baso # 0.03 0.00 - 0.20 K/uL    nRBC 0 0 /100 WBC    Gran% 82.3 (H) 38.0 - 73.0 %    Lymph% 8.6 (L) 18.0 - 48.0 %    Mono% 7.1 4.0 - 15.0 %    Eosinophil% 0.9 0.0 - 8.0 %    Basophil% 0.2 0.0 - 1.9 %    Differential Method Automated    Comprehensive metabolic panel    Collection Time: 01/11/20 12:45 AM   Result Value Ref Range    Sodium 130 (L) 136 - 145 mmol/L    Potassium 5.0 3.5 - 5.1 mmol/L    Chloride 99 95 - 110 mmol/L    CO2 21 (L) 23 - 29 mmol/L    Glucose 83 70 - 110 mg/dL    BUN, Bld 15 8 - 23 mg/dL    Creatinine 0.9 0.5 - 1.4 mg/dL    Calcium 8.1 (L) 8.7 - 10.5 mg/dL    Total Protein 5.4 (L) 6.0 - 8.4 g/dL    Albumin 2.1 (L) 3.5 - 5.2 g/dL    Total Bilirubin 0.6 0.1 - 1.0 mg/dL    Alkaline Phosphatase 175 (H) 55 - 135 U/L    AST 57 (H) 10 - 40 U/L    ALT 29 10 - 44 U/L    Anion Gap 10 8 - 16 mmol/L    eGFR if African American >60.0 >60 mL/min/1.73 m^2    eGFR if non African American >60.0 >60 mL/min/1.73 m^2   Lactic acid, plasma    Collection Time: 01/11/20 12:45 AM   Result Value Ref Range    Lactate (Lactic Acid) 2.3 (H) 0.5 - 2.2 mmol/L   ISTAT PROCEDURE    Collection Time: 01/11/20  1:13 AM   Result Value Ref Range    POC PH 7.482 (H) 7.35 - 7.45    POC PCO2 34.4 (L) 35 - 45 mmHg    POC PO2  76 (L) 80 - 100 mmHg    POC HCO3 25.8 24 - 28 mmol/L    POC BE 2 -2 to 2 mmol/L    POC SATURATED O2 96 95 - 100 %    POC TCO2 27 23 - 27 mmol/L    Sample ARTERIAL     Site RR     Allens Test Pass     DelSys Nasal Can     Mode SPONT     Flow 5    APTT    Collection Time: 01/11/20  5:19 AM   Result Value Ref Range    aPTT 62.7 (H) 21.0 - 32.0 sec   Comprehensive Metabolic Panel (CMP)    Collection Time: 01/11/20  5:19 AM   Result Value Ref Range    Sodium 134 (L) 136 - 145 mmol/L    Potassium 4.8 3.5 - 5.1 mmol/L    Chloride 101 95 - 110 mmol/L    CO2 23 23 - 29 mmol/L    Glucose 72 70 - 110 mg/dL    BUN, Bld 15 8 - 23 mg/dL    Creatinine 1.0 0.5 - 1.4 mg/dL    Calcium 8.4 (L) 8.7 - 10.5 mg/dL    Total Protein 5.6 (L) 6.0 - 8.4 g/dL    Albumin 2.1 (L) 3.5 - 5.2 g/dL    Total Bilirubin 0.7 0.1 - 1.0 mg/dL    Alkaline Phosphatase 197 (H) 55 - 135 U/L    AST 62 (H) 10 - 40 U/L    ALT 34 10 - 44 U/L    Anion Gap 10 8 - 16 mmol/L    eGFR if African American >60.0 >60 mL/min/1.73 m^2    eGFR if non  57.6 (A) >60 mL/min/1.73 m^2   CBC with Automated Differential    Collection Time: 01/11/20  5:19 AM   Result Value Ref Range    WBC 18.68 (H) 3.90 - 12.70 K/uL    RBC 3.44 (L) 4.00 - 5.40 M/uL    Hemoglobin 9.4 (L) 12.0 - 16.0 g/dL    Hematocrit 31.6 (L) 37.0 - 48.5 %    Mean Corpuscular Volume 92 82 - 98 fL    Mean Corpuscular Hemoglobin 27.3 27.0 - 31.0 pg    Mean Corpuscular Hemoglobin Conc 29.7 (L) 32.0 - 36.0 g/dL    RDW 19.7 (H) 11.5 - 14.5 %    Platelets 199 150 - 350 K/uL    MPV 10.9 9.2 - 12.9 fL    Immature Granulocytes 1.0 (H) 0.0 - 0.5 %    Gran # (ANC) 14.7 (H) 1.8 - 7.7 K/uL    Immature Grans (Abs) 0.18 (H) 0.00 - 0.04 K/uL    Lymph # 2.2 1.0 - 4.8 K/uL    Mono # 1.3 (H) 0.3 - 1.0 K/uL    Eos # 0.2 0.0 - 0.5 K/uL    Baso # 0.04 0.00 - 0.20 K/uL    nRBC 0 0 /100 WBC    Gran% 78.9 (H) 38.0 - 73.0 %    Lymph% 11.9 (L) 18.0 - 48.0 %    Mono% 7.1 4.0 - 15.0 %    Eosinophil% 0.9 0.0 - 8.0 %    Basophil%  0.2 0.0 - 1.9 %    Differential Method Automated    PT/INR    Collection Time: 01/11/20  5:19 AM   Result Value Ref Range    Prothrombin Time 10.0 9.0 - 12.5 sec    INR 1.0 0.8 - 1.2   Lactic acid, plasma    Collection Time: 01/11/20  5:19 AM   Result Value Ref Range    Lactate (Lactic Acid) 3.7 (HH) 0.5 - 2.2 mmol/L         Significant Imaging:   Imaging Results           CTA Runoff ABD PEL Bilat Lower Ext (Final result)  Result time 01/09/20 22:55:58   Procedure changed from CTA Abdomen and Pelvis     Final result by Gagandeep Sellers MD (01/09/20 22:55:58)                 Impression:      Extensive atherosclerosis of the abdominal aorta and its branches.  Near complete thrombosis of the proximal right common iliac artery distal to presumed aortoiliac stent graft with restoration of flow in the distal common iliac and femoral arteries.  At least partial occlusion of the right deep femoral artery and occlusion of the right popliteal artery with diminished 2 vessel runoff to the right lower extremity.  Vascular surgery evaluation is recommended.    Severe narrowing of the left common iliac artery with patent but diminutive three-vessel runoff to the left lower extremity.    Large right middle lobe pulmonary mass with extensive metastatic disease within the abdomen and soft tissues including the left axilla, liver, spleen, left adrenal gland, back and lower extremity musculature, and numerous peritoneal implants.  Suggest correlation with percutaneous sampling for tissue diagnosis.    Severe stenosis and intermittent occlusion of the right renal artery with atrophic right kidney and multiple areas of decreased enhancement suggestive of renal infarcts.  Suggest attention on follow-up to exclude renal masses.    Severe narrowing versus occlusion of the CARLOS.    Additional findings as above.    This report was flagged in Epic as abnormal.    COMMUNICATION  This critical result was discovered/received at 22:00 on  01/09/2020.  The critical information above was relayed directly by Dr. Drew to Dr. Sagastume with [facility] medicine at 22:20 on 01/09/2020.    Electronically signed by resident: Zan Drew MD  Date:    01/09/2020  Time:    21:22    Electronically signed by: Gagandeep Sellers MD  Date:    01/09/2020  Time:    22:55             Narrative:    EXAMINATION:  CTA RUNOFF ABD PEL BILAT LOWER EXT    CLINICAL HISTORY:  AV replace, percutaneous planning, aorta/iliofemoral;    TECHNIQUE:  CT angiogram of the abdomen/pelvis with lower extremity runoff using 125 mL of  Omnipaque 350 IV contrast.  Oral contrast was not administered.  Multiplanar reconstructions performed including MIP reformats.  Delayed phase images also obtained through the bilateral lower extremities.    COMPARISON:  CTA chest 01/02/2020.  Bilateral lower extremity arterial ultrasound 01/09/2020.    FINDINGS:  Lower chest:    Redemonstration of large right middle lobe mass extending to the right hilum.  Stable filling defect within the superior right pulmonary vein extending to its ostium suggestive of venous invasion.  1.1 cm pulmonary nodule in the left lower lobe.  Small right pleural effusion, slightly increased since recent exam dated 01/02/2020.  Left axillary 2.5 cm soft tissue mass (series 3, image 1).    Abdomen:    The liver demonstrate multiple hypoattenuating masses with subtle peripheral enhancement, with the largest measuring 4 cm in the right hepatic lobe (series 3, image 87).  7.9 cm left adrenal heterogenous mass (series 3, image 73, closely approximated to the adjacent spleen, superior pole of the left kidney, and pancreatic tail.  4.7 cm hypodense peripherally enhancing splenic lesion.  Numerous hypodense peripherally enhancing soft tissue masses of variable sizes are noted within the bilateral lower extremity musculature, abdominal wall muscles, bilateral psoas muscles and gluteal muscles.  The largest mass is in the right gluteal region and  measures approximately 5.3 cm in size (series 3, image 161).  Abnormal soft tissue masses are present in both lower extremities, left side greater than right.  Soft tissue lesion at the anterolateral aspect of the right lower thigh is in close approximation to the femoral shaft which makes osseous invasion difficult to entirely exclude.  This mass measures up to 4 cm in maximum axial dimension (series 3, image 295).  A similar lesion is closely approximated to the left femoral shaft (series 3, image 284).  Multiple peritoneal deposits with the largest measuring 3.1 cm inferior to the 1st part of the duodenum (series 3, image 96).  These lesions are concerning for diffuse metastatic disease.    The gallbladder is surgically absent.  The common bile duct is dilated measuring up to 1.4 cm.  The pancreas appear unremarkable with no evidence of pancreatic ductal dilatation.  The right adrenal gland is unremarkable.    The right kidney is atrophic and demonstrate heterogenous enhancement with areas of significant hypoattenuation and no surrounding inflammatory change.  Findings are likely related to right renal infarcts.  Suggest attention on follow-up to exclude renal masses.    The left kidney is slightly small in size with homogeneous enhancement.  There is a subcentimeter hypodensity which may represent a cyst.  No hydronephrosis or hydroureter.  No nephrolithiasis.  Bladder is well distended and appear unremarkable.    Multiple calcified fibroids.    No evidence of bowel obstruction or inflammation.  No free intraperitoneal air or free fluid.    Abdominopelvic Vasculature:    Extensive calcific and noncalcific atherosclerotic plaque of the abdominal aorta and its branches.  No aortic aneurysm.  Significant calcific atherosclerotic plaque at the distal abdominal aorta at the bifurcation with linear intraluminal foci which could reflect graft material.  The proximal right common iliac artery is not opacified which  could be due to thrombosis.  There is opacification of the remaining right common iliac artery however to severely narrowed.  Severe narrowing of the left common iliac artery which remains patent along its course.    The celiac artery and SMA are patent.  Non opacification of the CARLOS which could be due to severe narrowing versus occlusion.  The left renal arteries patent with significant calcified plaque at its origin.  Severe stenosis and intermittent occlusion of the right renal artery.    Right lower extremity:    The right common femoral artery, superficial and deep femoral arteries are small in caliber.  The deep femoral artery appears to be at least partially occluded.  The proximal popliteal artery is patent with loss of contrast opacification along its course distally.  Diminished 2 vessel runoff to the right lower extremity with intermittent stenosis.  Right peroneal artery may be occluded.    Left lower extremity:    The left common femoral artery, superficial and deep femoral arteries are patent with small caliber.  The left popliteal, anterior and posterior tibial arteries are patent.  Left peroneal artery is patent but diminutive.    Bones: Bones are diffusely demineralized.  No displaced fracture.  Right femoral hardware.  Abnormal appearance of the iliac bones which could be due to diffuse demineralization.                               US Lower Extremity Veins Right (Final result)  Result time 01/09/20 19:58:46    Final result by Jens Chaudhari MD (01/09/20 19:58:46)                 Impression:      No evidence of deep venous thrombosis in the right lower extremity.    Electronically signed by resident: Zan Drew MD  Date:    01/09/2020  Time:    19:45    Electronically signed by: Jens Chaudhari  Date:    01/09/2020  Time:    19:58             Narrative:    EXAMINATION:  US LOWER EXTREMITY VEINS RIGHT    CLINICAL HISTORY:  Pain in right leg    TECHNIQUE:  Duplex and color flow Doppler evaluation  and graded compression of the right lower extremity veins was performed.    COMPARISON:  None    FINDINGS:  Right thigh veins: The common femoral, femoral, popliteal, upper greater saphenous, and deep femoral veins are patent and free of thrombus. The veins are normally compressible and have normal phasic flow and augmentation response.    Right calf veins: The visualized calf veins are patent.    Contralateral CFV: The contralateral (left) common femoral vein is patent and free of thrombus.    Miscellaneous: None                                US Lower Extrem Arteries Bilat with ADELA (xpd) (Final result)  Result time 01/09/20 20:02:32    Final result by Jens Chaudhari MD (01/09/20 20:02:32)                 Impression:      Diffuse slow flow and monophasic waveforms throughout the right lower extremity arteries suggestive of proximal inflow stenosis.    The right peroneal artery was not visualized.    Bilateral ankle brachial indices are within normal limits.    Left Baker's cyst.    This report was flagged in Epic as abnormal.    Electronically signed by resident: Zan Drew MD  Date:    01/09/2020  Time:    19:35    Electronically signed by: Jens Chaudhari  Date:    01/09/2020  Time:    20:02             Narrative:    EXAMINATION:  US ARTERIAL LOWER EXTREMITY BILAT WITH ADELA (XPD)    CLINICAL HISTORY:  right leg pain, cool to touch, slight discoloration. non palpable pulses;    TECHNIQUE:  Ultrasound arterial lower extremity bilateral with ADELA.    COMPARISON:  None    FINDINGS:  Right lower extremity.    CFA : 68 cm/sec, monophasic    Deep femoral artery: 24 centimeter/seconds, monophasic.    Prox SFA: 48 cm/sec, monophasic    Mid SFA : 52 cm/sec, monophasic    Dist SFA : 43 cm/sec, monophasic    Proximal pop A: : 19 cm/sec, monophasic    Distal popliteal artery: 29 centimeter/second, monophasic.    EZ: 8 cm/sec, monophasic    The peroneal artery was not visualized.    PTA: 9 cm/sec, monophasic    Diffuse  monophasic waveforms in the right lower extremity indicating slow flow and proximal inflow stenosis.    Right ADELA: 1.1    Left lower extremity    CFA: 137 cm/sec, triphasic    Deep femoral artery: 118 centimeter/second, triphasic.    Prox SFA: 125 cm/sec, triphasic    Mid SFA: 127 cm/sec, triphasic    Dist SFA: 86 cm/sec, triphasic    Proximal pop A: 78 cm/sec, triphasic    Distal popliteal artery: 83 centimeter/seconds, triphasic.    EZ: 84 cm/sec, monophasic    Peroneal A: 67 cm/sec, monophasic    PTA: 103 cm/sec, monophasic    Left ADELA: 1.0    Left Baker's cyst measuring 3.6 x 1 x 2.4 cm.

## 2020-01-11 NOTE — NURSING
Hospital med on call dr rivas notified of critical  Lactic acid 3.7. Also informed pt received 4mg morphine iv about an hour ago, and she is calling for more pain med, stated its her foot.  Stated she will inform the primary team.

## 2020-01-11 NOTE — PROGRESS NOTES
Spoke with Dr. Delgado with vascular surgery regarding heparin drip still currently infusing. Per MD, heparin to be stopped now prior to transfer from [facility] to surgery.

## 2020-01-11 NOTE — ANESTHESIA PREPROCEDURE EVALUATION
01/11/2020  Sharita Castañeda is a 69 y.o., female.  [facility]  Anesthesia Pre-Operative Evaluation         Patient Name: Sharita Castañeda  YOB: 1950  MRN: [id number]    SUBJECTIVE:     Pre-operative evaluation for Procedure(s) (LRB):  EMBOLECTOMY (Right)     01/11/2020    Sharita Castañeda is a 69 y.o. female w/ a significant PMHx of HTN, HLD, COPD (on home O2 at 2-3L), PUD, polyarthralgia, and PVD s/p R femoral artery stent (placed in 2006 at [facility]).  - presented to the ED with right lower extremity pain and coolness.  - Pt has newly discovered lung and liver lesions concerning for cancer. Patients CT scans very concerning for lung cancer with mets. Liver biopsy taken 1/6  - Last meal around 9 Pm 1/10/2020. Nothing per mouth today (not even fluids).   - Currently she has very bad nausea without emesis   - Decrease breath sounds BL, but mostly noted on the Rt side   - Currently on 6 L of O2 NC supplement     Labs:  - WBC 18, H/H 9.4/31.6, recent lactic acid 3.7, and prolonged PTT, Normal PT/INR    Images:  - CTA showing evidence of a thrombus just distal to previously placed iliac stent as well as complete occlusion in distal popliteal vessel.  - Venous U/S did not demonstrate any DVT in RLE  - CT AP = Extensive atherosclerosis of the abdominal aorta and occlusion of the right renal artery proximally noting non enhancement of the right kidney indicating renal infarction. filling defect within the distal aspect of the right common iliac stent consistent with thrombosis. Nonvisualization of the CARLOS which is likely severely stenosed or occluded. Bilateral pleural effusions, right more than left with consolidative and atelectatic changes in the lower lobes. large right lung mass with numerous metastatic lesions      LDA:   2 PIV 20 gauge         Peripheral IV - Single Lumen 01/09/20 1711 20 G Right  Hand (Active)   Site Assessment Clean;Dry;Intact 1/10/2020  8:45 PM   Line Status Infusing 1/10/2020  8:45 PM   Dressing Status Clean;Dry;Intact 1/10/2020  8:45 PM   Number of days: 1            Peripheral IV - Single Lumen 01/09/20 1759 20 G Left Antecubital (Active)   Site Assessment Clean;Dry;Intact 1/10/2020  8:45 PM   Line Status Saline locked 1/10/2020  8:45 PM   Dressing Status Clean;Dry;Intact 1/10/2020  8:45 PM   Dressing Intervention New dressing 1/9/2020  5:59 PM   Reason Not Rotated Not due 1/9/2020  5:59 PM   Number of days: 1       Prev airway: None documented.    Drips: None documented.   heparin (porcine) in D5W 18 Units/kg/hr (01/10/20 0445)       Patient Active Problem List   Diagnosis    Postablative hypothyroidism    Hyperlipidemia    Hypertension    Depression    PVD (peripheral vascular disease)    Allergic rhinitis    Chronic obstructive pulmonary disease with acute exacerbation    GERD (gastroesophageal reflux disease)    Polyarthralgia    COPD (chronic obstructive pulmonary disease)    Hemoptysis    Acute hypoxemic respiratory failure    Mass of middle lobe of right lung    Postobstructive pneumonia    Anxiety    Nausea    Migraine    Palliative care encounter    Advanced care planning/counseling discussion    Goals of care, counseling/discussion    Acute pain of right lower extremity    Right leg pain    Hypothyroidism    Abdominal pain       Review of patient's allergies indicates:  No Known Allergies    Current Inpatient Medications:   amLODIPine  5 mg Oral Daily    aspirin  81 mg Oral Daily    atorvastatin  40 mg Oral Daily    calcium-vitamin D3  1 tablet Oral Daily    clopidogrel  75 mg Oral Daily    fluticasone furoate-vilanterol  1 puff Inhalation Daily    levothyroxine  100 mcg Oral Before breakfast    losartan  25 mg Oral Daily    montelukast  10 mg Oral Daily       No current facility-administered medications on file prior to encounter.       Current Outpatient Medications on File Prior to Encounter   Medication Sig Dispense Refill    amLODIPine (NORVASC) 5 MG tablet Take 1 tablet (5 mg total) by mouth once daily. 90 tablet 1    aspirin (ECOTRIN) 81 MG EC tablet Take 1 tablet (81 mg total) by mouth once daily. 90 tablet 3    atorvastatin (LIPITOR) 40 MG tablet TAKE 1 TABLET(40 MG) BY MOUTH EVERY DAY 90 tablet 3    benzonatate (TESSALON) 100 MG capsule Take 1 capsule (100 mg total) by mouth 3 (three) times daily as needed for Cough. 30 capsule 0    clopidogrel (PLAVIX) 75 mg tablet Take 1 tablet (75 mg total) by mouth once daily.      HYDROcodone-acetaminophen (NORCO) 7.5-325 mg per tablet Take 1 tablet by mouth every 8 (eight) hours as needed for Pain (Pain). Take 1/2 to 1 tablet by mouth 3 times daily as needed for pain 21 tablet 0    levothyroxine (SYNTHROID) 100 MCG tablet Take 1 tablet (100 mcg total) by mouth before breakfast. 90 tablet 3    losartan (COZAAR) 25 MG tablet Take 1 tablet (25 mg total) by mouth once daily. 90 tablet 1    montelukast (SINGULAIR) 10 mg tablet TAKE 1 TABLET(10 MG) BY MOUTH EVERY DAY 90 tablet 3    albuterol (PROAIR HFA) 90 mcg/actuation inhaler Inhale 2 puffs into the lungs every 6 (six) hours as needed for Wheezing. 1 Inhaler 5    albuterol-ipratropium (DUO-NEB) 2.5 mg-0.5 mg/3 mL nebulizer solution Use 3 mL inhaled every 4 hours as needed for shortness of breath or wheezing (ICD-10-CM: J44.1) 540 mL 1    alendronate (FOSAMAX) 70 MG tablet TAKE 1 TABLET(70 MG) BY MOUTH EVERY 7 DAYS (Patient taking differently: every Monday. ) 12 tablet 0    butalbital-acetaminophen-caffeine -40 mg (FIORICET, ESGIC) -40 mg per tablet Take 1 tablet by mouth every 12 (twelve) hours as needed for Pain or Headaches. 30 tablet 1    calcium-vitamin D3 (CALCIUM 500 + D) 500 mg(1,250mg) -200 unit per tablet Take 1 tablet by mouth 2 (two) times daily with meals. (Patient taking differently: Take 1 tablet by mouth once  daily. ) 180 tablet 3    umeclidinium (INCRUSE ELLIPTA) 62.5 mcg/actuation DsDv Inhale 1 each into the lungs once daily. Controller 30 each 2       Past Surgical History:   Procedure Laterality Date    CHOLECYSTECTOMY  1986    GALLBLADDER SURGERY  2006    HIP SURGERY  2005    Right hip fracture/surgery    PERIPHERAL ARTERIAL STENT GRAFT Right 2007    stent for femoral artery blockage       Social History     Socioeconomic History    Marital status:      Spouse name: Not on file    Number of children: Not on file    Years of education: Not on file    Highest education level: Not on file   Occupational History    Not on file   Social Needs    Financial resource strain: Not on file    Food insecurity:     Worry: Not on file     Inability: Not on file    Transportation needs:     Medical: Not on file     Non-medical: Not on file   Tobacco Use    Smoking status: Current Every Day Smoker     Types: Cigarettes    Smokeless tobacco: Never Used    Tobacco comment: None since Dec 26   Substance and Sexual Activity    Alcohol use: No     Alcohol/week: 0.0 standard drinks    Drug use: Never    Sexual activity: Not on file   Lifestyle    Physical activity:     Days per week: Not on file     Minutes per session: Not on file    Stress: Not on file   Relationships    Social connections:     Talks on phone: Not on file     Gets together: Not on file     Attends Baptism service: Not on file     Active member of club or organization: Not on file     Attends meetings of clubs or organizations: Not on file     Relationship status: Not on file   Other Topics Concern    Not on file   Social History Narrative    Not on file       OBJECTIVE:     Vital Signs Range (Last 24H):  Temp:  [35.8 °C (96.4 °F)-36.9 °C (98.5 °F)]   Pulse:  []   Resp:  [16-24]   BP: ()/(43-62)   SpO2:  [82 %-98 %]       Significant Labs:  Lab Results   Component Value Date    WBC 18.68 (H) 01/11/2020    HGB 9.4 (L)  01/11/2020    HCT 31.6 (L) 01/11/2020     01/11/2020    CHOL 153 01/30/2019    TRIG 157 (H) 01/30/2019    HDL 39 (L) 01/30/2019    ALT 34 01/11/2020    AST 62 (H) 01/11/2020     (L) 01/11/2020    K 4.8 01/11/2020     01/11/2020    CREATININE 1.0 01/11/2020    BUN 15 01/11/2020    CO2 23 01/11/2020    TSH 9.853 (H) 01/02/2020    INR 1.0 01/11/2020    HGBA1C 5.4 01/02/2020       Diagnostic Studies: No relevant studies.    EKG:   Results for orders placed or performed during the hospital encounter of 01/09/20   EKG 12-lead    Collection Time: 01/10/20  3:52 AM    Narrative    Test Reason : R00.0,    Vent. Rate : 090 BPM     Atrial Rate : 090 BPM     P-R Int : 110 ms          QRS Dur : 078 ms      QT Int : 366 ms       P-R-T Axes : 058 074 064 degrees     QTc Int : 447 ms    Sinus rhythm with short CO  Otherwise normal ECG  When compared with ECG of 02-JAN-2020 06:57,  Vent. rate has decreased BY  45 BPM  Criteria for Septal infarct are no longer Present  Nonspecific T wave abnormality no longer evident in Anterior leads  Confirmed by EVY KENNY MD ([id number]) on 1/10/2020 10:34:18 AM    Referred By: [email or web address]   SELF           Confirmed By:EVY KENNY MD       2D ECHO:  TTE:  Results for orders placed or performed during the hospital encounter of 01/02/20   Echo Color Flow Doppler? Yes   Result Value Ref Range    Ascending aorta 3.16 cm    STJ 2.56 cm    AV mean gradient 5 mmHg    Ao peak kingsley 1.65 m/s    Ao VTI 34.99 cm    IVS 0.88 0.6 - 1.1 cm    LA size 3.00 cm    Left Atrium Major Axis 4.77 cm    Left Atrium Minor Axis 4.78 cm    LVIDD 4.27 3.5 - 6.0 cm    LVIDS 3.14 2.1 - 4.0 cm    LVOT diameter 1.94 cm    LVOT peak VTI 23.86 cm    PW 0.80 0.6 - 1.1 cm    MV Peak A Kingsley 0.67 m/s    E wave decelartion time 182.50 msec    MV Peak E Kingsley 0.98 m/s    PV Peak D Kingsley 0.75 m/s    PV Peak S Kingsley 1.02 m/s    RA Major Axis 3.53 cm    RA Width 3.23 cm    RVDD 3.04 cm    Sinus 3.53 cm    TAPSE 2.11 cm    TR Max Kingsley  3.26 m/s    TDI LATERAL 0.11 m/s    TDI SEPTAL 0.08 m/s    LA WIDTH 3.53 cm    LV Diastolic Volume 81.86 mL    LV Systolic Volume 38.99 mL    LVOT peak kingsley 1.21 m/s    LV LATERAL E/E' RATIO 8.91 m/s    LV SEPTAL E/E' RATIO 12.25 m/s    FS 26 %    LA volume 42.98 cm3    LV mass 111.08 g    Left Ventricle Relative Wall Thickness 0.37 cm    AV valve area 2.01 cm2    AV Velocity Ratio 0.73     AV index (prosthetic) 0.68     E/A ratio 1.46     Mean e' 0.10 m/s    Pulm vein S/D ratio 1.36     LVOT area 3.0 cm2    LVOT stroke volume 70.49 cm3    AV peak gradient 11 mmHg    E/E' ratio 10.32 m/s    LV Systolic Volume Index 23.1 mL/m2    LV Diastolic Volume Index 48.52 mL/m2    LA Volume Index 25.5 mL/m2    LV Mass Index 66 g/m2    Triscuspid Valve Regurgitation Peak Gradient 43 mmHg    BSA 1.71 m2    Right Atrial Pressure (from IVC) 3 mmHg    TV rest pulmonary artery pressure 46 mmHg    Narrative    · Normal left ventricular systolic function. The estimated ejection   fraction is 60%.  · Normal LV diastolic function.  · Normal right ventricular systolic function.  · Mild tricuspid regurgitation.  · The estimated PA systolic pressure is 46 mm Hg  · Normal central venous pressure (3 mm Hg).  · Pulmonary hypertension present.          AMY:  No results found for this or any previous visit.    ASSESSMENT/PLAN:       Anesthesia Evaluation    I have reviewed the Patient Summary Reports.     I have reviewed the Medications.     Review of Systems  Anesthesia Hx:  No problems with previous Anesthesia Denies Hx of Anesthetic complications  History of prior surgery of interest to airway management or planning: Previous anesthesia: General Denies Family Hx of Anesthesia complications.   Denies Personal Hx of Anesthesia complications.   Social:  Non-Smoker, No Alcohol Use    Hematology/Oncology:         -- Denies Anemia: Denies Current/Recent Cancer   EENT/Dental:   denies chronic allergic rhinitis  Denies Otitis Media Denies Chronic  Tonsillitis   Cardiovascular:   Denies Pacemaker. Hypertension  Denies MI.  Denies CAD.    Denies CABG/stent.  Denies Dysrhythmias.   Denies Angina.             hyperlipidemia      Functional Capacity good / => 4 METS    Pulmonary:   Denies Pneumonia COPD Denies Asthma.  Denies Shortness of breath.  Denies Recent URI.  Denies Sleep Apnea.    Renal/:   Denies Chronic Renal Disease.     Hepatic/GI:   PUD, Denies Hiatal Hernia. GERD Denies Liver Disease.  Denies Hepatitis.    Musculoskeletal:   Arthritis   Ankylosing Spondylitis    Neurological:   Denies TIA. Denies CVA. Denies Neuromuscular Disease.   Denies Headaches. Denies Seizures. C-spine cleared.     Denies Chronic Pain Syndrome  Denies Peripheral Neuropathy    Endocrine:   Denies Diabetes. Denies Hypothyroidism. Denies Hyperthyroidism.    Psych:   Denies Psychiatric History. denies anxiety denies depression          Physical Exam  General:  Obesity    Airway/Jaw/Neck:  Airway Findings: Mouth Opening: Normal Tongue: Normal  General Airway Assessment: Adult  Mallampati: I  Improves to I with phonation.  TM Distance: 4 - 6 cm  Jaw/Neck Findings:  Micrognathia: Negative Mandibular Fracture: Negative    Neck ROM: Normal ROM      Dental:  Dental Findings: Upper Dentures, Lower Dentures   Chest/Lungs:  Chest/Lungs Findings: Decreased Breath Sounds Bilateral, Decreased Breathe Sounds Right     Heart/Vascular:  Heart Findings: Rate: Normal  Rhythm: Regular Rhythm  Sounds: Normal  Heart murmur: negative    Abdomen:  Abdomen Findings:  Normal, Soft       Mental Status:  Mental Status Findings:  Cooperative, Alert and Oriented         Anesthesia Plan  Type of Anesthesia, risks & benefits discussed:  Anesthesia Type:  general  Patient's Preference: GA  Intra-op Monitoring Plan: standard ASA monitors and arterial line  Intra-op Monitoring Plan Comments:   Post Op Pain Control Plan: multimodal analgesia  Post Op Pain Control Plan Comments:   Induction:   IV  Beta Blocker:          Informed Consent: Patient understands risks and agrees with Anesthesia plan.  Questions answered. Anesthesia consent signed with patient.  ASA Score: 3  emergent   Day of Surgery Review of History & Physical:  There are no significant changes.  H&P update referred to the surgeon.         Ready For Surgery From Anesthesia Perspective.

## 2020-01-11 NOTE — PROGRESS NOTES
Safety check performed at surgery desk; consents for surgery, anesthesia, and blood verified; infiltrated PIV removed from right wrist/hand; contact information for Pt's daughter entered into chart; silver toned necklace with heart pendant removed and placed in Pt's chart and taken to OR with Pt.

## 2020-01-11 NOTE — HPI
Sharita Castañeda is a 69 y.o. female with HTN, HLD, COPD (on home O2 at 2-3L), PUD, polyarthralgia, and PVD s/p R femoral artery stent who is being admitted to the SICU after undergoing right lower extremity angiogram and thrombectomy after presenting with a cool leg and rest pain.   Patient was recently admitted from 1/2 to 1/8 for acute hypoxic respiratory failure concerning for post obstructive pneumonia.  Previous CTA concerning for lung malignancy with diffuse metastatic disease.  Liver biopsy 1/6 pending as well as common iliac thrombosis.

## 2020-01-11 NOTE — ASSESSMENT & PLAN NOTE
Patient with hx of PAD s/p right femoral stent (2006) presenting with cold ischemic foot x 2 days. Patient reports acute onset pain and coldness to R foot since previous hospitalization (1/2 -1/8) one day prior to discharge. Patient's plavix/ASA were held at that time for a liver biopsy (1/6), which was resumed upon discharge. Of note, patient has newly discovered lung and liver lesions concerning for cancer.    - CTA showing evidence of a thrombus just distal to previously placed iliac stent as well as complete occlusion in distal popliteal vessel.  - Venous U/S did not demonstrate any DVT in RLE    Plan:  - Vascular surgery consulted.   Patient taken to the OR 1/11.   - Heparin drip since 1/9. In therapeutic range  - continue home ASA/plavix  - NPO at midnight  - neurovascular checks q4h  - morphine 4mg q4h PRN for moderate pain and .5mg dilaudid for severe    Patient placed into the SICU after surgery 1/11. Will resume her care once she is stepped down

## 2020-01-11 NOTE — PROGRESS NOTES
[facility] Hospital Medicine  Progress Note    Patient Name: Sharita Castañeda  MRN: [id number]  Patient Class: IP- Inpatient   Admission Date: 1/9/2020  Length of Stay: 2 days  Attending Physician: BRAULIO Verma II, MD  Primary Care Provider: Wallace Deng MD    [facility] Medicine Team: [facility] HOSP MED 2 Andrea Crowell MD    Subjective:     Principal Problem:Acute pain of right lower extremity        HPI:  Ms. Sharita Castañeda is a 68 yo F with hx of HTN, HLD, COPD (on home O2 at 2-3L), PUD, polyarthralgia, and PVD s/p R femoral artery stent (placed in 2006 at [facility]) who presents with acute onset of pain to her right foot starting 2 days ago while hospitalized for pneumonia. Patient was recently admitted 1/2- 1/8 for acute hypoxic resp failure and suspected post obstructive PNA. She was admitted before that on 12/26-12/28 for hemoptysis and CTA was concerning for primary lung malignancy with metastasis to the liver. During this most recent admission, her pneumonia was treated with vanc/zosyn then switched to doxycycline (end date: 1/9). IR performed liver biopsy on 1/6 and patient was discharged with outpatient follow-ups with pulmonology and oncology. Patient first reported foot pain while in the hospital the day prior to discharge (during which time her plavix had been held for her liver bx). Her plavix/ASA were restarted upon discharge and she took them both this morning 1/9. She reports her right foot to be cold and progressively more painful. The pain is constant, severe, and a persistent ache. She has not been able to ambulate without assistance at home 2/2 severe pain. Her foot was noted to be purple and blue at the toes, most noticeably at toes 4 and 5. She has not been able to warm up her foot despite using a heated blanket. It looked to be dusky today so patient re-presented to the ER. She is able to feel the base of her foot and able to move her ankle and toes. Patient is a former smoker and quit  several weeks ago when she was admitted for pneumonia. She denies any CP or SOB. No fevers/chills, HA, confusion, abdominal pain, n/v/d, leg swelling.       Overview/Hospital Course:  1/10: Patient readmitted 1/9 with signs of ischemia of right lower extremity. She is now having abdominal pain out of proportion to exam. Will order a CTA abdomen and pelvis. She is in therapeutic range with heparin drip. States she has regained more movement in her toes than earlier. Foot is still cold to the touch with no palpable pulses. Vascular following along. Planning to take to OR tomorrow.     Interval History: Overnight patient having really bad pain in her right foot. Unfortunately blood pressures were low as well so overnight person was holding narcotics. They also trended lactates that elevated to 3.7. She was given a small 500 cc bolus and her BP responded. Patient taken to surgery this morning. Did not see the patient beforehand. Patient has been placed into the SICU after surgery. We will resume her care once she is stepped down.       Objective:     Vital Signs (Most Recent):  Temp: 96.4 °F (35.8 °C) (01/11/20 0443)  Pulse: 74 (01/11/20 0443)  Resp: 18 (01/11/20 0443)  BP: (!) 124/56 (01/11/20 0443)  SpO2: (!) 93 % (01/11/20 0443) Vital Signs (24h Range):  Temp:  [96.4 °F (35.8 °C)-98.5 °F (36.9 °C)] 96.4 °F (35.8 °C)  Pulse:  [] 74  Resp:  [18-24] 18  SpO2:  [84 %-98 %] 93 %  BP: ()/(43-62) 124/56     Weight: 62.1 kg (136 lb 14.5 oz)  Body mass index is 25.04 kg/m².    Intake/Output Summary (Last 24 hours) at 1/11/2020 1327  Last data filed at 1/11/2020 1252  Gross per 24 hour   Intake 1772.4 ml   Output 200 ml   Net 1572.4 ml      Physical Exam     Patient not seen due to being in surgery    Significant Labs:   Recent Results (from the past 48 hour(s))   APTT    Collection Time: 01/09/20  5:11 PM   Result Value Ref Range    aPTT 21.4 21.0 - 32.0 sec   Protime-INR    Collection Time: 01/09/20  5:11 PM    Result Value Ref Range    Prothrombin Time 10.2 9.0 - 12.5 sec    INR 1.0 0.8 - 1.2   CBC auto differential    Collection Time: 01/09/20  5:11 PM   Result Value Ref Range    WBC 18.89 (H) 3.90 - 12.70 K/uL    RBC 3.63 (L) 4.00 - 5.40 M/uL    Hemoglobin 9.8 (L) 12.0 - 16.0 g/dL    Hematocrit 31.6 (L) 37.0 - 48.5 %    Mean Corpuscular Volume 87 82 - 98 fL    Mean Corpuscular Hemoglobin 27.0 27.0 - 31.0 pg    Mean Corpuscular Hemoglobin Conc 31.0 (L) 32.0 - 36.0 g/dL    RDW 18.9 (H) 11.5 - 14.5 %    Platelets 170 150 - 350 K/uL    MPV 10.6 9.2 - 12.9 fL    Immature Granulocytes 0.9 (H) 0.0 - 0.5 %    Gran # (ANC) 15.1 (H) 1.8 - 7.7 K/uL    Immature Grans (Abs) 0.17 (H) 0.00 - 0.04 K/uL    Lymph # 2.2 1.0 - 4.8 K/uL    Mono # 1.2 (H) 0.3 - 1.0 K/uL    Eos # 0.2 0.0 - 0.5 K/uL    Baso # 0.02 0.00 - 0.20 K/uL    nRBC 0 0 /100 WBC    Gran% 79.9 (H) 38.0 - 73.0 %    Lymph% 11.5 (L) 18.0 - 48.0 %    Mono% 6.5 4.0 - 15.0 %    Eosinophil% 1.1 0.0 - 8.0 %    Basophil% 0.1 0.0 - 1.9 %    Differential Method Automated    Comprehensive metabolic panel    Collection Time: 01/09/20  5:11 PM   Result Value Ref Range    Sodium 134 (L) 136 - 145 mmol/L    Potassium 4.7 3.5 - 5.1 mmol/L    Chloride 98 95 - 110 mmol/L    CO2 28 23 - 29 mmol/L    Glucose 86 70 - 110 mg/dL    BUN, Bld 12 8 - 23 mg/dL    Creatinine 0.8 0.5 - 1.4 mg/dL    Calcium 8.6 (L) 8.7 - 10.5 mg/dL    Total Protein 5.7 (L) 6.0 - 8.4 g/dL    Albumin 2.4 (L) 3.5 - 5.2 g/dL    Total Bilirubin 0.6 0.1 - 1.0 mg/dL    Alkaline Phosphatase 96 55 - 135 U/L    AST 43 (H) 10 - 40 U/L    ALT 30 10 - 44 U/L    Anion Gap 8 8 - 16 mmol/L    eGFR if African American >60.0 >60 mL/min/1.73 m^2    eGFR if non African American >60.0 >60 mL/min/1.73 m^2   Procalcitonin    Collection Time: 01/09/20  5:11 PM   Result Value Ref Range    Procalcitonin 0.31 (H) <0.25 ng/mL   C-reactive protein    Collection Time: 01/09/20  5:11 PM   Result Value Ref Range    CRP 98.9 (H) 0.0 - 8.2 mg/L    Sedimentation rate    Collection Time: 01/09/20  7:51 PM   Result Value Ref Range    Sed Rate 32 0 - 36 mm/Hr   Lactic acid, plasma    Collection Time: 01/09/20  8:04 PM   Result Value Ref Range    Lactate (Lactic Acid) 1.4 0.5 - 2.2 mmol/L   Comprehensive Metabolic Panel (CMP)    Collection Time: 01/10/20  1:52 AM   Result Value Ref Range    Sodium 135 (L) 136 - 145 mmol/L    Potassium 4.4 3.5 - 5.1 mmol/L    Chloride 97 95 - 110 mmol/L    CO2 27 23 - 29 mmol/L    Glucose 60 (L) 70 - 110 mg/dL    BUN, Bld 10 8 - 23 mg/dL    Creatinine 0.8 0.5 - 1.4 mg/dL    Calcium 8.6 (L) 8.7 - 10.5 mg/dL    Total Protein 5.6 (L) 6.0 - 8.4 g/dL    Albumin 2.3 (L) 3.5 - 5.2 g/dL    Total Bilirubin 0.6 0.1 - 1.0 mg/dL    Alkaline Phosphatase 124 55 - 135 U/L    AST 44 (H) 10 - 40 U/L    ALT 29 10 - 44 U/L    Anion Gap 11 8 - 16 mmol/L    eGFR if African American >60.0 >60 mL/min/1.73 m^2    eGFR if non African American >60.0 >60 mL/min/1.73 m^2   Magnesium    Collection Time: 01/10/20  1:52 AM   Result Value Ref Range    Magnesium 2.1 1.6 - 2.6 mg/dL   Phosphorus    Collection Time: 01/10/20  1:52 AM   Result Value Ref Range    Phosphorus 3.1 2.7 - 4.5 mg/dL   CBC with Automated Differential    Collection Time: 01/10/20  1:52 AM   Result Value Ref Range    WBC 19.72 (H) 3.90 - 12.70 K/uL    RBC 3.98 (L) 4.00 - 5.40 M/uL    Hemoglobin 10.7 (L) 12.0 - 16.0 g/dL    Hematocrit 35.0 (L) 37.0 - 48.5 %    Mean Corpuscular Volume 88 82 - 98 fL    Mean Corpuscular Hemoglobin 26.9 (L) 27.0 - 31.0 pg    Mean Corpuscular Hemoglobin Conc 30.6 (L) 32.0 - 36.0 g/dL    RDW 19.4 (H) 11.5 - 14.5 %    Platelets 202 150 - 350 K/uL    MPV 11.5 9.2 - 12.9 fL    Immature Granulocytes 1.0 (H) 0.0 - 0.5 %    Gran # (ANC) 15.8 (H) 1.8 - 7.7 K/uL    Immature Grans (Abs) 0.20 (H) 0.00 - 0.04 K/uL    Lymph # 2.1 1.0 - 4.8 K/uL    Mono # 1.3 (H) 0.3 - 1.0 K/uL    Eos # 0.3 0.0 - 0.5 K/uL    Baso # 0.04 0.00 - 0.20 K/uL    nRBC 0 0 /100 WBC    Gran% 80.0 (H)  38.0 - 73.0 %    Lymph% 10.4 (L) 18.0 - 48.0 %    Mono% 6.8 4.0 - 15.0 %    Eosinophil% 1.6 0.0 - 8.0 %    Basophil% 0.2 0.0 - 1.9 %    Differential Method Automated    APTT    Collection Time: 01/10/20  1:52 AM   Result Value Ref Range    aPTT 83.4 (H) 21.0 - 32.0 sec   APTT    Collection Time: 01/10/20  5:04 AM   Result Value Ref Range    aPTT 63.2 (H) 21.0 - 32.0 sec   PT/INR    Collection Time: 01/10/20  5:04 AM   Result Value Ref Range    Prothrombin Time 10.4 9.0 - 12.5 sec    INR 1.0 0.8 - 1.2   APTT    Collection Time: 01/10/20 11:32 AM   Result Value Ref Range    aPTT 78.7 (H) 21.0 - 32.0 sec   CBC auto differential    Collection Time: 01/11/20 12:45 AM   Result Value Ref Range    WBC 18.22 (H) 3.90 - 12.70 K/uL    RBC 3.42 (L) 4.00 - 5.40 M/uL    Hemoglobin 9.2 (L) 12.0 - 16.0 g/dL    Hematocrit 30.5 (L) 37.0 - 48.5 %    Mean Corpuscular Volume 89 82 - 98 fL    Mean Corpuscular Hemoglobin 26.9 (L) 27.0 - 31.0 pg    Mean Corpuscular Hemoglobin Conc 30.2 (L) 32.0 - 36.0 g/dL    RDW 19.3 (H) 11.5 - 14.5 %    Platelets 187 150 - 350 K/uL    MPV 10.3 9.2 - 12.9 fL    Immature Granulocytes 0.9 (H) 0.0 - 0.5 %    Gran # (ANC) 15.0 (H) 1.8 - 7.7 K/uL    Immature Grans (Abs) 0.17 (H) 0.00 - 0.04 K/uL    Lymph # 1.6 1.0 - 4.8 K/uL    Mono # 1.3 (H) 0.3 - 1.0 K/uL    Eos # 0.2 0.0 - 0.5 K/uL    Baso # 0.03 0.00 - 0.20 K/uL    nRBC 0 0 /100 WBC    Gran% 82.3 (H) 38.0 - 73.0 %    Lymph% 8.6 (L) 18.0 - 48.0 %    Mono% 7.1 4.0 - 15.0 %    Eosinophil% 0.9 0.0 - 8.0 %    Basophil% 0.2 0.0 - 1.9 %    Differential Method Automated    Comprehensive metabolic panel    Collection Time: 01/11/20 12:45 AM   Result Value Ref Range    Sodium 130 (L) 136 - 145 mmol/L    Potassium 5.0 3.5 - 5.1 mmol/L    Chloride 99 95 - 110 mmol/L    CO2 21 (L) 23 - 29 mmol/L    Glucose 83 70 - 110 mg/dL    BUN, Bld 15 8 - 23 mg/dL    Creatinine 0.9 0.5 - 1.4 mg/dL    Calcium 8.1 (L) 8.7 - 10.5 mg/dL    Total Protein 5.4 (L) 6.0 - 8.4 g/dL     Albumin 2.1 (L) 3.5 - 5.2 g/dL    Total Bilirubin 0.6 0.1 - 1.0 mg/dL    Alkaline Phosphatase 175 (H) 55 - 135 U/L    AST 57 (H) 10 - 40 U/L    ALT 29 10 - 44 U/L    Anion Gap 10 8 - 16 mmol/L    eGFR if African American >60.0 >60 mL/min/1.73 m^2    eGFR if non African American >60.0 >60 mL/min/1.73 m^2   Lactic acid, plasma    Collection Time: 01/11/20 12:45 AM   Result Value Ref Range    Lactate (Lactic Acid) 2.3 (H) 0.5 - 2.2 mmol/L   ISTAT PROCEDURE    Collection Time: 01/11/20  1:13 AM   Result Value Ref Range    POC PH 7.482 (H) 7.35 - 7.45    POC PCO2 34.4 (L) 35 - 45 mmHg    POC PO2 76 (L) 80 - 100 mmHg    POC HCO3 25.8 24 - 28 mmol/L    POC BE 2 -2 to 2 mmol/L    POC SATURATED O2 96 95 - 100 %    POC TCO2 27 23 - 27 mmol/L    Sample ARTERIAL     Site RR     Allens Test Pass     DelSys Nasal Can     Mode SPONT     Flow 5    APTT    Collection Time: 01/11/20  5:19 AM   Result Value Ref Range    aPTT 62.7 (H) 21.0 - 32.0 sec   Comprehensive Metabolic Panel (CMP)    Collection Time: 01/11/20  5:19 AM   Result Value Ref Range    Sodium 134 (L) 136 - 145 mmol/L    Potassium 4.8 3.5 - 5.1 mmol/L    Chloride 101 95 - 110 mmol/L    CO2 23 23 - 29 mmol/L    Glucose 72 70 - 110 mg/dL    BUN, Bld 15 8 - 23 mg/dL    Creatinine 1.0 0.5 - 1.4 mg/dL    Calcium 8.4 (L) 8.7 - 10.5 mg/dL    Total Protein 5.6 (L) 6.0 - 8.4 g/dL    Albumin 2.1 (L) 3.5 - 5.2 g/dL    Total Bilirubin 0.7 0.1 - 1.0 mg/dL    Alkaline Phosphatase 197 (H) 55 - 135 U/L    AST 62 (H) 10 - 40 U/L    ALT 34 10 - 44 U/L    Anion Gap 10 8 - 16 mmol/L    eGFR if African American >60.0 >60 mL/min/1.73 m^2    eGFR if non  57.6 (A) >60 mL/min/1.73 m^2   CBC with Automated Differential    Collection Time: 01/11/20  5:19 AM   Result Value Ref Range    WBC 18.68 (H) 3.90 - 12.70 K/uL    RBC 3.44 (L) 4.00 - 5.40 M/uL    Hemoglobin 9.4 (L) 12.0 - 16.0 g/dL    Hematocrit 31.6 (L) 37.0 - 48.5 %    Mean Corpuscular Volume 92 82 - 98 fL    Mean  Corpuscular Hemoglobin 27.3 27.0 - 31.0 pg    Mean Corpuscular Hemoglobin Conc 29.7 (L) 32.0 - 36.0 g/dL    RDW 19.7 (H) 11.5 - 14.5 %    Platelets 199 150 - 350 K/uL    MPV 10.9 9.2 - 12.9 fL    Immature Granulocytes 1.0 (H) 0.0 - 0.5 %    Gran # (ANC) 14.7 (H) 1.8 - 7.7 K/uL    Immature Grans (Abs) 0.18 (H) 0.00 - 0.04 K/uL    Lymph # 2.2 1.0 - 4.8 K/uL    Mono # 1.3 (H) 0.3 - 1.0 K/uL    Eos # 0.2 0.0 - 0.5 K/uL    Baso # 0.04 0.00 - 0.20 K/uL    nRBC 0 0 /100 WBC    Gran% 78.9 (H) 38.0 - 73.0 %    Lymph% 11.9 (L) 18.0 - 48.0 %    Mono% 7.1 4.0 - 15.0 %    Eosinophil% 0.9 0.0 - 8.0 %    Basophil% 0.2 0.0 - 1.9 %    Differential Method Automated    PT/INR    Collection Time: 01/11/20  5:19 AM   Result Value Ref Range    Prothrombin Time 10.0 9.0 - 12.5 sec    INR 1.0 0.8 - 1.2   Lactic acid, plasma    Collection Time: 01/11/20  5:19 AM   Result Value Ref Range    Lactate (Lactic Acid) 3.7 (HH) 0.5 - 2.2 mmol/L         Significant Imaging:   Imaging Results           CTA Runoff ABD PEL Bilat Lower Ext (Final result)  Result time 01/09/20 22:55:58   Procedure changed from CTA Abdomen and Pelvis     Final result by Gagandeep Sellers MD (01/09/20 22:55:58)                 Impression:      Extensive atherosclerosis of the abdominal aorta and its branches.  Near complete thrombosis of the proximal right common iliac artery distal to presumed aortoiliac stent graft with restoration of flow in the distal common iliac and femoral arteries.  At least partial occlusion of the right deep femoral artery and occlusion of the right popliteal artery with diminished 2 vessel runoff to the right lower extremity.  Vascular surgery evaluation is recommended.    Severe narrowing of the left common iliac artery with patent but diminutive three-vessel runoff to the left lower extremity.    Large right middle lobe pulmonary mass with extensive metastatic disease within the abdomen and soft tissues including the left axilla, liver,  spleen, left adrenal gland, back and lower extremity musculature, and numerous peritoneal implants.  Suggest correlation with percutaneous sampling for tissue diagnosis.    Severe stenosis and intermittent occlusion of the right renal artery with atrophic right kidney and multiple areas of decreased enhancement suggestive of renal infarcts.  Suggest attention on follow-up to exclude renal masses.    Severe narrowing versus occlusion of the CARLOS.    Additional findings as above.    This report was flagged in Epic as abnormal.    COMMUNICATION  This critical result was discovered/received at 22:00 on 01/09/2020.  The critical information above was relayed directly by Dr. Drew to Dr. Sagastume with [facility] medicine at 22:20 on 01/09/2020.    Electronically signed by resident: Zan Drew MD  Date:    01/09/2020  Time:    21:22    Electronically signed by: Gagandeep Sellers MD  Date:    01/09/2020  Time:    22:55             Narrative:    EXAMINATION:  CTA RUNOFF ABD PEL BILAT LOWER EXT    CLINICAL HISTORY:  AV replace, percutaneous planning, aorta/iliofemoral;    TECHNIQUE:  CT angiogram of the abdomen/pelvis with lower extremity runoff using 125 mL of  Omnipaque 350 IV contrast.  Oral contrast was not administered.  Multiplanar reconstructions performed including MIP reformats.  Delayed phase images also obtained through the bilateral lower extremities.    COMPARISON:  CTA chest 01/02/2020.  Bilateral lower extremity arterial ultrasound 01/09/2020.    FINDINGS:  Lower chest:    Redemonstration of large right middle lobe mass extending to the right hilum.  Stable filling defect within the superior right pulmonary vein extending to its ostium suggestive of venous invasion.  1.1 cm pulmonary nodule in the left lower lobe.  Small right pleural effusion, slightly increased since recent exam dated 01/02/2020.  Left axillary 2.5 cm soft tissue mass (series 3, image 1).    Abdomen:    The liver demonstrate multiple hypoattenuating  masses with subtle peripheral enhancement, with the largest measuring 4 cm in the right hepatic lobe (series 3, image 87).  7.9 cm left adrenal heterogenous mass (series 3, image 73, closely approximated to the adjacent spleen, superior pole of the left kidney, and pancreatic tail.  4.7 cm hypodense peripherally enhancing splenic lesion.  Numerous hypodense peripherally enhancing soft tissue masses of variable sizes are noted within the bilateral lower extremity musculature, abdominal wall muscles, bilateral psoas muscles and gluteal muscles.  The largest mass is in the right gluteal region and measures approximately 5.3 cm in size (series 3, image 161).  Abnormal soft tissue masses are present in both lower extremities, left side greater than right.  Soft tissue lesion at the anterolateral aspect of the right lower thigh is in close approximation to the femoral shaft which makes osseous invasion difficult to entirely exclude.  This mass measures up to 4 cm in maximum axial dimension (series 3, image 295).  A similar lesion is closely approximated to the left femoral shaft (series 3, image 284).  Multiple peritoneal deposits with the largest measuring 3.1 cm inferior to the 1st part of the duodenum (series 3, image 96).  These lesions are concerning for diffuse metastatic disease.    The gallbladder is surgically absent.  The common bile duct is dilated measuring up to 1.4 cm.  The pancreas appear unremarkable with no evidence of pancreatic ductal dilatation.  The right adrenal gland is unremarkable.    The right kidney is atrophic and demonstrate heterogenous enhancement with areas of significant hypoattenuation and no surrounding inflammatory change.  Findings are likely related to right renal infarcts.  Suggest attention on follow-up to exclude renal masses.    The left kidney is slightly small in size with homogeneous enhancement.  There is a subcentimeter hypodensity which may represent a cyst.  No  hydronephrosis or hydroureter.  No nephrolithiasis.  Bladder is well distended and appear unremarkable.    Multiple calcified fibroids.    No evidence of bowel obstruction or inflammation.  No free intraperitoneal air or free fluid.    Abdominopelvic Vasculature:    Extensive calcific and noncalcific atherosclerotic plaque of the abdominal aorta and its branches.  No aortic aneurysm.  Significant calcific atherosclerotic plaque at the distal abdominal aorta at the bifurcation with linear intraluminal foci which could reflect graft material.  The proximal right common iliac artery is not opacified which could be due to thrombosis.  There is opacification of the remaining right common iliac artery however to severely narrowed.  Severe narrowing of the left common iliac artery which remains patent along its course.    The celiac artery and SMA are patent.  Non opacification of the CARLOS which could be due to severe narrowing versus occlusion.  The left renal arteries patent with significant calcified plaque at its origin.  Severe stenosis and intermittent occlusion of the right renal artery.    Right lower extremity:    The right common femoral artery, superficial and deep femoral arteries are small in caliber.  The deep femoral artery appears to be at least partially occluded.  The proximal popliteal artery is patent with loss of contrast opacification along its course distally.  Diminished 2 vessel runoff to the right lower extremity with intermittent stenosis.  Right peroneal artery may be occluded.    Left lower extremity:    The left common femoral artery, superficial and deep femoral arteries are patent with small caliber.  The left popliteal, anterior and posterior tibial arteries are patent.  Left peroneal artery is patent but diminutive.    Bones: Bones are diffusely demineralized.  No displaced fracture.  Right femoral hardware.  Abnormal appearance of the iliac bones which could be due to diffuse  demineralization.                               US Lower Extremity Veins Right (Final result)  Result time 01/09/20 19:58:46    Final result by Jens Chaudhari MD (01/09/20 19:58:46)                 Impression:      No evidence of deep venous thrombosis in the right lower extremity.    Electronically signed by resident: Zan Drew MD  Date:    01/09/2020  Time:    19:45    Electronically signed by: Jens Chaudhari  Date:    01/09/2020  Time:    19:58             Narrative:    EXAMINATION:  US LOWER EXTREMITY VEINS RIGHT    CLINICAL HISTORY:  Pain in right leg    TECHNIQUE:  Duplex and color flow Doppler evaluation and graded compression of the right lower extremity veins was performed.    COMPARISON:  None    FINDINGS:  Right thigh veins: The common femoral, femoral, popliteal, upper greater saphenous, and deep femoral veins are patent and free of thrombus. The veins are normally compressible and have normal phasic flow and augmentation response.    Right calf veins: The visualized calf veins are patent.    Contralateral CFV: The contralateral (left) common femoral vein is patent and free of thrombus.    Miscellaneous: None                                US Lower Extrem Arteries Bilat with ADELA (xpd) (Final result)  Result time 01/09/20 20:02:32    Final result by Jens Chaudhari MD (01/09/20 20:02:32)                 Impression:      Diffuse slow flow and monophasic waveforms throughout the right lower extremity arteries suggestive of proximal inflow stenosis.    The right peroneal artery was not visualized.    Bilateral ankle brachial indices are within normal limits.    Left Baker's cyst.    This report was flagged in Epic as abnormal.    Electronically signed by resident: Zan Drew MD  Date:    01/09/2020  Time:    19:35    Electronically signed by: Jens Chaudhari  Date:    01/09/2020  Time:    20:02             Narrative:    EXAMINATION:  US ARTERIAL LOWER EXTREMITY BILAT WITH ADELA (XPD)    CLINICAL  HISTORY:  right leg pain, cool to touch, slight discoloration. non palpable pulses;    TECHNIQUE:  Ultrasound arterial lower extremity bilateral with ADELA.    COMPARISON:  None    FINDINGS:  Right lower extremity.    CFA : 68 cm/sec, monophasic    Deep femoral artery: 24 centimeter/seconds, monophasic.    Prox SFA: 48 cm/sec, monophasic    Mid SFA : 52 cm/sec, monophasic    Dist SFA : 43 cm/sec, monophasic    Proximal pop A: : 19 cm/sec, monophasic    Distal popliteal artery: 29 centimeter/second, monophasic.    EZ: 8 cm/sec, monophasic    The peroneal artery was not visualized.    PTA: 9 cm/sec, monophasic    Diffuse monophasic waveforms in the right lower extremity indicating slow flow and proximal inflow stenosis.    Right ADELA: 1.1    Left lower extremity    CFA: 137 cm/sec, triphasic    Deep femoral artery: 118 centimeter/second, triphasic.    Prox SFA: 125 cm/sec, triphasic    Mid SFA: 127 cm/sec, triphasic    Dist SFA: 86 cm/sec, triphasic    Proximal pop A: 78 cm/sec, triphasic    Distal popliteal artery: 83 centimeter/seconds, triphasic.    EZ: 84 cm/sec, monophasic    Peroneal A: 67 cm/sec, monophasic    PTA: 103 cm/sec, monophasic    Left ADELA: 1.0    Left Baker's cyst measuring 3.6 x 1 x 2.4 cm.                                Assessment/Plan:      * Acute pain of right lower extremity  Patient with hx of PAD s/p right femoral stent (2006) presenting with cold ischemic foot x 2 days. Patient reports acute onset pain and coldness to R foot since previous hospitalization (1/2 -1/8) one day prior to discharge. Patient's plavix/ASA were held at that time for a liver biopsy (1/6), which was resumed upon discharge. Of note, patient has newly discovered lung and liver lesions concerning for cancer.    - CTA showing evidence of a thrombus just distal to previously placed iliac stent as well as complete occlusion in distal popliteal vessel.  - Venous U/S did not demonstrate any DVT in RLE    Plan:  - Vascular  "surgery consulted.   Patient taken to the OR 1/11.   - Heparin drip since 1/9. In therapeutic range  - continue home ASA/plavix  - NPO at midnight  - neurovascular checks q4h  - morphine 4mg q4h PRN for moderate pain and .5mg dilaudid for severe    Patient placed into the SICU after surgery 1/11. Will resume her care once she is stepped down      Abdominal pain  Patient with abdominal pain out of proportion to exam. 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    Aorto bi-iliac stent graft with notable filling defect within the distal aspect of the right common iliac stent consistent with thrombosis.  Distal right common iliac artery and branches are patent.  Appearance is similar to recent examination.    Nonvisualization of the ACRLOS which is likely severely stenosed or occluded, unchanged.  SMA and celiac trunk are patent without definitive evidence of thrombosis."    Plan  Continue to monitor patients abdominal pain      COPD (chronic obstructive pulmonary disease)  Patient with hx of COPD (diagnosed by PCP, no PFTs on file) with newly discovered lung and liver lesions concerning for cancer. Recently discharged with home oxygen 2-3L due to hypoxia on exertion.  Home meds include: albuterol and duonebs PRN, Ellipta daily, singulair 10mg daily    - continue O2 supplementation, wean as tolerated to maintain SpO2 > 90%  - duonebs q4h PRN  - Breo daily while inpatient  - continue singulair 10mg daily    Mass of middle lobe of right lung  Patients CT scans very concerning for lung cancer with mets. Liver biopsy taken 1/6. Still waiting for results      Hypothyroidism  Last TSH 9.853 on 1/2/20  Free T4 normal 0.81    - continue home " levothyroxine 100mcg daily      Goals of care, counseling/discussion  Patient met with palliative care last admission and goals of care established      Hypertension  - continue home amlodipine 5mg daily and losartan 25mg daily      Hyperlipidemia  - continue home atorvastatin 40mg daily        VTE Risk Mitigation (From admission, onward)         Ordered     heparin 25,000 units in dextrose 5% 250 mL (100 units/mL) infusion (heparin infusion - NO NOMOGRAM)  Continuous      01/11/20 1443     IP VTE HIGH RISK PATIENT  Once      01/11/20 1440     Reason for No Pharmacological VTE Prophylaxis  Once     Question:  Reasons:  Answer:  IV Heparin w/in 24 hrs. Pre or Post-Op    01/09/20 1921                      Andrea Crowell MD  Department of Hospital Medicine   [facility]

## 2020-01-11 NOTE — TRANSFER OF CARE
"Anesthesia Transfer of Care Note    Patient: Sharita Castañeda    Procedure(s) Performed: Procedure(s) (LRB):  ANGIOGRAM, LOWER EXTREMITY (N/A)  THROMBECTOMY (Right)    Patient location: ICU ([facility] )    Anesthesia Type: general    Transport from OR: Transported from OR on 6-10 L/min O2 by face mask with adequate spontaneous ventilation. Continuous ECG monitoring in transport. Continuous SpO2 monitoring in transport    Post pain: adequate analgesia    Post assessment: no apparent anesthetic complications and tolerated procedure well    Post vital signs: stable    Level of consciousness: awake    Nausea/Vomiting: no nausea/vomiting    Complications: none    Transfer of care protocol was followed      Last vitals:   Visit Vitals  BP (!) 108/53 (BP Location: Left arm, Patient Position: Lying)   Pulse 74   Temp 36.5 °C (97.7 °F) (Oral)   Resp (!) 32   Ht 5' 2" (1.575 m)   Wt 62.1 kg (136 lb 14.5 oz)   SpO2 95%   Breastfeeding? No   BMI 25.04 kg/m²     "

## 2020-01-12 LAB
ABO + RH BLD: NORMAL
ALBUMIN SERPL BCP-MCNC: 1.6 G/DL (ref 3.5–5.2)
ALP SERPL-CCNC: 187 U/L (ref 55–135)
ALT SERPL W/O P-5'-P-CCNC: 31 U/L (ref 10–44)
ANION GAP SERPL CALC-SCNC: 7 MMOL/L (ref 8–16)
APTT BLDCRRT: 37.3 SEC (ref 21–32)
AST SERPL-CCNC: 108 U/L (ref 10–40)
BASOPHILS # BLD AUTO: 0.02 K/UL (ref 0–0.2)
BASOPHILS # BLD AUTO: 0.03 K/UL (ref 0–0.2)
BASOPHILS NFR BLD: 0.1 % (ref 0–1.9)
BASOPHILS NFR BLD: 0.2 % (ref 0–1.9)
BILIRUB SERPL-MCNC: 0.5 MG/DL (ref 0.1–1)
BLD GP AB SCN CELLS X3 SERPL QL: NORMAL
BUN SERPL-MCNC: 16 MG/DL (ref 8–23)
CALCIUM SERPL-MCNC: 7.2 MG/DL (ref 8.7–10.5)
CHLORIDE SERPL-SCNC: 102 MMOL/L (ref 95–110)
CO2 SERPL-SCNC: 24 MMOL/L (ref 23–29)
CREAT SERPL-MCNC: 0.8 MG/DL (ref 0.5–1.4)
DIFFERENTIAL METHOD: ABNORMAL
DIFFERENTIAL METHOD: ABNORMAL
EOSINOPHIL # BLD AUTO: 0 K/UL (ref 0–0.5)
EOSINOPHIL # BLD AUTO: 0.1 K/UL (ref 0–0.5)
EOSINOPHIL NFR BLD: 0.1 % (ref 0–8)
EOSINOPHIL NFR BLD: 0.6 % (ref 0–8)
ERYTHROCYTE [DISTWIDTH] IN BLOOD BY AUTOMATED COUNT: 17.6 % (ref 11.5–14.5)
ERYTHROCYTE [DISTWIDTH] IN BLOOD BY AUTOMATED COUNT: 19.7 % (ref 11.5–14.5)
EST. GFR  (AFRICAN AMERICAN): >60 ML/MIN/1.73 M^2
EST. GFR  (NON AFRICAN AMERICAN): >60 ML/MIN/1.73 M^2
GLUCOSE SERPL-MCNC: 79 MG/DL (ref 70–110)
HCT VFR BLD AUTO: 22.2 % (ref 37–48.5)
HCT VFR BLD AUTO: 24.4 % (ref 37–48.5)
HGB BLD-MCNC: 6.7 G/DL (ref 12–16)
HGB BLD-MCNC: 7.9 G/DL (ref 12–16)
IMM GRANULOCYTES # BLD AUTO: 0.1 K/UL (ref 0–0.04)
IMM GRANULOCYTES # BLD AUTO: 0.2 K/UL (ref 0–0.04)
IMM GRANULOCYTES NFR BLD AUTO: 0.6 % (ref 0–0.5)
IMM GRANULOCYTES NFR BLD AUTO: 1.1 % (ref 0–0.5)
INR PPP: 1 (ref 0.8–1.2)
LYMPHOCYTES # BLD AUTO: 1.4 K/UL (ref 1–4.8)
LYMPHOCYTES # BLD AUTO: 1.4 K/UL (ref 1–4.8)
LYMPHOCYTES NFR BLD: 7.6 % (ref 18–48)
LYMPHOCYTES NFR BLD: 8.4 % (ref 18–48)
MAGNESIUM SERPL-MCNC: 2.2 MG/DL (ref 1.6–2.6)
MCH RBC QN AUTO: 27 PG (ref 27–31)
MCH RBC QN AUTO: 29 PG (ref 27–31)
MCHC RBC AUTO-ENTMCNC: 30.2 G/DL (ref 32–36)
MCHC RBC AUTO-ENTMCNC: 32.4 G/DL (ref 32–36)
MCV RBC AUTO: 90 FL (ref 82–98)
MCV RBC AUTO: 90 FL (ref 82–98)
MONOCYTES # BLD AUTO: 1.3 K/UL (ref 0.3–1)
MONOCYTES # BLD AUTO: 1.4 K/UL (ref 0.3–1)
MONOCYTES NFR BLD: 7.4 % (ref 4–15)
MONOCYTES NFR BLD: 7.5 % (ref 4–15)
NEUTROPHILS # BLD AUTO: 14.2 K/UL (ref 1.8–7.7)
NEUTROPHILS # BLD AUTO: 15.4 K/UL (ref 1.8–7.7)
NEUTROPHILS NFR BLD: 82.7 % (ref 38–73)
NEUTROPHILS NFR BLD: 83.7 % (ref 38–73)
NRBC BLD-RTO: 0 /100 WBC
NRBC BLD-RTO: 0 /100 WBC
PHOSPHATE SERPL-MCNC: 4.1 MG/DL (ref 2.7–4.5)
PLATELET # BLD AUTO: 162 K/UL (ref 150–350)
PLATELET # BLD AUTO: 179 K/UL (ref 150–350)
PMV BLD AUTO: 10.2 FL (ref 9.2–12.9)
PMV BLD AUTO: 10.6 FL (ref 9.2–12.9)
POC ACTIVATED CLOTTING TIME K: 125 SEC (ref 74–137)
POC ACTIVATED CLOTTING TIME K: 197 SEC (ref 74–137)
POC ACTIVATED CLOTTING TIME K: 208 SEC (ref 74–137)
POC ACTIVATED CLOTTING TIME K: 224 SEC (ref 74–137)
POC ACTIVATED CLOTTING TIME K: 224 SEC (ref 74–137)
POC ACTIVATED CLOTTING TIME K: 230 SEC (ref 74–137)
POC ACTIVATED CLOTTING TIME K: 235 SEC (ref 74–137)
POC ACTIVATED CLOTTING TIME K: 235 SEC (ref 74–137)
POC ACTIVATED CLOTTING TIME K: 257 SEC (ref 74–137)
POTASSIUM SERPL-SCNC: 4.8 MMOL/L (ref 3.5–5.1)
PROT SERPL-MCNC: 4.8 G/DL (ref 6–8.4)
PROTHROMBIN TIME: 10.2 SEC (ref 9–12.5)
RBC # BLD AUTO: 2.48 M/UL (ref 4–5.4)
RBC # BLD AUTO: 2.72 M/UL (ref 4–5.4)
SAMPLE: ABNORMAL
SAMPLE: NORMAL
SODIUM SERPL-SCNC: 133 MMOL/L (ref 136–145)
WBC # BLD AUTO: 17.11 K/UL (ref 3.9–12.7)
WBC # BLD AUTO: 18.44 K/UL (ref 3.9–12.7)

## 2020-01-12 PROCEDURE — P9021 RED BLOOD CELLS UNIT: HCPCS

## 2020-01-12 PROCEDURE — 63600175 PHARM REV CODE 636 W HCPCS: Performed by: SURGERY

## 2020-01-12 PROCEDURE — 36430 TRANSFUSION BLD/BLD COMPNT: CPT

## 2020-01-12 PROCEDURE — 94761 N-INVAS EAR/PLS OXIMETRY MLT: CPT

## 2020-01-12 PROCEDURE — 86920 COMPATIBILITY TEST SPIN: CPT

## 2020-01-12 PROCEDURE — 63600175 PHARM REV CODE 636 W HCPCS: Performed by: STUDENT IN AN ORGANIZED HEALTH CARE EDUCATION/TRAINING PROGRAM

## 2020-01-12 PROCEDURE — 84100 ASSAY OF PHOSPHORUS: CPT

## 2020-01-12 PROCEDURE — 25000003 PHARM REV CODE 250: Performed by: SURGERY

## 2020-01-12 PROCEDURE — 99291 CRITICAL CARE FIRST HOUR: CPT | Mod: GC,,, | Performed by: ANESTHESIOLOGY

## 2020-01-12 PROCEDURE — 85730 THROMBOPLASTIN TIME PARTIAL: CPT

## 2020-01-12 PROCEDURE — 80053 COMPREHEN METABOLIC PANEL: CPT

## 2020-01-12 PROCEDURE — 27000221 HC OXYGEN, UP TO 24 HOURS

## 2020-01-12 PROCEDURE — 85610 PROTHROMBIN TIME: CPT

## 2020-01-12 PROCEDURE — 99291 PR CRITICAL CARE, E/M 30-74 MINUTES: ICD-10-PCS | Mod: GC,,, | Performed by: ANESTHESIOLOGY

## 2020-01-12 PROCEDURE — 85025 COMPLETE CBC W/AUTO DIFF WBC: CPT

## 2020-01-12 PROCEDURE — 25000242 PHARM REV CODE 250 ALT 637 W/ HCPCS: Performed by: STUDENT IN AN ORGANIZED HEALTH CARE EDUCATION/TRAINING PROGRAM

## 2020-01-12 PROCEDURE — 25000003 PHARM REV CODE 250: Performed by: STUDENT IN AN ORGANIZED HEALTH CARE EDUCATION/TRAINING PROGRAM

## 2020-01-12 PROCEDURE — 20000000 HC ICU ROOM

## 2020-01-12 PROCEDURE — 86850 RBC ANTIBODY SCREEN: CPT

## 2020-01-12 PROCEDURE — 83735 ASSAY OF MAGNESIUM: CPT

## 2020-01-12 RX ORDER — HYDROCODONE BITARTRATE AND ACETAMINOPHEN 500; 5 MG/1; MG/1
TABLET ORAL
Status: DISCONTINUED | OUTPATIENT
Start: 2020-01-12 | End: 2020-01-13

## 2020-01-12 RX ORDER — HEPARIN SODIUM,PORCINE/D5W 25000/250
18 INTRAVENOUS SOLUTION INTRAVENOUS CONTINUOUS
Status: DISCONTINUED | OUTPATIENT
Start: 2020-01-12 | End: 2020-01-12

## 2020-01-12 RX ORDER — HEPARIN SODIUM 10000 [USP'U]/100ML
600 INJECTION, SOLUTION INTRAVENOUS CONTINUOUS
Status: DISCONTINUED | OUTPATIENT
Start: 2020-01-12 | End: 2020-01-13

## 2020-01-12 RX ADMIN — OXYCODONE HYDROCHLORIDE 15 MG: 10 TABLET ORAL at 02:01

## 2020-01-12 RX ADMIN — ACETAMINOPHEN 650 MG: 325 TABLET ORAL at 12:01

## 2020-01-12 RX ADMIN — MUPIROCIN 1 G: 20 OINTMENT TOPICAL at 09:01

## 2020-01-12 RX ADMIN — OXYCODONE HYDROCHLORIDE 10 MG: 10 TABLET ORAL at 09:01

## 2020-01-12 RX ADMIN — HEPARIN SODIUM 600 UNITS/HR: 10000 INJECTION, SOLUTION INTRAVENOUS at 09:01

## 2020-01-12 RX ADMIN — OYSTER SHELL CALCIUM WITH VITAMIN D 1 TABLET: 500; 200 TABLET, FILM COATED ORAL at 09:01

## 2020-01-12 RX ADMIN — ACETAMINOPHEN 650 MG: 325 TABLET ORAL at 06:01

## 2020-01-12 RX ADMIN — OXYCODONE HYDROCHLORIDE 10 MG: 10 TABLET ORAL at 08:01

## 2020-01-12 RX ADMIN — HYDROMORPHONE HYDROCHLORIDE 0.5 MG: 1 INJECTION, SOLUTION INTRAMUSCULAR; INTRAVENOUS; SUBCUTANEOUS at 05:01

## 2020-01-12 RX ADMIN — AMLODIPINE BESYLATE 5 MG: 5 TABLET ORAL at 09:01

## 2020-01-12 RX ADMIN — MONTELUKAST 10 MG: 10 TABLET, FILM COATED ORAL at 09:01

## 2020-01-12 RX ADMIN — SODIUM CHLORIDE, SODIUM LACTATE, POTASSIUM CHLORIDE, AND CALCIUM CHLORIDE: 600; 310; 30; 20 INJECTION, SOLUTION INTRAVENOUS at 12:01

## 2020-01-12 RX ADMIN — LEVOTHYROXINE SODIUM 100 MCG: 100 TABLET ORAL at 08:01

## 2020-01-12 RX ADMIN — OXYCODONE HYDROCHLORIDE 10 MG: 10 TABLET ORAL at 02:01

## 2020-01-12 RX ADMIN — FLUTICASONE FUROATE AND VILANTEROL TRIFENATATE 1 PUFF: 200; 25 POWDER RESPIRATORY (INHALATION) at 09:01

## 2020-01-12 RX ADMIN — ONDANSETRON 4 MG: 2 INJECTION INTRAMUSCULAR; INTRAVENOUS at 05:01

## 2020-01-12 RX ADMIN — SENNOSIDES AND DOCUSATE SODIUM 1 TABLET: 8.6; 5 TABLET ORAL at 09:01

## 2020-01-12 RX ADMIN — LOSARTAN POTASSIUM 25 MG: 25 TABLET ORAL at 09:01

## 2020-01-12 RX ADMIN — ACETAMINOPHEN 650 MG: 325 TABLET ORAL at 11:01

## 2020-01-12 RX ADMIN — ASPIRIN 81 MG: 81 TABLET, COATED ORAL at 09:01

## 2020-01-12 RX ADMIN — CLOPIDOGREL BISULFATE 75 MG: 75 TABLET ORAL at 09:01

## 2020-01-12 RX ADMIN — POLYETHYLENE GLYCOL 3350 17 G: 17 POWDER, FOR SOLUTION ORAL at 09:01

## 2020-01-12 RX ADMIN — ATORVASTATIN CALCIUM 40 MG: 20 TABLET, FILM COATED ORAL at 09:01

## 2020-01-12 RX ADMIN — ACETAMINOPHEN 650 MG: 325 TABLET ORAL at 05:01

## 2020-01-12 RX ADMIN — HYDROMORPHONE HYDROCHLORIDE 0.5 MG: 1 INJECTION, SOLUTION INTRAMUSCULAR; INTRAVENOUS; SUBCUTANEOUS at 11:01

## 2020-01-12 NOTE — HOSPITAL COURSE
1/11:  Patient admitted to SICU.    1/13: Neurovascular checks remain stable, complaints of same abdominal pain, VSS AF, good UOP

## 2020-01-12 NOTE — SUBJECTIVE & OBJECTIVE
Follow-up For: Procedure(s) (LRB):  ANGIOGRAM, LOWER EXTREMITY (N/A)  THROMBECTOMY (Right)    Post-Operative Day: Day of Surgery     Past Medical History:   Diagnosis Date    Allergic rhinitis     COPD (chronic obstructive pulmonary disease)     Depression     GERD (gastroesophageal reflux disease)     Headache     Hyperlipidemia     Hypertension     Pneumonia     Polyarthralgia     Postablative hypothyroidism     hypothyroidism s/p OCASIO therapy    PVD (peripheral vascular disease)     Treated by Dr. Sim       Past Surgical History:   Procedure Laterality Date    CHOLECYSTECTOMY  1986    GALLBLADDER SURGERY  2006    HIP SURGERY  2005    Right hip fracture/surgery    PERIPHERAL ARTERIAL STENT GRAFT Right 2007    stent for femoral artery blockage       Review of patient's allergies indicates:  No Known Allergies    Family History     Problem Relation (Age of Onset)    COPD Mother    Cancer Mother    Heart attack Father (55), Sister (53)    Thyroid disease Mother        Tobacco Use    Smoking status: Current Every Day Smoker     Types: Cigarettes    Smokeless tobacco: Never Used    Tobacco comment: None since Dec 26   Substance and Sexual Activity    Alcohol use: No     Alcohol/week: 0.0 standard drinks    Drug use: Never    Sexual activity: Not on file      Review of Systems   Unable to perform ROS: Acuity of condition     Objective:     Vital Signs (Most Recent):  Temp: 97.5 °F (36.4 °C) (01/11/20 1900)  Pulse: 81 (01/11/20 2215)  Resp: (!) 35 (01/11/20 2215)  BP: (!) 105/54 (01/11/20 2200)  SpO2: 100 % (01/11/20 2215) Vital Signs (24h Range):  Temp:  [96.4 °F (35.8 °C)-97.7 °F (36.5 °C)] 97.5 °F (36.4 °C)  Pulse:  [] 81  Resp:  [10-38] 35  SpO2:  [84 %-100 %] 100 %  BP: ()/(43-62) 105/54  Arterial Line BP: (78)/(48) 78/48     Weight: 62.1 kg (136 lb 14.5 oz)  Body mass index is 25.04 kg/m².      Intake/Output Summary (Last 24 hours) at 1/11/2020 2229  Last data filed at 1/11/2020  2000  Gross per 24 hour   Intake 2772.4 ml   Output 915 ml   Net 1857.4 ml       Physical Exam   Constitutional: She appears well-developed. She appears distressed.   HENT:   Head: Normocephalic and atraumatic.   Eyes: Pupils are equal, round, and reactive to light. EOM are normal.   Neck: Normal range of motion. Neck supple.   Cardiovascular: Normal rate and regular rhythm.   Dopplerable PT signal, no DP signal   Pulmonary/Chest: Effort normal. No respiratory distress.   8L simple face mask   Abdominal: Soft. She exhibits no distension. There is tenderness. There is guarding.   Genitourinary:   Genitourinary Comments: Valdes in place   Musculoskeletal:   Bilateral groin access sites dressed.     Neurological: She is alert.   Skin: Skin is warm.       Vents:  Oxygen Concentration (%): 40 (01/11/20 1951)    Lines/Drains/Airways     Drain                 Urethral Catheter 01/11/20 1445 Non-latex;Straight-tip 16 Fr. less than 1 day          Arterial Line                 Arterial Line 01/11/20 Left Radial less than 1 day          Peripheral Intravenous Line                 Peripheral IV - Single Lumen 01/09/20 0700 18 G Right Forearm 2 days         Peripheral IV - Single Lumen 01/09/20 1759 20 G Left Antecubital 2 days                Significant Labs:    CBC/Anemia Profile:  Recent Labs   Lab 01/11/20  0519 01/11/20  1523 01/11/20  1826   WBC 18.68* 22.24* 23.04*   HGB 9.4* 8.0* 8.1*   HCT 31.6* 25.9* 26.4*    207 223   MCV 92 89 89   RDW 19.7* 19.4* 19.6*        Chemistries:  Recent Labs   Lab 01/10/20  0152  01/11/20  0519 01/11/20  1523 01/11/20  1826   *   < > 134* 134* 135*   K 4.4   < > 4.8 5.0 5.5*   CL 97   < > 101 103 103   CO2 27   < > 23 25 24   BUN 10   < > 15 15 15   CREATININE 0.8   < > 1.0 0.9 0.8   CALCIUM 8.6*   < > 8.4* 7.2* 7.5*   ALBUMIN 2.3*   < > 2.1* 1.8* 1.7*   PROT 5.6*   < > 5.6* 5.0* 5.4*   BILITOT 0.6   < > 0.7 0.5 0.5   ALKPHOS 124   < > 197* 183* 194*   ALT 29   < > 34 32 33    AST 44*   < > 62* 66* 89*   MG 2.1  --   --  2.2  --    PHOS 3.1  --   --  4.9*  --     < > = values in this interval not displayed.       All pertinent labs within the past 24 hours have been reviewed.    Significant Imaging:   CXR:  Cardiac silhouette is stable.  Atherosclerotic calcifications overlie the aortic arch.  There are diffusely coarsened interstitial lung markings.  Worsening opacification of the right mid and lower lung zone likely secondary to known right hilar mass and right pleural effusion with adjacent passive atelectasis or consolidation.  These findings are better characterized on recent abdominal CT.  There is a small lung nodule again identified in the left lung base.  Mild left basilar subsegmental atelectasis.  Left lung is otherwise essentially clear.    AXR:  Mild to moderate gaseous distension of large and small bowel.  Bowel gas pattern is overall nonobstructive.  No large volume stool burden.  No convincing pneumoperitoneum identified on the upright radiograph.  Right hip prosthesis is present.  Bones are stable.

## 2020-01-12 NOTE — ASSESSMENT & PLAN NOTE
69 y.o. female with  HTN, HLD, COPD (on home O2 at 2-3L), PUD, polyarthralgia, and PVD s/p R femoral artery stent who is being admitted to the SICU after undergoing right lower extremity angiogram and thrombectomy on 1/11/2020    Neuro:   - Oxycodone and Dilaudid prn for pain control    Pulmonary:   - History of COPD on home oxygen.    - Large perihilar lung mass  - Supplemental oxygen as needed to maintain saturation above 88%  - ABGs PRN  - Continue montelukast and Breo inhaler  - BTs, DuoNebs    Cardiac:   -Currently hemodynamically stable  - Losartan and amlodipine for hypertension  - DAPT  - Heparin gtt    Renal:    - Monitor UOP  - Trend BUN/Cr    ID:   - Afebrile  - Leukocytosis  - Trend WBC daily  - Completed perioperative antibiotics    Hem/Onc:   - H/H 6.7 this morning transfused 1 unit of pRBC  - Transfuse as need to maintain H/H of 7/21    Endocrine:    - SSI  - Continue synthroid    Fluids/Electrolytes/Nutrition/GI:  - Patient complaining of abdominal pain with guarding, not peritonitic on exam, will continue to follow with serial abdominal exam.    - Replace electrolytes PRN  - Regular diet, discontinue MIVF    PPx:  - DVT: Heparin gtt.   - Bowel: Senna-ducosate    DISPO:  - Continue SICU care, possible stepdown tomorrow

## 2020-01-12 NOTE — PROGRESS NOTES
[facility]  Critical Care - Surgery  Progress Note    Patient Name: Sharita Castañeda  MRN: [id number]  Admission Date: 1/9/2020  Hospital Length of Stay: 3 days  Code Status: Full Code  Attending Provider: BRAULIO Verma II, MD  Primary Care Provider: Wallace Deng MD   Principal Problem: Acute pain of right lower extremity    Subjective:     Hospital/ICU Course:  1/11:  Patient admitted to SICU.        Interval History/Significant Events:     Patient with improved abdominal pain on serial exams.  Pain better controlled.  Heparin infusion running.      Follow-up For: Procedure(s) (LRB):  ANGIOGRAM, LOWER EXTREMITY (N/A)  THROMBECTOMY (Right)    Post-Operative Day: 1 Day Post-Op    Objective:     Vital Signs (Most Recent):  Temp: 96.6 °F (35.9 °C) (01/12/20 0900)  Pulse: 78 (01/12/20 0900)  Resp: 13 (01/12/20 0900)  BP: 116/68 (01/12/20 0900)  SpO2: 99 % (01/12/20 0900) Vital Signs (24h Range):  Temp:  [96.4 °F (35.8 °C)-97.7 °F (36.5 °C)] 96.6 °F (35.9 °C)  Pulse:  [64-89] 78  Resp:  [8-40] 13  SpO2:  [89 %-100 %] 99 %  BP: ()/(46-98) 116/68  Arterial Line BP: (59-78)/(30-48) 72/36     Weight: 62.1 kg (136 lb 14.5 oz)  Body mass index is 25.04 kg/m².      Intake/Output Summary (Last 24 hours) at 1/12/2020 0958  Last data filed at 1/12/2020 0845  Gross per 24 hour   Intake 2349 ml   Output 1145 ml   Net 1204 ml       Physical Exam   Constitutional: She is oriented to person, place, and time. She appears well-developed. No distress.   HENT:   Head: Normocephalic and atraumatic.   Eyes: Pupils are equal, round, and reactive to light. EOM are normal.   Neck: Normal range of motion. Neck supple.   Cardiovascular: Normal rate and regular rhythm.   Dopplerable PT signal, no DP signal   Pulmonary/Chest: Effort normal. No respiratory distress.   Nasal Cannula   Abdominal: Soft. She exhibits no distension. There is no tenderness. There is no guarding.   Genitourinary:   Genitourinary Comments: Valdes in place    Musculoskeletal:   Bilateral groin access sites dressed.     Neurological: She is alert and oriented to person, place, and time.   Skin: Skin is warm.   Psychiatric: She has a normal mood and affect.       Vents:  Oxygen Concentration (%): 40 (01/11/20 1951)    Lines/Drains/Airways     Drain                 Urethral Catheter 01/11/20 1445 Non-latex;Straight-tip 16 Fr. less than 1 day          Arterial Line                 Arterial Line 01/11/20 Left Radial 1 day          Peripheral Intravenous Line                 Peripheral IV - Single Lumen 01/09/20 0700 18 G Right Forearm 3 days         Peripheral IV - Single Lumen 01/09/20 1759 20 G Left Antecubital 2 days                Significant Labs:    CBC/Anemia Profile:  Recent Labs   Lab 01/11/20  1523 01/11/20  1826 01/12/20  0320   WBC 22.24* 23.04* 18.44*   HGB 8.0* 8.1* 6.7*   HCT 25.9* 26.4* 22.2*    223 179   MCV 89 89 90   RDW 19.4* 19.6* 19.7*        Chemistries:  Recent Labs   Lab 01/11/20  1523 01/11/20  1826 01/12/20  0320   * 135* 133*   K 5.0 5.5* 4.8    103 102   CO2 25 24 24   BUN 15 15 16   CREATININE 0.9 0.8 0.8   CALCIUM 7.2* 7.5* 7.2*   ALBUMIN 1.8* 1.7* 1.6*   PROT 5.0* 5.4* 4.8*   BILITOT 0.5 0.5 0.5   ALKPHOS 183* 194* 187*   ALT 32 33 31   AST 66* 89* 108*   MG 2.2  --  2.2   PHOS 4.9*  --  4.1       All pertinent labs within the past 24 hours have been reviewed.    Significant Imagi ng:  No new imaging    Assessment/Plan:     PVD (peripheral vascular disease)  69 y.o. female with  HTN, HLD, COPD (on home O2 at 2-3L), PUD, polyarthralgia, and PVD s/p R femoral artery stent who is being admitted to the SICU after undergoing right lower extremity angiogram and thrombectomy on 1/11/2020    Neuro:   - Oxycodone and Dilaudid prn for pain control    Pulmonary:   - History of COPD on home oxygen.    - Large perihilar lung mass  - Supplemental oxygen as needed to maintain saturation above 88%  - ABGs PRN  - Continue montelukast and  Breo inhaler  - BTs, DuoNebs    Cardiac:   -Currently hemodynamically stable  - Losartan and amlodipine for hypertension  - DAPT  - Heparin gtt    Renal:    - Monitor UOP  - Trend BUN/Cr    ID:   - Afebrile  - Leukocytosis  - Trend WBC daily  - Completed perioperative antibiotics    Hem/Onc:   - H/H 6.7 this morning transfused 1 unit of pRBC  - Transfuse as need to maintain H/H of 7/21    Endocrine:    - SSI  - Continue synthroid    Fluids/Electrolytes/Nutrition/GI:  - Patient complaining of abdominal pain with guarding, not peritonitic on exam, will continue to follow with serial abdominal exam.    - Replace electrolytes PRN  - Regular diet, discontinue MIVF    PPx:  - DVT: Heparin gtt.   - Bowel: Senna-ducosate    DISPO:  - Continue SICU care, possible stepdown tomorrow             Critical care was time spent personally by me on the following activities: development of treatment plan with patient or surrogate and bedside caregivers, discussions with consultants, evaluation of patient's response to treatment, examination of patient, ordering and performing treatments and interventions, ordering and review of laboratory studies, ordering and review of radiographic studies, pulse oximetry, re-evaluation of patient's condition.  This critical care time did not overlap with that of any other provider or involve time for any procedures.     Leon Durbin MD  Critical Care - Surgery  [facility]

## 2020-01-12 NOTE — OP NOTE
Vascular Surgery Op Note    Date of Operation/Procedure: 1/11/20    Pre-operative Diagnosis: right lower extremity critical limb ischemia    Post-operative Diagnosis: same    Anesthesia: general    Operation/Procedure Performed:   1. Right common femoral artery exposure  2. Right common iliac artery thromboembolectomy  3. Right superficial femoral artery reconstruction with saphenous vein interposition graft  4. Aortogram  5. Bilateral common iliac artery balloon angioplasty (8mm balloon)  6. Right popliteal artery thrombectomy  7. Right tibial artery thrombectomy  8. Right four compartment fasciotomy  9. Left radial Arterial line placement    Attending Surgeon: BRAULIO Verma II, MD    Resident/Fellow: Hardy Delgado MD PGY7    Indications: 70yo F with history of bilateral common iliac artery stenting who is admitted with 3 days of worsening right lower extremity ischemia rest pain. She had recently been admitted and diagnosed with metastatic cancer with metastases in multiple locations in her body. She had undergone liver biopsy while inpatient and had to held her aspirin and clopidogrel pre and postop.  It is likely that this may have predisposed her to her thrombotic event.  On physical exam she had a weak external iliac pulse and absent distal doppler signals She had preserved motor function in the RLE. She complained of slight numbness in her toes but otherwise sensation was intact.  The CT angiogram with BLE run-off was obtained which showed thrombosis of the distal right common iliac stent with reconstitution via the internal iliac artery. There was also occlusion of the right below-knee popliteal artery suspicious for thrombus is due to embolism. While waiting for elective revascularization the patient had progression of her ischemic symptoms with decreased dorsiflexion of her right foot on the morning of 1/11/20. She was consented for urgent RLE revascularization due to her worsening symptoms. Risks,  benefits and alternatives were explained to the patient who expressed full understanding and wished to proceed.    Procedure in Detail:   The patient was taken to the operating room supine position.  General anesthesia was administered by the anesthesia team. I placed a left radial arterial line for continuous blood pressure monitoring and ACT measurements.  The patient's right leg was prepped and draped circumferentially.  The bilateral groins were prepped and draped in normal sterile fashion. A time-out was performed to confirm appropriate patient identification, procedure, and laterality.  Preoperative antibiotics were administered.    We began by making an oblique incision in the patient's right groin over the common femoral artery.  The incision was deepened with a combination of electrocautery, blunt dissection, and sharp dissection. The tissues were divided obliquely until we encountered the femoral sheath.  The femoral sheath was opened longitudinally in line with the artery.  The anterior surface of the common femoral artery was identified. We dissected the artery circumferentially in the proximal aspect of the incision just under the inguinal ligament.  We encircled the artery with a large vessel loop.  We then continued dissecting distally and identified the bifurcation of the profunda femoris and superficial femoral artery.  The profunda was encircled with a small vessel loops and the superficial femoral artery encircled with a large vessel loop. The arteries were all small and soft and appeared fragile. We then administered 5000 units of heparin for systemic anticoagulation.  Heparin was readministered throughout the case based on ACT's.  We applied tension to the proximal and distal vascular swelling of the around the profunda femoris artery.  I made a transverse arterial on the anterior surface of the superficial femoral artery in the middle of our incision to accomodate inital passage of a  thrombectomy catheter proximally and allow potential distal thrombectomy if necessary.  We then used a 5 Beny catheter to perform thrombectomy of the right common iliac artery.  We removed acute and subacute appearing thrombus.  We passed the catheter 3 times until no thrombus was returned. There was strong pulsatile inflow after thrombectomy was completed.     Unfortunately the tension of the vessel loop on the proximal superficial femoral artery was enough to partially transect the fragile artery. A clamp was applied to the proximal common femoral artery to control inflow bleeding. We also noted that the thrombectomy catheter had widened transverse arteriotomy on the superficial femoral artery to more than 50% of the circumference. Unfortunately neither of these vessel injuries could be primarily repaired due to the extent of damage and tension on the artery. Therefore the damaged segment of SFA was resected. An interposition graft would be necessary to restore flow in the SFA. We extended the distal aspect of our incision medially and circumferentially disected a 3cm segment of greater saphenous vein. The vein diameter measured at least >4 mm.  The vein was marked for orientation and divided proximally and distally within the incision and the segment removed and placed in heparinized saline. Both remaining ends were suture ligated with 3-0 silk suture. We spatulated the transected ends of the superficial femoral artery.  The orientation of the vein was reversed and the ends of the vein spatulated to match the artery.  We performed an end-to-end anastomosis at both ends using running 6 0 Prolene suture with 4 knot technique. After completion of the anastomoses the profundus femoris was implant followed by the superficial femoral artery and final followed by the proximal common femoral artery.  There was no apparent bleeding along the suture line.    Reason obtained percutaneous access to the left common femoral  artery.  We placed a 5 English sheath followed by a Glidewire and Omni Flush catheter.  We performed an aortogram through the Omni Flush catheter.  This showed severe stenosis of the right proximal common iliac artery.  The left common iliac artery stent remained patent. We then placed a pre closure suture on the anterior surface of the right common femoral artery using 5 0 Prolene.  The artery was accessed with a micropuncture needle at the site of the pre closure suture. Through series of wire and catheter exchanges we placed a 5 English sheath into the right common femoral artery.  A Glidewire was advanced through the right-sided sheath and up through the right common iliac artery stent. An 8 x 60 mm Cristobal balloon was advanced over the wire on the right side and a 8 x 40 mm cristobal balloon advanced over the wire on the left side. Balloons were simultaneously inflated to nominal pressure. The balloons were then simultaneously deflated and removed over the wires.  Ninety flush catheter was readvanced over the wire in the left groin and a completion aortogram was performed. This showed widely patent bilateral common iliac arteries.  The sheath was removed from the right common femoral artery in the pre closures suture tied down successfully good hemostasis. An angiogram was performed of the common femoral which showed patent common, profunda, and superficial femoral arteries. There was 30% stenosis of the proximal anastamosis of the vein interposition graft which looked more severe due to increased diameter of the vein graft compared to the artery. We did not feel that this stenosis was flow-limiting. Having restored inflow, we then checked signals at the foot. Unfortunately no doppler signals were detectable; thus we made the decision to perform popliteal artery exploration and thrombectomy.    A longitudinal incision was made below the knee 1 fingerbreadth medial to the tibia.  The incision was deepened with a  combination of electrocautery, blunt dissection and sharp dissection. The muscular fascia was opened.  The saphenous vein which was 2mm in diameter was divided within the incision and hemoclips applied to its ends.  The popliteal vein was identified within the fat pad.  There is retracted posteriorly and the popliteal artery could be visualized.  Popliteal artery was dissected circumferentially and encircled with vessel loop for retraction. We continued dissecting down the medial surface of the popliteal artery to the origin of the anterior tibial artery and TP trunk.  The anterior tibial vein was divided to expose the tibioperoneal trunk.  Made a transverse arteriotomy in the mid popliteal artery.  We passed the 3 afia thrombectomy catheter proximally into the popliteal artery.  Acute thrombus was removed and there was brisk pulsatile inflow. A clamp was applied to the proximal below-knee popliteal artery.  We passed the artery a total of 3 times until no thrombus was returned.  We then passed the catheter distally into the tibioperoneal trunk and posterior tibial artery.  We passed the catheter 3 times before we were able to remove a stubborn plug of thrombus lodged in the tibioperoneal trunk which restored distal back-bleeding.  The arteriotomy was closed with three simple interrupted 6 0 Prolene sutures. We recheck distal Doppler signals and there is a triphasic signal in posterior tibial artery.    Due to the duration of the patient's ischemia and severity of symptoms we felt that a 4 compartment fasciotomy would be necessary.  The the posterior and posterior fascia had already been opened through our below-knee popliteal incision. We made a 8cm longitudinal  on the lateral aspect of the calf.  The raphe was identified and the fascia of the anterior and lateral compartments opened with Metzenbaum scissors. The muscle bellies appeared pink and healthy and did not swell. We took care NOT to pass the scissors  too far proximally near the fibular head to avoid injuring the peroneal nerve.     All the wounds were then thoroughly irrigated with saline. Heparin was reversed with 30 mg of protamine.  Thrombin Gelfoam was added to the wounds to assist with hemostasis. The wounds were again irrigated with saline and there was no ongoing bleeding. The groin incision was closed with running 2 0 Vicryl suture in the femoral sheath.  Remainder of the deep tissue in the groin incision was closed with interrupted 2 0 Vicryl in multiple layers. The deep dermal tissue was closed with interrupted 3 0 Vicryl suture.. The skin was closed with interrupted vertical mattress 3-0 nylon suture.  A single skin staple was placed at the top of the incision to better approximate the skin. The below-knee incision was closed with 3 0 Vicryl in the deep dermis followed by interrupted vertical mattress 3 0 nylon and staples. The lateral fasciotomy incision was closed last.  There was no swelling of the muscle whatsoever so we felt that it would be safe to close in the OR.  The incision was closed with interrupted vertical mattress 3 a nylon suture. All sponge needle instrument counts were correct at the close of the case.    The fixed imaging system suddenly lost power twice due to weather during the procedure and had to be restarted on generator power. This increased the duration of the case by 30 minutes as we waited for it to reboot.    The patient tolerated the procedure well and was transported to the ICU for recovery.    Estimated Blood loss: 300ml    G. Odilon Verma II, MD, VI  Vascular Surgeon  [facility]

## 2020-01-12 NOTE — H&P
[facility]  Critical Care - Surgery  History & Physical    Patient Name: Sharita Castañeda  MRN: [id number]  Admission Date: 1/9/2020  Code Status: Full Code  Attending Physician: BRAULIO Verma II, MD   Primary Care Provider: Wallace Deng MD   Principal Problem: Acute pain of right lower extremity    Subjective:     HPI:  Sharita Castañeda is a 69 y.o. female with HTN, HLD, COPD (on home O2 at 2-3L), PUD, polyarthralgia, and PVD s/p R femoral artery stent who is being admitted to the SICU after undergoing right lower extremity angiogram and thrombectomy after presenting with a cool leg and rest pain.   Patient was recently admitted from 1/2 to 1/8 for acute hypoxic respiratory failure concerning for post obstructive pneumonia.  Previous CTA concerning for lung malignancy with diffuse metastatic disease.  Liver biopsy 1/6 pending as well as common iliac thrombosis.          Hospital/ICU Course:  No notes on file    Follow-up For: Procedure(s) (LRB):  ANGIOGRAM, LOWER EXTREMITY (N/A)  THROMBECTOMY (Right)    Post-Operative Day: Day of Surgery     Past Medical History:   Diagnosis Date    Allergic rhinitis     COPD (chronic obstructive pulmonary disease)     Depression     GERD (gastroesophageal reflux disease)     Headache     Hyperlipidemia     Hypertension     Pneumonia     Polyarthralgia     Postablative hypothyroidism     hypothyroidism s/p OCASIO therapy    PVD (peripheral vascular disease)     Treated by Dr. Sim       Past Surgical History:   Procedure Laterality Date    CHOLECYSTECTOMY  1986    GALLBLADDER SURGERY  2006    HIP SURGERY  2005    Right hip fracture/surgery    PERIPHERAL ARTERIAL STENT GRAFT Right 2007    stent for femoral artery blockage       Review of patient's allergies indicates:  No Known Allergies    Family History     Problem Relation (Age of Onset)    COPD Mother    Cancer Mother    Heart attack Father (55), Sister (53)    Thyroid disease Mother        Tobacco Use     Smoking status: Current Every Day Smoker     Types: Cigarettes    Smokeless tobacco: Never Used    Tobacco comment: None since Dec 26   Substance and Sexual Activity    Alcohol use: No     Alcohol/week: 0.0 standard drinks    Drug use: Never    Sexual activity: Not on file      Review of Systems   Unable to perform ROS: Acuity of condition     Objective:     Vital Signs (Most Recent):  Temp: 97.5 °F (36.4 °C) (01/11/20 1900)  Pulse: 81 (01/11/20 2215)  Resp: (!) 35 (01/11/20 2215)  BP: (!) 105/54 (01/11/20 2200)  SpO2: 100 % (01/11/20 2215) Vital Signs (24h Range):  Temp:  [96.4 °F (35.8 °C)-97.7 °F (36.5 °C)] 97.5 °F (36.4 °C)  Pulse:  [] 81  Resp:  [10-38] 35  SpO2:  [84 %-100 %] 100 %  BP: ()/(43-62) 105/54  Arterial Line BP: (78)/(48) 78/48     Weight: 62.1 kg (136 lb 14.5 oz)  Body mass index is 25.04 kg/m².      Intake/Output Summary (Last 24 hours) at 1/11/2020 2229  Last data filed at 1/11/2020 2000  Gross per 24 hour   Intake 2772.4 ml   Output 915 ml   Net 1857.4 ml       Physical Exam   Constitutional: She appears well-developed. She appears distressed.   HENT:   Head: Normocephalic and atraumatic.   Eyes: Pupils are equal, round, and reactive to light. EOM are normal.   Neck: Normal range of motion. Neck supple.   Cardiovascular: Normal rate and regular rhythm.   Dopplerable PT signal, no DP signal   Pulmonary/Chest: Effort normal. No respiratory distress.   8L simple face mask   Abdominal: Soft. She exhibits no distension. There is tenderness. There is guarding.   Genitourinary:   Genitourinary Comments: Valdes in place   Musculoskeletal:   Bilateral groin access sites dressed.     Neurological: She is alert.   Skin: Skin is warm.       Vents:  Oxygen Concentration (%): 40 (01/11/20 1951)    Lines/Drains/Airways     Drain                 Urethral Catheter 01/11/20 1445 Non-latex;Straight-tip 16 Fr. less than 1 day          Arterial Line                 Arterial Line 01/11/20 Left Radial  less than 1 day          Peripheral Intravenous Line                 Peripheral IV - Single Lumen 01/09/20 0700 18 G Right Forearm 2 days         Peripheral IV - Single Lumen 01/09/20 1759 20 G Left Antecubital 2 days                Significant Labs:    CBC/Anemia Profile:  Recent Labs   Lab 01/11/20  0519 01/11/20  1523 01/11/20  1826   WBC 18.68* 22.24* 23.04*   HGB 9.4* 8.0* 8.1*   HCT 31.6* 25.9* 26.4*    207 223   MCV 92 89 89   RDW 19.7* 19.4* 19.6*        Chemistries:  Recent Labs   Lab 01/10/20  0152  01/11/20  0519 01/11/20  1523 01/11/20  1826   *   < > 134* 134* 135*   K 4.4   < > 4.8 5.0 5.5*   CL 97   < > 101 103 103   CO2 27   < > 23 25 24   BUN 10   < > 15 15 15   CREATININE 0.8   < > 1.0 0.9 0.8   CALCIUM 8.6*   < > 8.4* 7.2* 7.5*   ALBUMIN 2.3*   < > 2.1* 1.8* 1.7*   PROT 5.6*   < > 5.6* 5.0* 5.4*   BILITOT 0.6   < > 0.7 0.5 0.5   ALKPHOS 124   < > 197* 183* 194*   ALT 29   < > 34 32 33   AST 44*   < > 62* 66* 89*   MG 2.1  --   --  2.2  --    PHOS 3.1  --   --  4.9*  --     < > = values in this interval not displayed.       All pertinent labs within the past 24 hours have been reviewed.    Significant Imaging:   CXR:  Cardiac silhouette is stable.  Atherosclerotic calcifications overlie the aortic arch.  There are diffusely coarsened interstitial lung markings.  Worsening opacification of the right mid and lower lung zone likely secondary to known right hilar mass and right pleural effusion with adjacent passive atelectasis or consolidation.  These findings are better characterized on recent abdominal CT.  There is a small lung nodule again identified in the left lung base.  Mild left basilar subsegmental atelectasis.  Left lung is otherwise essentially clear.    AXR:  Mild to moderate gaseous distension of large and small bowel.  Bowel gas pattern is overall nonobstructive.  No large volume stool burden.  No convincing pneumoperitoneum identified on the upright radiograph.  Right hip  prosthesis is present.  Bones are stable.      Assessment/Plan:     PVD (peripheral vascular disease)  69 y.o. female with  HTN, HLD, COPD (on home O2 at 2-3L), PUD, polyarthralgia, and PVD s/p R femoral artery stent who is being admitted to the SICU after undergoing right lower extremity angiogram and thrombectomy    Neuro:   - Oxycodone and Dilaudid prn for pain control    Pulmonary:   - History of COPD on home oxygen.    - Large perihilar lung mass  - Supplemental oxygen as needed to maintain saturation above 88%  - ABGs PRN  - Continue montelukast and Breo inhaler  - BTs, DuoNebs    Cardiac:   -Currently hemodynamically stable  - Losartan and amlodipine for hypertension  - DAPT  - Heparin gtt      Renal:    - Monitor UOP  - Trend BUN/Cr      ID:   - Afebrile  - Leukocytosis  - Trend WBC daily  - Continue perioperative antibiotics    Hem/Onc:   - H/H stable  - Transfuse as need to maintain H/H of 7/21    Endocrine:    - SSI  - Continue synthroid    Fluids/Electrolytes/Nutrition/GI:  - Patient complaining of abdominal pain with guarding, not peritonitic on exam, will continue to follow with serial abdominal exam.    - Replace electrolytes PRN  - mIVF: LR @ 100cc/hr  - Currently NPO    PPx:  - DVT: Heparin gtt.   - Bowel: Senna-ducosate    DISPO:  - Continue SICU care         Critical care was time spent personally by me on the following activities: development of treatment plan with patient or surrogate and bedside caregivers, discussions with consultants, evaluation of patient's response to treatment, examination of patient, ordering and performing treatments and interventions, ordering and review of laboratory studies, ordering and review of radiographic studies, pulse oximetry, re-evaluation of patient's condition.  This critical care time did not overlap with that of any other provider or involve time for any procedures.     Leon Durbin MD  Critical Care - Surgery  [facility]

## 2020-01-12 NOTE — SUBJECTIVE & OBJECTIVE
Interval History/Significant Events:     Patient with improved abdominal pain on serial exams.  Pain better controlled.  Heparin infusion running.      Follow-up For: Procedure(s) (LRB):  ANGIOGRAM, LOWER EXTREMITY (N/A)  THROMBECTOMY (Right)    Post-Operative Day: 1 Day Post-Op    Objective:     Vital Signs (Most Recent):  Temp: 96.6 °F (35.9 °C) (01/12/20 0900)  Pulse: 78 (01/12/20 0900)  Resp: 13 (01/12/20 0900)  BP: 116/68 (01/12/20 0900)  SpO2: 99 % (01/12/20 0900) Vital Signs (24h Range):  Temp:  [96.4 °F (35.8 °C)-97.7 °F (36.5 °C)] 96.6 °F (35.9 °C)  Pulse:  [64-89] 78  Resp:  [8-40] 13  SpO2:  [89 %-100 %] 99 %  BP: ()/(46-98) 116/68  Arterial Line BP: (59-78)/(30-48) 72/36     Weight: 62.1 kg (136 lb 14.5 oz)  Body mass index is 25.04 kg/m².      Intake/Output Summary (Last 24 hours) at 1/12/2020 0958  Last data filed at 1/12/2020 0845  Gross per 24 hour   Intake 2349 ml   Output 1145 ml   Net 1204 ml       Physical Exam   Constitutional: She is oriented to person, place, and time. She appears well-developed. No distress.   HENT:   Head: Normocephalic and atraumatic.   Eyes: Pupils are equal, round, and reactive to light. EOM are normal.   Neck: Normal range of motion. Neck supple.   Cardiovascular: Normal rate and regular rhythm.   Dopplerable PT signal, no DP signal   Pulmonary/Chest: Effort normal. No respiratory distress.   Nasal Cannula   Abdominal: Soft. She exhibits no distension. There is no tenderness. There is no guarding.   Genitourinary:   Genitourinary Comments: Valdes in place   Musculoskeletal:   Bilateral groin access sites dressed.     Neurological: She is alert and oriented to person, place, and time.   Skin: Skin is warm.   Psychiatric: She has a normal mood and affect.       Vents:  Oxygen Concentration (%): 40 (01/11/20 1951)    Lines/Drains/Airways     Drain                 Urethral Catheter 01/11/20 1445 Non-latex;Straight-tip 16 Fr. less than 1 day          Arterial Line                  Arterial Line 01/11/20 Left Radial 1 day          Peripheral Intravenous Line                 Peripheral IV - Single Lumen 01/09/20 0700 18 G Right Forearm 3 days         Peripheral IV - Single Lumen 01/09/20 1759 20 G Left Antecubital 2 days                Significant Labs:    CBC/Anemia Profile:  Recent Labs   Lab 01/11/20  1523 01/11/20  1826 01/12/20  0320   WBC 22.24* 23.04* 18.44*   HGB 8.0* 8.1* 6.7*   HCT 25.9* 26.4* 22.2*    223 179   MCV 89 89 90   RDW 19.4* 19.6* 19.7*        Chemistries:  Recent Labs   Lab 01/11/20  1523 01/11/20  1826 01/12/20  0320   * 135* 133*   K 5.0 5.5* 4.8    103 102   CO2 25 24 24   BUN 15 15 16   CREATININE 0.9 0.8 0.8   CALCIUM 7.2* 7.5* 7.2*   ALBUMIN 1.8* 1.7* 1.6*   PROT 5.0* 5.4* 4.8*   BILITOT 0.5 0.5 0.5   ALKPHOS 183* 194* 187*   ALT 32 33 31   AST 66* 89* 108*   MG 2.2  --  2.2   PHOS 4.9*  --  4.1       All pertinent labs within the past 24 hours have been reviewed.    Significant Imagi ng:  No new imaging

## 2020-01-12 NOTE — SUBJECTIVE & OBJECTIVE
Medications Prior to Admission   Medication Sig Dispense Refill Last Dose    amLODIPine (NORVASC) 5 MG tablet Take 1 tablet (5 mg total) by mouth once daily. 90 tablet 1 1/9/2020    aspirin (ECOTRIN) 81 MG EC tablet Take 1 tablet (81 mg total) by mouth once daily. 90 tablet 3 1/9/2020    atorvastatin (LIPITOR) 40 MG tablet TAKE 1 TABLET(40 MG) BY MOUTH EVERY DAY 90 tablet 3 1/9/2020    benzonatate (TESSALON) 100 MG capsule Take 1 capsule (100 mg total) by mouth 3 (three) times daily as needed for Cough. 30 capsule 0 1/9/2020    clopidogrel (PLAVIX) 75 mg tablet Take 1 tablet (75 mg total) by mouth once daily.   1/9/2020    HYDROcodone-acetaminophen (NORCO) 7.5-325 mg per tablet Take 1 tablet by mouth every 8 (eight) hours as needed for Pain (Pain). Take 1/2 to 1 tablet by mouth 3 times daily as needed for pain 21 tablet 0 1/9/2020    levothyroxine (SYNTHROID) 100 MCG tablet Take 1 tablet (100 mcg total) by mouth before breakfast. 90 tablet 3 1/9/2020    losartan (COZAAR) 25 MG tablet Take 1 tablet (25 mg total) by mouth once daily. 90 tablet 1 1/9/2020    montelukast (SINGULAIR) 10 mg tablet TAKE 1 TABLET(10 MG) BY MOUTH EVERY DAY 90 tablet 3 1/9/2020    albuterol (PROAIR HFA) 90 mcg/actuation inhaler Inhale 2 puffs into the lungs every 6 (six) hours as needed for Wheezing. 1 Inhaler 5     albuterol-ipratropium (DUO-NEB) 2.5 mg-0.5 mg/3 mL nebulizer solution Use 3 mL inhaled every 4 hours as needed for shortness of breath or wheezing (ICD-10-CM: J44.1) 540 mL 1     alendronate (FOSAMAX) 70 MG tablet TAKE 1 TABLET(70 MG) BY MOUTH EVERY 7 DAYS (Patient taking differently: every Monday. ) 12 tablet 0     butalbital-acetaminophen-caffeine -40 mg (FIORICET, ESGIC) -40 mg per tablet Take 1 tablet by mouth every 12 (twelve) hours as needed for Pain or Headaches. 30 tablet 1 Unknown    calcium-vitamin D3 (CALCIUM 500 + D) 500 mg(1,250mg) -200 unit per tablet Take 1 tablet by mouth 2 (two) times  daily with meals. (Patient taking differently: Take 1 tablet by mouth once daily. ) 180 tablet 3     [] doxycycline (VIBRA-TABS) 100 MG tablet Take 1 tablet (100 mg total) by mouth every 12 (twelve) hours. for 1 day 1 tablet 0 Unknown    umeclidinium (INCRUSE ELLIPTA) 62.5 mcg/actuation DsDv Inhale 1 each into the lungs once daily. Controller 30 each 2        Review of patient's allergies indicates:  No Known Allergies    Past Medical History:   Diagnosis Date    Allergic rhinitis     COPD (chronic obstructive pulmonary disease)     Depression     GERD (gastroesophageal reflux disease)     Headache     Hyperlipidemia     Hypertension     Pneumonia     Polyarthralgia     Postablative hypothyroidism     hypothyroidism s/p OCASIO therapy    PVD (peripheral vascular disease)     Treated by Dr. Sim     Past Surgical History:   Procedure Laterality Date    CHOLECYSTECTOMY  1986    GALLBLADDER SURGERY  2006    HIP SURGERY  2005    Right hip fracture/surgery    PERIPHERAL ARTERIAL STENT GRAFT Right 2007    stent for femoral artery blockage     Family History     Problem Relation (Age of Onset)    COPD Mother    Cancer Mother    Heart attack Father (55), Sister (53)    Thyroid disease Mother        Tobacco Use    Smoking status: Current Every Day Smoker     Types: Cigarettes    Smokeless tobacco: Never Used    Tobacco comment: None since Dec 26   Substance and Sexual Activity    Alcohol use: No     Alcohol/week: 0.0 standard drinks    Drug use: Never    Sexual activity: Not on file     Review of Systems   All other systems reviewed and are negative.    Objective:     Vital Signs (Most Recent):  Temp: 97.6 °F (36.4 °C) (20 0300)  Pulse: 75 (20 0645)  Resp: 19 (20 0645)  BP: (!) 125/98 (20 0630)  SpO2: 99 % (20 0645) Vital Signs (24h Range):  Temp:  [97.5 °F (36.4 °C)-97.7 °F (36.5 °C)] 97.6 °F (36.4 °C)  Pulse:  [64-89] 75  Resp:  [8-38] 19  SpO2:  [89 %-100 %] 99  %  BP: ()/(46-98) 125/98  Arterial Line BP: (78)/(48) 78/48     Weight: 62.1 kg (136 lb 14.5 oz)  Body mass index is 25.04 kg/m².    Physical Exam   Constitutional: She is oriented to person, place, and time. She appears well-developed and well-nourished. No distress.   HENT:   Head: Normocephalic and atraumatic.   Eyes: Pupils are equal, round, and reactive to light. EOM are normal.   Neck: Normal range of motion. Neck supple.   Cardiovascular: Normal rate and regular rhythm.   Pulmonary/Chest: Effort normal. No respiratory distress.   On 3L O2   Abdominal: Soft. She exhibits no distension.   Musculoskeletal: Normal range of motion.   R fem 2+, PT biphasic  R foot moderate weakness at ankle, but now able to plantar and dorsiflex again.   2+ palpable left femoral, no hematoma  Biphasic left DP and PT   Neurological: She is alert and oriented to person, place, and time.   Skin: Skin is warm and dry. She is not diaphoretic.   Psychiatric: She has a normal mood and affect. Her behavior is normal. Judgment and thought content normal.   Nursing note and vitals reviewed.      Significant Labs:  CBC:   Recent Labs   Lab 01/12/20  0320   WBC 18.44*   RBC 2.48*   HGB 6.7*   HCT 22.2*      MCV 90   MCH 27.0   MCHC 30.2*     CMP:   Recent Labs   Lab 01/12/20  0320   GLU 79   CALCIUM 7.2*   ALBUMIN 1.6*   PROT 4.8*   *   K 4.8   CO2 24      BUN 16   CREATININE 0.8   ALKPHOS 187*   ALT 31   *   BILITOT 0.5       Significant Diagnostics:  I have reviewed all pertinent imaging results/findings within the past 24 hours.

## 2020-01-12 NOTE — PROGRESS NOTES
[facility]  Vascular Surgery  Progress Note    Patient Name: Sharita Castañeda  MRN: [id number]  Admission Date: 1/9/2020  Primary Care Provider: Wallace Deng MD    Subjective:     Interval History: no events. Hb 6.7. Rest pain resolved    Post-Op Info:  Procedure(s) (LRB):  ANGIOGRAM, LOWER EXTREMITY (N/A)  THROMBECTOMY (Right)   1 Day Post-Op     Medications Prior to Admission   Medication Sig Dispense Refill Last Dose    amLODIPine (NORVASC) 5 MG tablet Take 1 tablet (5 mg total) by mouth once daily. 90 tablet 1 1/9/2020    aspirin (ECOTRIN) 81 MG EC tablet Take 1 tablet (81 mg total) by mouth once daily. 90 tablet 3 1/9/2020    atorvastatin (LIPITOR) 40 MG tablet TAKE 1 TABLET(40 MG) BY MOUTH EVERY DAY 90 tablet 3 1/9/2020    benzonatate (TESSALON) 100 MG capsule Take 1 capsule (100 mg total) by mouth 3 (three) times daily as needed for Cough. 30 capsule 0 1/9/2020    clopidogrel (PLAVIX) 75 mg tablet Take 1 tablet (75 mg total) by mouth once daily.   1/9/2020    HYDROcodone-acetaminophen (NORCO) 7.5-325 mg per tablet Take 1 tablet by mouth every 8 (eight) hours as needed for Pain (Pain). Take 1/2 to 1 tablet by mouth 3 times daily as needed for pain 21 tablet 0 1/9/2020    levothyroxine (SYNTHROID) 100 MCG tablet Take 1 tablet (100 mcg total) by mouth before breakfast. 90 tablet 3 1/9/2020    losartan (COZAAR) 25 MG tablet Take 1 tablet (25 mg total) by mouth once daily. 90 tablet 1 1/9/2020    montelukast (SINGULAIR) 10 mg tablet TAKE 1 TABLET(10 MG) BY MOUTH EVERY DAY 90 tablet 3 1/9/2020    albuterol (PROAIR HFA) 90 mcg/actuation inhaler Inhale 2 puffs into the lungs every 6 (six) hours as needed for Wheezing. 1 Inhaler 5     albuterol-ipratropium (DUO-NEB) 2.5 mg-0.5 mg/3 mL nebulizer solution Use 3 mL inhaled every 4 hours as needed for shortness of breath or wheezing (ICD-10-CM: J44.1) 540 mL 1     alendronate (FOSAMAX) 70 MG tablet TAKE 1 TABLET(70 MG) BY MOUTH EVERY 7 DAYS (Patient  taking differently: every Monday. ) 12 tablet 0     butalbital-acetaminophen-caffeine -40 mg (FIORICET, ESGIC) -40 mg per tablet Take 1 tablet by mouth every 12 (twelve) hours as needed for Pain or Headaches. 30 tablet 1 Unknown    calcium-vitamin D3 (CALCIUM 500 + D) 500 mg(1,250mg) -200 unit per tablet Take 1 tablet by mouth 2 (two) times daily with meals. (Patient taking differently: Take 1 tablet by mouth once daily. ) 180 tablet 3     [] doxycycline (VIBRA-TABS) 100 MG tablet Take 1 tablet (100 mg total) by mouth every 12 (twelve) hours. for 1 day 1 tablet 0 Unknown    umeclidinium (INCRUSE ELLIPTA) 62.5 mcg/actuation DsDv Inhale 1 each into the lungs once daily. Controller 30 each 2        Review of patient's allergies indicates:  No Known Allergies    Past Medical History:   Diagnosis Date    Allergic rhinitis     COPD (chronic obstructive pulmonary disease)     Depression     GERD (gastroesophageal reflux disease)     Headache     Hyperlipidemia     Hypertension     Pneumonia     Polyarthralgia     Postablative hypothyroidism     hypothyroidism s/p OCASIO therapy    PVD (peripheral vascular disease)     Treated by Dr. Sim     Past Surgical History:   Procedure Laterality Date    CHOLECYSTECTOMY  1986    GALLBLADDER SURGERY  2006    HIP SURGERY  2005    Right hip fracture/surgery    PERIPHERAL ARTERIAL STENT GRAFT Right 2007    stent for femoral artery blockage     Family History     Problem Relation (Age of Onset)    COPD Mother    Cancer Mother    Heart attack Father (55), Sister (53)    Thyroid disease Mother        Tobacco Use    Smoking status: Current Every Day Smoker     Types: Cigarettes    Smokeless tobacco: Never Used    Tobacco comment: None since Dec 26   Substance and Sexual Activity    Alcohol use: No     Alcohol/week: 0.0 standard drinks    Drug use: Never    Sexual activity: Not on file     Review of Systems   All other systems reviewed and are  negative.    Objective:     Vital Signs (Most Recent):  Temp: 97.6 °F (36.4 °C) (01/12/20 0300)  Pulse: 75 (01/12/20 0645)  Resp: 19 (01/12/20 0645)  BP: (!) 125/98 (01/12/20 0630)  SpO2: 99 % (01/12/20 0645) Vital Signs (24h Range):  Temp:  [97.5 °F (36.4 °C)-97.7 °F (36.5 °C)] 97.6 °F (36.4 °C)  Pulse:  [64-89] 75  Resp:  [8-38] 19  SpO2:  [89 %-100 %] 99 %  BP: ()/(46-98) 125/98  Arterial Line BP: (78)/(48) 78/48     Weight: 62.1 kg (136 lb 14.5 oz)  Body mass index is 25.04 kg/m².    Physical Exam   Constitutional: She is oriented to person, place, and time. She appears well-developed and well-nourished. No distress.   HENT:   Head: Normocephalic and atraumatic.   Eyes: Pupils are equal, round, and reactive to light. EOM are normal.   Neck: Normal range of motion. Neck supple.   Cardiovascular: Normal rate and regular rhythm.   Pulmonary/Chest: Effort normal. No respiratory distress.   On 3L O2   Abdominal: Soft. She exhibits no distension.   Musculoskeletal: Normal range of motion.   R fem 2+, PT biphasic  R foot moderate weakness at ankle, but now able to plantar and dorsiflex again.   2+ palpable left femoral, no hematoma  Biphasic left DP and PT   Neurological: She is alert and oriented to person, place, and time.   Skin: Skin is warm and dry. She is not diaphoretic.   Psychiatric: She has a normal mood and affect. Her behavior is normal. Judgment and thought content normal.   Nursing note and vitals reviewed.      Significant Labs:  CBC:   Recent Labs   Lab 01/12/20  0320   WBC 18.44*   RBC 2.48*   HGB 6.7*   HCT 22.2*      MCV 90   MCH 27.0   MCHC 30.2*     CMP:   Recent Labs   Lab 01/12/20  0320   GLU 79   CALCIUM 7.2*   ALBUMIN 1.6*   PROT 4.8*   *   K 4.8   CO2 24      BUN 16   CREATININE 0.8   ALKPHOS 187*   ALT 31   *   BILITOT 0.5       Significant Diagnostics:  I have reviewed all pertinent imaging results/findings within the past 24 hours.    Assessment/Plan:     *  Acute pain of right lower extremity  Patient is 70 yo F with hx of PAD and right femoral stent who presented with subacute limb ischemia  Now s/p R iliofemoral embolectomy, SFA interposition bypass with vein, iliac stent angioplasty on 1/11/20    - cont IV and po pain control  - cont albuterol, breo, and O2 support for COPD  - hemodynamically stable. Cont losartan and norvasc  - reg diet. Has continued abominal pain, same as prior to surgery. Has known carcinomatosis  - cont ASA, plavix, heparin gtt. Will increase to therapeutic if hb stable in AM  - hb 6.7. Will transfuse 1u prbc  - palliative is following for her metastatic cancer    Dispo: monitor in ICU for 1 more day. Anticipate step down to medicine tomorrow              Hardy Delgado MD  Vascular Surgery  [facility]

## 2020-01-12 NOTE — ASSESSMENT & PLAN NOTE
Patient is 68 yo F with hx of PAD and right femoral stent who presented with subacute limb ischemia  Now s/p R iliofemoral embolectomy, SFA interposition bypass with vein, iliac stent angioplasty on 1/11/20    - cont IV and po pain control  - cont albuterol, breo, and O2 support for COPD  - hemodynamically stable. Cont losartan and norvasc  - reg diet. Has continued abominal pain, same as prior to surgery. Has known carcinomatosis  - cont ASA, plavix, heparin gtt. Will increase to therapeutic if hb stable in AM  - hb 6.7. Will transfuse 1u prbc  - palliative is following for her metastatic cancer    Dispo: monitor in ICU for 1 more day. Anticipate step down to medicine tomorrow

## 2020-01-12 NOTE — PROGRESS NOTES
[facility]  Vascular Surgery  Progress Note    Patient Name: Sharita Castañeda  MRN: [id number]  Admission Date: 1/9/2020  Primary Care Provider: Wallace Deng MD    Subjective:     Interval History:  Intermittently hypotensive with increasing oxygen requirements overnight. Patient with significantly increased rest pain in RLE, with new development of foot drop. Taken to OR this morning for revascularization, Class A.    Post-Op Info:  Procedure(s) (LRB):  ANGIOGRAM, LOWER EXTREMITY (N/A)  THROMBECTOMY (Right)   Day of Surgery     Medications Prior to Admission   Medication Sig Dispense Refill Last Dose    amLODIPine (NORVASC) 5 MG tablet Take 1 tablet (5 mg total) by mouth once daily. 90 tablet 1 1/9/2020    aspirin (ECOTRIN) 81 MG EC tablet Take 1 tablet (81 mg total) by mouth once daily. 90 tablet 3 1/9/2020    atorvastatin (LIPITOR) 40 MG tablet TAKE 1 TABLET(40 MG) BY MOUTH EVERY DAY 90 tablet 3 1/9/2020    benzonatate (TESSALON) 100 MG capsule Take 1 capsule (100 mg total) by mouth 3 (three) times daily as needed for Cough. 30 capsule 0 1/9/2020    clopidogrel (PLAVIX) 75 mg tablet Take 1 tablet (75 mg total) by mouth once daily.   1/9/2020    HYDROcodone-acetaminophen (NORCO) 7.5-325 mg per tablet Take 1 tablet by mouth every 8 (eight) hours as needed for Pain (Pain). Take 1/2 to 1 tablet by mouth 3 times daily as needed for pain 21 tablet 0 1/9/2020    levothyroxine (SYNTHROID) 100 MCG tablet Take 1 tablet (100 mcg total) by mouth before breakfast. 90 tablet 3 1/9/2020    losartan (COZAAR) 25 MG tablet Take 1 tablet (25 mg total) by mouth once daily. 90 tablet 1 1/9/2020    montelukast (SINGULAIR) 10 mg tablet TAKE 1 TABLET(10 MG) BY MOUTH EVERY DAY 90 tablet 3 1/9/2020    albuterol (PROAIR HFA) 90 mcg/actuation inhaler Inhale 2 puffs into the lungs every 6 (six) hours as needed for Wheezing. 1 Inhaler 5     albuterol-ipratropium (DUO-NEB) 2.5 mg-0.5 mg/3 mL nebulizer solution Use 3 mL  inhaled every 4 hours as needed for shortness of breath or wheezing (ICD-10-CM: J44.1) 540 mL 1     alendronate (FOSAMAX) 70 MG tablet TAKE 1 TABLET(70 MG) BY MOUTH EVERY 7 DAYS (Patient taking differently: every Monday. ) 12 tablet 0     butalbital-acetaminophen-caffeine -40 mg (FIORICET, ESGIC) -40 mg per tablet Take 1 tablet by mouth every 12 (twelve) hours as needed for Pain or Headaches. 30 tablet 1 Unknown    calcium-vitamin D3 (CALCIUM 500 + D) 500 mg(1,250mg) -200 unit per tablet Take 1 tablet by mouth 2 (two) times daily with meals. (Patient taking differently: Take 1 tablet by mouth once daily. ) 180 tablet 3     [] doxycycline (VIBRA-TABS) 100 MG tablet Take 1 tablet (100 mg total) by mouth every 12 (twelve) hours. for 1 day 1 tablet 0 Unknown    umeclidinium (INCRUSE ELLIPTA) 62.5 mcg/actuation DsDv Inhale 1 each into the lungs once daily. Controller 30 each 2        Review of patient's allergies indicates:  No Known Allergies    Past Medical History:   Diagnosis Date    Allergic rhinitis     COPD (chronic obstructive pulmonary disease)     Depression     GERD (gastroesophageal reflux disease)     Headache     Hyperlipidemia     Hypertension     Pneumonia     Polyarthralgia     Postablative hypothyroidism     hypothyroidism s/p OCASIO therapy    PVD (peripheral vascular disease)     Treated by Dr. Sim     Past Surgical History:   Procedure Laterality Date    CHOLECYSTECTOMY      GALLBLADDER SURGERY  2006    HIP SURGERY  2005    Right hip fracture/surgery    PERIPHERAL ARTERIAL STENT GRAFT Right 2007    stent for femoral artery blockage     Family History     Problem Relation (Age of Onset)    COPD Mother    Cancer Mother    Heart attack Father (55), Sister (53)    Thyroid disease Mother        Tobacco Use    Smoking status: Current Every Day Smoker     Types: Cigarettes    Smokeless tobacco: Never Used    Tobacco comment: None since Dec 26   Substance and  Sexual Activity    Alcohol use: No     Alcohol/week: 0.0 standard drinks    Drug use: Never    Sexual activity: Not on file     Review of Systems   All other systems reviewed and are negative.    Objective:     Vital Signs (Most Recent):  Temp: 97.5 °F (36.4 °C) (01/11/20 1900)  Pulse: 73 (01/11/20 2000)  Resp: 19 (01/11/20 2000)  BP: (!) 103/57 (01/11/20 2000)  SpO2: 100 % (01/11/20 2000) Vital Signs (24h Range):  Temp:  [96.4 °F (35.8 °C)-97.7 °F (36.5 °C)] 97.5 °F (36.4 °C)  Pulse:  [] 73  Resp:  [10-32] 19  SpO2:  [84 %-100 %] 100 %  BP: ()/(43-62) 103/57  Arterial Line BP: (78)/(48) 78/48     Weight: 62.1 kg (136 lb 14.5 oz)  Body mass index is 25.04 kg/m².    Physical Exam   Constitutional: She is oriented to person, place, and time. She appears well-developed and well-nourished. No distress.   HENT:   Head: Normocephalic and atraumatic.   Eyes: Pupils are equal, round, and reactive to light. EOM are normal.   Neck: Normal range of motion. Neck supple.   Cardiovascular: Normal rate and regular rhythm.   Right distal foot cold and blue, chronic wound scar present  No DP or PT signals in her right foot  Biphasic right popliteal  Triphasic right femoral  Biphasic left DP and PT  2+ palpable left femoral   Pulmonary/Chest: Effort normal. No respiratory distress.   On 3L O2   Abdominal: Soft. She exhibits no distension.   Musculoskeletal: Normal range of motion.   Decreased sensation in right toes.  New development of foot drop in RLE, worsening rest pain   Neurological: She is alert and oriented to person, place, and time.   Skin: Skin is warm and dry. She is not diaphoretic.   Psychiatric: She has a normal mood and affect. Her behavior is normal. Judgment and thought content normal.   Nursing note and vitals reviewed.      Significant Labs:  CBC:   Recent Labs   Lab 01/11/20  1826   WBC 23.04*   RBC 2.97*   HGB 8.1*   HCT 26.4*      MCV 89   MCH 27.3   MCHC 30.7*     CMP:   Recent Labs   Lab  01/11/20  1826      CALCIUM 7.5*   ALBUMIN 1.7*   PROT 5.4*   *   K 5.5*   CO2 24      BUN 15   CREATININE 0.8   ALKPHOS 194*   ALT 33   AST 89*   BILITOT 0.5       Significant Diagnostics:  I have reviewed all pertinent imaging results/findings within the past 24 hours.    Assessment/Plan:     * Acute pain of right lower extremity  Patient is 70 yo F with hx of PAD and right femoral stent who presents with a cold ischemic foot for past 3 days    Patient with acute change/worsening exam overnight, to OR for class A RLE revascularization  Continue heparin gtt - goal PTT 60-80  CTA showing evidence of a thrombus just distal to previously placed iliac stent as well as complete occlusion in distal popliteal vessel. Remains stable on exam.           Baylee Brewer MD  Vascular Surgery  [facility]

## 2020-01-12 NOTE — ASSESSMENT & PLAN NOTE
Patient is 68 yo F with hx of PAD and right femoral stent who presents with a cold ischemic foot for past 3 days    Patient with acute change/worsening exam overnight, to OR for class A RLE revascularization  Continue heparin gtt - goal PTT 60-80  CTA showing evidence of a thrombus just distal to previously placed iliac stent as well as complete occlusion in distal popliteal vessel. Remains stable on exam.

## 2020-01-12 NOTE — ASSESSMENT & PLAN NOTE
69 y.o. female with  HTN, HLD, COPD (on home O2 at 2-3L), PUD, polyarthralgia, and PVD s/p R femoral artery stent who is being admitted to the SICU after undergoing right lower extremity angiogram and thrombectomy    Neuro:   - Oxycodone and Dilaudid prn for pain control    Pulmonary:   - History of COPD on home oxygen.    - Large perihilar lung mass  - Supplemental oxygen as needed to maintain saturation above 88%  - ABGs PRN  - Continue montelukast and Breo inhaler  - BTs, DuoNebs    Cardiac:   -Currently hemodynamically stable  - Losartan and amlodipine for hypertension  - DAPT  - Heparin gtt      Renal:    - Monitor UOP  - Trend BUN/Cr      ID:   - Afebrile  - Leukocytosis  - Trend WBC daily  - Continue perioperative antibiotics    Hem/Onc:   - H/H stable  - Transfuse as need to maintain H/H of 7/21    Endocrine:    - SSI  - Continue synthroid    Fluids/Electrolytes/Nutrition/GI:  - Patient complaining of abdominal pain with guarding, not peritonitic on exam, will continue to follow with serial abdominal exam.    - Replace electrolytes PRN  - mIVF: LR @ 100cc/hr  - Currently NPO    PPx:  - DVT: Heparin gtt.   - Bowel: Senna-ducosate    DISPO:  - Continue SICU care

## 2020-01-12 NOTE — CARE UPDATE
Assessed patient at bedside.  Abdomen is soft with minimal tenderness with deep palpation.  Patient reports improved abdominal pain but does report right foot pain. Neurovascular exam unchanged.        Leon Durbin MD   Resident Physician  Anesthesiology   [facility]

## 2020-01-12 NOTE — PLAN OF CARE
Pt AAOx4. On 5L NC, O2 sats >88%. VSS. Heparin @ 600 U/hr, nontitrating. LR @ 100 cc/hr. A-line dampened, team aware. Neurovascular checks done q1h, see flow sheet for details. UO <30 cc/hr for most of shift, team aware. Dsgs changed by MD Delgado this AM. Passed bedside swallow study. Complaints of abdominal and leg pain throughout night, chest and abd xrays obtained, managed with prn meds. 1U PRBC to be given. Pt updated on plan of care throughout shift. See flow sheet for assessment data.

## 2020-01-12 NOTE — NURSING
Patient admitted to room 30263 on transport monitor and simple mask at 8L. Dr. Durbin at bedside and RT at bedside. Patient to remain flat for 2 hours and then can only be in reverse trendelenburg for 4 more hours and then can sit up to 30 degrees. Hourly neurovascular assessment. Arterial line is dampened, MD aware. VSS. Will continue to monitor.

## 2020-01-12 NOTE — SUBJECTIVE & OBJECTIVE
Medications Prior to Admission   Medication Sig Dispense Refill Last Dose    amLODIPine (NORVASC) 5 MG tablet Take 1 tablet (5 mg total) by mouth once daily. 90 tablet 1 1/9/2020    aspirin (ECOTRIN) 81 MG EC tablet Take 1 tablet (81 mg total) by mouth once daily. 90 tablet 3 1/9/2020    atorvastatin (LIPITOR) 40 MG tablet TAKE 1 TABLET(40 MG) BY MOUTH EVERY DAY 90 tablet 3 1/9/2020    benzonatate (TESSALON) 100 MG capsule Take 1 capsule (100 mg total) by mouth 3 (three) times daily as needed for Cough. 30 capsule 0 1/9/2020    clopidogrel (PLAVIX) 75 mg tablet Take 1 tablet (75 mg total) by mouth once daily.   1/9/2020    HYDROcodone-acetaminophen (NORCO) 7.5-325 mg per tablet Take 1 tablet by mouth every 8 (eight) hours as needed for Pain (Pain). Take 1/2 to 1 tablet by mouth 3 times daily as needed for pain 21 tablet 0 1/9/2020    levothyroxine (SYNTHROID) 100 MCG tablet Take 1 tablet (100 mcg total) by mouth before breakfast. 90 tablet 3 1/9/2020    losartan (COZAAR) 25 MG tablet Take 1 tablet (25 mg total) by mouth once daily. 90 tablet 1 1/9/2020    montelukast (SINGULAIR) 10 mg tablet TAKE 1 TABLET(10 MG) BY MOUTH EVERY DAY 90 tablet 3 1/9/2020    albuterol (PROAIR HFA) 90 mcg/actuation inhaler Inhale 2 puffs into the lungs every 6 (six) hours as needed for Wheezing. 1 Inhaler 5     albuterol-ipratropium (DUO-NEB) 2.5 mg-0.5 mg/3 mL nebulizer solution Use 3 mL inhaled every 4 hours as needed for shortness of breath or wheezing (ICD-10-CM: J44.1) 540 mL 1     alendronate (FOSAMAX) 70 MG tablet TAKE 1 TABLET(70 MG) BY MOUTH EVERY 7 DAYS (Patient taking differently: every Monday. ) 12 tablet 0     butalbital-acetaminophen-caffeine -40 mg (FIORICET, ESGIC) -40 mg per tablet Take 1 tablet by mouth every 12 (twelve) hours as needed for Pain or Headaches. 30 tablet 1 Unknown    calcium-vitamin D3 (CALCIUM 500 + D) 500 mg(1,250mg) -200 unit per tablet Take 1 tablet by mouth 2 (two) times  daily with meals. (Patient taking differently: Take 1 tablet by mouth once daily. ) 180 tablet 3     [] doxycycline (VIBRA-TABS) 100 MG tablet Take 1 tablet (100 mg total) by mouth every 12 (twelve) hours. for 1 day 1 tablet 0 Unknown    umeclidinium (INCRUSE ELLIPTA) 62.5 mcg/actuation DsDv Inhale 1 each into the lungs once daily. Controller 30 each 2        Review of patient's allergies indicates:  No Known Allergies    Past Medical History:   Diagnosis Date    Allergic rhinitis     COPD (chronic obstructive pulmonary disease)     Depression     GERD (gastroesophageal reflux disease)     Headache     Hyperlipidemia     Hypertension     Pneumonia     Polyarthralgia     Postablative hypothyroidism     hypothyroidism s/p OCASIO therapy    PVD (peripheral vascular disease)     Treated by Dr. Sim     Past Surgical History:   Procedure Laterality Date    CHOLECYSTECTOMY      GALLBLADDER SURGERY  2006    HIP SURGERY  2005    Right hip fracture/surgery    PERIPHERAL ARTERIAL STENT GRAFT Right 2007    stent for femoral artery blockage     Family History     Problem Relation (Age of Onset)    COPD Mother    Cancer Mother    Heart attack Father (55), Sister (53)    Thyroid disease Mother        Tobacco Use    Smoking status: Current Every Day Smoker     Types: Cigarettes    Smokeless tobacco: Never Used    Tobacco comment: None since Dec 26   Substance and Sexual Activity    Alcohol use: No     Alcohol/week: 0.0 standard drinks    Drug use: Never    Sexual activity: Not on file     Review of Systems   All other systems reviewed and are negative.    Objective:     Vital Signs (Most Recent):  Temp: 97.5 °F (36.4 °C) (20)  Pulse: 73 (20)  Resp: 19 (20)  BP: (!) 103/57 (20)  SpO2: 100 % (20) Vital Signs (24h Range):  Temp:  [96.4 °F (35.8 °C)-97.7 °F (36.5 °C)] 97.5 °F (36.4 °C)  Pulse:  [] 73  Resp:  [10-32] 19  SpO2:  [84 %-100 %]  100 %  BP: ()/(43-62) 103/57  Arterial Line BP: (78)/(48) 78/48     Weight: 62.1 kg (136 lb 14.5 oz)  Body mass index is 25.04 kg/m².    Physical Exam   Constitutional: She is oriented to person, place, and time. She appears well-developed and well-nourished. No distress.   HENT:   Head: Normocephalic and atraumatic.   Eyes: Pupils are equal, round, and reactive to light. EOM are normal.   Neck: Normal range of motion. Neck supple.   Cardiovascular: Normal rate and regular rhythm.   Right distal foot cold and blue, chronic wound scar present  No DP or PT signals in her right foot  Biphasic right popliteal  Triphasic right femoral  Biphasic left DP and PT  2+ palpable left femoral   Pulmonary/Chest: Effort normal. No respiratory distress.   On 3L O2   Abdominal: Soft. She exhibits no distension.   Musculoskeletal: Normal range of motion.   Decreased sensation in right toes.  New development of foot drop in RLE, worsening rest pain   Neurological: She is alert and oriented to person, place, and time.   Skin: Skin is warm and dry. She is not diaphoretic.   Psychiatric: She has a normal mood and affect. Her behavior is normal. Judgment and thought content normal.   Nursing note and vitals reviewed.      Significant Labs:  CBC:   Recent Labs   Lab 01/11/20  1826   WBC 23.04*   RBC 2.97*   HGB 8.1*   HCT 26.4*      MCV 89   MCH 27.3   MCHC 30.7*     CMP:   Recent Labs   Lab 01/11/20  1826      CALCIUM 7.5*   ALBUMIN 1.7*   PROT 5.4*   *   K 5.5*   CO2 24      BUN 15   CREATININE 0.8   ALKPHOS 194*   ALT 33   AST 89*   BILITOT 0.5       Significant Diagnostics:  I have reviewed all pertinent imaging results/findings within the past 24 hours.

## 2020-01-13 PROBLEM — M79.671 FOOT PAIN, RIGHT: Status: ACTIVE | Noted: 2020-01-09

## 2020-01-13 LAB
ALBUMIN SERPL BCP-MCNC: 1.6 G/DL (ref 3.5–5.2)
ALP SERPL-CCNC: 289 U/L (ref 55–135)
ALT SERPL W/O P-5'-P-CCNC: 22 U/L (ref 10–44)
ANION GAP SERPL CALC-SCNC: 6 MMOL/L (ref 8–16)
APTT BLDCRRT: 34 SEC (ref 21–32)
AST SERPL-CCNC: 122 U/L (ref 10–40)
BASOPHILS # BLD AUTO: 0.03 K/UL (ref 0–0.2)
BASOPHILS # BLD AUTO: 0.03 K/UL (ref 0–0.2)
BASOPHILS NFR BLD: 0.2 % (ref 0–1.9)
BASOPHILS NFR BLD: 0.2 % (ref 0–1.9)
BILIRUB SERPL-MCNC: 0.7 MG/DL (ref 0.1–1)
BUN SERPL-MCNC: 15 MG/DL (ref 8–23)
CALCIUM SERPL-MCNC: 7.3 MG/DL (ref 8.7–10.5)
CHLORIDE SERPL-SCNC: 102 MMOL/L (ref 95–110)
CO2 SERPL-SCNC: 25 MMOL/L (ref 23–29)
CREAT SERPL-MCNC: 0.9 MG/DL (ref 0.5–1.4)
DIFFERENTIAL METHOD: ABNORMAL
DIFFERENTIAL METHOD: ABNORMAL
EOSINOPHIL # BLD AUTO: 0.2 K/UL (ref 0–0.5)
EOSINOPHIL # BLD AUTO: 0.3 K/UL (ref 0–0.5)
EOSINOPHIL NFR BLD: 1.4 % (ref 0–8)
EOSINOPHIL NFR BLD: 1.5 % (ref 0–8)
ERYTHROCYTE [DISTWIDTH] IN BLOOD BY AUTOMATED COUNT: 18.1 % (ref 11.5–14.5)
ERYTHROCYTE [DISTWIDTH] IN BLOOD BY AUTOMATED COUNT: 18.3 % (ref 11.5–14.5)
EST. GFR  (AFRICAN AMERICAN): >60 ML/MIN/1.73 M^2
EST. GFR  (NON AFRICAN AMERICAN): >60 ML/MIN/1.73 M^2
GLUCOSE SERPL-MCNC: 67 MG/DL (ref 70–110)
HCT VFR BLD AUTO: 26 % (ref 37–48.5)
HCT VFR BLD AUTO: 26.1 % (ref 37–48.5)
HGB BLD-MCNC: 7.9 G/DL (ref 12–16)
HGB BLD-MCNC: 8.2 G/DL (ref 12–16)
IMM GRANULOCYTES # BLD AUTO: 0.13 K/UL (ref 0–0.04)
IMM GRANULOCYTES # BLD AUTO: 0.15 K/UL (ref 0–0.04)
IMM GRANULOCYTES NFR BLD AUTO: 0.8 % (ref 0–0.5)
IMM GRANULOCYTES NFR BLD AUTO: 0.9 % (ref 0–0.5)
INR PPP: 1 (ref 0.8–1.2)
LACTATE SERPL-SCNC: 1.3 MMOL/L (ref 0.5–2.2)
LYMPHOCYTES # BLD AUTO: 1.3 K/UL (ref 1–4.8)
LYMPHOCYTES # BLD AUTO: 1.6 K/UL (ref 1–4.8)
LYMPHOCYTES NFR BLD: 7.5 % (ref 18–48)
LYMPHOCYTES NFR BLD: 9.1 % (ref 18–48)
MAGNESIUM SERPL-MCNC: 2.1 MG/DL (ref 1.6–2.6)
MCH RBC QN AUTO: 28 PG (ref 27–31)
MCH RBC QN AUTO: 28.5 PG (ref 27–31)
MCHC RBC AUTO-ENTMCNC: 30.3 G/DL (ref 32–36)
MCHC RBC AUTO-ENTMCNC: 31.5 G/DL (ref 32–36)
MCV RBC AUTO: 90 FL (ref 82–98)
MCV RBC AUTO: 93 FL (ref 82–98)
MONOCYTES # BLD AUTO: 1 K/UL (ref 0.3–1)
MONOCYTES # BLD AUTO: 1.2 K/UL (ref 0.3–1)
MONOCYTES NFR BLD: 5.6 % (ref 4–15)
MONOCYTES NFR BLD: 6.9 % (ref 4–15)
NEUTROPHILS # BLD AUTO: 13.9 K/UL (ref 1.8–7.7)
NEUTROPHILS # BLD AUTO: 14.5 K/UL (ref 1.8–7.7)
NEUTROPHILS NFR BLD: 81.5 % (ref 38–73)
NEUTROPHILS NFR BLD: 84.4 % (ref 38–73)
NRBC BLD-RTO: 0 /100 WBC
NRBC BLD-RTO: 0 /100 WBC
PHOSPHATE SERPL-MCNC: 2.2 MG/DL (ref 2.7–4.5)
PLATELET # BLD AUTO: 171 K/UL (ref 150–350)
PLATELET # BLD AUTO: 172 K/UL (ref 150–350)
PMV BLD AUTO: 10.8 FL (ref 9.2–12.9)
PMV BLD AUTO: 10.9 FL (ref 9.2–12.9)
POTASSIUM SERPL-SCNC: 4.7 MMOL/L (ref 3.5–5.1)
PROT SERPL-MCNC: 4.8 G/DL (ref 6–8.4)
PROTHROMBIN TIME: 10 SEC (ref 9–12.5)
RBC # BLD AUTO: 2.82 M/UL (ref 4–5.4)
RBC # BLD AUTO: 2.88 M/UL (ref 4–5.4)
SODIUM SERPL-SCNC: 133 MMOL/L (ref 136–145)
WBC # BLD AUTO: 17.04 K/UL (ref 3.9–12.7)
WBC # BLD AUTO: 17.15 K/UL (ref 3.9–12.7)

## 2020-01-13 PROCEDURE — 63600175 PHARM REV CODE 636 W HCPCS: Performed by: SURGERY

## 2020-01-13 PROCEDURE — 83735 ASSAY OF MAGNESIUM: CPT

## 2020-01-13 PROCEDURE — 27000221 HC OXYGEN, UP TO 24 HOURS

## 2020-01-13 PROCEDURE — 63600175 PHARM REV CODE 636 W HCPCS: Performed by: STUDENT IN AN ORGANIZED HEALTH CARE EDUCATION/TRAINING PROGRAM

## 2020-01-13 PROCEDURE — 80053 COMPREHEN METABOLIC PANEL: CPT

## 2020-01-13 PROCEDURE — 20600001 HC STEP DOWN PRIVATE ROOM

## 2020-01-13 PROCEDURE — 25000003 PHARM REV CODE 250: Performed by: STUDENT IN AN ORGANIZED HEALTH CARE EDUCATION/TRAINING PROGRAM

## 2020-01-13 PROCEDURE — 99233 PR SUBSEQUENT HOSPITAL CARE,LEVL III: ICD-10-PCS | Mod: ,,, | Performed by: SURGERY

## 2020-01-13 PROCEDURE — 85025 COMPLETE CBC W/AUTO DIFF WBC: CPT | Mod: 91

## 2020-01-13 PROCEDURE — 94761 N-INVAS EAR/PLS OXIMETRY MLT: CPT

## 2020-01-13 PROCEDURE — 84100 ASSAY OF PHOSPHORUS: CPT

## 2020-01-13 PROCEDURE — 25000003 PHARM REV CODE 250: Performed by: SURGERY

## 2020-01-13 PROCEDURE — 25000242 PHARM REV CODE 250 ALT 637 W/ HCPCS: Performed by: STUDENT IN AN ORGANIZED HEALTH CARE EDUCATION/TRAINING PROGRAM

## 2020-01-13 PROCEDURE — 25500020 PHARM REV CODE 255: Performed by: SURGERY

## 2020-01-13 PROCEDURE — P9045 ALBUMIN (HUMAN), 5%, 250 ML: HCPCS | Mod: JG

## 2020-01-13 PROCEDURE — 99233 SBSQ HOSP IP/OBS HIGH 50: CPT | Mod: ,,, | Performed by: SURGERY

## 2020-01-13 PROCEDURE — 83605 ASSAY OF LACTIC ACID: CPT

## 2020-01-13 PROCEDURE — 85730 THROMBOPLASTIN TIME PARTIAL: CPT

## 2020-01-13 PROCEDURE — 63600175 PHARM REV CODE 636 W HCPCS: Mod: JG

## 2020-01-13 PROCEDURE — 85610 PROTHROMBIN TIME: CPT

## 2020-01-13 RX ORDER — GABAPENTIN 300 MG/1
300 CAPSULE ORAL 3 TIMES DAILY
Status: DISCONTINUED | OUTPATIENT
Start: 2020-01-13 | End: 2020-01-15 | Stop reason: HOSPADM

## 2020-01-13 RX ORDER — HYDROMORPHONE HYDROCHLORIDE 1 MG/ML
1 INJECTION, SOLUTION INTRAMUSCULAR; INTRAVENOUS; SUBCUTANEOUS ONCE
Status: COMPLETED | OUTPATIENT
Start: 2020-01-13 | End: 2020-01-13

## 2020-01-13 RX ORDER — NOREPINEPHRINE BITARTRATE/D5W 4MG/250ML
0.02 PLASTIC BAG, INJECTION (ML) INTRAVENOUS CONTINUOUS
Status: DISCONTINUED | OUTPATIENT
Start: 2020-01-13 | End: 2020-01-13

## 2020-01-13 RX ORDER — HEPARIN SODIUM 10000 [USP'U]/100ML
17 INJECTION, SOLUTION INTRAVENOUS CONTINUOUS
Status: DISCONTINUED | OUTPATIENT
Start: 2020-01-13 | End: 2020-01-15

## 2020-01-13 RX ORDER — ALBUMIN HUMAN 50 G/1000ML
SOLUTION INTRAVENOUS
Status: COMPLETED
Start: 2020-01-13 | End: 2020-01-13

## 2020-01-13 RX ADMIN — IOHEXOL 100 ML: 350 INJECTION, SOLUTION INTRAVENOUS at 03:01

## 2020-01-13 RX ADMIN — HYDROMORPHONE HYDROCHLORIDE 0.5 MG: 1 INJECTION, SOLUTION INTRAMUSCULAR; INTRAVENOUS; SUBCUTANEOUS at 10:01

## 2020-01-13 RX ADMIN — FLUTICASONE FUROATE AND VILANTEROL TRIFENATATE 1 PUFF: 200; 25 POWDER RESPIRATORY (INHALATION) at 12:01

## 2020-01-13 RX ADMIN — ACETAMINOPHEN 650 MG: 325 TABLET ORAL at 11:01

## 2020-01-13 RX ADMIN — SENNOSIDES AND DOCUSATE SODIUM 1 TABLET: 8.6; 5 TABLET ORAL at 08:01

## 2020-01-13 RX ADMIN — HEPARIN SODIUM 600 UNITS/HR: 10000 INJECTION, SOLUTION INTRAVENOUS at 10:01

## 2020-01-13 RX ADMIN — MONTELUKAST 10 MG: 10 TABLET, FILM COATED ORAL at 08:01

## 2020-01-13 RX ADMIN — SODIUM PHOSPHATE, MONOBASIC, MONOHYDRATE 20.01 MMOL: 276; 142 INJECTION, SOLUTION INTRAVENOUS at 05:01

## 2020-01-13 RX ADMIN — AMLODIPINE BESYLATE 5 MG: 5 TABLET ORAL at 08:01

## 2020-01-13 RX ADMIN — ONDANSETRON 4 MG: 2 INJECTION INTRAMUSCULAR; INTRAVENOUS at 03:01

## 2020-01-13 RX ADMIN — SODIUM CHLORIDE, SODIUM LACTATE, POTASSIUM CHLORIDE, AND CALCIUM CHLORIDE 500 ML: 600; 310; 30; 20 INJECTION, SOLUTION INTRAVENOUS at 11:01

## 2020-01-13 RX ADMIN — HEPARIN SODIUM AND DEXTROSE 17 UNITS/KG/HR: 10000; 5 INJECTION INTRAVENOUS at 06:01

## 2020-01-13 RX ADMIN — OXYCODONE HYDROCHLORIDE 15 MG: 10 TABLET ORAL at 06:01

## 2020-01-13 RX ADMIN — ACETAMINOPHEN 650 MG: 325 TABLET ORAL at 06:01

## 2020-01-13 RX ADMIN — HYDROMORPHONE HYDROCHLORIDE 0.5 MG: 1 INJECTION, SOLUTION INTRAMUSCULAR; INTRAVENOUS; SUBCUTANEOUS at 04:01

## 2020-01-13 RX ADMIN — HYDROMORPHONE HYDROCHLORIDE 1 MG: 1 INJECTION, SOLUTION INTRAMUSCULAR; INTRAVENOUS; SUBCUTANEOUS at 07:01

## 2020-01-13 RX ADMIN — OYSTER SHELL CALCIUM WITH VITAMIN D 1 TABLET: 500; 200 TABLET, FILM COATED ORAL at 08:01

## 2020-01-13 RX ADMIN — POLYETHYLENE GLYCOL 3350 17 G: 17 POWDER, FOR SOLUTION ORAL at 08:01

## 2020-01-13 RX ADMIN — LOSARTAN POTASSIUM 25 MG: 25 TABLET ORAL at 08:01

## 2020-01-13 RX ADMIN — ALBUMIN (HUMAN) 25 G: 12.5 SOLUTION INTRAVENOUS at 12:01

## 2020-01-13 RX ADMIN — GABAPENTIN 300 MG: 300 CAPSULE ORAL at 08:01

## 2020-01-13 RX ADMIN — MUPIROCIN 1 G: 20 OINTMENT TOPICAL at 08:01

## 2020-01-13 RX ADMIN — ATORVASTATIN CALCIUM 40 MG: 20 TABLET, FILM COATED ORAL at 08:01

## 2020-01-13 RX ADMIN — CLOPIDOGREL BISULFATE 75 MG: 75 TABLET ORAL at 08:01

## 2020-01-13 RX ADMIN — HYDROMORPHONE HYDROCHLORIDE 0.5 MG: 1 INJECTION, SOLUTION INTRAMUSCULAR; INTRAVENOUS; SUBCUTANEOUS at 06:01

## 2020-01-13 RX ADMIN — OXYCODONE HYDROCHLORIDE 10 MG: 10 TABLET ORAL at 02:01

## 2020-01-13 RX ADMIN — LEVOTHYROXINE SODIUM 100 MCG: 100 TABLET ORAL at 08:01

## 2020-01-13 RX ADMIN — ACETAMINOPHEN 650 MG: 325 TABLET ORAL at 05:01

## 2020-01-13 RX ADMIN — ASPIRIN 81 MG: 81 TABLET, COATED ORAL at 08:01

## 2020-01-13 RX ADMIN — OXYCODONE HYDROCHLORIDE 10 MG: 10 TABLET ORAL at 05:01

## 2020-01-13 NOTE — PROGRESS NOTES
[facility]  Critical Care - Surgery  Progress Note    Patient Name: Sharita Castañeda  MRN: [id number]  Admission Date: 1/9/2020  Hospital Length of Stay: 4 days  Code Status: Full Code  Attending Provider: BRAULIO Verma II, MD  Primary Care Provider: Wallace Deng MD   Principal Problem: Acute pain of right lower extremity    Subjective:     Hospital/ICU Course:  1/11:  Patient admitted to SICU.    1/13: Neurovascular checks remain stable, complaints of same abdominal pain, VSS AF, good UOP    Interval History/Significant Events: Similar abdominal pain/discomfort. Denies N/V. VSS AF. Hep gtt running at 600 U/hr.     Follow-up For: Procedure(s) (LRB):  ANGIOGRAM, LOWER EXTREMITY (N/A)  THROMBECTOMY (Right)    Post-Operative Day: 2 Days Post-Op    Objective:     Vital Signs (Most Recent):  Temp: 98.3 °F (36.8 °C) (01/13/20 0300)  Pulse: 79 (01/13/20 0630)  Resp: (!) 21 (01/13/20 0630)  BP: (!) 100/58 (01/13/20 0630)  SpO2: 97 % (01/13/20 0630) Vital Signs (24h Range):  Temp:  [96.4 °F (35.8 °C)-98.8 °F (37.1 °C)] 98.3 °F (36.8 °C)  Pulse:  [] 79  Resp:  [8-47] 21  SpO2:  [93 %-100 %] 97 %  BP: ()/(47-86) 100/58  Arterial Line BP: (50-73)/(30-42) 68/36     Weight: 62.1 kg (136 lb 14.5 oz)  Body mass index is 25.04 kg/m².      Intake/Output Summary (Last 24 hours) at 1/13/2020 0708  Last data filed at 1/13/2020 0600  Gross per 24 hour   Intake 2040.47 ml   Output 1075 ml   Net 965.47 ml       Physical Exam   Constitutional: She is oriented to person, place, and time. She appears well-developed. No distress.   HENT:   Head: Normocephalic and atraumatic.   Eyes: Pupils are equal, round, and reactive to light. EOM are normal.   Neck: Normal range of motion. Neck supple.   Cardiovascular: Normal rate and regular rhythm.   Dopplerable PT signal, no DP signal   Pulmonary/Chest: Effort normal. No respiratory distress.   Nasal Cannula   Abdominal: Soft. She exhibits no distension. There is no tenderness.  There is no guarding.   Genitourinary:   Genitourinary Comments: Valdes in place   Musculoskeletal:   Bilateral groin access sites dressed.     Neurological: She is alert and oriented to person, place, and time.   Skin: Skin is warm.   Psychiatric: She has a normal mood and affect.       Vents:  Oxygen Concentration (%): 40 (01/12/20 2112)    Lines/Drains/Airways     Drain                 Urethral Catheter 01/11/20 1445 Non-latex;Straight-tip 16 Fr. 1 day          Arterial Line                 Arterial Line 01/11/20 Left Radial 2 days          Peripheral Intravenous Line                 Peripheral IV - Single Lumen 01/09/20 1759 20 G Left Antecubital 3 days         Peripheral IV - Single Lumen 01/13/20 0006 18 G Right Forearm less than 1 day                Significant Labs:    CBC/Anemia Profile:  Recent Labs   Lab 01/12/20  0320 01/12/20  1216 01/13/20  0312   WBC 18.44* 17.11* 17.04*   HGB 6.7* 7.9* 8.2*   HCT 22.2* 24.4* 26.0*    162 172   MCV 90 90 90   RDW 19.7* 17.6* 18.1*        Chemistries:  Recent Labs   Lab 01/11/20  1523 01/11/20  1826 01/12/20  0320 01/13/20  0312   * 135* 133* 133*   K 5.0 5.5* 4.8 4.7    103 102 102   CO2 25 24 24 25   BUN 15 15 16 15   CREATININE 0.9 0.8 0.8 0.9   CALCIUM 7.2* 7.5* 7.2* 7.3*   ALBUMIN 1.8* 1.7* 1.6* 1.6*   PROT 5.0* 5.4* 4.8* 4.8*   BILITOT 0.5 0.5 0.5 0.7   ALKPHOS 183* 194* 187* 289*   ALT 32 33 31 22   AST 66* 89* 108* 122*   MG 2.2  --  2.2 2.1   PHOS 4.9*  --  4.1 2.2*       Coagulation:   Recent Labs   Lab 01/13/20  0312   INR 1.0   APTT 34.0*       Significant Imaging:  I have reviewed all pertinent imaging results/findings within the past 24 hours.    Assessment/Plan:     PVD (peripheral vascular disease)  69 y.o. female with  HTN, HLD, COPD (on home O2 at 2-3L), PUD, polyarthralgia, and PVD s/p R femoral artery stent who is being admitted to the SICU after undergoing right lower extremity angiogram and thrombectomy on 1/11/2020    Neuro:   -  Tylenol, Oxycodone and Dilaudid prn for pain control    Pulmonary:   - History of COPD on home oxygen.    - Large perihilar lung mass  - Supplemental oxygen as needed to maintain saturation above 88%  - ABGs PRN  - Continue montelukast and Breo inhaler  - BTs, DuoNebs    Cardiac:   - Currently hemodynamically stable  - Losartan and amlodipine for hypertension  - DAPT  - Heparin gtt    Renal:    - Monitor UOP  - Trend BUN/Cr    ID:   - Afebrile  - Leukocytosis trending down  - Trend WBC daily  - Completed perioperative antibiotics    Hem/Onc:   - H/H 8.2  - Transfuse as need to maintain H/H of 7/21    Endocrine:    - SSI  - Continue synthroid    Fluids/Electrolytes/Nutrition/GI:  - Patient complaining of abdominal pain with guarding, not peritonitic on exam, will continue to follow with serial abdominal exam.    - Replace electrolytes PRN  - Regular diet, discontinue MIVF    PPx:  - DVT: Heparin gtt per Vascular Team  - Bowel: Senna-ducosate    DISPO:  - Step down today      Critical care was time spent personally by me on the following activities: development of treatment plan with patient or surrogate and bedside caregivers, discussions with consultants, evaluation of patient's response to treatment, examination of patient, ordering and performing treatments and interventions, ordering and review of laboratory studies, ordering and review of radiographic studies, pulse oximetry, re-evaluation of patient's condition.  This critical care time did not overlap with that of any other provider or involve time for any procedures.     Génesis Morgan MD  Critical Care - Surgery  [facility]

## 2020-01-13 NOTE — PLAN OF CARE
Pt AAOx4. On 5L NC, O2 sats >88%. VSS. Heparin @ 600 U/hr, nontitrating. A-line dampened, team aware. Neurovascular checks done q1h, see flow sheet for details. UO 30-45 cc/hr. Dsgs changed by MD Delgado this AM. Regular diet. Complaints of abdominal and leg pain throughout night, managed with prn meds. Pt updated on plan of care throughout shift. See flow sheet for assessment data.

## 2020-01-13 NOTE — CARE UPDATE
During power injection of contrast, approximately 40cc cc of iodine-based contrast extravasated into the soft tissues of the pt's right AC. The pt remained neurovascularly intact with no pain at the site of extravasation. The site was marked circumferentially for future reference during follow up physical examinations by the pt's primary team and nurse. Cold pack was ordered to be placed for 1 hour on, 1 hour off for 4 cycles to be completed in total. The right arm is to be elevated until the local swelling subsides. The arm is to be assessed Q30min x2 hours, then Q4H until discontinued. The pt's nurse and primary team were informed of the situation.         Maicol Reid MD, MS  Radiology  PGY-2  Pager: [phone]

## 2020-01-13 NOTE — NURSING
Patient hypotensive 70s/40s with a MAP in 50s.  HR 70s. SpO2 96% on % 5L NC.  Urine output 25ml/h for past consecutive hours.  Dr. Morgan and charge nurse notified.  500ml LR bolus ordered.

## 2020-01-13 NOTE — SUBJECTIVE & OBJECTIVE
Medications:  Continuous Infusions:   heparin (porcine) in 5 % dex 600 Units/hr (01/13/20 0600)     Scheduled Meds:   acetaminophen  650 mg Oral Q6H    amLODIPine  5 mg Oral Daily    aspirin  81 mg Oral Daily    atorvastatin  40 mg Oral Daily    calcium-vitamin D3  1 tablet Oral Daily    clopidogrel  75 mg Oral Daily    fluticasone furoate-vilanterol  1 puff Inhalation Daily    levothyroxine  100 mcg Oral Before breakfast    losartan  25 mg Oral Daily    montelukast  10 mg Oral Daily    mupirocin  1 g Nasal BID    polyethylene glycol  17 g Oral Daily    senna-docusate 8.6-50 mg  1 tablet Oral BID     PRN Meds:sodium chloride, albuterol-ipratropium, benzonatate, calcium gluconate IVPB, calcium gluconate IVPB, calcium gluconate IVPB, Dextrose 10% Bolus, Dextrose 10% Bolus, glucagon (human recombinant), glucose, glucose, HYDROmorphone, magnesium sulfate IVPB, magnesium sulfate IVPB, ondansetron, oxyCODONE, oxyCODONE, oxyCODONE, potassium chloride in water **AND** potassium chloride in water **AND** potassium chloride in water, sodium chloride 0.9%, sodium phosphate IVPB, sodium phosphate IVPB, sodium phosphate IVPB     Objective:     Vital Signs (Most Recent):  Temp: 98.3 °F (36.8 °C) (01/13/20 0300)  Pulse: 79 (01/13/20 0630)  Resp: (!) 21 (01/13/20 0630)  BP: (!) 100/58 (01/13/20 0630)  SpO2: 97 % (01/13/20 0630) Vital Signs (24h Range):  Temp:  [96.4 °F (35.8 °C)-98.8 °F (37.1 °C)] 98.3 °F (36.8 °C)  Pulse:  [] 79  Resp:  [8-47] 21  SpO2:  [93 %-100 %] 97 %  BP: ()/(47-86) 100/58  Arterial Line BP: (50-73)/(30-42) 68/36         Physical Exam   Constitutional: She is oriented to person, place, and time. She appears well-developed and well-nourished. No distress.   HENT:   Head: Normocephalic and atraumatic.   Eyes: Pupils are equal, round, and reactive to light. EOM are normal.   Neck: Normal range of motion. Neck supple.   Cardiovascular: Normal rate and regular rhythm.   Pulmonary/Chest:  Effort normal. No respiratory distress.   On 3L O2   Abdominal: Soft. She exhibits no distension. There is tenderness. There is no guarding.   Complaining of chronic abd pain   Musculoskeletal: Normal range of motion.   R fem 2+, PT biphasic  R foot moderate weakness at ankle, but now able to plantar and dorsiflex again.   2+ palpable left femoral, no hematoma  Triphasic left PT   Neurological: She is alert and oriented to person, place, and time.   Skin: Skin is warm and dry. She is not diaphoretic.   Psychiatric: She has a normal mood and affect. Her behavior is normal. Judgment and thought content normal.   Nursing note and vitals reviewed.      Significant Labs:  CBC:   Recent Labs   Lab 01/13/20 0312   WBC 17.04*   RBC 2.88*   HGB 8.2*   HCT 26.0*      MCV 90   MCH 28.5   MCHC 31.5*     CMP:   Recent Labs   Lab 01/13/20 0312   GLU 67*   CALCIUM 7.3*   ALBUMIN 1.6*   PROT 4.8*   *   K 4.7   CO2 25      BUN 15   CREATININE 0.9   ALKPHOS 289*   ALT 22   *   BILITOT 0.7     Coagulation:   Recent Labs   Lab 01/13/20 0312   LABPROT 10.0   INR 1.0   APTT 34.0*       Significant Diagnostics:  I have reviewed all pertinent imaging results/findings within the past 24 hours.

## 2020-01-13 NOTE — ASSESSMENT & PLAN NOTE
Patient is 70 yo F with hx of PAD and right femoral stent who presented with subacute limb ischemia  Now s/p R iliofemoral embolectomy, SFA interposition bypass with vein, iliac stent angioplasty on 1/11/20    - cont IV and po pain control  - cont albuterol, breo, and O2 support for COPD  - hemodynamically stable. Cont losartan and norvasc  - reg diet. Has continued abominal pain, same as prior to surgery. Has known carcinomatosis  - cont ASA, plavix, heparin gtt - goal PTT 60-80  - Responded appropriately to transfusion, hgb 8.2 today  - palliative is following for her metastatic cancer  - Stepdown to floor today, will likely transfer to medicine tomorrow

## 2020-01-13 NOTE — PLAN OF CARE
01/13/20 1329   Discharge Reassessment   Assessment Type Discharge Planning Reassessment   Provided patient/caregiver education on the expected discharge date and the discharge plan Yes   Do you have any problems affording any of your prescribed medications? No   Discharge Plan A Home Health   Discharge Plan B Home with family   DME Needed Upon Discharge  other (see comments)  (TBD)   Anticipated Discharge Disposition Home-Health   Post-Acute Status   Post-Acute Placement Status Awaiting Internal Medical Clearance   Home Health/Hospice Status Awaiting Internal Medical Clearance   Discharge Delays None known at this time       Julieta Flores MPH, RN, CM  Ext. [phone]

## 2020-01-13 NOTE — SUBJECTIVE & OBJECTIVE
Interval History/Significant Events: Similar abdominal pain/discomfort. Denies N/V. VSS AF. Hep gtt running at 600 U/hr.     Follow-up For: Procedure(s) (LRB):  ANGIOGRAM, LOWER EXTREMITY (N/A)  THROMBECTOMY (Right)    Post-Operative Day: 2 Days Post-Op    Objective:     Vital Signs (Most Recent):  Temp: 98.3 °F (36.8 °C) (01/13/20 0300)  Pulse: 79 (01/13/20 0630)  Resp: (!) 21 (01/13/20 0630)  BP: (!) 100/58 (01/13/20 0630)  SpO2: 97 % (01/13/20 0630) Vital Signs (24h Range):  Temp:  [96.4 °F (35.8 °C)-98.8 °F (37.1 °C)] 98.3 °F (36.8 °C)  Pulse:  [] 79  Resp:  [8-47] 21  SpO2:  [93 %-100 %] 97 %  BP: ()/(47-86) 100/58  Arterial Line BP: (50-73)/(30-42) 68/36     Weight: 62.1 kg (136 lb 14.5 oz)  Body mass index is 25.04 kg/m².      Intake/Output Summary (Last 24 hours) at 1/13/2020 0708  Last data filed at 1/13/2020 0600  Gross per 24 hour   Intake 2040.47 ml   Output 1075 ml   Net 965.47 ml       Physical Exam   Constitutional: She is oriented to person, place, and time. She appears well-developed. No distress.   HENT:   Head: Normocephalic and atraumatic.   Eyes: Pupils are equal, round, and reactive to light. EOM are normal.   Neck: Normal range of motion. Neck supple.   Cardiovascular: Normal rate and regular rhythm.   Dopplerable PT signal, no DP signal   Pulmonary/Chest: Effort normal. No respiratory distress.   Nasal Cannula   Abdominal: Soft. She exhibits no distension. There is no tenderness. There is no guarding.   Genitourinary:   Genitourinary Comments: Valdes in place   Musculoskeletal:   Bilateral groin access sites dressed.     Neurological: She is alert and oriented to person, place, and time.   Skin: Skin is warm.   Psychiatric: She has a normal mood and affect.       Vents:  Oxygen Concentration (%): 40 (01/12/20 2112)    Lines/Drains/Airways     Drain                 Urethral Catheter 01/11/20 1445 Non-latex;Straight-tip 16 Fr. 1 day          Arterial Line                 Arterial  Line 01/11/20 Left Radial 2 days          Peripheral Intravenous Line                 Peripheral IV - Single Lumen 01/09/20 1759 20 G Left Antecubital 3 days         Peripheral IV - Single Lumen 01/13/20 0006 18 G Right Forearm less than 1 day                Significant Labs:    CBC/Anemia Profile:  Recent Labs   Lab 01/12/20  0320 01/12/20  1216 01/13/20  0312   WBC 18.44* 17.11* 17.04*   HGB 6.7* 7.9* 8.2*   HCT 22.2* 24.4* 26.0*    162 172   MCV 90 90 90   RDW 19.7* 17.6* 18.1*        Chemistries:  Recent Labs   Lab 01/11/20  1523 01/11/20  1826 01/12/20 0320 01/13/20 0312   * 135* 133* 133*   K 5.0 5.5* 4.8 4.7    103 102 102   CO2 25 24 24 25   BUN 15 15 16 15   CREATININE 0.9 0.8 0.8 0.9   CALCIUM 7.2* 7.5* 7.2* 7.3*   ALBUMIN 1.8* 1.7* 1.6* 1.6*   PROT 5.0* 5.4* 4.8* 4.8*   BILITOT 0.5 0.5 0.5 0.7   ALKPHOS 183* 194* 187* 289*   ALT 32 33 31 22   AST 66* 89* 108* 122*   MG 2.2  --  2.2 2.1   PHOS 4.9*  --  4.1 2.2*       Coagulation:   Recent Labs   Lab 01/13/20 0312   INR 1.0   APTT 34.0*       Significant Imaging:  I have reviewed all pertinent imaging results/findings within the past 24 hours.

## 2020-01-13 NOTE — ASSESSMENT & PLAN NOTE
69 y.o. female with  HTN, HLD, COPD (on home O2 at 2-3L), PUD, polyarthralgia, and PVD s/p R femoral artery stent who is being admitted to the SICU after undergoing right lower extremity angiogram and thrombectomy on 1/11/2020    Neuro:   - Tylenol, Oxycodone and Dilaudid prn for pain control    Pulmonary:   - History of COPD on home oxygen.    - Large perihilar lung mass  - Supplemental oxygen as needed to maintain saturation above 88%  - ABGs PRN  - Continue montelukast and Breo inhaler  - BTs, DuoNebs    Cardiac:   - Currently hemodynamically stable  - Losartan and amlodipine for hypertension  - DAPT  - Heparin gtt    Renal:    - Monitor UOP  - Trend BUN/Cr    ID:   - Afebrile  - Leukocytosis trending down  - Trend WBC daily  - Completed perioperative antibiotics    Hem/Onc:   - H/H 8.2  - Transfuse as need to maintain H/H of 7/21    Endocrine:    - SSI  - Continue synthroid    Fluids/Electrolytes/Nutrition/GI:  - Patient complaining of abdominal pain with guarding, not peritonitic on exam, will continue to follow with serial abdominal exam.    - Replace electrolytes PRN  - Regular diet, discontinue MIVF    PPx:  - DVT: Heparin gtt per Vascular Team  - Bowel: Senna-ducosate    DISPO:  - Step down today

## 2020-01-13 NOTE — PROGRESS NOTES
[facility]  Vascular Surgery  Progress Note    ADDENDUM: Will order a stat CTA abd/pelvis to r/o mesenteric ischemia. If negative, ok to step down.      Patient Name: Sharita Castañeda  MRN: [id number]  Admission Date: 1/9/2020  Primary Care Provider: Wallace Deng MD    Subjective:     Interval History: NAEON, continues to have abd pain similar to before, afebrile, HDS, no changes to neurovascular exam of RLE    Post-Op Info:  Procedure(s) (LRB):  ANGIOGRAM, LOWER EXTREMITY (N/A)  THROMBECTOMY (Right)   2 Days Post-Op       Medications:  Continuous Infusions:   heparin (porcine) in 5 % dex 600 Units/hr (01/13/20 0600)     Scheduled Meds:   acetaminophen  650 mg Oral Q6H    amLODIPine  5 mg Oral Daily    aspirin  81 mg Oral Daily    atorvastatin  40 mg Oral Daily    calcium-vitamin D3  1 tablet Oral Daily    clopidogrel  75 mg Oral Daily    fluticasone furoate-vilanterol  1 puff Inhalation Daily    levothyroxine  100 mcg Oral Before breakfast    losartan  25 mg Oral Daily    montelukast  10 mg Oral Daily    mupirocin  1 g Nasal BID    polyethylene glycol  17 g Oral Daily    senna-docusate 8.6-50 mg  1 tablet Oral BID     PRN Meds:sodium chloride, albuterol-ipratropium, benzonatate, calcium gluconate IVPB, calcium gluconate IVPB, calcium gluconate IVPB, Dextrose 10% Bolus, Dextrose 10% Bolus, glucagon (human recombinant), glucose, glucose, HYDROmorphone, magnesium sulfate IVPB, magnesium sulfate IVPB, ondansetron, oxyCODONE, oxyCODONE, oxyCODONE, potassium chloride in water **AND** potassium chloride in water **AND** potassium chloride in water, sodium chloride 0.9%, sodium phosphate IVPB, sodium phosphate IVPB, sodium phosphate IVPB     Objective:     Vital Signs (Most Recent):  Temp: 98.3 °F (36.8 °C) (01/13/20 0300)  Pulse: 79 (01/13/20 0630)  Resp: (!) 21 (01/13/20 0630)  BP: (!) 100/58 (01/13/20 0630)  SpO2: 97 % (01/13/20 0630) Vital Signs (24h Range):  Temp:  [96.4 °F (35.8 °C)-98.8 °F (37.1  °C)] 98.3 °F (36.8 °C)  Pulse:  [] 79  Resp:  [8-47] 21  SpO2:  [93 %-100 %] 97 %  BP: ()/(47-86) 100/58  Arterial Line BP: (50-73)/(30-42) 68/36         Physical Exam   Constitutional: She is oriented to person, place, and time. She appears well-developed and well-nourished. No distress.   HENT:   Head: Normocephalic and atraumatic.   Eyes: Pupils are equal, round, and reactive to light. EOM are normal.   Neck: Normal range of motion. Neck supple.   Cardiovascular: Normal rate and regular rhythm.   Pulmonary/Chest: Effort normal. No respiratory distress.   On 3L O2   Abdominal: Soft. She exhibits no distension. There is tenderness. There is no guarding.   Complaining of chronic abd pain   Musculoskeletal: Normal range of motion.   R fem 2+, PT biphasic  R foot moderate weakness at ankle, but now able to plantar and dorsiflex again.   2+ palpable left femoral, no hematoma  Triphasic left PT   Neurological: She is alert and oriented to person, place, and time.   Skin: Skin is warm and dry. She is not diaphoretic.   Psychiatric: She has a normal mood and affect. Her behavior is normal. Judgment and thought content normal.   Nursing note and vitals reviewed.      Significant Labs:  CBC:   Recent Labs   Lab 01/13/20 0312   WBC 17.04*   RBC 2.88*   HGB 8.2*   HCT 26.0*      MCV 90   MCH 28.5   MCHC 31.5*     CMP:   Recent Labs   Lab 01/13/20 0312   GLU 67*   CALCIUM 7.3*   ALBUMIN 1.6*   PROT 4.8*   *   K 4.7   CO2 25      BUN 15   CREATININE 0.9   ALKPHOS 289*   ALT 22   *   BILITOT 0.7     Coagulation:   Recent Labs   Lab 01/13/20 0312   LABPROT 10.0   INR 1.0   APTT 34.0*       Significant Diagnostics:  I have reviewed all pertinent imaging results/findings within the past 24 hours.    Assessment/Plan:     * Acute pain of right lower extremity  Patient is 70 yo F with hx of PAD and right femoral stent who presented with subacute limb ischemia  Now s/p R iliofemoral embolectomy,  SFA interposition bypass with vein, iliac stent angioplasty on 1/11/20    - cont IV and po pain control  - cont albuterol, breo, and O2 support for COPD  - hemodynamically stable. Cont losartan and norvasc  - reg diet. Has continued abominal pain, same as prior to surgery. Has known carcinomatosis  - cont ASA, plavix, heparin gtt - goal PTT 60-80  - Responded appropriately to transfusion, hgb 8.2 today  - palliative is following for her metastatic cancer  - Stepdown to floor today, will likely transfer to medicine tomorrow            Everardo Harrington MD  Vascular Surgery  [facility]

## 2020-01-13 NOTE — CARE UPDATE
Called to bedside for continued hypotension despite 1L LR bolus. Arterial line with dampened waveform, readings likely erroneous. Cuff pressures improving, 110/50s. Pt remains alert, oriented. Other vitals have remained unchanged.    Will order 500cc Albumin, stat KUB, CBC, lactate as a precaution. Ok to keep a-line for lab draws, for now.     Génesis Morgan MD  Critical Care - Surgery  [facility]

## 2020-01-14 PROBLEM — I95.9 HYPOTENSION: Status: ACTIVE | Noted: 2020-01-14

## 2020-01-14 LAB
ALBUMIN SERPL BCP-MCNC: 1.7 G/DL (ref 3.5–5.2)
ALP SERPL-CCNC: 293 U/L (ref 55–135)
ALT SERPL W/O P-5'-P-CCNC: 21 U/L (ref 10–44)
ANION GAP SERPL CALC-SCNC: 9 MMOL/L (ref 8–16)
APTT BLDCRRT: 43.9 SEC (ref 21–32)
APTT BLDCRRT: 66.2 SEC (ref 21–32)
APTT BLDCRRT: 67.4 SEC (ref 21–32)
APTT BLDCRRT: 73.1 SEC (ref 21–32)
APTT BLDCRRT: 78 SEC (ref 21–32)
AST SERPL-CCNC: 88 U/L (ref 10–40)
BASOPHILS # BLD AUTO: 0.06 K/UL (ref 0–0.2)
BASOPHILS NFR BLD: 0.3 % (ref 0–1.9)
BILIRUB SERPL-MCNC: 1 MG/DL (ref 0.1–1)
BUN SERPL-MCNC: 13 MG/DL (ref 8–23)
CALCIUM SERPL-MCNC: 7.6 MG/DL (ref 8.7–10.5)
CHLORIDE SERPL-SCNC: 102 MMOL/L (ref 95–110)
CO2 SERPL-SCNC: 24 MMOL/L (ref 23–29)
CREAT SERPL-MCNC: 0.8 MG/DL (ref 0.5–1.4)
DIFFERENTIAL METHOD: ABNORMAL
EOSINOPHIL # BLD AUTO: 0.2 K/UL (ref 0–0.5)
EOSINOPHIL NFR BLD: 1.1 % (ref 0–8)
ERYTHROCYTE [DISTWIDTH] IN BLOOD BY AUTOMATED COUNT: 18.6 % (ref 11.5–14.5)
EST. GFR  (AFRICAN AMERICAN): >60 ML/MIN/1.73 M^2
EST. GFR  (NON AFRICAN AMERICAN): >60 ML/MIN/1.73 M^2
GLUCOSE SERPL-MCNC: 69 MG/DL (ref 70–110)
HCT VFR BLD AUTO: 29.4 % (ref 37–48.5)
HGB BLD-MCNC: 9 G/DL (ref 12–16)
IMM GRANULOCYTES # BLD AUTO: 0.19 K/UL (ref 0–0.04)
IMM GRANULOCYTES NFR BLD AUTO: 1 % (ref 0–0.5)
INR PPP: 1.1 (ref 0.8–1.2)
LACTATE SERPL-SCNC: 1.7 MMOL/L (ref 0.5–2.2)
LYMPHOCYTES # BLD AUTO: 1.3 K/UL (ref 1–4.8)
LYMPHOCYTES NFR BLD: 6.9 % (ref 18–48)
MAGNESIUM SERPL-MCNC: 2 MG/DL (ref 1.6–2.6)
MCH RBC QN AUTO: 28 PG (ref 27–31)
MCHC RBC AUTO-ENTMCNC: 30.6 G/DL (ref 32–36)
MCV RBC AUTO: 92 FL (ref 82–98)
MONOCYTES # BLD AUTO: 1 K/UL (ref 0.3–1)
MONOCYTES NFR BLD: 5.4 % (ref 4–15)
NEUTROPHILS # BLD AUTO: 16.1 K/UL (ref 1.8–7.7)
NEUTROPHILS NFR BLD: 85.3 % (ref 38–73)
NRBC BLD-RTO: 0 /100 WBC
PHOSPHATE SERPL-MCNC: 3 MG/DL (ref 2.7–4.5)
PLATELET # BLD AUTO: 221 K/UL (ref 150–350)
PMV BLD AUTO: 10.7 FL (ref 9.2–12.9)
POCT GLUCOSE: 74 MG/DL (ref 70–110)
POCT GLUCOSE: 86 MG/DL (ref 70–110)
POCT GLUCOSE: 88 MG/DL (ref 70–110)
POTASSIUM SERPL-SCNC: 4.6 MMOL/L (ref 3.5–5.1)
PROT SERPL-MCNC: 5 G/DL (ref 6–8.4)
PROTHROMBIN TIME: 11.6 SEC (ref 9–12.5)
RBC # BLD AUTO: 3.21 M/UL (ref 4–5.4)
SODIUM SERPL-SCNC: 135 MMOL/L (ref 136–145)
WBC # BLD AUTO: 18.85 K/UL (ref 3.9–12.7)

## 2020-01-14 PROCEDURE — 36415 COLL VENOUS BLD VENIPUNCTURE: CPT

## 2020-01-14 PROCEDURE — 99233 SBSQ HOSP IP/OBS HIGH 50: CPT | Mod: GC,,, | Performed by: INTERNAL MEDICINE

## 2020-01-14 PROCEDURE — 63600175 PHARM REV CODE 636 W HCPCS: Performed by: STUDENT IN AN ORGANIZED HEALTH CARE EDUCATION/TRAINING PROGRAM

## 2020-01-14 PROCEDURE — 99497 ADVNCD CARE PLAN 30 MIN: CPT | Mod: ,,, | Performed by: INTERNAL MEDICINE

## 2020-01-14 PROCEDURE — 99222 PR INITIAL HOSPITAL CARE,LEVL II: ICD-10-PCS | Mod: ,,, | Performed by: CLINICAL NURSE SPECIALIST

## 2020-01-14 PROCEDURE — 25000003 PHARM REV CODE 250: Performed by: SURGERY

## 2020-01-14 PROCEDURE — 27000221 HC OXYGEN, UP TO 24 HOURS

## 2020-01-14 PROCEDURE — 94640 AIRWAY INHALATION TREATMENT: CPT

## 2020-01-14 PROCEDURE — 83735 ASSAY OF MAGNESIUM: CPT

## 2020-01-14 PROCEDURE — 99222 PR INITIAL HOSPITAL CARE,LEVL II: ICD-10-PCS | Mod: ,,, | Performed by: NURSE PRACTITIONER

## 2020-01-14 PROCEDURE — 85610 PROTHROMBIN TIME: CPT

## 2020-01-14 PROCEDURE — 25000242 PHARM REV CODE 250 ALT 637 W/ HCPCS: Performed by: STUDENT IN AN ORGANIZED HEALTH CARE EDUCATION/TRAINING PROGRAM

## 2020-01-14 PROCEDURE — 99222 1ST HOSP IP/OBS MODERATE 55: CPT | Mod: ,,, | Performed by: NURSE PRACTITIONER

## 2020-01-14 PROCEDURE — 99222 1ST HOSP IP/OBS MODERATE 55: CPT | Mod: ,,, | Performed by: CLINICAL NURSE SPECIALIST

## 2020-01-14 PROCEDURE — 85730 THROMBOPLASTIN TIME PARTIAL: CPT | Mod: 91

## 2020-01-14 PROCEDURE — 99233 PR SUBSEQUENT HOSPITAL CARE,LEVL III: ICD-10-PCS | Mod: GC,,, | Performed by: INTERNAL MEDICINE

## 2020-01-14 PROCEDURE — 25000003 PHARM REV CODE 250: Performed by: STUDENT IN AN ORGANIZED HEALTH CARE EDUCATION/TRAINING PROGRAM

## 2020-01-14 PROCEDURE — 99223 PR INITIAL HOSPITAL CARE,LEVL III: ICD-10-PCS | Mod: GC,,, | Performed by: INTERNAL MEDICINE

## 2020-01-14 PROCEDURE — 85025 COMPLETE CBC W/AUTO DIFF WBC: CPT

## 2020-01-14 PROCEDURE — 63600175 PHARM REV CODE 636 W HCPCS: Performed by: SURGERY

## 2020-01-14 PROCEDURE — 99497 PR ADVNCD CARE PLAN 30 MIN: ICD-10-PCS | Mod: ,,, | Performed by: INTERNAL MEDICINE

## 2020-01-14 PROCEDURE — 83605 ASSAY OF LACTIC ACID: CPT

## 2020-01-14 PROCEDURE — 84100 ASSAY OF PHOSPHORUS: CPT

## 2020-01-14 PROCEDURE — 99900035 HC TECH TIME PER 15 MIN (STAT)

## 2020-01-14 PROCEDURE — 20600001 HC STEP DOWN PRIVATE ROOM

## 2020-01-14 PROCEDURE — 99223 1ST HOSP IP/OBS HIGH 75: CPT | Mod: GC,,, | Performed by: INTERNAL MEDICINE

## 2020-01-14 PROCEDURE — 80053 COMPREHEN METABOLIC PANEL: CPT

## 2020-01-14 PROCEDURE — 94761 N-INVAS EAR/PLS OXIMETRY MLT: CPT

## 2020-01-14 PROCEDURE — 85730 THROMBOPLASTIN TIME PARTIAL: CPT

## 2020-01-14 RX ORDER — MORPHINE SULFATE 2 MG/ML
2 INJECTION, SOLUTION INTRAMUSCULAR; INTRAVENOUS EVERY 6 HOURS PRN
Status: DISCONTINUED | OUTPATIENT
Start: 2020-01-14 | End: 2020-01-15

## 2020-01-14 RX ORDER — FOLIC ACID 1 MG/1
1 TABLET ORAL DAILY
Status: DISCONTINUED | OUTPATIENT
Start: 2020-01-15 | End: 2020-01-15 | Stop reason: HOSPADM

## 2020-01-14 RX ORDER — NALOXONE HCL 0.4 MG/ML
0.4 VIAL (ML) INJECTION
Status: DISCONTINUED | OUTPATIENT
Start: 2020-01-14 | End: 2020-01-15 | Stop reason: HOSPADM

## 2020-01-14 RX ORDER — ACETAMINOPHEN 325 MG/1
650 TABLET ORAL EVERY 6 HOURS PRN
Status: DISCONTINUED | OUTPATIENT
Start: 2020-01-14 | End: 2020-01-15 | Stop reason: HOSPADM

## 2020-01-14 RX ORDER — ONDANSETRON 2 MG/ML
4 INJECTION INTRAMUSCULAR; INTRAVENOUS EVERY 8 HOURS PRN
Status: DISCONTINUED | OUTPATIENT
Start: 2020-01-14 | End: 2020-01-15 | Stop reason: HOSPADM

## 2020-01-14 RX ORDER — OXYCODONE HYDROCHLORIDE 10 MG/1
10 TABLET ORAL EVERY 6 HOURS PRN
Status: DISCONTINUED | OUTPATIENT
Start: 2020-01-14 | End: 2020-01-15

## 2020-01-14 RX ORDER — IPRATROPIUM BROMIDE AND ALBUTEROL SULFATE 2.5; .5 MG/3ML; MG/3ML
3 SOLUTION RESPIRATORY (INHALATION) EVERY 4 HOURS
Status: DISCONTINUED | OUTPATIENT
Start: 2020-01-14 | End: 2020-01-15 | Stop reason: HOSPADM

## 2020-01-14 RX ORDER — PROMETHAZINE HYDROCHLORIDE 12.5 MG/1
12.5 TABLET ORAL EVERY 6 HOURS PRN
Status: DISCONTINUED | OUTPATIENT
Start: 2020-01-14 | End: 2020-01-15 | Stop reason: HOSPADM

## 2020-01-14 RX ADMIN — ONDANSETRON 4 MG: 2 INJECTION INTRAMUSCULAR; INTRAVENOUS at 04:01

## 2020-01-14 RX ADMIN — IPRATROPIUM BROMIDE AND ALBUTEROL SULFATE 3 ML: .5; 3 SOLUTION RESPIRATORY (INHALATION) at 08:01

## 2020-01-14 RX ADMIN — MORPHINE SULFATE 2 MG: 2 INJECTION, SOLUTION INTRAMUSCULAR; INTRAVENOUS at 08:01

## 2020-01-14 RX ADMIN — IPRATROPIUM BROMIDE AND ALBUTEROL SULFATE 3 ML: .5; 3 SOLUTION RESPIRATORY (INHALATION) at 05:01

## 2020-01-14 RX ADMIN — HEPARIN SODIUM AND DEXTROSE 20.58 UNITS/KG/HR: 10000; 5 INJECTION INTRAVENOUS at 03:01

## 2020-01-14 RX ADMIN — GABAPENTIN 300 MG: 300 CAPSULE ORAL at 03:01

## 2020-01-14 RX ADMIN — ACETAMINOPHEN 650 MG: 325 TABLET ORAL at 12:01

## 2020-01-14 RX ADMIN — HYDROMORPHONE HYDROCHLORIDE 0.5 MG: 1 INJECTION, SOLUTION INTRAMUSCULAR; INTRAVENOUS; SUBCUTANEOUS at 12:01

## 2020-01-14 RX ADMIN — LEVOTHYROXINE SODIUM 100 MCG: 100 TABLET ORAL at 06:01

## 2020-01-14 RX ADMIN — HYDROMORPHONE HYDROCHLORIDE 0.5 MG: 1 INJECTION, SOLUTION INTRAMUSCULAR; INTRAVENOUS; SUBCUTANEOUS at 05:01

## 2020-01-14 RX ADMIN — CLOPIDOGREL BISULFATE 75 MG: 75 TABLET ORAL at 09:01

## 2020-01-14 RX ADMIN — IPRATROPIUM BROMIDE AND ALBUTEROL SULFATE 3 ML: .5; 3 SOLUTION RESPIRATORY (INHALATION) at 09:01

## 2020-01-14 RX ADMIN — GABAPENTIN 300 MG: 300 CAPSULE ORAL at 09:01

## 2020-01-14 RX ADMIN — SENNOSIDES AND DOCUSATE SODIUM 1 TABLET: 8.6; 5 TABLET ORAL at 08:01

## 2020-01-14 RX ADMIN — ACETAMINOPHEN 650 MG: 325 TABLET ORAL at 06:01

## 2020-01-14 RX ADMIN — ASPIRIN 81 MG: 81 TABLET, COATED ORAL at 09:01

## 2020-01-14 RX ADMIN — OXYCODONE HYDROCHLORIDE 10 MG: 10 TABLET ORAL at 09:01

## 2020-01-14 RX ADMIN — HEPARIN SODIUM AND DEXTROSE 20.58 UNITS/KG/HR: 10000; 5 INJECTION INTRAVENOUS at 02:01

## 2020-01-14 RX ADMIN — ACETAMINOPHEN 650 MG: 325 TABLET ORAL at 01:01

## 2020-01-14 RX ADMIN — GABAPENTIN 300 MG: 300 CAPSULE ORAL at 08:01

## 2020-01-14 RX ADMIN — OXYCODONE HYDROCHLORIDE 10 MG: 10 TABLET ORAL at 03:01

## 2020-01-14 NOTE — PROGRESS NOTES
[facility] Hospital Medicine  Progress Note    Patient Name: Sharita Castañeda  MRN: [id number]  Patient Class: IP- Inpatient   Admission Date: 1/9/2020  Length of Stay: 5 days  Attending Physician: Ana Fajardo MD  Primary Care Provider: Wallace Deng MD    [facility] Medicine Team: [facility] 2 Modesta Carter MD    Subjective:     Principal Problem:Acute pain of right lower extremity        HPI:  Ms. Sharita Castañeda is a 68 yo F with hx of HTN, HLD, COPD (on home O2 at 2-3L), PUD, polyarthralgia, and PVD s/p R femoral artery stent (placed in 2006 at [facility]) who presents with acute onset of pain to her right foot starting 2 days ago while hospitalized for pneumonia. Patient was recently admitted 1/2- 1/8 for acute hypoxic resp failure and suspected post obstructive PNA. She was admitted before that on 12/26-12/28 for hemoptysis and CTA was concerning for primary lung malignancy with metastasis to the liver. During this most recent admission, her pneumonia was treated with vanc/zosyn then switched to doxycycline (end date: 1/9). IR performed liver biopsy on 1/6 and patient was discharged with outpatient follow-ups with pulmonology and oncology. Patient first reported foot pain while in the hospital the day prior to discharge (during which time her plavix had been held for her liver bx). Her plavix/ASA were restarted upon discharge and she took them both this morning 1/9. She reports her right foot to be cold and progressively more painful. The pain is constant, severe, and a persistent ache. She has not been able to ambulate without assistance at home 2/2 severe pain. Her foot was noted to be purple and blue at the toes, most noticeably at toes 4 and 5. She has not been able to warm up her foot despite using a heated blanket. It looked to be dusky today so patient re-presented to the ER. She is able to feel the base of her foot and able to move her ankle and toes. Patient is a former smoker and quit several weeks ago  when she was admitted for pneumonia. She denies any CP or SOB. No fevers/chills, HA, confusion, abdominal pain, n/v/d, leg swelling.       Overview/Hospital Course:  1/10: Patient readmitted 1/9 with signs of ischemia of right lower extremity. She is now having abdominal pain out of proportion to exam. Will order a CTA abdomen and pelvis. She is in therapeutic range with heparin drip. States she has regained more movement in her toes than earlier. Foot is still cold to the touch with no palpable pulses. Vascular following along. Planned to take to OR. She is s/p R iliofemoral embolectomy, SFA interposition bypass with vein, iliac stent angioplasty on 1/11/20. Patient stepped down to the floor on 1/14/2020 found to have resp distress and hypotension BP: 90s/50s so 1 L LR administered. Patient with mets lung cancer and positive family history. Seen by MICU, Heme/onco and palliative patient and the family decided to be DNR and planned for hospice tomorrow     Past Medical History:   Diagnosis Date    Allergic rhinitis     COPD (chronic obstructive pulmonary disease)     Depression     GERD (gastroesophageal reflux disease)     Headache     Hyperlipidemia     Hypertension     Pneumonia     Polyarthralgia     Postablative hypothyroidism     hypothyroidism s/p OCASIO therapy    PVD (peripheral vascular disease)     Treated by Dr. Sim       Past Surgical History:   Procedure Laterality Date    ANGIOGRAPHY OF LOWER EXTREMITY N/A 1/11/2020    Procedure: ANGIOGRAM, LOWER EXTREMITY;  Surgeon: BRAULIO Verma II, MD;  Location: [address];  Service: Cardiovascular;  Laterality: N/A;  19mLs contrast  4.1 min fluro   252.28mGy    CHOLECYSTECTOMY  1986    GALLBLADDER SURGERY  2006    HIP SURGERY  2005    Right hip fracture/surgery    PERIPHERAL ARTERIAL STENT GRAFT Right 2007    stent for femoral artery blockage    THROMBECTOMY Right 1/11/2020    Procedure: THROMBECTOMY;  Surgeon: BRAULIO Verma II, MD;   Location: [facility] OR [address] [place] [address];  Service: Cardiovascular;  Laterality: Right;  illiac, popliteal, and tibial arteries       Review of patient's allergies indicates:  No Known Allergies    Family History     Problem Relation (Age of Onset)    COPD Mother    Cancer Mother    Heart attack Father (55), Sister (53)    Thyroid disease Mother        Tobacco Use    Smoking status: Current Every Day Smoker     Types: Cigarettes    Smokeless tobacco: Never Used    Tobacco comment: None since Dec 26   Substance and Sexual Activity    Alcohol use: No     Alcohol/week: 0.0 standard drinks    Drug use: Never    Sexual activity: Not on file      Review of Systems   Constitutional: Negative for activity change, appetite change, chills, fatigue, fever and unexpected weight change.   HENT: Negative for congestion, dental problem, rhinorrhea, sore throat and trouble swallowing.    Eyes: Negative for discharge, itching and visual disturbance.   Respiratory: Negative for cough, chest tightness, shortness of breath and wheezing.    Cardiovascular: Negative for chest pain, palpitations and leg swelling.   Gastrointestinal: Positive for abdominal pain. Negative for constipation, diarrhea, nausea, rectal pain and vomiting.   Endocrine: Negative for cold intolerance and heat intolerance.   Genitourinary: Negative for difficulty urinating, dysuria, enuresis, flank pain and hematuria.   Musculoskeletal: Negative for arthralgias, back pain, joint swelling, myalgias, neck pain and neck stiffness.   Skin: Negative for color change, rash and wound.   Allergic/Immunologic: Negative for environmental allergies and food allergies.   Neurological: Positive for weakness. Negative for dizziness, tremors, syncope, numbness (R foot) and headaches.   Hematological: Does not bruise/bleed easily.   Psychiatric/Behavioral: Negative for confusion, decreased concentration and dysphoric mood.     Objective:     Vital Signs (Most Recent):  Temp: 98.6 °F (37 °C)  (01/14/20 1547)  Pulse: 88 (01/14/20 1547)  Resp: 18 (01/14/20 1547)  BP: (!) 96/55 (01/14/20 1547)  SpO2: (!) 91 % (01/14/20 1547) Vital Signs (24h Range):  Temp:  [96.1 °F (35.6 °C)-98.6 °F (37 °C)] 98.6 °F (37 °C)  Pulse:  [76-97] 88  Resp:  [11-36] 18  SpO2:  [84 %-100 %] 91 %  BP: ()/(47-58) 96/55   Weight: 62.1 kg (136 lb 14.5 oz)  Body mass index is 25.04 kg/m².      Intake/Output Summary (Last 24 hours) at 1/14/2020 1609  Last data filed at 1/14/2020 0500  Gross per 24 hour   Intake 179.1 ml   Output 400 ml   Net -220.9 ml       Physical Exam   Constitutional: She is oriented to person, place, and time. She appears well-developed and well-nourished. She has a sickly appearance. No distress. Face mask in place.   HENT:   Head: Normocephalic and atraumatic.   Eyes: Pupils are equal, round, and reactive to light. EOM are normal.   Neck: Normal range of motion. Neck supple.   Cardiovascular: Normal rate and regular rhythm.   Pulses:       Radial pulses are 1+ on the right side, and 1+ on the left side.   bilat LE pulses present with doppler   Pulmonary/Chest: Effort normal. No respiratory distress. She has decreased breath sounds in the right upper field, the right middle field, the right lower field, the left upper field, the left middle field and the left lower field. She has rhonchi in the right middle field, the right lower field, the left middle field and the left lower field.   On 10-15 L O2   Abdominal: Soft. Bowel sounds are normal. She exhibits no distension. There is tenderness. There is no guarding.   Complaining of chronic abd pain   Musculoskeletal: Normal range of motion.   R fem and PT 2+  R foot moderate weakness at ankle, but now able to plantar and dorsiflex again.   L groin incision clean/dry/intact  Calf incisions clean/dry/intact  2+ palpable left femoral, no hematoma  Triphasic left PT   Neurological: She is alert and oriented to person, place, and time. GCS eye subscore is 4. GCS  verbal subscore is 4. GCS motor subscore is 6.   Skin: Skin is warm and dry. She is not diaphoretic.   Nursing note and vitals reviewed.      Vents:  Oxygen Concentration (%): 35 (01/14/20 0912)  Lines/Drains/Airways     Peripheral Intravenous Line                 Peripheral IV - Single Lumen 01/13/20 1831 20 G Anterior;Distal;Right Forearm less than 1 day         Peripheral IV - Single Lumen 01/13/20 1831 20 G Left Antecubital less than 1 day              Significant Labs:    CBC/Anemia Profile:  Recent Labs   Lab 01/13/20  0312 01/13/20  1349 01/14/20  0659   WBC 17.04* 17.15* 18.85*   HGB 8.2* 7.9* 9.0*   HCT 26.0* 26.1* 29.4*    171 221   MCV 90 93 92   RDW 18.1* 18.3* 18.6*        Chemistries:  Recent Labs   Lab 01/13/20  0312 01/14/20  0659   * 135*   K 4.7 4.6    102   CO2 25 24   BUN 15 13   CREATININE 0.9 0.8   CALCIUM 7.3* 7.6*   ALBUMIN 1.6* 1.7*   PROT 4.8* 5.0*   BILITOT 0.7 1.0   ALKPHOS 289* 293*   ALT 22 21   * 88*   MG 2.1 2.0   PHOS 2.2* 3.0       All pertinent labs within the past 24 hours have been reviewed.    Significant Imaging: I have reviewed all pertinent imaging results/findings within the past 24 hours.      Assessment/Plan:      * Acute pain of right lower extremity  Patient with hx of PAD s/p right femoral stent (2006) presenting with cold ischemic foot x 2 days. Patient reports acute onset pain and coldness to R foot since previous hospitalization (1/2 -1/8) one day prior to discharge. Patient's plavix/ASA were held at that time for a liver biopsy (1/6), which was resumed upon discharge. Of note, patient has newly discovered lung and liver lesions concerning for cancer.    - CTA showing evidence of a thrombus just distal to previously placed iliac stent as well as complete occlusion in distal popliteal vessel.  - Venous U/S did not demonstrate any DVT in RLE    Plan:  - Vascular surgery consulted.   Patient taken to the OR 1/11.   - Heparin drip since 1/9. In  "therapeutic range  - continue home ASA/plavix  - NPO at midnight  - neurovascular checks q4h  - morphine 4mg q4h PRN for moderate pain and .5mg dilaudid for severe    Patient placed into the SICU after surgery 1/11;  She is s/p R iliofemoral embolectomy, SFA interposition bypass with vein, iliac stent angioplasty on 1/11/20 she is is on heparin gtt. She is stepped down 1/14/2020. Patient in resp distress with hypotension 90/50s; she received 1 L LR bolus. MICU, Heme/onc and palliative consulted. Patient and family wants to be DNR today with hospice tomorrow      DNR (do not resuscitate)  Palliative care consulted appreciate recs      Pain  PRN pain medicines      Abdominal pain  Patient with abdominal pain out of proportion to exam. 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    Aorto bi-iliac stent graft with notable filling defect within the distal aspect of the right common iliac stent consistent with thrombosis.  Distal right common iliac artery and branches are patent.  Appearance is similar to recent examination.    Nonvisualization of the CARLOS which is likely severely stenosed or occluded, unchanged.  SMA and celiac trunk are patent without definitive evidence of thrombosis."    Patient also has numerous metastatic lesions within the liver parenchyma, peritoneum, abdominal wall, psoas, and paraspinal muscles, gluteal regions, spleen, left adrenal gland, and left lower lobe     Plan  Continue to monitor patients abdominal pain  Pain medicines regimen per Palliative   (Karma Parada, CNS)     Oxycodone PO 10 mg PRN for moderate pain   Oxycodone PO 15 mg PRN for severe pain (first choice)   Morphine 2 mg IV PRN (Last " choice)        Hypothyroidism  Last TSH 9.853 on 1/2/20  Free T4 normal 0.81    - continue home levothyroxine 100mcg daily      Foot pain, right        Goals of care, counseling/discussion  Patient met with palliative care this admission and goals of care established to be DNR and move to hospice tomorrow      Palliative care encounter  Appreciate recs      Mass of middle lobe of right lung  Patients CT scans very concerning for lung cancer with mets. Liver biopsy taken 1/6.     COPD (chronic obstructive pulmonary disease)  Patient with hx of COPD (diagnosed by PCP, no PFTs on file) with newly discovered lung and liver lesions concerning for cancer. Recently discharged with home oxygen 2-3L due to hypoxia on exertion.  Home meds include: albuterol and duonebs PRN, Ellipta daily, singulair 10mg daily    - continue O2 supplementation, wean as tolerated to maintain SpO2 > 90%  - duonebs q4h  - Breo daily while inpatient  - continue singulair 10mg daily    Chronic obstructive pulmonary disease with acute exacerbation        Hypertension  - continue home amlodipine 5mg daily and losartan 25mg daily      Hyperlipidemia  - continue home atorvastatin 40mg daily        VTE Risk Mitigation (From admission, onward)         Ordered     heparin 25,000 units in dextrose 5% 250 mL (100 units/mL) infusion (heparin infusion - NO NOMOGRAM)  Continuous      01/13/20 1705     IP VTE HIGH RISK PATIENT  Once      01/11/20 1440     Reason for No Pharmacological VTE Prophylaxis  Once     Question:  Reasons:  Answer:  IV Heparin w/in 24 hrs. Pre or Post-Op    01/09/20 1921                      Modesta Carter MD  Department of Hospital Medicine   [facility]

## 2020-01-14 NOTE — ASSESSMENT & PLAN NOTE
Patient with hx of COPD (diagnosed by PCP, no PFTs on file) with newly discovered lung and liver lesions concerning for cancer. Recently discharged with home oxygen 2-3L due to hypoxia on exertion.  Home meds include: albuterol and duonebs PRN, Ellipta daily, singulair 10mg daily    - continue O2 supplementation, wean as tolerated to maintain SpO2 > 90%  - duonebs q4h  - Breo daily while inpatient  - continue singulair 10mg daily

## 2020-01-14 NOTE — PROGRESS NOTES
[facility]  Palliative Medicine  Consult Note    Patient Name: Sharita Castañeda  MRN: [id number]  Admission Date: 1/9/2020  Hospital Length of Stay: 5 days  Code Status: DNR   Attending Provider: Ana Fajardo MD  Consulting Provider: NIKOLAS Arrieta  Primary Care Physician: Wallace Deng MD  Principal Problem:Acute pain of right lower extremity    Patient information was obtained from patient, relative(s) and ER records.      Assessment/Plan:     Palliative care encounter  Impression: Pt is a 68 y/o female  admitted with sub acute illio ischemia s/p embolectomy, SFA and stent placement. Pt has Stage IV NSCLC per Hem/Onc. Pt not a candidate for chemo. Pt is on non-rebreather. Pt now a DNR.     Reason for consult:[facility]    Goals of care: Hem/Onc met with family. Per hem/onc, pt not a candidate for chemo. Pt on non-rebreather, debilitated. Dr. Littlejohn with [facility] also met with family. Per MD, pt nearing EOL. Prognosis-weeks. Family verbalized understanding. Then this STONEY met with pt's family. Met with pt and pt's son, daughter, and two grandsons. Pt and family in agreement to inpt to DNR status. Family pt interested in inpt hospice. Pt and family overwhelmed. Let family know I would f/u in morning and discuss options.     Symptom management:   Pt on Oxycodone 15 mg q 6 hrs severe pain.   Pt on Oxycodone 10 mg q 6hrs prn moderate pain.     Consider changing frequency of oxycodone to q 4 hrs prn.     Pt has Morphine 2 mg IVP q 6 hrs prn  (third choice).    Constipation:   Pt on bowel regiment.     Nausea:  Pt has Zofran 4 mg q 8 hrs prn nausea.   Can increase dose to 8 mg q  8hrs prn if nausea not relieved with 4 mg.     Plan:  Pt made DNR today.   Pt and pt's adult children in agreement to hospice care. Family would like inpt hospice.   Will meet with family in morning to discuss hospice options.   Will follow.   Spoke to Dr. Carbajal concerning plan of care.             I will follow-up with patient. Please  contact us if you have any additional questions.    Subjective:     Chief Complaint:   Chief Complaint   Patient presents with    Foot Pain     right foot pain and swelling       HPI:   Pt  is a 68 y/o female admitted with sub acute illio ischemia s/p embolectomy, SFA and stent placement.      Per chart review, pt was recently in the hospital for PNA and CTA  Which revealed a large RML mass extending to the right hilum and subcarinal mediastinum consistent with primary and secondary neoplasia. Additional metastatic disease likely present in the right axilla, liver, spleen, and left renal fossa.IR was consulted and biopsy of liver lesions was performed on 01/06. Preliminary pathology results suggest NSCLC.      Per chart review, this hospital course has been notable for multiple RR team and IC consults for hypotension and respiratory distress. CTA A/P 01/13 revealed : large right lung mass with numerous metastatic lesions within the liver parenchyma, peritoneum, abdominal wall, psoas, and paraspinal muscles, gluteal regions, spleen, left adrenal gland,   Medical oncology have been consulted to discuss treatment options for stage IV NSCLC as well as the utility of starting chemotherapy in the current clinical setting. She is non ambulatory, and on a non rebreather. She indicates that she is aware that she is sick but wants to know how she can get better.         Hospital Course:  No notes on file    Interval History: Pt made DNR today.     Past Medical History:   Diagnosis Date    Allergic rhinitis     COPD (chronic obstructive pulmonary disease)     Depression     GERD (gastroesophageal reflux disease)     Headache     Hyperlipidemia     Hypertension     Pneumonia     Polyarthralgia     Postablative hypothyroidism     hypothyroidism s/p OCASIO therapy    PVD (peripheral vascular disease)     Treated by Dr. Sim       Past Surgical History:   Procedure Laterality Date    ANGIOGRAPHY OF LOWER EXTREMITY N/A  1/11/2020    Procedure: ANGIOGRAM, LOWER EXTREMITY;  Surgeon: BRAULIO Verma II, MD;  [facility];  Service: Cardiovascular;  Laterality: N/A;  19mLs contrast  4.1 min fluro   252.28mGy    CHOLECYSTECTOMY  1986    GALLBLADDER SURGERY  2006    HIP SURGERY  2005    Right hip fracture/surgery    PERIPHERAL ARTERIAL STENT GRAFT Right 2007    stent for femoral artery blockage    THROMBECTOMY Right 1/11/2020    Procedure: THROMBECTOMY;  Surgeon: BRAULIO Verma II, MD;  [facility];  Service: Cardiovascular;  Laterality: Right;  illiac, popliteal, and tibial arteries       Review of patient's allergies indicates:  No Known Allergies    Medications:  Continuous Infusions:   heparin (porcine) in 5 % dex 20.583 Units/kg/hr (01/14/20 1511)     Scheduled Meds:   aspirin  81 mg Oral Daily    atorvastatin  40 mg Oral Daily    calcium-vitamin D3  1 tablet Oral Daily    clopidogrel  75 mg Oral Daily    fluticasone furoate-vilanterol  1 puff Inhalation Daily    [START ON 1/15/2020] folic acid  1 mg Oral Daily    gabapentin  300 mg Oral TID    levothyroxine  100 mcg Oral Before breakfast    montelukast  10 mg Oral Daily    mupirocin  1 g Nasal BID    polyethylene glycol  17 g Oral Daily    senna-docusate 8.6-50 mg  1 tablet Oral BID     PRN Meds:acetaminophen, albuterol-ipratropium, benzonatate, calcium gluconate IVPB, calcium gluconate IVPB, calcium gluconate IVPB, Dextrose 10% Bolus, Dextrose 10% Bolus, glucagon (human recombinant), glucose, glucose, HYDROmorphone, magnesium sulfate IVPB, magnesium sulfate IVPB, ondansetron, oxyCODONE, oxyCODONE, oxyCODONE, potassium chloride in water **AND** potassium chloride in water **AND** potassium chloride in water, sodium chloride 0.9%, sodium phosphate IVPB, sodium phosphate IVPB, sodium phosphate IVPB    Family History     Problem Relation (Age of Onset)    COPD Mother    Cancer Mother    Heart attack Father (55), Sister (53)    Thyroid  disease Mother        Tobacco Use    Smoking status: Current Every Day Smoker     Types: Cigarettes    Smokeless tobacco: Never Used    Tobacco comment: None since Dec 26   Substance and Sexual Activity    Alcohol use: No     Alcohol/week: 0.0 standard drinks    Drug use: Never    Sexual activity: Not on file       Review of Systems   Constitutional: Positive for activity change and fatigue.   Respiratory: Positive for shortness of breath.    Gastrointestinal: Positive for abdominal distention.   Neurological: Positive for weakness.   Psychiatric/Behavioral: Negative for agitation.     Objective:     Vital Signs (Most Recent):  Temp: 98.6 °F (37 °C) (01/14/20 1547)  Pulse: 88 (01/14/20 1547)  Resp: 18 (01/14/20 1547)  BP: (!) 96/55 (01/14/20 1547)  SpO2: (!) 91 % (01/14/20 1547) Vital Signs (24h Range):  Temp:  [96.1 °F (35.6 °C)-98.6 °F (37 °C)] 98.6 °F (37 °C)  Pulse:  [76-97] 88  Resp:  [11-36] 18  SpO2:  [84 %-100 %] 91 %  BP: ()/(46-58) 96/55     Weight: 62.1 kg (136 lb 14.5 oz)  Body mass index is 25.04 kg/m².    Review of Symptoms  Symptom Assessment (ESAS 0-10 scale)   ESAS 0 1 2 3 4 5 6 7 8 9 10   Pain   X           Dyspnea      X        Anxiety              Nausea  X            Depression               Anorexia              Fatigue       X       Insomnia              Restlessness               Agitation              CAM / Delirium __ --  ___+   Constipation     __ --  ___+   Diarrhea           __ --  ___+  Bowel Management Plan (BMP): Yes      Pain Assessment: Pt reports ABD pain that is relieved by pain meds. Pt reports pain is described as throbbing.       Performance Status: 10    ECOG Performance Status Grade: 4 - Completely disabled    Physical Exam   Constitutional: She is oriented to person, place, and time. She appears well-developed. She has a sickly appearance.   Pt on non-rebreather   Pulmonary/Chest: Tachypnea noted. She has wheezes.   Abdominal: Bowel sounds are normal. She  exhibits distension.   Neurological: She is alert and oriented to person, place, and time.       Significant Labs: All pertinent labs within the past 24 hours have been reviewed.  CBC:   Recent Labs   Lab 01/14/20  0659   WBC 18.85*   HGB 9.0*   HCT 29.4*   MCV 92        BMP:  Recent Labs   Lab 01/14/20  0659   GLU 69*   *   K 4.6      CO2 24   BUN 13   CREATININE 0.8   CALCIUM 7.6*   MG 2.0     LFT:  Lab Results   Component Value Date    AST 88 (H) 01/14/2020    ALKPHOS 293 (H) 01/14/2020    BILITOT 1.0 01/14/2020     Albumin:   Albumin   Date Value Ref Range Status   01/14/2020 1.7 (L) 3.5 - 5.2 g/dL Final     Protein:   Total Protein   Date Value Ref Range Status   01/14/2020 5.0 (L) 6.0 - 8.4 g/dL Final     Lactic acid:   Lab Results   Component Value Date    LACTATE 1.7 01/14/2020    LACTATE 1.3 01/13/2020       Significant Imaging: I have reviewed all pertinent imaging results/findings within the past 24 hours.    Advance Care Planning   Advanced Directives::  Living Will: No  LaPOST: No  Do Not Resuscitate Status: DNR order form signed per Dr. Carter    Medical Power of : Pt's son and daughter are next of kin.     Decision-Making Capacity: Patient answered questions, Family answered questions       Living Arrangements: Lives alone    Psychosocial/Cultural:  Pt not . Pt has one son and daughter. Pt has four grandchildren and one great-grandchild.           75 minutes spent with pt and family concerning goals of care/advance care planning, code status, symptom management.     Karma Parada, CNS  Palliative Medicine  [facility]

## 2020-01-14 NOTE — PROGRESS NOTES
"RAPID RESPONSE NURSE NOTE     Admit Date: 2020  LOS: 5  Code Status: Full Code   Date of Consult: 2020  : 1950  Age: 69 y.o.  Weight:   Wt Readings from Last 1 Encounters:   20 62.1 kg (136 lb 14.5 oz)     Sex: female  Race: White   Bed: [id number] A:   MRN: [id number]  Time Rapid Response Team page Received: 09  Time Rapid Response Team at Bedside: 09  Time Rapid Response Team left Bedside: 915  Was the patient discharged from an ICU this admission?   yes  Was the patient discharged from a PACU within last 24 hours?  no  Did the patient receive conscious sedation/general anesthesia within last 24 hours?  no  Was the patient in the ED within the past 24 hours?  no  Was the patient started on NIPPV within the past 24 hours?  yes  Did this progress into an ARC or CPA:  no  Attending Physician: BRAULIO Verma II, MD  Primary Service: Networked reference to record PCT   Consult Requested By: BRAULIO Verma II, MD     SITUATION     Reason for Call: Respiratory Status   Called to evaluate the patient for Respiratory Status.    BACKGROUND     Why is the patient in the hospital?: Acute pain of right lower extremity    Patient has a past medical history of Allergic rhinitis, COPD (chronic obstructive pulmonary disease), Depression, GERD (gastroesophageal reflux disease), Headache, Hyperlipidemia, Hypertension, Pneumonia, Polyarthralgia, Postablative hypothyroidism, and PVD (peripheral vascular disease).    ASSESSMENT/INTERVENTIONS     BP (!) 106/55 (BP Location: Right arm, Patient Position: Lying)   Pulse 94   Temp 97.9 °F (36.6 °C) (Axillary)   Resp 20   Ht 5' 2" (1.575 m)   Wt 62.1 kg (136 lb 14.5 oz)   SpO2 (S) (!) 91%   Breastfeeding? No   BMI 25.04 kg/m²     What did you find: Patient was on 12 L Venti Mask, complaining of pain. Upon auscultation, bilateral diminished breath sounds noted. Hypoactive bowel sounds. ABG and Chest X-ray ordered per MD Bayron. APTT is WNL and " recheck is at 1300. Critical Care notified regarding the patient's status.     RECOMMENDATIONS     We recommend: ABG and Chest X-Ray. Critical Care notified about patient status and awaiting ABG results.     FOLLOW-UP/CONTINGENCY PLAN     Patient needs a second visit at : 1300    Call the Rapid Response Nurse, Buck White RN at x [phone] for additional questions or concerns.    PHYSICIAN ESCALATION     Orders received and case discussed with Dr. Verma and RNPeyton (#[id number]). Critical Care MDSherman.     Disposition: Remain in room 1007.

## 2020-01-14 NOTE — HPI
Sharita Castañeda is a 68 y/o F with Pmhx significant for COPD (home O2 2-3 L), HTN/HLD, PVD s/p R femoral artery stent (placed in 2006 at [facility] and replaced on 1/11 (on ASA and plavix; held for biopsy), hypothyroidism presented to [facility] on 1/9 for evaluation of right foot pain that began x 2 days earlier. Recently underwent extremity angiogram and thrombectomy with stent after presenting with a cool leg and rest pain. She was recently admitted from 1/2 to 1/8 for acute hypoxic respiratory failure concerning for post obstructive pneumonia. CTA concerning for lung malignancy with diffuse metastatic disease; pathologies pending; large right lung mass with numerous metastatic lesions within the liver parenchyma, peritoneum, abdominal wall, psoas, and paraspinal muscles, gluteal regions, spleen, left adrenal gland. Liver biopsy 1/6 pending as well as common iliac thrombosis.

## 2020-01-14 NOTE — ASSESSMENT & PLAN NOTE
--1 L LR administered  --hold antihypertensives  --consider giving additional fluid if patient remain hypotensive

## 2020-01-14 NOTE — PROGRESS NOTES
2045: SICU resident notified of pts sats in 80s after receiving dilaudid IV. Pt sats increase to 90s with pursed lip breathing technique. Pt remains oriented x4 and somewhat drowsy, although arouses to voice. O2 remains at 5L NC. No new orders at this time. [facility].     2130: sats currently 97% on 5L NC, pt alert and oriented. Transferring pt to Room 1007. WCTM.

## 2020-01-14 NOTE — ASSESSMENT & PLAN NOTE
Patient is 68 yo F with hx of PAD and right femoral stent who presented with subacute limb ischemia  Now s/p R iliofemoral embolectomy, SFA interposition bypass with vein, iliac stent angioplasty on 1/11/20    - cont IV and po pain control  - cont albuterol, breo, and O2 support for COPD  - hemodynamically stable. Cont losartan and norvasc  - reg diet. Has continued abominal pain, same as prior to surgery. Has known carcinomatosis  - cont ASA, plavix, heparin gtt - goal PTT 60-80  - will need lifelong anticoagulation. Given her cancer history would recommend lovenox  - gauze dressing to R groin inciison. OK to leave calf incisions open to air  - palliative is following for her metastatic cancer    - transfer to medicine today (discussed with Med 2)

## 2020-01-14 NOTE — PROGRESS NOTES
[facility]  Vascular Surgery  Progress Note    Patient Name: Sharita Castañeda  MRN: [id number]  Admission Date: 1/9/2020  Primary Care Provider: Wallace Deng MD    Subjective:     Interval History: no events overnight. Intact motor and sensory to R foot    Post-Op Info:  Procedure(s) (LRB):  ANGIOGRAM, LOWER EXTREMITY (N/A)  THROMBECTOMY (Right)   3 Days Post-Op       Medications:  Continuous Infusions:   heparin (porcine) in 5 % dex 20.583 Units/kg/hr (01/14/20 0200)     Scheduled Meds:   acetaminophen  650 mg Oral Q6H    amLODIPine  5 mg Oral Daily    aspirin  81 mg Oral Daily    atorvastatin  40 mg Oral Daily    calcium-vitamin D3  1 tablet Oral Daily    clopidogrel  75 mg Oral Daily    fluticasone furoate-vilanterol  1 puff Inhalation Daily    gabapentin  300 mg Oral TID    levothyroxine  100 mcg Oral Before breakfast    losartan  25 mg Oral Daily    montelukast  10 mg Oral Daily    mupirocin  1 g Nasal BID    polyethylene glycol  17 g Oral Daily    senna-docusate 8.6-50 mg  1 tablet Oral BID     PRN Meds:albuterol-ipratropium, benzonatate, calcium gluconate IVPB, calcium gluconate IVPB, calcium gluconate IVPB, Dextrose 10% Bolus, Dextrose 10% Bolus, glucagon (human recombinant), glucose, glucose, HYDROmorphone, magnesium sulfate IVPB, magnesium sulfate IVPB, ondansetron, oxyCODONE, oxyCODONE, oxyCODONE, potassium chloride in water **AND** potassium chloride in water **AND** potassium chloride in water, sodium chloride 0.9%, sodium phosphate IVPB, sodium phosphate IVPB, sodium phosphate IVPB     Objective:     Vital Signs (Most Recent):  Temp: 96.1 °F (35.6 °C) (01/14/20 0400)  Pulse: 86 (01/14/20 0400)  Resp: 12 (01/14/20 0400)  BP: (!) 113/58 (01/14/20 0400)  SpO2: (!) 90 % (01/13/20 2250) Vital Signs (24h Range):  Temp:  [96.1 °F (35.6 °C)-98.2 °F (36.8 °C)] 96.1 °F (35.6 °C)  Pulse:  [66-92] 86  Resp:  [10-36] 12  SpO2:  [83 %-100 %] 90 %  BP: ()/(42-59) 113/58         Physical  Exam   Constitutional: She is oriented to person, place, and time. She appears well-developed and well-nourished. No distress.   HENT:   Head: Normocephalic and atraumatic.   Eyes: Pupils are equal, round, and reactive to light. EOM are normal.   Neck: Normal range of motion. Neck supple.   Cardiovascular: Normal rate and regular rhythm.   Pulmonary/Chest: Effort normal. No respiratory distress.   On 3L O2   Abdominal: Soft. She exhibits no distension. There is tenderness. There is no guarding.   Complaining of chronic abd pain   Musculoskeletal: Normal range of motion.   R fem and PT 2+  R foot moderate weakness at ankle, but now able to plantar and dorsiflex again.   L groin incision clean/dry/intact  Calf incisions clean/dry/intact  2+ palpable left femoral, no hematoma  Triphasic left PT   Neurological: She is alert and oriented to person, place, and time.   Skin: Skin is warm and dry. She is not diaphoretic.   Psychiatric: She has a normal mood and affect. Her behavior is normal. Judgment and thought content normal.   Nursing note and vitals reviewed.      Significant Labs:  CBC:   Recent Labs   Lab 01/13/20  1349   WBC 17.15*   RBC 2.82*   HGB 7.9*   HCT 26.1*      MCV 93   MCH 28.0   MCHC 30.3*     CMP:   Recent Labs   Lab 01/13/20  0312   GLU 67*   CALCIUM 7.3*   ALBUMIN 1.6*   PROT 4.8*   *   K 4.7   CO2 25      BUN 15   CREATININE 0.9   ALKPHOS 289*   ALT 22   *   BILITOT 0.7     Coagulation:   Recent Labs   Lab 01/13/20  0312 01/14/20  0057   LABPROT 10.0  --    INR 1.0  --    APTT 34.0* 43.9*       Significant Diagnostics:  I have reviewed all pertinent imaging results/findings within the past 24 hours.    Assessment/Plan:     * Acute pain of right lower extremity  Patient is 70 yo F with hx of PAD and right femoral stent who presented with subacute limb ischemia  Now s/p R iliofemoral embolectomy, SFA interposition bypass with vein, iliac stent angioplasty on 1/11/20    - cont  IV and po pain control  - cont albuterol, breo, and O2 support for COPD  - hemodynamically stable. Cont losartan and norvasc  - reg diet. Has continued abominal pain, same as prior to surgery. Has known carcinomatosis  - cont ASA, plavix, heparin gtt - goal PTT 60-80  - will need lifelong anticoagulation. Given her cancer history would recommend lovenox  - gauze dressing to R groin inciison. OK to leave calf incisions open to air  - palliative is following for her metastatic cancer    - transfer to medicine today (discussed with Med 2)            Hardy Delgado MD  Vascular Surgery  [facility]

## 2020-01-14 NOTE — HPI
Pt  is a 70 y/o female admitted with sub acute illio ischemia s/p embolectomy, SFA and stent placement.      Per chart review, pt was recently in the hospital for PNA and CTA  Which revealed a large RML mass extending to the right hilum and subcarinal mediastinum consistent with primary and secondary neoplasia. Additional metastatic disease likely present in the right axilla, liver, spleen, and left renal fossa.IR was consulted and biopsy of liver lesions was performed on 01/06. Preliminary pathology results suggest NSCLC.      Per chart review, this hospital course has been notable for multiple RR team and IC consults for hypotension and respiratory distress. CTA A/P 01/13 revealed : large right lung mass with numerous metastatic lesions within the liver parenchyma, peritoneum, abdominal wall, psoas, and paraspinal muscles, gluteal regions, spleen, left adrenal gland,   Medical oncology have been consulted to discuss treatment options for stage IV NSCLC as well as the utility of starting chemotherapy in the current clinical setting. She is non ambulatory, and on a non rebreather. She indicates that she is aware that she is sick but wants to know how she can get better.

## 2020-01-14 NOTE — CONSULTS
Consult Note    Consults  SUBJECTIVE:     History of Present Illness:    Sharita Castañeda is a 70 y/o F admitted with sub acute illio ischemia s/p embolectomy, SFA and stent placement.     Of note she was recently in the hospital for PNA and CTA revealed a large RML mass extending to the right hilum and subcarinal mediastinum consistent with primary and secondary neoplasia. Additional metastatic disease likely present in the right axilla, liver, spleen, and left renal fossa.IR was consulted and biopsy of liver lesions was performed on 01/06. Preliminary pathology results suggest NSCLC.     This hospital course has been notable for multiple RR team and IC consults for hypotension and respiratory distress. CTA A/P 01/13 revealed : large right lung mass with numerous metastatic lesions within the liver parenchyma, peritoneum, abdominal wall, psoas, and paraspinal muscles, gluteal regions, spleen, left adrenal gland,    Medical oncology have been consulted to discuss treatment options for stage IV NSCLC as well as the utility of starting chemotherapy in the current clinical setting. She is non ambulatory, and on a non rebreather. She indicates that she is aware that she is sick but wants to know how she can get better.      Review of patient's allergies indicates:  No Known Allergies  Past Medical History:   Diagnosis Date    Allergic rhinitis     COPD (chronic obstructive pulmonary disease)     Depression     GERD (gastroesophageal reflux disease)     Headache     Hyperlipidemia     Hypertension     Pneumonia     Polyarthralgia     Postablative hypothyroidism     hypothyroidism s/p OCASIO therapy    PVD (peripheral vascular disease)     Treated by Dr. Sim     Past Surgical History:   Procedure Laterality Date    ANGIOGRAPHY OF LOWER EXTREMITY N/A 1/11/2020    Procedure: ANGIOGRAM, LOWER EXTREMITY;  Surgeon: BRAULIO Verma II, MD;  Location: [facility] OR [address] [place] [address];  Service: Cardiovascular;  Laterality: N/A;  19mLs  contrast  4.1 min fluro   252.28mGy    CHOLECYSTECTOMY  1986    GALLBLADDER SURGERY  2006    HIP SURGERY  2005    Right hip fracture/surgery    PERIPHERAL ARTERIAL STENT GRAFT Right 2007    stent for femoral artery blockage    THROMBECTOMY Right 2020    Procedure: THROMBECTOMY;  Surgeon: BRAULIO Verma II, MD;  Location: [facility] OR [address] [place] [address];  Service: Cardiovascular;  Laterality: Right;  illiac, popliteal, and tibial arteries     Family History   Problem Relation Age of Onset    COPD Mother     Thyroid disease Mother         thyroidectomy    Cancer Mother         lung    Heart attack Father 55        [facility] -     Heart attack Sister 53        [facility]     Social History     Tobacco Use    Smoking status: Current Every Day Smoker     Types: Cigarettes    Smokeless tobacco: Never Used    Tobacco comment: None since Dec 26   Substance Use Topics    Alcohol use: No     Alcohol/week: 0.0 standard drinks    Drug use: Never     Review of Systems   Constitutional: Negative for chills and fever.   Eyes: Negative for blurred vision and double vision.   Respiratory: Positive for shortness of breath. Negative for cough.    Cardiovascular: Negative for chest pain.   Gastrointestinal: Negative for abdominal pain.   Genitourinary: Negative for dysuria.   Skin: Negative for rash.   Neurological: Negative for focal weakness.   Endo/Heme/Allergies: Does not bruise/bleed easily.   Psychiatric/Behavioral: The patient is not nervous/anxious.      OBJECTIVE:     Vital Signs:  Temp:  [96.1 °F (35.6 °C)-98.3 °F (36.8 °C)]   Pulse:  [86-97]   Resp:  [12-36]   BP: ()/(54-58)   SpO2:  [84 %-93 %]     Physical Exam   Constitutional: She is oriented to person, place, and time. She appears well-developed and well-nourished.   HENT:   Head: Normocephalic and atraumatic.   Eyes: No scleral icterus.   Cardiovascular: Normal rate and regular rhythm.   Pulmonary/Chest: She is in respiratory distress. She has wheezes.   Abdominal:  Soft. She exhibits no distension.   Lymphadenopathy:     She has cervical adenopathy.   Neurological: She is alert and oriented to person, place, and time.   Skin: Skin is warm.   Psychiatric: She has a normal mood and affect.     Laboratory:  CBC:   Recent Labs   Lab 01/14/20  0659   WBC 18.85*   RBC 3.21*   HGB 9.0*   HCT 29.4*      MCV 92   MCH 28.0   MCHC 30.6*     CMP:   Recent Labs   Lab 01/14/20  0659   GLU 69*   CALCIUM 7.6*   ALBUMIN 1.7*   PROT 5.0*   *   K 4.6   CO2 24      BUN 13   CREATININE 0.8   ALKPHOS 293*   ALT 21   AST 88*   BILITOT 1.0       Diagnostic Results:  Labs: Reviewed  CT: Reviewed    ASSESSMENT/PLAN:     Sharita Castañeda is a 70 y/o F with multiple medical problems and newly diagnosed stage IV NSCLC. We have been asked to discuss treatment options with this patient and discuss the utility of comfort care and palliative medicine.      Discussed with Ms. Castañeda our concern that anti cancer therapy would not be well tolerated by her and instead of benefiting, would likely cause more harm. We expanded on the utility of palliative medicine and hospice agencies and recommended discussing supportive care measures with them. The patient and her family understood our concerns about the harm of subjecting her to chemotherapy given her frail state and agreed with discussing hospice.      The pt was discussed and examined with the attending physician .    Jayne Dolan MD  Clinical Fellow  Hematology and Medical Oncology.  01/14/2020

## 2020-01-14 NOTE — ASSESSMENT & PLAN NOTE
Patient met with palliative care this admission and goals of care established to be DNR and move to hospice tomorrow

## 2020-01-14 NOTE — NURSING TRANSFER
Nursing Transfer Note      1/14/2020     Transfer To: 1007    Transfer via bed    Transfer with 5L NC to O2, cardiac monitoring    Transported by RN and PCT    Medicines sent: heparin gtt    Chart send with patient: Yes    Notified: daughter aware of bed assignment    Patient reassessed at: 1/13/2020, 2145 (date, time)    Upon arrival to floor: cardiac monitor applied, patient oriented to room, call bell in reach and bed in lowest position. Sharita RN at pt's bedside.

## 2020-01-14 NOTE — SUBJECTIVE & OBJECTIVE
Interval History: Pt made DNR today.     Past Medical History:   Diagnosis Date    Allergic rhinitis     COPD (chronic obstructive pulmonary disease)     Depression     GERD (gastroesophageal reflux disease)     Headache     Hyperlipidemia     Hypertension     Pneumonia     Polyarthralgia     Postablative hypothyroidism     hypothyroidism s/p OCASIO therapy    PVD (peripheral vascular disease)     Treated by Dr. Sim       Past Surgical History:   Procedure Laterality Date    ANGIOGRAPHY OF LOWER EXTREMITY N/A 1/11/2020    Procedure: ANGIOGRAM, LOWER EXTREMITY;  Surgeon: BRAULIO Verma II, MD;  Location: [facility] OR [address] [place] [address];  Service: Cardiovascular;  Laterality: N/A;  19mLs contrast  4.1 min fluro   252.28mGy    CHOLECYSTECTOMY  1986    GALLBLADDER SURGERY  2006    HIP SURGERY  2005    Right hip fracture/surgery    PERIPHERAL ARTERIAL STENT GRAFT Right 2007    stent for femoral artery blockage    THROMBECTOMY Right 1/11/2020    Procedure: THROMBECTOMY;  Surgeon: BRAULIO Verma II, MD;  Location: [facility] OR [address] [place] [address];  Service: Cardiovascular;  Laterality: Right;  illiac, popliteal, and tibial arteries       Review of patient's allergies indicates:  No Known Allergies    Medications:  Continuous Infusions:   heparin (porcine) in 5 % dex 20.583 Units/kg/hr (01/14/20 1511)     Scheduled Meds:   aspirin  81 mg Oral Daily    atorvastatin  40 mg Oral Daily    calcium-vitamin D3  1 tablet Oral Daily    clopidogrel  75 mg Oral Daily    fluticasone furoate-vilanterol  1 puff Inhalation Daily    [START ON 1/15/2020] folic acid  1 mg Oral Daily    gabapentin  300 mg Oral TID    levothyroxine  100 mcg Oral Before breakfast    montelukast  10 mg Oral Daily    mupirocin  1 g Nasal BID    polyethylene glycol  17 g Oral Daily    senna-docusate 8.6-50 mg  1 tablet Oral BID     PRN Meds:acetaminophen, albuterol-ipratropium, benzonatate, calcium gluconate IVPB, calcium gluconate IVPB, calcium gluconate IVPB,  Dextrose 10% Bolus, Dextrose 10% Bolus, glucagon (human recombinant), glucose, glucose, HYDROmorphone, magnesium sulfate IVPB, magnesium sulfate IVPB, ondansetron, oxyCODONE, oxyCODONE, oxyCODONE, potassium chloride in water **AND** potassium chloride in water **AND** potassium chloride in water, sodium chloride 0.9%, sodium phosphate IVPB, sodium phosphate IVPB, sodium phosphate IVPB    Family History     Problem Relation (Age of Onset)    COPD Mother    Cancer Mother    Heart attack Father (55), Sister (53)    Thyroid disease Mother        Tobacco Use    Smoking status: Current Every Day Smoker     Types: Cigarettes    Smokeless tobacco: Never Used    Tobacco comment: None since Dec 26   Substance and Sexual Activity    Alcohol use: No     Alcohol/week: 0.0 standard drinks    Drug use: Never    Sexual activity: Not on file       Review of Systems   Constitutional: Positive for activity change and fatigue.   Respiratory: Positive for shortness of breath.    Gastrointestinal: Positive for abdominal distention.   Neurological: Positive for weakness.   Psychiatric/Behavioral: Negative for agitation.     Objective:     Vital Signs (Most Recent):  Temp: 98.6 °F (37 °C) (01/14/20 1547)  Pulse: 88 (01/14/20 1547)  Resp: 18 (01/14/20 1547)  BP: (!) 96/55 (01/14/20 1547)  SpO2: (!) 91 % (01/14/20 1547) Vital Signs (24h Range):  Temp:  [96.1 °F (35.6 °C)-98.6 °F (37 °C)] 98.6 °F (37 °C)  Pulse:  [76-97] 88  Resp:  [11-36] 18  SpO2:  [84 %-100 %] 91 %  BP: ()/(46-58) 96/55     Weight: 62.1 kg (136 lb 14.5 oz)  Body mass index is 25.04 kg/m².    Review of Symptoms  Symptom Assessment (ESAS 0-10 scale)   ESAS 0 1 2 3 4 5 6 7 8 9 10   Pain   X           Dyspnea      X        Anxiety              Nausea  X            Depression               Anorexia              Fatigue       X       Insomnia              Restlessness               Agitation              CAM / Delirium __ --  ___+   Constipation     __ --  ___+    Diarrhea           __ --  ___+  Bowel Management Plan (BMP): Yes      Pain Assessment: Pt reports ABD pain that is relieved by pain meds. Pt reports pain is described as throbbing.       Performance Status: 10    ECOG Performance Status Grade: 4 - Completely disabled    Physical Exam   Constitutional: She is oriented to person, place, and time. She appears well-developed. She has a sickly appearance.   Pt on non-rebreather   Pulmonary/Chest: Tachypnea noted. She has wheezes.   Abdominal: Bowel sounds are normal. She exhibits distension.   Neurological: She is alert and oriented to person, place, and time.       Significant Labs: All pertinent labs within the past 24 hours have been reviewed.  CBC:   Recent Labs   Lab 01/14/20  0659   WBC 18.85*   HGB 9.0*   HCT 29.4*   MCV 92        BMP:  Recent Labs   Lab 01/14/20  0659   GLU 69*   *   K 4.6      CO2 24   BUN 13   CREATININE 0.8   CALCIUM 7.6*   MG 2.0     LFT:  Lab Results   Component Value Date    AST 88 (H) 01/14/2020    ALKPHOS 293 (H) 01/14/2020    BILITOT 1.0 01/14/2020     Albumin:   Albumin   Date Value Ref Range Status   01/14/2020 1.7 (L) 3.5 - 5.2 g/dL Final     Protein:   Total Protein   Date Value Ref Range Status   01/14/2020 5.0 (L) 6.0 - 8.4 g/dL Final     Lactic acid:   Lab Results   Component Value Date    LACTATE 1.7 01/14/2020    LACTATE 1.3 01/13/2020       Significant Imaging: I have reviewed all pertinent imaging results/findings within the past 24 hours.    Advance Care Planning   Advanced Directives::  Living Will: No  LaPOST: No  Do Not Resuscitate Status: DNR order form signed per Dr. Carter    Medical Power of : Pt's son and daughter are next of kin.     Decision-Making Capacity: Patient answered questions, Family answered questions       Living Arrangements: Lives alone    Psychosocial/Cultural:  Pt not . Pt has one son and daughter. Pt has four grandchildren and one great-grandchild.

## 2020-01-14 NOTE — CARE UPDATE
Rapid Response Respiratory Therapy Proactive Rounding Note      Time of visit: 0848    Code Status: Full Code   : 1950  Age: 69 y.o.  Weight:   Wt Readings from Last 1 Encounters:   20 62.1 kg (136 lb 14.5 oz)     Sex: female  Race: White   Bed: / A:   MRN: [id number]    SITUATION     Evaluated patient for: respiratory status    BACKGROUND     Patient has a past medical history of Allergic rhinitis, COPD (chronic obstructive pulmonary disease), Depression, GERD (gastroesophageal reflux disease), Headache, Hyperlipidemia, Hypertension, Pneumonia, Polyarthralgia, Postablative hypothyroidism, and PVD (peripheral vascular disease).    ASSESSMENT/INTERVENTIONS     Upon arrival in room pt has increased work of breathing. Pt is experiencing and increased amount of pain. Pt was placed on NRB temporarily by floor RT and she called rapid team to evaluate pt.  Pt had O2 sat of 84% on 5L NC. upon arrival I placed pt on venti mask and titrated to 12L 35%O2. Pt O2sat to 91%. Chest Xray was obtained. Unable to ABG at this time after 2 attempts. Gave Donita tx. Critical care notified to evaluate pt for further care.    Pulse: 97 Respiratory rate: 36 Temperature: Temp: 97.9 °F (36.6 °C) BP: BP: (!) 106/55 SpO2: 91%  Level of Consciousness: Level of Consciousness (AVPU): alert  Respiratory Effort: Respiratory Effort: Mild, Labored, Mouth breathing Expansion/Accessory Muscle Usage: Expansion/Accessory Muscles/Retractions: abdominal muscle use  All Lung Field Breath Sounds: All Lung Fields Breath Sounds: Anterior:, Lateral:, coarse, diminished, equal bilaterally  PATRICK Breath Sounds: diminished  LLL Breath Sounds: coarse, diminished  RUL Breath Sounds: diminished  RML Breath Sounds: coarse, diminished  RLL Breath Sounds: diminished  Mobility at time of assessment: General Mobility: generalized weakness  O2 Device/Concentration: venti mmask 12L 35%  Most recent blood gas:   Recent Labs     20  1831   PH  7.365   PCO2 47.8*   PO2 73*   HCO3 27.3   POCSATURATED 94*   BE 2     NIPPV: No Surgical airway: No  ETCO2 monitored:    Ambu at bedside: Ambu bag with the patient?: Yes, Adult Ambu    Current Respiratory Care Orders:   01/14/20 0901  POCT ARTERIAL BLOOD GAS Blood Gas Once     Comments: Notify Physician if: see parameters below.   Question: Component: Answer: Blood Gas    01/14/20 0900   01/12/20 0900  Incentive spirometry Daily (3 of 7 released)    Release    01/11/20 1544   01/11/20 1600  Pulse Oximetry Q4H Every 4 hours (21 of 38 released)    Release    01/11/20 1440   01/11/20 0126  Oxygen Continuous Continuous     References: Oxygen Titration Protocol   Question Answer Comment   Device type: Low flow    Device: Nasal Cannula (1- 5 Liters)    LPM: 5    Titrate O2 per Oxygen Titration Protocol: Yes    To maintain SpO2 goal of: >= 90%    Notify MD of: Inability to achieve desired SpO2; Sudden change in patient status and requires 20% increase in FiO2; Patient requires >60% FiO2                 RECOMMENDATIONS     We recommend: critical care to evaluate pt     ESCALATION      Physician Escalation (Yes/No) yes   Care discussed with: Peyton CAN  Discussed plan of care primary RTPEDRO     FOLLOW-UP     Please call back the Rapid Response RT, Natty Navarro, CRT at x [phone] for any questions or concerns.

## 2020-01-14 NOTE — CONSULTS
[facility]  Critical Care Medicine  Consult Note    Patient Name: Sharita Castañeda  MRN: [id number]  Admission Date: 1/9/2020  Hospital Length of Stay: 5 days  Code Status: Full Code  Attending Physician: Ana Fajardo MD   Primary Care Provider: Wallace Deng MD   Principal Problem: Acute pain of right lower extremity    Inpatient consult to Critical Care Medicine  Consult performed by: Bandar Epstein, NP  Consult ordered by: Everardo Harrington MD        Subjective:     HPI:  Sharita Castañeda is a 70 y/o F with Pmhx significant for COPD (home O2 2-3 L), HTN/HLD, PVD s/p R femoral artery stent (placed in 2006 at [facility] and replaced on 1/11 (on ASA and plavix; held for biopsy), hypothyroidism presented to [facility] on 1/9 for evaluation of right foot pain that began x 2 days earlier. Recently underwent extremity angiogram and thrombectomy with stent after presenting with a cool leg and rest pain. She was recently admitted from 1/2 to 1/8 for acute hypoxic respiratory failure concerning for post obstructive pneumonia. CTA concerning for lung malignancy with diffuse metastatic disease; pathologies pending; large right lung mass with numerous metastatic lesions within the liver parenchyma, peritoneum, abdominal wall, psoas, and paraspinal muscles, gluteal regions, spleen, left adrenal gland. Liver biopsy 1/6 pending as well as common iliac thrombosis.        Hospital/ICU Course:  CMICU was consulted regarding hypotension and increased oxygen requirements. Palliative care as well as oncology consulted. Family made aware of her acute condition and poor prognosis.    Past Medical History:   Diagnosis Date    Allergic rhinitis     COPD (chronic obstructive pulmonary disease)     Depression     GERD (gastroesophageal reflux disease)     Headache     Hyperlipidemia     Hypertension     Pneumonia     Polyarthralgia     Postablative hypothyroidism     hypothyroidism s/p OCASIO therapy    PVD (peripheral vascular disease)      Treated by Dr. Sim       Past Surgical History:   Procedure Laterality Date    ANGIOGRAPHY OF LOWER EXTREMITY N/A 1/11/2020    Procedure: ANGIOGRAM, LOWER EXTREMITY;  Surgeon: BRAULIO Verma II, MD;  Location: [facility] OR [address] [place] [address];  Service: Cardiovascular;  Laterality: N/A;  19mLs contrast  4.1 min fluro   252.28mGy    CHOLECYSTECTOMY  1986    GALLBLADDER SURGERY  2006    HIP SURGERY  2005    Right hip fracture/surgery    PERIPHERAL ARTERIAL STENT GRAFT Right 2007    stent for femoral artery blockage    THROMBECTOMY Right 1/11/2020    Procedure: THROMBECTOMY;  Surgeon: BRAULIO Verma II, MD;  Location: [facility] OR [address] [place] [address];  Service: Cardiovascular;  Laterality: Right;  illiac, popliteal, and tibial arteries       Review of patient's allergies indicates:  No Known Allergies    Family History     Problem Relation (Age of Onset)    COPD Mother    Cancer Mother    Heart attack Father (55), Sister (53)    Thyroid disease Mother        Tobacco Use    Smoking status: Current Every Day Smoker     Types: Cigarettes    Smokeless tobacco: Never Used    Tobacco comment: None since Dec 26   Substance and Sexual Activity    Alcohol use: No     Alcohol/week: 0.0 standard drinks    Drug use: Never    Sexual activity: Not on file      Review of Systems   Constitutional: Negative for activity change, appetite change, chills, fatigue, fever and unexpected weight change.   HENT: Negative for congestion, dental problem, rhinorrhea, sore throat and trouble swallowing.    Eyes: Negative for discharge, itching and visual disturbance.   Respiratory: Negative for cough, chest tightness, shortness of breath and wheezing.    Cardiovascular: Negative for chest pain, palpitations and leg swelling.   Gastrointestinal: Positive for abdominal pain. Negative for constipation, diarrhea, nausea, rectal pain and vomiting.   Endocrine: Negative for cold intolerance and heat intolerance.   Genitourinary: Negative for difficulty urinating,  dysuria, enuresis, flank pain and hematuria.   Musculoskeletal: Negative for arthralgias, back pain, joint swelling, myalgias, neck pain and neck stiffness.   Skin: Negative for color change, rash and wound.   Allergic/Immunologic: Negative for environmental allergies and food allergies.   Neurological: Positive for weakness. Negative for dizziness, tremors, syncope, numbness (R foot) and headaches.   Hematological: Does not bruise/bleed easily.   Psychiatric/Behavioral: Negative for decreased concentration and dysphoric mood.     Objective:     Vital Signs (Most Recent):  Temp: 98.3 °F (36.8 °C) (01/14/20 1133)  Pulse: 90 (01/14/20 1528)  Resp: (!) 22 (01/14/20 1133)  BP: (!) 98/57 (01/14/20 1306)  SpO2: (!) 92 % (01/14/20 1306) Vital Signs (24h Range):  Temp:  [96.1 °F (35.6 °C)-98.3 °F (36.8 °C)] 98.3 °F (36.8 °C)  Pulse:  [76-97] 90  Resp:  [11-36] 22  SpO2:  [84 %-100 %] 92 %  BP: ()/(46-58) 98/57   Weight: 62.1 kg (136 lb 14.5 oz)  Body mass index is 25.04 kg/m².      Intake/Output Summary (Last 24 hours) at 1/14/2020 1546  Last data filed at 1/14/2020 0500  Gross per 24 hour   Intake 179.1 ml   Output 475 ml   Net -295.9 ml       Physical Exam   Constitutional: She is oriented to person, place, and time. She appears well-developed and well-nourished. She has a sickly appearance. No distress. Face mask in place.   HENT:   Head: Normocephalic and atraumatic.   Eyes: Pupils are equal, round, and reactive to light. EOM are normal.   Neck: Normal range of motion. Neck supple.   Cardiovascular: Normal rate and regular rhythm.   Pulses:       Radial pulses are 1+ on the right side, and 1+ on the left side.   bilat LE pulses present with doppler   Pulmonary/Chest: Effort normal. No respiratory distress. She has decreased breath sounds in the right upper field, the right middle field, the right lower field, the left upper field, the left middle field and the left lower field. She has rhonchi in the right middle  field, the right lower field, the left middle field and the left lower field.   On 3L O2   Abdominal: Soft. Bowel sounds are normal. She exhibits no distension. There is tenderness. There is no guarding.   Complaining of chronic abd pain   Musculoskeletal: Normal range of motion.   R fem and PT 2+  R foot moderate weakness at ankle, but now able to plantar and dorsiflex again.   L groin incision clean/dry/intact  Calf incisions clean/dry/intact  2+ palpable left femoral, no hematoma  Triphasic left PT   Neurological: She is alert and oriented to person, place, and time. GCS eye subscore is 4. GCS verbal subscore is 4. GCS motor subscore is 6.   Skin: Skin is warm and dry. She is not diaphoretic.   Psychiatric:   Patient remains confused   Nursing note and vitals reviewed.      Vents:  Oxygen Concentration (%): 35 (01/14/20 0912)  Lines/Drains/Airways     Peripheral Intravenous Line                 Peripheral IV - Single Lumen 01/13/20 1831 20 G Anterior;Distal;Right Forearm less than 1 day         Peripheral IV - Single Lumen 01/13/20 1831 20 G Left Antecubital less than 1 day              Significant Labs:    CBC/Anemia Profile:  Recent Labs   Lab 01/13/20  0312 01/13/20  1349 01/14/20  0659   WBC 17.04* 17.15* 18.85*   HGB 8.2* 7.9* 9.0*   HCT 26.0* 26.1* 29.4*    171 221   MCV 90 93 92   RDW 18.1* 18.3* 18.6*        Chemistries:  Recent Labs   Lab 01/13/20  0312 01/14/20  0659   * 135*   K 4.7 4.6    102   CO2 25 24   BUN 15 13   CREATININE 0.9 0.8   CALCIUM 7.3* 7.6*   ALBUMIN 1.6* 1.7*   PROT 4.8* 5.0*   BILITOT 0.7 1.0   ALKPHOS 289* 293*   ALT 22 21   * 88*   MG 2.1 2.0   PHOS 2.2* 3.0       All pertinent labs within the past 24 hours have been reviewed.    Significant Imaging: I have reviewed all pertinent imaging results/findings within the past 24 hours.      ABG  Recent Labs   Lab 01/11/20  1831   PH 7.365   PO2 73*   PCO2 47.8*   HCO3 27.3   BE 2     Assessment/Plan:      Pulmonary  Chronic obstructive pulmonary disease with acute exacerbation  --consult oncology  --f/up on pathologies  --see [facility]    Cardiac/Vascular  Hypotension  --1 L LR administered  --hold antihypertensives  --consider giving additional fluid if patient remain hypotensive    Ischemic foot  --continue heparin drip for anticoagulation  --cont ASA and plavix  --neuro checks q4h to LEs    Other  Goals of care, counseling/discussion  --recommend consulting oncology and palliative care  --f/up with family regarding options for hospice care, comfort care, etc.  --Palliative care at bedside with family and patient. Dr. Littlejohn at bedside; discussed patient's poor prognosis 2/2 metastasized cancer. Family tearful but accepting. Patient does not seem to grasp the severity of her prognosis as she remains confused but seems accepting.    Palliative care encounter  --see goals of care discussion     I have spent 35 min with this patient, with over 50% of this time spent coordinating care and speaking with the family    Thank you for your consult. I will sign off. Please contact us if you have any additional questions.     Bandar Epstein NP  Critical Care Medicine  [facility]

## 2020-01-14 NOTE — SUBJECTIVE & OBJECTIVE
Medications:  Continuous Infusions:   heparin (porcine) in 5 % dex 20.583 Units/kg/hr (01/14/20 0200)     Scheduled Meds:   acetaminophen  650 mg Oral Q6H    amLODIPine  5 mg Oral Daily    aspirin  81 mg Oral Daily    atorvastatin  40 mg Oral Daily    calcium-vitamin D3  1 tablet Oral Daily    clopidogrel  75 mg Oral Daily    fluticasone furoate-vilanterol  1 puff Inhalation Daily    gabapentin  300 mg Oral TID    levothyroxine  100 mcg Oral Before breakfast    losartan  25 mg Oral Daily    montelukast  10 mg Oral Daily    mupirocin  1 g Nasal BID    polyethylene glycol  17 g Oral Daily    senna-docusate 8.6-50 mg  1 tablet Oral BID     PRN Meds:albuterol-ipratropium, benzonatate, calcium gluconate IVPB, calcium gluconate IVPB, calcium gluconate IVPB, Dextrose 10% Bolus, Dextrose 10% Bolus, glucagon (human recombinant), glucose, glucose, HYDROmorphone, magnesium sulfate IVPB, magnesium sulfate IVPB, ondansetron, oxyCODONE, oxyCODONE, oxyCODONE, potassium chloride in water **AND** potassium chloride in water **AND** potassium chloride in water, sodium chloride 0.9%, sodium phosphate IVPB, sodium phosphate IVPB, sodium phosphate IVPB     Objective:     Vital Signs (Most Recent):  Temp: 96.1 °F (35.6 °C) (01/14/20 0400)  Pulse: 86 (01/14/20 0400)  Resp: 12 (01/14/20 0400)  BP: (!) 113/58 (01/14/20 0400)  SpO2: (!) 90 % (01/13/20 2250) Vital Signs (24h Range):  Temp:  [96.1 °F (35.6 °C)-98.2 °F (36.8 °C)] 96.1 °F (35.6 °C)  Pulse:  [66-92] 86  Resp:  [10-36] 12  SpO2:  [83 %-100 %] 90 %  BP: ()/(42-59) 113/58         Physical Exam   Constitutional: She is oriented to person, place, and time. She appears well-developed and well-nourished. No distress.   HENT:   Head: Normocephalic and atraumatic.   Eyes: Pupils are equal, round, and reactive to light. EOM are normal.   Neck: Normal range of motion. Neck supple.   Cardiovascular: Normal rate and regular rhythm.   Pulmonary/Chest: Effort normal. No  respiratory distress.   On 3L O2   Abdominal: Soft. She exhibits no distension. There is tenderness. There is no guarding.   Complaining of chronic abd pain   Musculoskeletal: Normal range of motion.   R fem and PT 2+  R foot moderate weakness at ankle, but now able to plantar and dorsiflex again.   L groin incision clean/dry/intact  Calf incisions clean/dry/intact  2+ palpable left femoral, no hematoma  Triphasic left PT   Neurological: She is alert and oriented to person, place, and time.   Skin: Skin is warm and dry. She is not diaphoretic.   Psychiatric: She has a normal mood and affect. Her behavior is normal. Judgment and thought content normal.   Nursing note and vitals reviewed.      Significant Labs:  CBC:   Recent Labs   Lab 01/13/20  1349   WBC 17.15*   RBC 2.82*   HGB 7.9*   HCT 26.1*      MCV 93   MCH 28.0   MCHC 30.3*     CMP:   Recent Labs   Lab 01/13/20  0312   GLU 67*   CALCIUM 7.3*   ALBUMIN 1.6*   PROT 4.8*   *   K 4.7   CO2 25      BUN 15   CREATININE 0.9   ALKPHOS 289*   ALT 22   *   BILITOT 0.7     Coagulation:   Recent Labs   Lab 01/13/20  0312 01/14/20  0057   LABPROT 10.0  --    INR 1.0  --    APTT 34.0* 43.9*       Significant Diagnostics:  I have reviewed all pertinent imaging results/findings within the past 24 hours.

## 2020-01-14 NOTE — ASSESSMENT & PLAN NOTE
--recommend consulting oncology and palliative care  --f/up with family regarding options for hospice care, comfort care, etc.  --Palliative care at bedside with family and patient. Dr. Littlejohn at bedside; discussed patient's poor prognosis 2/2 metastasized cancer. Family tearful but accepting. Patient does not seem to grasp the severity of her prognosis as she remains confused but seems accepting.

## 2020-01-14 NOTE — HOSPITAL COURSE
CMICU was consulted regarding hypotension and increased oxygen requirements. Palliative care as well as oncology consulted. Family made aware of her acute condition and poor prognosis.

## 2020-01-14 NOTE — PLAN OF CARE
Patient is alert and oriented. Able to make needs known to staff. PRN Oxycodone given for pain control. Patient remains on telemetry monitoring with NSR noted. Sutures to RLE remain clean, dry, and intact. PIVs are patent and flush well. Heparin drip runs continuously at 11.3 ml/hr with APTT lab draws ordered every 6 hours. Results have been WNLs. Patient remains on a regular diet but appetite is very low. VS stable. No issues or concerns at this time. Continue to monitor.

## 2020-01-14 NOTE — PROGRESS NOTES
Paged Vascular surgery to discuss pt . Pt to step down to IM2 but still on vascular team when speaking to IM2. Will await call-back.   Full consult to follow.     Karma CHEEK, APRN, AGCNS

## 2020-01-14 NOTE — SUBJECTIVE & OBJECTIVE
Past Medical History:   Diagnosis Date    Allergic rhinitis     COPD (chronic obstructive pulmonary disease)     Depression     GERD (gastroesophageal reflux disease)     Headache     Hyperlipidemia     Hypertension     Pneumonia     Polyarthralgia     Postablative hypothyroidism     hypothyroidism s/p OCASIO therapy    PVD (peripheral vascular disease)     Treated by Dr. Sim       Past Surgical History:   Procedure Laterality Date    ANGIOGRAPHY OF LOWER EXTREMITY N/A 1/11/2020    Procedure: ANGIOGRAM, LOWER EXTREMITY;  Surgeon: BRAULIO Verma II, MD;  Location: [facility] OR [address] [place] [address];  Service: Cardiovascular;  Laterality: N/A;  19mLs contrast  4.1 min fluro   252.28mGy    CHOLECYSTECTOMY  1986    GALLBLADDER SURGERY  2006    HIP SURGERY  2005    Right hip fracture/surgery    PERIPHERAL ARTERIAL STENT GRAFT Right 2007    stent for femoral artery blockage    THROMBECTOMY Right 1/11/2020    Procedure: THROMBECTOMY;  Surgeon: BRAULIO Verma II, MD;  Location: [facility] OR [address] [place] [address];  Service: Cardiovascular;  Laterality: Right;  illiac, popliteal, and tibial arteries       Review of patient's allergies indicates:  No Known Allergies    Family History     Problem Relation (Age of Onset)    COPD Mother    Cancer Mother    Heart attack Father (55), Sister (53)    Thyroid disease Mother        Tobacco Use    Smoking status: Current Every Day Smoker     Types: Cigarettes    Smokeless tobacco: Never Used    Tobacco comment: None since Dec 26   Substance and Sexual Activity    Alcohol use: No     Alcohol/week: 0.0 standard drinks    Drug use: Never    Sexual activity: Not on file      Review of Systems   Constitutional: Negative for activity change, appetite change, chills, fatigue, fever and unexpected weight change.   HENT: Negative for congestion, dental problem, rhinorrhea, sore throat and trouble swallowing.    Eyes: Negative for discharge, itching and visual disturbance.   Respiratory: Negative for  cough, chest tightness, shortness of breath and wheezing.    Cardiovascular: Negative for chest pain, palpitations and leg swelling.   Gastrointestinal: Positive for abdominal pain. Negative for constipation, diarrhea, nausea, rectal pain and vomiting.   Endocrine: Negative for cold intolerance and heat intolerance.   Genitourinary: Negative for difficulty urinating, dysuria, enuresis, flank pain and hematuria.   Musculoskeletal: Negative for arthralgias, back pain, joint swelling, myalgias, neck pain and neck stiffness.   Skin: Negative for color change, rash and wound.   Allergic/Immunologic: Negative for environmental allergies and food allergies.   Neurological: Positive for weakness. Negative for dizziness, tremors, syncope, numbness (R foot) and headaches.   Hematological: Does not bruise/bleed easily.   Psychiatric/Behavioral: Negative for decreased concentration and dysphoric mood.     Objective:     Vital Signs (Most Recent):  Temp: 98.3 °F (36.8 °C) (01/14/20 1133)  Pulse: 90 (01/14/20 1528)  Resp: (!) 22 (01/14/20 1133)  BP: (!) 98/57 (01/14/20 1306)  SpO2: (!) 92 % (01/14/20 1306) Vital Signs (24h Range):  Temp:  [96.1 °F (35.6 °C)-98.3 °F (36.8 °C)] 98.3 °F (36.8 °C)  Pulse:  [76-97] 90  Resp:  [11-36] 22  SpO2:  [84 %-100 %] 92 %  BP: ()/(46-58) 98/57   Weight: 62.1 kg (136 lb 14.5 oz)  Body mass index is 25.04 kg/m².      Intake/Output Summary (Last 24 hours) at 1/14/2020 1546  Last data filed at 1/14/2020 0500  Gross per 24 hour   Intake 179.1 ml   Output 475 ml   Net -295.9 ml       Physical Exam   Constitutional: She is oriented to person, place, and time. She appears well-developed and well-nourished. She has a sickly appearance. No distress. Face mask in place.   HENT:   Head: Normocephalic and atraumatic.   Eyes: Pupils are equal, round, and reactive to light. EOM are normal.   Neck: Normal range of motion. Neck supple.   Cardiovascular: Normal rate and regular rhythm.   Pulses:        Radial pulses are 1+ on the right side, and 1+ on the left side.   bilat LE pulses present with doppler   Pulmonary/Chest: Effort normal. No respiratory distress. She has decreased breath sounds in the right upper field, the right middle field, the right lower field, the left upper field, the left middle field and the left lower field. She has rhonchi in the right middle field, the right lower field, the left middle field and the left lower field.   On 3L O2   Abdominal: Soft. Bowel sounds are normal. She exhibits no distension. There is tenderness. There is no guarding.   Complaining of chronic abd pain   Musculoskeletal: Normal range of motion.   R fem and PT 2+  R foot moderate weakness at ankle, but now able to plantar and dorsiflex again.   L groin incision clean/dry/intact  Calf incisions clean/dry/intact  2+ palpable left femoral, no hematoma  Triphasic left PT   Neurological: She is alert and oriented to person, place, and time. GCS eye subscore is 4. GCS verbal subscore is 4. GCS motor subscore is 6.   Skin: Skin is warm and dry. She is not diaphoretic.   Psychiatric:   Patient remains confused   Nursing note and vitals reviewed.      Vents:  Oxygen Concentration (%): 35 (01/14/20 0912)  Lines/Drains/Airways     Peripheral Intravenous Line                 Peripheral IV - Single Lumen 01/13/20 1831 20 G Anterior;Distal;Right Forearm less than 1 day         Peripheral IV - Single Lumen 01/13/20 1831 20 G Left Antecubital less than 1 day              Significant Labs:    CBC/Anemia Profile:  Recent Labs   Lab 01/13/20  0312 01/13/20  1349 01/14/20  0659   WBC 17.04* 17.15* 18.85*   HGB 8.2* 7.9* 9.0*   HCT 26.0* 26.1* 29.4*    171 221   MCV 90 93 92   RDW 18.1* 18.3* 18.6*        Chemistries:  Recent Labs   Lab 01/13/20  0312 01/14/20  0659   * 135*   K 4.7 4.6    102   CO2 25 24   BUN 15 13   CREATININE 0.9 0.8   CALCIUM 7.3* 7.6*   ALBUMIN 1.6* 1.7*   PROT 4.8* 5.0*   BILITOT 0.7 1.0    ALKPHOS 289* 293*   ALT 22 21   * 88*   MG 2.1 2.0   PHOS 2.2* 3.0       All pertinent labs within the past 24 hours have been reviewed.    Significant Imaging: I have reviewed all pertinent imaging results/findings within the past 24 hours.

## 2020-01-14 NOTE — ASSESSMENT & PLAN NOTE
"Patient with abdominal pain out of proportion to exam. 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    Aorto bi-iliac stent graft with notable filling defect within the distal aspect of the right common iliac stent consistent with thrombosis.  Distal right common iliac artery and branches are patent.  Appearance is similar to recent examination.    Nonvisualization of the CARLOS which is likely severely stenosed or occluded, unchanged.  SMA and celiac trunk are patent without definitive evidence of thrombosis."    Patient also has numerous metastatic lesions within the liver parenchyma, peritoneum, abdominal wall, psoas, and paraspinal muscles, gluteal regions, spleen, left adrenal gland, and left lower lobe     Plan  Continue to monitor patients abdominal pain  Pain medicines regimen per Palliative   (Karma Parada, CNS)     Oxycodone PO 10 mg PRN for moderate pain   Oxycodone PO 15 mg PRN for severe pain (first choice)   Morphine 2 mg IV PRN (Last choice)      "

## 2020-01-14 NOTE — SUBJECTIVE & OBJECTIVE
Past Medical History:   Diagnosis Date    Allergic rhinitis     COPD (chronic obstructive pulmonary disease)     Depression     GERD (gastroesophageal reflux disease)     Headache     Hyperlipidemia     Hypertension     Pneumonia     Polyarthralgia     Postablative hypothyroidism     hypothyroidism s/p OCASIO therapy    PVD (peripheral vascular disease)     Treated by Dr. Sim       Past Surgical History:   Procedure Laterality Date    ANGIOGRAPHY OF LOWER EXTREMITY N/A 1/11/2020    Procedure: ANGIOGRAM, LOWER EXTREMITY;  Surgeon: BRAULIO Verma II, MD;  Location: [facility] OR [address] [place] [address];  Service: Cardiovascular;  Laterality: N/A;  19mLs contrast  4.1 min fluro   252.28mGy    CHOLECYSTECTOMY  1986    GALLBLADDER SURGERY  2006    HIP SURGERY  2005    Right hip fracture/surgery    PERIPHERAL ARTERIAL STENT GRAFT Right 2007    stent for femoral artery blockage    THROMBECTOMY Right 1/11/2020    Procedure: THROMBECTOMY;  Surgeon: BRAULIO Verma II, MD;  Location: [facility] OR [address] [place] [address];  Service: Cardiovascular;  Laterality: Right;  illiac, popliteal, and tibial arteries       Review of patient's allergies indicates:  No Known Allergies    Family History     Problem Relation (Age of Onset)    COPD Mother    Cancer Mother    Heart attack Father (55), Sister (53)    Thyroid disease Mother        Tobacco Use    Smoking status: Current Every Day Smoker     Types: Cigarettes    Smokeless tobacco: Never Used    Tobacco comment: None since Dec 26   Substance and Sexual Activity    Alcohol use: No     Alcohol/week: 0.0 standard drinks    Drug use: Never    Sexual activity: Not on file      Review of Systems   Constitutional: Negative for activity change, appetite change, chills, fatigue, fever and unexpected weight change.   HENT: Negative for congestion, dental problem, rhinorrhea, sore throat and trouble swallowing.    Eyes: Negative for discharge, itching and visual disturbance.   Respiratory: Negative for  cough, chest tightness, shortness of breath and wheezing.    Cardiovascular: Negative for chest pain, palpitations and leg swelling.   Gastrointestinal: Positive for abdominal pain. Negative for constipation, diarrhea, nausea, rectal pain and vomiting.   Endocrine: Negative for cold intolerance and heat intolerance.   Genitourinary: Negative for difficulty urinating, dysuria, enuresis, flank pain and hematuria.   Musculoskeletal: Negative for arthralgias, back pain, joint swelling, myalgias, neck pain and neck stiffness.   Skin: Negative for color change, rash and wound.   Allergic/Immunologic: Negative for environmental allergies and food allergies.   Neurological: Positive for weakness. Negative for dizziness, tremors, syncope, numbness (R foot) and headaches.   Hematological: Does not bruise/bleed easily.   Psychiatric/Behavioral: Negative for confusion, decreased concentration and dysphoric mood.     Objective:     Vital Signs (Most Recent):  Temp: 98.6 °F (37 °C) (01/14/20 1547)  Pulse: 88 (01/14/20 1547)  Resp: 18 (01/14/20 1547)  BP: (!) 96/55 (01/14/20 1547)  SpO2: (!) 91 % (01/14/20 1547) Vital Signs (24h Range):  Temp:  [96.1 °F (35.6 °C)-98.6 °F (37 °C)] 98.6 °F (37 °C)  Pulse:  [76-97] 88  Resp:  [11-36] 18  SpO2:  [84 %-100 %] 91 %  BP: ()/(47-58) 96/55   Weight: 62.1 kg (136 lb 14.5 oz)  Body mass index is 25.04 kg/m².      Intake/Output Summary (Last 24 hours) at 1/14/2020 1609  Last data filed at 1/14/2020 0500  Gross per 24 hour   Intake 179.1 ml   Output 400 ml   Net -220.9 ml       Physical Exam   Constitutional: She is oriented to person, place, and time. She appears well-developed and well-nourished. She has a sickly appearance. No distress. Face mask in place.   HENT:   Head: Normocephalic and atraumatic.   Eyes: Pupils are equal, round, and reactive to light. EOM are normal.   Neck: Normal range of motion. Neck supple.   Cardiovascular: Normal rate and regular rhythm.   Pulses:        Radial pulses are 1+ on the right side, and 1+ on the left side.   bilat LE pulses present with doppler   Pulmonary/Chest: Effort normal. No respiratory distress. She has decreased breath sounds in the right upper field, the right middle field, the right lower field, the left upper field, the left middle field and the left lower field. She has rhonchi in the right middle field, the right lower field, the left middle field and the left lower field.   On 10-15 L O2   Abdominal: Soft. Bowel sounds are normal. She exhibits no distension. There is tenderness. There is no guarding.   Complaining of chronic abd pain   Musculoskeletal: Normal range of motion.   R fem and PT 2+  R foot moderate weakness at ankle, but now able to plantar and dorsiflex again.   L groin incision clean/dry/intact  Calf incisions clean/dry/intact  2+ palpable left femoral, no hematoma  Triphasic left PT   Neurological: She is alert and oriented to person, place, and time. GCS eye subscore is 4. GCS verbal subscore is 4. GCS motor subscore is 6.   Skin: Skin is warm and dry. She is not diaphoretic.   Nursing note and vitals reviewed.      Vents:  Oxygen Concentration (%): 35 (01/14/20 0912)  Lines/Drains/Airways     Peripheral Intravenous Line                 Peripheral IV - Single Lumen 01/13/20 1831 20 G Anterior;Distal;Right Forearm less than 1 day         Peripheral IV - Single Lumen 01/13/20 1831 20 G Left Antecubital less than 1 day              Significant Labs:    CBC/Anemia Profile:  Recent Labs   Lab 01/13/20  0312 01/13/20  1349 01/14/20  0659   WBC 17.04* 17.15* 18.85*   HGB 8.2* 7.9* 9.0*   HCT 26.0* 26.1* 29.4*    171 221   MCV 90 93 92   RDW 18.1* 18.3* 18.6*        Chemistries:  Recent Labs   Lab 01/13/20  0312 01/14/20  0659   * 135*   K 4.7 4.6    102   CO2 25 24   BUN 15 13   CREATININE 0.9 0.8   CALCIUM 7.3* 7.6*   ALBUMIN 1.6* 1.7*   PROT 4.8* 5.0*   BILITOT 0.7 1.0   ALKPHOS 289* 293*   ALT 22 21   *  88*   MG 2.1 2.0   PHOS 2.2* 3.0       All pertinent labs within the past 24 hours have been reviewed.    Significant Imaging: I have reviewed all pertinent imaging results/findings within the past 24 hours.

## 2020-01-14 NOTE — ASSESSMENT & PLAN NOTE
Impression: Pt is a 68 y/o female  admitted with sub acute illio ischemia s/p embolectomy, SFA and stent placement. Pt has Stage IV NSCLC per Hem/Onc. Pt not a candidate for chemo. Pt is on non-rebreather. Pt now a DNR.     Reason for consult:[facility]    Goals of care: Hem/Onc met with family. Per hem/onc, pt not a candidate for chemo. Pt on non-rebreather, debilitated. Dr. Littlejohn with [facility] also met with family. Per MD, pt nearing EOL. Prognosis-weeks. Family verbalized understanding. Then this STONEY met with pt's family. Met with pt and pt's son, daughter, and two grandsons. Pt and family in agreement to inpt to DNR status. Family pt interested in inpt hospice. Pt and family overwhelmed. Let family know I would f/u in morning and discuss options.     Symptom management:   Pt on Oxycodone 15 mg q 6 hrs severe pain.   Pt on Oxycodone 10 mg q 6hrs prn moderate pain.     Consider changing frequency of oxycodone to q 4 hrs prn.     Pt has Morphine 2 mg IVP q 6 hrs prn  (third choice).    Constipation:   Pt on bowel regiment.     Nausea:  Pt has Zofran 4 mg q 8 hrs prn nausea.   Can increase dose to 8 mg q  8hrs prn if nausea not relieved with 4 mg.     Plan:  Pt made DNR today.   Pt and pt's adult children in agreement to hospice care. Family would like inpt hospice.   Will meet with family in morning to discuss hospice options.   Will follow.   Spoke to Dr. Carbajal concerning plan of care.

## 2020-01-14 NOTE — ASSESSMENT & PLAN NOTE
Patient with hx of PAD s/p right femoral stent (2006) presenting with cold ischemic foot x 2 days. Patient reports acute onset pain and coldness to R foot since previous hospitalization (1/2 -1/8) one day prior to discharge. Patient's plavix/ASA were held at that time for a liver biopsy (1/6), which was resumed upon discharge. Of note, patient has newly discovered lung and liver lesions concerning for cancer.    - CTA showing evidence of a thrombus just distal to previously placed iliac stent as well as complete occlusion in distal popliteal vessel.  - Venous U/S did not demonstrate any DVT in RLE    Plan:  - Vascular surgery consulted.   Patient taken to the OR 1/11.   - Heparin drip since 1/9. In therapeutic range  - continue home ASA/plavix  - NPO at midnight  - neurovascular checks q4h  - morphine 4mg q4h PRN for moderate pain and .5mg dilaudid for severe    Patient placed into the SICU after surgery 1/11;  She is s/p R iliofemoral embolectomy, SFA interposition bypass with vein, iliac stent angioplasty on 1/11/20 she is is on heparin gtt. She is stepped down 1/14/2020. Patient in resp distress with hypotension 90/50s; she received 1 L LR bolus. MICU, Heme/onc and palliative consulted. Patient and family wants to be DNR today with hospice tomorrow

## 2020-01-15 VITALS
WEIGHT: 136.88 LBS | SYSTOLIC BLOOD PRESSURE: 109 MMHG | TEMPERATURE: 98 F | HEIGHT: 62 IN | DIASTOLIC BLOOD PRESSURE: 50 MMHG | RESPIRATION RATE: 20 BRPM | BODY MASS INDEX: 25.19 KG/M2 | OXYGEN SATURATION: 92 % | HEART RATE: 104 BPM

## 2020-01-15 LAB
ALBUMIN SERPL BCP-MCNC: 1.5 G/DL (ref 3.5–5.2)
ALP SERPL-CCNC: 279 U/L (ref 55–135)
ALT SERPL W/O P-5'-P-CCNC: 18 U/L (ref 10–44)
ANION GAP SERPL CALC-SCNC: 6 MMOL/L (ref 8–16)
APTT BLDCRRT: 62.4 SEC (ref 21–32)
APTT BLDCRRT: 81.2 SEC (ref 21–32)
AST SERPL-CCNC: 64 U/L (ref 10–40)
BASOPHILS # BLD AUTO: 0.03 K/UL (ref 0–0.2)
BASOPHILS NFR BLD: 0.2 % (ref 0–1.9)
BILIRUB SERPL-MCNC: 0.8 MG/DL (ref 0.1–1)
BUN SERPL-MCNC: 12 MG/DL (ref 8–23)
CALCIUM SERPL-MCNC: 7.5 MG/DL (ref 8.7–10.5)
CHLORIDE SERPL-SCNC: 101 MMOL/L (ref 95–110)
CO2 SERPL-SCNC: 26 MMOL/L (ref 23–29)
CREAT SERPL-MCNC: 0.8 MG/DL (ref 0.5–1.4)
DIFFERENTIAL METHOD: ABNORMAL
EOSINOPHIL # BLD AUTO: 0.3 K/UL (ref 0–0.5)
EOSINOPHIL NFR BLD: 1.6 % (ref 0–8)
ERYTHROCYTE [DISTWIDTH] IN BLOOD BY AUTOMATED COUNT: 18.8 % (ref 11.5–14.5)
EST. GFR  (AFRICAN AMERICAN): >60 ML/MIN/1.73 M^2
EST. GFR  (NON AFRICAN AMERICAN): >60 ML/MIN/1.73 M^2
GLUCOSE SERPL-MCNC: 72 MG/DL (ref 70–110)
HCT VFR BLD AUTO: 27.8 % (ref 37–48.5)
HGB BLD-MCNC: 8.7 G/DL (ref 12–16)
IMM GRANULOCYTES # BLD AUTO: 0.23 K/UL (ref 0–0.04)
IMM GRANULOCYTES NFR BLD AUTO: 1.3 % (ref 0–0.5)
INR PPP: 1 (ref 0.8–1.2)
LYMPHOCYTES # BLD AUTO: 1.4 K/UL (ref 1–4.8)
LYMPHOCYTES NFR BLD: 7.8 % (ref 18–48)
MAGNESIUM SERPL-MCNC: 2.1 MG/DL (ref 1.6–2.6)
MCH RBC QN AUTO: 28.2 PG (ref 27–31)
MCHC RBC AUTO-ENTMCNC: 31.3 G/DL (ref 32–36)
MCV RBC AUTO: 90 FL (ref 82–98)
MONOCYTES # BLD AUTO: 1.1 K/UL (ref 0.3–1)
MONOCYTES NFR BLD: 6.1 % (ref 4–15)
NEUTROPHILS # BLD AUTO: 14.3 K/UL (ref 1.8–7.7)
NEUTROPHILS NFR BLD: 83 % (ref 38–73)
NRBC BLD-RTO: 0 /100 WBC
PHOSPHATE SERPL-MCNC: 2.6 MG/DL (ref 2.7–4.5)
PLATELET # BLD AUTO: 246 K/UL (ref 150–350)
PMV BLD AUTO: 10.3 FL (ref 9.2–12.9)
POCT GLUCOSE: 118 MG/DL (ref 70–110)
POCT GLUCOSE: 151 MG/DL (ref 70–110)
POCT GLUCOSE: 164 MG/DL (ref 70–110)
POCT GLUCOSE: 70 MG/DL (ref 70–110)
POTASSIUM SERPL-SCNC: 4.7 MMOL/L (ref 3.5–5.1)
PROT SERPL-MCNC: 4.9 G/DL (ref 6–8.4)
PROTHROMBIN TIME: 10.7 SEC (ref 9–12.5)
RBC # BLD AUTO: 3.08 M/UL (ref 4–5.4)
SODIUM SERPL-SCNC: 133 MMOL/L (ref 136–145)
WBC # BLD AUTO: 17.25 K/UL (ref 3.9–12.7)

## 2020-01-15 PROCEDURE — 83735 ASSAY OF MAGNESIUM: CPT

## 2020-01-15 PROCEDURE — 84100 ASSAY OF PHOSPHORUS: CPT

## 2020-01-15 PROCEDURE — 99233 SBSQ HOSP IP/OBS HIGH 50: CPT | Mod: ,,, | Performed by: CLINICAL NURSE SPECIALIST

## 2020-01-15 PROCEDURE — 94640 AIRWAY INHALATION TREATMENT: CPT

## 2020-01-15 PROCEDURE — 80053 COMPREHEN METABOLIC PANEL: CPT

## 2020-01-15 PROCEDURE — 94761 N-INVAS EAR/PLS OXIMETRY MLT: CPT

## 2020-01-15 PROCEDURE — 51798 US URINE CAPACITY MEASURE: CPT

## 2020-01-15 PROCEDURE — 85025 COMPLETE CBC W/AUTO DIFF WBC: CPT

## 2020-01-15 PROCEDURE — 27000221 HC OXYGEN, UP TO 24 HOURS

## 2020-01-15 PROCEDURE — 63600175 PHARM REV CODE 636 W HCPCS: Performed by: STUDENT IN AN ORGANIZED HEALTH CARE EDUCATION/TRAINING PROGRAM

## 2020-01-15 PROCEDURE — 25000003 PHARM REV CODE 250: Performed by: STUDENT IN AN ORGANIZED HEALTH CARE EDUCATION/TRAINING PROGRAM

## 2020-01-15 PROCEDURE — 99900035 HC TECH TIME PER 15 MIN (STAT)

## 2020-01-15 PROCEDURE — 36415 COLL VENOUS BLD VENIPUNCTURE: CPT

## 2020-01-15 PROCEDURE — 85730 THROMBOPLASTIN TIME PARTIAL: CPT

## 2020-01-15 PROCEDURE — 51702 INSERT TEMP BLADDER CATH: CPT

## 2020-01-15 PROCEDURE — 85610 PROTHROMBIN TIME: CPT

## 2020-01-15 PROCEDURE — 99233 PR SUBSEQUENT HOSPITAL CARE,LEVL III: ICD-10-PCS | Mod: ,,, | Performed by: CLINICAL NURSE SPECIALIST

## 2020-01-15 PROCEDURE — 25000242 PHARM REV CODE 250 ALT 637 W/ HCPCS: Performed by: STUDENT IN AN ORGANIZED HEALTH CARE EDUCATION/TRAINING PROGRAM

## 2020-01-15 RX ORDER — BENZONATATE 100 MG/1
100 CAPSULE ORAL 3 TIMES DAILY PRN
Qty: 30 CAPSULE | Refills: 3
Start: 2020-01-15 | End: 2020-01-25

## 2020-01-15 RX ORDER — PROMETHAZINE HYDROCHLORIDE 12.5 MG/1
12.5 TABLET ORAL EVERY 6 HOURS PRN
Qty: 30 TABLET | Refills: 3 | Status: SHIPPED | OUTPATIENT
Start: 2020-01-15

## 2020-01-15 RX ORDER — OXYCODONE HYDROCHLORIDE 10 MG/1
10 TABLET ORAL EVERY 4 HOURS PRN
Status: DISCONTINUED | OUTPATIENT
Start: 2020-01-15 | End: 2020-01-15 | Stop reason: HOSPADM

## 2020-01-15 RX ORDER — OXYCODONE HYDROCHLORIDE 15 MG/1
15 TABLET ORAL EVERY 4 HOURS PRN
Qty: 30 TABLET | Refills: 0 | Status: SHIPPED | OUTPATIENT
Start: 2020-01-15 | End: 2020-01-22

## 2020-01-15 RX ORDER — MORPHINE SULFATE 2 MG/ML
2 INJECTION, SOLUTION INTRAMUSCULAR; INTRAVENOUS EVERY 4 HOURS PRN
Status: DISCONTINUED | OUTPATIENT
Start: 2020-01-15 | End: 2020-01-15 | Stop reason: HOSPADM

## 2020-01-15 RX ORDER — MORPHINE SULFATE 2 MG/ML
2 INJECTION, SOLUTION INTRAMUSCULAR; INTRAVENOUS EVERY 4 HOURS PRN
Refills: 0
Start: 2020-01-15

## 2020-01-15 RX ORDER — OXYCODONE HYDROCHLORIDE 10 MG/1
10 TABLET ORAL EVERY 4 HOURS PRN
Qty: 30 TABLET | Refills: 0 | Status: SHIPPED | OUTPATIENT
Start: 2020-01-15 | End: 2020-01-22

## 2020-01-15 RX ORDER — BENZONATATE 100 MG/1
100 CAPSULE ORAL 3 TIMES DAILY PRN
Qty: 30 CAPSULE | Refills: 3 | Status: SHIPPED | OUTPATIENT
Start: 2020-01-15 | End: 2020-01-15

## 2020-01-15 RX ORDER — POLYETHYLENE GLYCOL 3350 17 G/17G
17 POWDER, FOR SOLUTION ORAL DAILY
Qty: 238 G | Refills: 3 | Status: SHIPPED | OUTPATIENT
Start: 2020-01-16

## 2020-01-15 RX ORDER — AMOXICILLIN 250 MG
1 CAPSULE ORAL 2 TIMES DAILY
Qty: 30 TABLET | Refills: 3 | Status: SHIPPED | OUTPATIENT
Start: 2020-01-15

## 2020-01-15 RX ORDER — ONDANSETRON 2 MG/ML
4 INJECTION INTRAMUSCULAR; INTRAVENOUS EVERY 8 HOURS PRN
Qty: 30 AMPULE | Refills: 3 | Status: SHIPPED | OUTPATIENT
Start: 2020-01-15

## 2020-01-15 RX ADMIN — IPRATROPIUM BROMIDE AND ALBUTEROL SULFATE 3 ML: .5; 3 SOLUTION RESPIRATORY (INHALATION) at 04:01

## 2020-01-15 RX ADMIN — IPRATROPIUM BROMIDE AND ALBUTEROL SULFATE 3 ML: .5; 3 SOLUTION RESPIRATORY (INHALATION) at 03:01

## 2020-01-15 RX ADMIN — HEPARIN SODIUM AND DEXTROSE 19.5 UNITS/KG/HR: 10000; 5 INJECTION INTRAVENOUS at 05:01

## 2020-01-15 RX ADMIN — ASPIRIN 81 MG: 81 TABLET, COATED ORAL at 09:01

## 2020-01-15 RX ADMIN — IPRATROPIUM BROMIDE AND ALBUTEROL SULFATE 3 ML: .5; 3 SOLUTION RESPIRATORY (INHALATION) at 11:01

## 2020-01-15 RX ADMIN — IPRATROPIUM BROMIDE AND ALBUTEROL SULFATE 3 ML: .5; 3 SOLUTION RESPIRATORY (INHALATION) at 07:01

## 2020-01-15 RX ADMIN — MORPHINE SULFATE 2 MG: 2 INJECTION, SOLUTION INTRAMUSCULAR; INTRAVENOUS at 02:01

## 2020-01-15 RX ADMIN — IPRATROPIUM BROMIDE AND ALBUTEROL SULFATE 3 ML: .5; 3 SOLUTION RESPIRATORY (INHALATION) at 12:01

## 2020-01-15 RX ADMIN — OXYCODONE HYDROCHLORIDE 10 MG: 10 TABLET ORAL at 03:01

## 2020-01-15 RX ADMIN — CLOPIDOGREL BISULFATE 75 MG: 75 TABLET ORAL at 09:01

## 2020-01-15 RX ADMIN — GABAPENTIN 300 MG: 300 CAPSULE ORAL at 09:01

## 2020-01-15 RX ADMIN — GABAPENTIN 300 MG: 300 CAPSULE ORAL at 03:01

## 2020-01-15 RX ADMIN — LEVOTHYROXINE SODIUM 100 MCG: 100 TABLET ORAL at 06:01

## 2020-01-15 NOTE — PLAN OF CARE
Patient with Palliative Care Consult today, made DNR, family wants Inpatient Hospice.  Ran into patient family when this CN was leaving for the day, emotional support provided after discussion on decision.  Patient's family stated they want patient to go to [facility] in [place], which is closest to their home in [place] and a relative works there.  Will inform SW in am.       01/14/20 3083   Discharge Reassessment   Assessment Type Discharge Planning Reassessment   Discharge Plan A Inpatient Hospice   Post-Acute Status   Post-Acute Authorization Home Health/Hospice   Post-Acute Placement Status Patient List Provided

## 2020-01-15 NOTE — NURSING
Spoke with IM 2 about patient not voiding during the shift after delacruz catheter had been discontinued this AM at 0730. MD gave verbal order to straight cath patient. Results yielded 400 mls.

## 2020-01-15 NOTE — SUBJECTIVE & OBJECTIVE
Interval History: Pt made DNR today.     Past Medical History:   Diagnosis Date    Allergic rhinitis     COPD (chronic obstructive pulmonary disease)     Depression     GERD (gastroesophageal reflux disease)     Headache     Hyperlipidemia     Hypertension     Pneumonia     Polyarthralgia     Postablative hypothyroidism     hypothyroidism s/p OCASIO therapy    PVD (peripheral vascular disease)     Treated by Dr. Sim       Past Surgical History:   Procedure Laterality Date    ANGIOGRAPHY OF LOWER EXTREMITY N/A 1/11/2020    Procedure: ANGIOGRAM, LOWER EXTREMITY;  Surgeon: BRAULIO Verma II, MD;  Location: [facility] OR [address] [place] [address];  Service: Cardiovascular;  Laterality: N/A;  19mLs contrast  4.1 min fluro   252.28mGy    CHOLECYSTECTOMY  1986    GALLBLADDER SURGERY  2006    HIP SURGERY  2005    Right hip fracture/surgery    PERIPHERAL ARTERIAL STENT GRAFT Right 2007    stent for femoral artery blockage    THROMBECTOMY Right 1/11/2020    Procedure: THROMBECTOMY;  Surgeon: BRAULIO Verma II, MD;  Location: [facility] OR [address] [place] [address];  Service: Cardiovascular;  Laterality: Right;  illiac, popliteal, and tibial arteries       Review of patient's allergies indicates:  No Known Allergies    Medications:  Continuous Infusions:   heparin (porcine) in 5 % dex 19.5 Units/kg/hr (01/15/20 0558)     Scheduled Meds:   albuterol-ipratropium  3 mL Nebulization Q4H    aspirin  81 mg Oral Daily    atorvastatin  40 mg Oral Daily    calcium-vitamin D3  1 tablet Oral Daily    clopidogrel  75 mg Oral Daily    fluticasone furoate-vilanterol  1 puff Inhalation Daily    folic acid  1 mg Oral Daily    gabapentin  300 mg Oral TID    levothyroxine  100 mcg Oral Before breakfast    montelukast  10 mg Oral Daily    mupirocin  1 g Nasal BID    polyethylene glycol  17 g Oral Daily    senna-docusate 8.6-50 mg  1 tablet Oral BID     PRN Meds:acetaminophen, albuterol-ipratropium, benzonatate, Dextrose 10% Bolus, Dextrose 10% Bolus,  glucagon (human recombinant), glucose, glucose, morphine, naloxone, ondansetron, oxyCODONE, oxyCODONE, promethazine, sodium chloride 0.9%    Family History     Problem Relation (Age of Onset)    COPD Mother    Cancer Mother    Heart attack Father (55), Sister (53)    Thyroid disease Mother        Tobacco Use    Smoking status: Current Every Day Smoker     Types: Cigarettes    Smokeless tobacco: Never Used    Tobacco comment: None since Dec 26   Substance and Sexual Activity    Alcohol use: No     Alcohol/week: 0.0 standard drinks    Drug use: Never    Sexual activity: Not on file       Review of Systems   Constitutional: Positive for activity change and fatigue.   Respiratory: Positive for shortness of breath.    Gastrointestinal: Positive for abdominal distention.   Neurological: Positive for weakness.   Psychiatric/Behavioral: Negative for agitation.     Objective:     Vital Signs (Most Recent):  Temp: 97 °F (36.1 °C) (01/15/20 0725)  Pulse: 97 (01/15/20 0751)  Resp: 19 (01/15/20 0725)  BP: (!) 107/55 (01/15/20 0725)  SpO2: (!) 92 % (01/15/20 0725) Vital Signs (24h Range):  Temp:  [97 °F (36.1 °C)-99.1 °F (37.3 °C)] 97 °F (36.1 °C)  Pulse:  [77-97] 97  Resp:  [15-22] 19  SpO2:  [90 %-98 %] 92 %  BP: ()/(51-59) 107/55     Weight: 62.1 kg (136 lb 14.5 oz)  Body mass index is 25.04 kg/m².    Review of Symptoms  Symptom Assessment (ESAS 0-10 scale)   ESAS 0 1 2 3 4 5 6 7 8 9 10   Pain              Dyspnea              Anxiety              Nausea              Depression               Anorexia              Fatigue              Insomnia              Restlessness               Agitation              CAM / Delirium __ --  ___+   Constipation     __ --  ___+   Diarrhea           __ --  ___+  Bowel Management Plan (BMP): Yes      Pain Assessment: pt sleeping. Pt appears comfortable on 15 L O2 per venti-mask    Performance Status: 10    ECOG Performance Status Grade: 4 - Completely disabled    Physical Exam    Constitutional: She is oriented to person, place, and time. She appears well-developed. She has a sickly appearance.   Pt on non-rebreather   Pulmonary/Chest: Tachypnea noted. She has wheezes.   Abdominal: Bowel sounds are normal. She exhibits distension.   Neurological: She is alert and oriented to person, place, and time.       Significant Labs: All pertinent labs within the past 24 hours have been reviewed.  CBC:   Recent Labs   Lab 01/15/20  0448   WBC 17.25*   HGB 8.7*   HCT 27.8*   MCV 90        BMP:  Recent Labs   Lab 01/15/20  0448   GLU 72   *   K 4.7      CO2 26   BUN 12   CREATININE 0.8   CALCIUM 7.5*   MG 2.1     LFT:  Lab Results   Component Value Date    AST 64 (H) 01/15/2020    ALKPHOS 279 (H) 01/15/2020    BILITOT 0.8 01/15/2020     Albumin:   Albumin   Date Value Ref Range Status   01/15/2020 1.5 (L) 3.5 - 5.2 g/dL Final     Protein:   Total Protein   Date Value Ref Range Status   01/15/2020 4.9 (L) 6.0 - 8.4 g/dL Final     Lactic acid:   Lab Results   Component Value Date    LACTATE 1.7 01/14/2020    LACTATE 1.3 01/13/2020       Significant Imaging: I have reviewed all pertinent imaging results/findings within the past 24 hours.    Advance Care Planning   Advanced Directives::  Living Will: No  LaPOST: No  Do Not Resuscitate Status: DNR order form signed per Dr. Carter    Medical Power of : Pt's son and daughter are next of kin.     Decision-Making Capacity: Patient answered questions, Family answered questions       Living Arrangements: Lives alone    Psychosocial/Cultural:  Pt not . Pt has one son and daughter. Pt has four grandchildren and one great-grandchild.

## 2020-01-15 NOTE — PROGRESS NOTES
[facility]  Vascular Surgery  Progress Note    Patient Name: Sharita Castañeda  MRN: [id number]  Admission Date: 1/9/2020  Primary Care Provider: Wallace Deng MD    Subjective:     Interval History: HHad increasing O2 requirements yesterday, was evaluated by MICU and deemed stable for floor, was transferred to IM, stable overnight, no RLE pain or loss of motor or sensation    Post-Op Info:  Procedure(s) (LRB):  ANGIOGRAM, LOWER EXTREMITY (N/A)  THROMBECTOMY (Right)   4 Days Post-Op       Medications:  Continuous Infusions:   heparin (porcine) in 5 % dex 19.5 Units/kg/hr (01/15/20 0558)     Scheduled Meds:   albuterol-ipratropium  3 mL Nebulization Q4H    aspirin  81 mg Oral Daily    atorvastatin  40 mg Oral Daily    calcium-vitamin D3  1 tablet Oral Daily    clopidogrel  75 mg Oral Daily    fluticasone furoate-vilanterol  1 puff Inhalation Daily    folic acid  1 mg Oral Daily    gabapentin  300 mg Oral TID    levothyroxine  100 mcg Oral Before breakfast    montelukast  10 mg Oral Daily    mupirocin  1 g Nasal BID    polyethylene glycol  17 g Oral Daily    senna-docusate 8.6-50 mg  1 tablet Oral BID     PRN Meds:acetaminophen, albuterol-ipratropium, benzonatate, Dextrose 10% Bolus, Dextrose 10% Bolus, glucagon (human recombinant), glucose, glucose, morphine, naloxone, ondansetron, oxyCODONE, oxyCODONE, promethazine, sodium chloride 0.9%     Objective:     Vital Signs (Most Recent):  Temp: 97 °F (36.1 °C) (01/15/20 0725)  Pulse: 97 (01/15/20 0751)  Resp: 19 (01/15/20 0725)  BP: (!) 107/55 (01/15/20 0725)  SpO2: (!) 92 % (01/15/20 0725) Vital Signs (24h Range):  Temp:  [97 °F (36.1 °C)-99.1 °F (37.3 °C)] 97 °F (36.1 °C)  Pulse:  [77-97] 97  Resp:  [15-36] 19  SpO2:  [84 %-98 %] 92 %  BP: ()/(51-59) 107/55         Physical Exam   Constitutional: She is oriented to person, place, and time. She appears well-developed and well-nourished. No distress.   HENT:   Head: Normocephalic and atraumatic.    Eyes: Pupils are equal, round, and reactive to light. EOM are normal.   Neck: Normal range of motion. Neck supple.   Cardiovascular: Normal rate and regular rhythm.   Pulmonary/Chest: Effort normal. No respiratory distress.   On 3L O2   Abdominal: Soft. She exhibits no distension. There is tenderness. There is no guarding.   Complaining of chronic abd pain   Musculoskeletal: Normal range of motion.   R fem and PT 2+  R foot moderate weakness at ankle, but now able to plantar and dorsiflex again.   R groin incision clean/dry/intact  Calf incisions clean/dry/intact  2+ palpable left femoral, no hematoma  Triphasic left PT   Neurological: She is alert and oriented to person, place, and time.   Skin: Skin is warm and dry. She is not diaphoretic.   Psychiatric: She has a normal mood and affect. Her behavior is normal. Judgment and thought content normal.   Nursing note and vitals reviewed.      Significant Labs:  CBC:   Recent Labs   Lab 01/15/20  0448   WBC 17.25*   RBC 3.08*   HGB 8.7*   HCT 27.8*      MCV 90   MCH 28.2   MCHC 31.3*     CMP:   Recent Labs   Lab 01/15/20  0448   GLU 72   CALCIUM 7.5*   ALBUMIN 1.5*   PROT 4.9*   *   K 4.7   CO2 26      BUN 12   CREATININE 0.8   ALKPHOS 279*   ALT 18   AST 64*   BILITOT 0.8       Significant Diagnostics:  I have reviewed all pertinent imaging results/findings within the past 24 hours.    Assessment/Plan:     * Acute pain of right lower extremity  Patient is 70 yo F with hx of PAD and right femoral stent who presented with subacute limb ischemia  Now s/p R iliofemoral embolectomy, SFA interposition bypass with vein, iliac stent angioplasty on 1/11/20  Transferred to Medicine service 1/14    - cont ASA, plavix, heparin gtt - goal PTT 60-80, can d/c heparin once therapeutic lovenox intiaited  - will need lifelong anticoagulation. Given her cancer history would recommend lovenox  - gauze dressing with betadine painting once daily to R groin incision. OK  to leave calf incisions open to air  - palliative is following for her metastatic cancer    Rest of care per primary    Please call with any questions or concerns            Everardo Harrington MD  Vascular Surgery  [facility]

## 2020-01-15 NOTE — SUBJECTIVE & OBJECTIVE
Medications:  Continuous Infusions:   heparin (porcine) in 5 % dex 19.5 Units/kg/hr (01/15/20 0558)     Scheduled Meds:   albuterol-ipratropium  3 mL Nebulization Q4H    aspirin  81 mg Oral Daily    atorvastatin  40 mg Oral Daily    calcium-vitamin D3  1 tablet Oral Daily    clopidogrel  75 mg Oral Daily    fluticasone furoate-vilanterol  1 puff Inhalation Daily    folic acid  1 mg Oral Daily    gabapentin  300 mg Oral TID    levothyroxine  100 mcg Oral Before breakfast    montelukast  10 mg Oral Daily    mupirocin  1 g Nasal BID    polyethylene glycol  17 g Oral Daily    senna-docusate 8.6-50 mg  1 tablet Oral BID     PRN Meds:acetaminophen, albuterol-ipratropium, benzonatate, Dextrose 10% Bolus, Dextrose 10% Bolus, glucagon (human recombinant), glucose, glucose, morphine, naloxone, ondansetron, oxyCODONE, oxyCODONE, promethazine, sodium chloride 0.9%     Objective:     Vital Signs (Most Recent):  Temp: 97 °F (36.1 °C) (01/15/20 0725)  Pulse: 97 (01/15/20 0751)  Resp: 19 (01/15/20 0725)  BP: (!) 107/55 (01/15/20 0725)  SpO2: (!) 92 % (01/15/20 0725) Vital Signs (24h Range):  Temp:  [97 °F (36.1 °C)-99.1 °F (37.3 °C)] 97 °F (36.1 °C)  Pulse:  [77-97] 97  Resp:  [15-36] 19  SpO2:  [84 %-98 %] 92 %  BP: ()/(51-59) 107/55         Physical Exam   Constitutional: She is oriented to person, place, and time. She appears well-developed and well-nourished. No distress.   HENT:   Head: Normocephalic and atraumatic.   Eyes: Pupils are equal, round, and reactive to light. EOM are normal.   Neck: Normal range of motion. Neck supple.   Cardiovascular: Normal rate and regular rhythm.   Pulmonary/Chest: Effort normal. No respiratory distress.   On 3L O2   Abdominal: Soft. She exhibits no distension. There is tenderness. There is no guarding.   Complaining of chronic abd pain   Musculoskeletal: Normal range of motion.   R fem and PT 2+  R foot moderate weakness at ankle, but now able to plantar and dorsiflex  again.   R groin incision clean/dry/intact  Calf incisions clean/dry/intact  2+ palpable left femoral, no hematoma  Triphasic left PT   Neurological: She is alert and oriented to person, place, and time.   Skin: Skin is warm and dry. She is not diaphoretic.   Psychiatric: She has a normal mood and affect. Her behavior is normal. Judgment and thought content normal.   Nursing note and vitals reviewed.      Significant Labs:  CBC:   Recent Labs   Lab 01/15/20  0448   WBC 17.25*   RBC 3.08*   HGB 8.7*   HCT 27.8*      MCV 90   MCH 28.2   MCHC 31.3*     CMP:   Recent Labs   Lab 01/15/20  0448   GLU 72   CALCIUM 7.5*   ALBUMIN 1.5*   PROT 4.9*   *   K 4.7   CO2 26      BUN 12   CREATININE 0.8   ALKPHOS 279*   ALT 18   AST 64*   BILITOT 0.8       Significant Diagnostics:  I have reviewed all pertinent imaging results/findings within the past 24 hours.

## 2020-01-15 NOTE — CARE UPDATE
Rapid Response Nurse Follow-up Note     Followed up with patient for proactive rounding.   No acute issues at this time. Reviewed plan of care with primary RN, Peyton.   Please call Rapid Response RN, Gabriela Styles RN with any questions or concerns at [phone].

## 2020-01-15 NOTE — DISCHARGE SUMMARY
[facility] Hospital Medicine  Discharge Summary      Patient Name: Sharita Castañeda  MRN: [id number]  Admission Date: 1/9/2020  Hospital Length of Stay: 6 days  Discharge Date and Time:  01/15/2020 3:41 PM  Attending Physician: Javier Bower MD   Discharging Provider: Andrea Crowell MD  Primary Care Provider: Wallace Deng MD  [facility] Medicine Team: [facility] 2 Andrea Crowell MD    HPI:   Ms. Sharita Castañeda is a 70 yo F with hx of HTN, HLD, COPD (on home O2 at 2-3L), PUD, polyarthralgia, and PVD s/p R femoral artery stent (placed in 2006 at [facility]) who presents with acute onset of pain to her right foot starting 2 days ago while hospitalized for pneumonia. Patient was recently admitted 1/2- 1/8 for acute hypoxic resp failure and suspected post obstructive PNA. She was admitted before that on 12/26-12/28 for hemoptysis and CTA was concerning for primary lung malignancy with metastasis to the liver. During this most recent admission, her pneumonia was treated with vanc/zosyn then switched to doxycycline (end date: 1/9). IR performed liver biopsy on 1/6 and patient was discharged with outpatient follow-ups with pulmonology and oncology. Patient first reported foot pain while in the hospital the day prior to discharge (during which time her plavix had been held for her liver bx). Her plavix/ASA were restarted upon discharge and she took them both this morning 1/9. She reports her right foot to be cold and progressively more painful. The pain is constant, severe, and a persistent ache. She has not been able to ambulate without assistance at home 2/2 severe pain. Her foot was noted to be purple and blue at the toes, most noticeably at toes 4 and 5. She has not been able to warm up her foot despite using a heated blanket. It looked to be dusky today so patient re-presented to the ER. She is able to feel the base of her foot and able to move her ankle and toes. Patient is a former smoker and quit several  weeks ago when she was admitted for pneumonia. She denies any CP or SOB. No fevers/chills, HA, confusion, abdominal pain, n/v/d, leg swelling.       Procedure(s) (LRB):  ANGIOGRAM, LOWER EXTREMITY (N/A)  THROMBECTOMY (Right)      Hospital Course:     Patient readmitted 1/9 with signs of ischemia of right lower extremity. Foot was cold but she still had movement. Vascular surgery immediately consulted and followed along. Heparin drip immediately started at time of admission. Took her to the OR 1/11 for R iliofemoral embolectomy, SFA interposition bypass with vein, iliac stent angioplasty on 1/11/20. She went to the SICU after surgery. Patient was there for 2 days and required blood transfusions. She regained more movement in her leg and got pulses back. Foot became warm as well. Patient stable enough for the floor on 1/14. However she was found to have resp distress and hypotension. She was able to become stable again. Oncology saw patient while she was her and determined she would not be a good candidate for chem due to her status. Palliative care consulted as well and talked to the patient and family about hospice. Ultimate decision made to transfer patient to inpatient hospice. Patient being discharged to the facility on 1/15.      Vitals:    01/15/20 1129 01/15/20 1151 01/15/20 1521 01/15/20 1537   BP:  (!) 105/53     BP Location:       Patient Position:  Lying     Pulse: 102 102 95 76   Resp: (!) 22 14  13   Temp:  97.8 °F (36.6 °C)     TempSrc:  Oral     SpO2: (!) 92% (!) 92%  95%   Weight:       Height:             Physical Exam   Constitutional: She is oriented to person, place, and time. She appears well-developed and well-nourished. She has a sickly appearance. No distress. Face mask in place.   HENT:   Head: Normocephalic and atraumatic.   Eyes: Pupils are equal, round, and reactive to light. EOM are normal.   Neck: Normal range of motion. Neck supple.   Cardiovascular: Normal rate and regular rhythm.    Pulmonary/Chest: Effort normal. No respiratory distress. She has decreased breath sounds in the right upper field, the right middle field, the right lower field, the left upper field, the left middle field and the left lower field. She has rhonchi in the right middle field, the right lower field, the left middle field and the left lower field.   On 10-15 L O2   Abdominal: Soft. Bowel sounds are normal. She exhibits no distension. There is tenderness. There is no guarding.   Musculoskeletal: Normal range of motion.   R fem and PT 2+  R foot moderate weakness at ankle, but now able to plantar and dorsiflex again.   L groin incision clean/dry/intact  Calf incisions clean/dry/intact  2+ palpable left femoral, no hematoma  Triphasic left PT   Neurological: She is alert and oriented to person, place, and time. GCS eye subscore is 4. GCS verbal subscore is 4. GCS motor subscore is 6.   Skin: Skin is warm and dry. She is not diaphoretic.   Nursing note and vitals reviewed.    Consults:   Consults (From admission, onward)        Status Ordering Provider     Inpatient consult to Critical Care Medicine  Once     Provider:  (Not yet assigned)    Completed NELLIE LOPEZ     Inpatient consult to Hematology/Oncology  Once     Provider:  (Not yet assigned)    Completed YARITZA BROWNE     Inpatient consult to Palliative Care  Once     Provider:  (Not yet assigned)    Completed VALENTIN ROMERO     Inpatient consult to Vascular Surgery  Once     Provider:  (Not yet assigned)    Completed TOM HOFFMAN          No new Assessment & Plan notes have been filed under this hospital service since the last note was generated.  Service: Hospital Medicine    Final Active Diagnoses:    Diagnosis Date Noted POA    PRINCIPAL PROBLEM:  Acute pain of right lower extremity [M79.604] 01/09/2020 Yes    Abdominal pain [R10.9] 01/10/2020 Yes    COPD (chronic obstructive pulmonary disease) [J44.9] 12/26/2019 Yes    Chronic obstructive  pulmonary disease with acute exacerbation [J44.1]  Yes    Mass of middle lobe of right lung [R91.8] 12/26/2019 Yes    Hypotension [I95.9] 01/14/2020 Unknown    Pain [R52]  Yes    DNR (do not resuscitate) [Z66]  Yes    Ischemic foot [I99.8] 01/11/2020 Yes    Foot pain, right [M79.671] 01/09/2020 Yes    Hypothyroidism [E03.9] 01/09/2020 Yes    Goals of care, counseling/discussion [Z71.89] 01/02/2020 Not Applicable    Palliative care encounter [Z51.5] 01/02/2020 Not Applicable    Hyperlipidemia [E78.5]  Yes    Hypertension [I10]  Yes    PVD (peripheral vascular disease) [I73.9]  Yes      Problems Resolved During this Admission:       Discharged Condition: stable    Disposition: Hospice/Medical Facility    Follow Up:    Patient Instructions:   No discharge procedures on file.    Significant Diagnostic Studies:   Recent Results (from the past 48 hour(s))   APTT    Collection Time: 01/14/20 12:57 AM   Result Value Ref Range    aPTT 43.9 (H) 21.0 - 32.0 sec   Comprehensive Metabolic Panel (CMP)    Collection Time: 01/14/20  6:59 AM   Result Value Ref Range    Sodium 135 (L) 136 - 145 mmol/L    Potassium 4.6 3.5 - 5.1 mmol/L    Chloride 102 95 - 110 mmol/L    CO2 24 23 - 29 mmol/L    Glucose 69 (L) 70 - 110 mg/dL    BUN, Bld 13 8 - 23 mg/dL    Creatinine 0.8 0.5 - 1.4 mg/dL    Calcium 7.6 (L) 8.7 - 10.5 mg/dL    Total Protein 5.0 (L) 6.0 - 8.4 g/dL    Albumin 1.7 (L) 3.5 - 5.2 g/dL    Total Bilirubin 1.0 0.1 - 1.0 mg/dL    Alkaline Phosphatase 293 (H) 55 - 135 U/L    AST 88 (H) 10 - 40 U/L    ALT 21 10 - 44 U/L    Anion Gap 9 8 - 16 mmol/L    eGFR if African American >60.0 >60 mL/min/1.73 m^2    eGFR if non African American >60.0 >60 mL/min/1.73 m^2   CBC with Automated Differential    Collection Time: 01/14/20  6:59 AM   Result Value Ref Range    WBC 18.85 (H) 3.90 - 12.70 K/uL    RBC 3.21 (L) 4.00 - 5.40 M/uL    Hemoglobin 9.0 (L) 12.0 - 16.0 g/dL    Hematocrit 29.4 (L) 37.0 - 48.5 %    Mean Corpuscular Volume  92 82 - 98 fL    Mean Corpuscular Hemoglobin 28.0 27.0 - 31.0 pg    Mean Corpuscular Hemoglobin Conc 30.6 (L) 32.0 - 36.0 g/dL    RDW 18.6 (H) 11.5 - 14.5 %    Platelets 221 150 - 350 K/uL    MPV 10.7 9.2 - 12.9 fL    Immature Granulocytes 1.0 (H) 0.0 - 0.5 %    Gran # (ANC) 16.1 (H) 1.8 - 7.7 K/uL    Immature Grans (Abs) 0.19 (H) 0.00 - 0.04 K/uL    Lymph # 1.3 1.0 - 4.8 K/uL    Mono # 1.0 0.3 - 1.0 K/uL    Eos # 0.2 0.0 - 0.5 K/uL    Baso # 0.06 0.00 - 0.20 K/uL    nRBC 0 0 /100 WBC    Gran% 85.3 (H) 38.0 - 73.0 %    Lymph% 6.9 (L) 18.0 - 48.0 %    Mono% 5.4 4.0 - 15.0 %    Eosinophil% 1.1 0.0 - 8.0 %    Basophil% 0.3 0.0 - 1.9 %    Differential Method Automated    PT/INR    Collection Time: 01/14/20  6:59 AM   Result Value Ref Range    Prothrombin Time 11.6 9.0 - 12.5 sec    INR 1.1 0.8 - 1.2   APTT    Collection Time: 01/14/20  6:59 AM   Result Value Ref Range    aPTT 67.4 (H) 21.0 - 32.0 sec   Magnesium    Collection Time: 01/14/20  6:59 AM   Result Value Ref Range    Magnesium 2.0 1.6 - 2.6 mg/dL   Phosphorus    Collection Time: 01/14/20  6:59 AM   Result Value Ref Range    Phosphorus 3.0 2.7 - 4.5 mg/dL   APTT    Collection Time: 01/14/20  8:24 AM   Result Value Ref Range    aPTT 78.0 (H) 21.0 - 32.0 sec   POCT glucose    Collection Time: 01/14/20  1:03 PM   Result Value Ref Range    POCT Glucose 74 70 - 110 mg/dL   APTT    Collection Time: 01/14/20  2:53 PM   Result Value Ref Range    aPTT 66.2 (H) 21.0 - 32.0 sec   Lactic acid, plasma    Collection Time: 01/14/20  2:53 PM   Result Value Ref Range    Lactate (Lactic Acid) 1.7 0.5 - 2.2 mmol/L   POCT glucose    Collection Time: 01/14/20  5:41 PM   Result Value Ref Range    POCT Glucose 88 70 - 110 mg/dL   APTT    Collection Time: 01/14/20  9:04 PM   Result Value Ref Range    aPTT 73.1 (H) 21.0 - 32.0 sec   POCT glucose    Collection Time: 01/14/20 10:54 PM   Result Value Ref Range    POCT Glucose 86 70 - 110 mg/dL   POCT glucose    Collection Time: 01/15/20   3:02 AM   Result Value Ref Range    POCT Glucose 70 70 - 110 mg/dL   Comprehensive Metabolic Panel (CMP)    Collection Time: 01/15/20  4:48 AM   Result Value Ref Range    Sodium 133 (L) 136 - 145 mmol/L    Potassium 4.7 3.5 - 5.1 mmol/L    Chloride 101 95 - 110 mmol/L    CO2 26 23 - 29 mmol/L    Glucose 72 70 - 110 mg/dL    BUN, Bld 12 8 - 23 mg/dL    Creatinine 0.8 0.5 - 1.4 mg/dL    Calcium 7.5 (L) 8.7 - 10.5 mg/dL    Total Protein 4.9 (L) 6.0 - 8.4 g/dL    Albumin 1.5 (L) 3.5 - 5.2 g/dL    Total Bilirubin 0.8 0.1 - 1.0 mg/dL    Alkaline Phosphatase 279 (H) 55 - 135 U/L    AST 64 (H) 10 - 40 U/L    ALT 18 10 - 44 U/L    Anion Gap 6 (L) 8 - 16 mmol/L    eGFR if African American >60.0 >60 mL/min/1.73 m^2    eGFR if non African American >60.0 >60 mL/min/1.73 m^2   CBC with Automated Differential    Collection Time: 01/15/20  4:48 AM   Result Value Ref Range    WBC 17.25 (H) 3.90 - 12.70 K/uL    RBC 3.08 (L) 4.00 - 5.40 M/uL    Hemoglobin 8.7 (L) 12.0 - 16.0 g/dL    Hematocrit 27.8 (L) 37.0 - 48.5 %    Mean Corpuscular Volume 90 82 - 98 fL    Mean Corpuscular Hemoglobin 28.2 27.0 - 31.0 pg    Mean Corpuscular Hemoglobin Conc 31.3 (L) 32.0 - 36.0 g/dL    RDW 18.8 (H) 11.5 - 14.5 %    Platelets 246 150 - 350 K/uL    MPV 10.3 9.2 - 12.9 fL    Immature Granulocytes 1.3 (H) 0.0 - 0.5 %    Gran # (ANC) 14.3 (H) 1.8 - 7.7 K/uL    Immature Grans (Abs) 0.23 (H) 0.00 - 0.04 K/uL    Lymph # 1.4 1.0 - 4.8 K/uL    Mono # 1.1 (H) 0.3 - 1.0 K/uL    Eos # 0.3 0.0 - 0.5 K/uL    Baso # 0.03 0.00 - 0.20 K/uL    nRBC 0 0 /100 WBC    Gran% 83.0 (H) 38.0 - 73.0 %    Lymph% 7.8 (L) 18.0 - 48.0 %    Mono% 6.1 4.0 - 15.0 %    Eosinophil% 1.6 0.0 - 8.0 %    Basophil% 0.2 0.0 - 1.9 %    Differential Method Automated    PT/INR    Collection Time: 01/15/20  4:48 AM   Result Value Ref Range    Prothrombin Time 10.7 9.0 - 12.5 sec    INR 1.0 0.8 - 1.2   Magnesium    Collection Time: 01/15/20  4:48 AM   Result Value Ref Range    Magnesium 2.1 1.6 -  2.6 mg/dL   Phosphorus    Collection Time: 01/15/20  4:48 AM   Result Value Ref Range    Phosphorus 2.6 (L) 2.7 - 4.5 mg/dL   APTT    Collection Time: 01/15/20  4:48 AM   Result Value Ref Range    aPTT 81.2 (H) 21.0 - 32.0 sec   POCT glucose    Collection Time: 01/15/20  8:59 AM   Result Value Ref Range    POCT Glucose 151 (H) 70 - 110 mg/dL   APTT    Collection Time: 01/15/20 11:42 AM   Result Value Ref Range    aPTT 62.4 (H) 21.0 - 32.0 sec   POCT glucose    Collection Time: 01/15/20 12:27 PM   Result Value Ref Range    POCT Glucose 118 (H) 70 - 110 mg/dL         Pending Diagnostic Studies:     None         Medications:  Reconciled Home Medications:      Medication List      START taking these medications    DOK Plus 8.6-50 mg per tablet  Generic drug:  senna-docusate 8.6-50 mg  Take 1 tablet by mouth 2 (two) times daily.     morphine 2 mg/mL Syrg  Inject 1 mL (2 mg total) into the vein every 4 (four) hours as needed.     ondansetron 4 mg/2 mL Soln  Inject 4 mg into the vein every 8 (eight) hours as needed.     * oxyCODONE 15 MG Tab  Commonly known as:  ROXICODONE  Take 1 tablet (15 mg total) by mouth every 4 (four) hours as needed (pain 8-10/10 pain scale).     * oxyCODONE 10 mg Tab immediate release tablet  Commonly known as:  ROXICODONE  Take 1 tablet (10 mg total) by mouth every 4 (four) hours as needed (pain 6-7/10).     polyethylene glycol 17 gram/dose powder  Commonly known as:  GLYCOLAX  Mix with a liquid and take 1 capful (17 g) by mouth once daily.  Start taking on:  January 16, 2020     promethazine 12.5 MG Tab  Commonly known as:  PHENERGAN  Take 1 tablet (12.5 mg total) by mouth every 6 (six) hours as needed.         * This list has 2 medication(s) that are the same as other medications prescribed for you. Read the directions carefully, and ask your doctor or other care provider to review them with you.            CONTINUE taking these medications    benzonatate 100 MG capsule  Commonly known as:   TESSALON  Take 1 capsule (100 mg total) by mouth 3 (three) times daily as needed for Cough.        STOP taking these medications    albuterol 90 mcg/actuation inhaler  Commonly known as:  ProAir HFA     albuterol-ipratropium 2.5 mg-0.5 mg/3 mL nebulizer solution  Commonly known as:  DUO-NEB     alendronate 70 MG tablet  Commonly known as:  FOSAMAX     amLODIPine 5 MG tablet  Commonly known as:  NORVASC     aspirin 81 MG EC tablet  Commonly known as:  ECOTRIN     atorvastatin 40 MG tablet  Commonly known as:  LIPITOR     butalbital-acetaminophen-caffeine -40 mg -40 mg per tablet  Commonly known as:  FIORICET, ESGIC     calcium-vitamin D3 500 mg(1,250mg) -200 unit per tablet  Commonly known as:  Calcium 500 + D     clopidogrel 75 mg tablet  Commonly known as:  PLAVIX     doxycycline 100 MG tablet  Commonly known as:  VIBRA-TABS     HYDROcodone-acetaminophen 7.5-325 mg per tablet  Commonly known as:  NORCO     Incruse Ellipta 62.5 mcg/actuation Dsdv  Generic drug:  umeclidinium     levothyroxine 100 MCG tablet  Commonly known as:  SYNTHROID     losartan 25 MG tablet  Commonly known as:  COZAAR     montelukast 10 mg tablet  Commonly known as:  SINGULAIR            Indwelling Lines/Drains at time of discharge:   Lines/Drains/Airways     None                 Time spent on the discharge of patient: 45 minutes  Patient was seen and examined on the date of discharge and determined to be suitable for discharge.         Andrea Crowell MD  Department of Hospital Medicine  [facility]

## 2020-01-15 NOTE — PLAN OF CARE
01/15/20 0858   Post-Acute Status   Post-Acute Authorization Home Health/Hospice   Home Health/Hospice Status   (waiting on Inpt Hospice Orders)   Andressa notified by CM that pt's family has chosen [facility] in patient hospice. ANDRESSA updated Palliative ANDRESSA, Le Pierce. ANDRESSA secure chatted medicine team and paged physician on call via  regarding hospice orders to be placed.    Melissa Mendiola LMSW  [facility]  [id number]

## 2020-01-15 NOTE — PLAN OF CARE
Pt AAOX4 but occasionally confused overnight. Pain managed with PRN pain meds. Regular diet, no complaints of nausea or vomiting. Unable to void, bladder scan showed 361. MD notified, orders to straight cath X1. VS stable on 15L walter mask. Pt slept between care. Turned. Bed low and locked, call bell within reach. Will continue to monitor.

## 2020-01-15 NOTE — PLAN OF CARE
"[facility]  Palliative Care   Psychosocial Assessment    Patient Name: Sharita Castañeda  MRN: [id number]  Admission Date: 1/9/2020  Hospital Length of Stay: 6 days  Code Status: DNR   Attending Provider: Javier Bower MD  Palliative Care Provider: HAM Matta, APRN, AGCNS  Primary Care Physician: Wallace Deng MD  Principal Problem:Acute pain of right lower extremity    Reason for Referral: assistance with clarification of goals of care  Consult Order Date: 1/14/20  Primary CM/SW: Olivia/Paty    Present during Interview: patient and dtr, son, multiple grandchildren and 1 great grandchild.      Primary Language:English   Needed: no      Past Medical Situation:   PMH:   Past Medical History:   Diagnosis Date    Allergic rhinitis     COPD (chronic obstructive pulmonary disease)     Depression     GERD (gastroesophageal reflux disease)     Headache     Hyperlipidemia     Hypertension     Pneumonia     Polyarthralgia     Postablative hypothyroidism     hypothyroidism s/p OCASIO therapy    PVD (peripheral vascular disease)     Treated by Dr. Sim     Mental Health/Substance Use History: patient reports drinking "a little" in her twenties. Patient smoked most of her life, quit for twelve years and started again six years ago  Non-traditional Health practices:     Understanding of diagnosis and prognosis: Family more understanding of px after speaking with Dr. Littlejohn. Pt somewhat confused at times.   Experience/Comfort level with health care system:    Patients Mental Status: alert but some confusion    Socio-Economic Factors/Resources:  Address: [address] [place] [address] [place] [address]  Phone Number: [phone] (home)     Marital Status:   Household Composition: Patient lives with grandson in [place]. Dtr was staying with her since discharge  Children: Patient has two children Leon and Shruti  Relationships with Family:    Patient reports having a loving, caring, " concerned family. Patient states that there are differences of opinion within the family and patient states trying to be neutral and allowing all to have their own perspectives. When patient talks about children she gives good insight into understanding their through processes and how they deal with challenges.       Patient reports four grandchildren. Patient states her youngest grandson Galdino, who is 19, lived with patient for a while. Patient reports one great granddaughter and a second one on the way that is due in May of this year.     Emergency Contacts: Daveyteoscar / daughter [phone]    Activities of Daily Living: assistance from adls  Support Systems-Family & Community (Home Health, HME etc):     Transportation:  yes    Work/Education History: disability   History: no    Financial Resources:Medicare      Advanced Care Planning & Legal Concerns:   Advanced Directives/Living Will: no   Planning:  no    Power of : no  Surrogate Decision Maker: Children      Spirituality, Culture & Coping Mechanisms:  F- Christi and Belief: Restoration     I - Importance: has christi    C - Community/Culture Values:     A - Address in Care: spiritual care to follow      Strengths/Coping Strategies:Patient is pleasant and outgoing. Patient appears to receive support and motivation from family   Self-Care Activities/Hobbies:Patient states liking to dance, cook, and bake.     Goals/Hopes/Expectations: Wants to live to see 2nd great granddaughter in May and dtr  in December.  Fears/Anxiety/Concerns: tearful regarding new px and lack of time        Preferences about EOL Environment: (own bed, family nearby, pets, music, etc)  With family near    Complicated Bereavement Risk Assessment Tool (CBRAT)  Reference:  [facility] Pall[facility] Clinical Practice Group (May 2016). Bereavement Risk Screening and Management Guidelines.  Retrieved from:  http://[email or web address]/wp-content/uploads//KSVIK-Zjpanpdptjr-Guopvbbsa-and-Management-Guideline-2016.pdf      Bereaved Client Characteristics   ? Under 18      yes  ? Was a Twin   no  ? Young Spouse   no  ? Elderly Spouse    no  ? Isolated    no  ? Lacks Meaningful Social Support   no  ? Dissatisfied with help available during illness   no  ? New to [employer] [place] no  ? New to Decision-Making   no    Illness  ? Inherited Disorder   no  ? Stigmatized Disease in the family/community   no  ? Lengthy/Burdensome   yes     Bereaved Client's History of Loss   ? Cumulative Multiple Losses   no  ? Previous Mental Health Illnesses   no  ? Current Mental Health Illness   no  ? Other Significant Health Issues   no   ? Migrant/Refugee   no Death  ? Sudden or Unexpected   no  ? Traumatic Circumstances Associated with Death   no  ? Significant Cultural/Social Burdens as a result of Death   no   Relationship with   ? Profound Lifelong Partner   no  ? Highly Dependent    no  ? Antagonistic   no  ? Ambivalent   yes  ? Deeply Connected   no  ? Culturally Defined   no   Risk Factors Scores  0-2  Low  3-5  Moderate  5+  High  All persons scoring moderate to high presume to be at risk**    (** It is acknowledged that protective factors and resilience may outweigh apparent risk factors.      Total Risk Factors Score:   Mild to Moderate    The way patient describes her relationship with children and grandchildren indicates patient's death will have an impact on the family. Grief may be intense at times. Family will benefit from follow up and offerings of support.       Discharge Planning Needs/Plan of Care:     Present during conversation with family and Dr. Littlejohn as well as [facility] APRN. Dr. Littlejohn stated that pt has prognosis of weeks and best option was hospice route.    Briefly discussed inpatient hospice which family interested. Family overwhelmed. Will discuss more options tomorrow.     Support  provided.     Le Pierce, YAHAIRA, [facility]-

## 2020-01-15 NOTE — ASSESSMENT & PLAN NOTE
Impression: Pt is a 68 y/o female  admitted with sub acute illio ischemia s/p embolectomy, SFA and stent placement. Pt has Stage IV NSCLC per Hem/Onc. Pt not a candidate for chemo. Pt is 15L O2 per face mask. Pt now a DNR.     Reason for consult:[facility]    Goals of care:   No family at bedside. Pt appears comfortable. Per  note, family has decided on [facility] inpt hospcie.       1/14/20Hem/Onc met with family. Per hem/onc, pt not a candidate for chemo. Pt on non-rebreather, debilitated. Dr. Littlejohn with [facility] also met with family. Per MD, pt nearing EOL. Prognosis-weeks. Family verbalized understanding. Then this STONEY met with pt's family. Met with pt and pt's son, daughter, and two grandsons. Pt and family in agreement to inpt to DNR status. Family pt interested in inpt hospice. Pt and family overwhelmed. Let family know I would f/u in morning and discuss options.     Symptom management:   Pt on Oxycodone 15 mg q 6 hrs severe pain.   Pt on Oxycodone 10 mg q 6hrs prn moderate pain.     Consider changing frequency of oxycodone to q 4 hrs prn.     Pt has Morphine 2 mg IVP q 6 hrs prn  (third choice).    Constipation:   Pt on bowel regiment.     Nausea:  Pt has Zofran 4 mg q 8 hrs prn nausea.   Can increase dose to 8 mg q  8hrs prn if nausea not relieved with 4 mg.     Plan:  Pt made DNR.    Pt and pt's adult children in agreement to hospice care. Family would like inpt hospice at [facility].   Will follow.

## 2020-01-15 NOTE — ASSESSMENT & PLAN NOTE
Patient is 70 yo F with hx of PAD and right femoral stent who presented with subacute limb ischemia  Now s/p R iliofemoral embolectomy, SFA interposition bypass with vein, iliac stent angioplasty on 1/11/20  Transferred to Medicine service 1/14    - cont ASA, plavix, heparin gtt - goal PTT 60-80, can d/c heparin once therapeutic lovenox intiaited  - will need lifelong anticoagulation. Given her cancer history would recommend lovenox  - gauze dressing with betadine painting once daily to R groin incision. OK to leave calf incisions open to air  - palliative is following for her metastatic cancer    Rest of care per primary    Please call with any questions or concerns

## 2020-01-15 NOTE — NURSING
Patient is discharging to a hospice facility. All discharge paperwork has been sent with patient. Facility requested for IV access to remain in patient during transport to facility. Patient also transported to facility with delacruz catheter in place. Blood sugar prior to transfer was 164. Patient was transported by [facility] ambulance service.

## 2020-01-15 NOTE — PLAN OF CARE
Patient discharging to [facility] today.       01/15/20 1452   Final Note   Assessment Type Final Discharge Note   Anticipated Discharge Disposition [facility]ospiceMedic   Right Care Referral Info   Post Acute Recommendation Other  ([facility])   Post-Acute Status   Post-Acute Authorization Placement  (Inpatient Hospice)   Post-Acute Placement Status Set-up Complete

## 2020-01-15 NOTE — PLAN OF CARE
01/15/20 1446   Post-Acute Status   Post-Acute Authorization Home Health/Hospice   Home Health/Hospice Status Set-up Complete   Pt going to inpatient hospice at [facility]. SW arranged stretcher transport via [facility]. Requested  time is 4:00 PM. Requested  time does not guarantee arrival time. Pt going to room 107. Nurse can call report to [phone].  Nurse notified of the above.  Ambulance packet given to nurse's station.    Melissa Mendiola LMSW  [facility]  [id number]

## 2020-01-16 LAB
BLD PROD TYP BPU: NORMAL
BLOOD UNIT EXPIRATION DATE: NORMAL
BLOOD UNIT TYPE CODE: 5100
BLOOD UNIT TYPE: NORMAL
CODING SYSTEM: NORMAL
DISPENSE STATUS: NORMAL
TRANS ERYTHROCYTES VOL PATIENT: NORMAL ML

## 2020-01-21 NOTE — PHYSICIAN QUERY
PT Name: Sharita Castaeñda  MR #: [id number]    Physician Query Form - Relationship to Procedure Clarification     CDS/: Irene Wade RN  CCDS               Contact information: [email or web address]     This form is a permanent document in the medical record.     Query Date: January 21, 2020      By submitting this query, we are merely seeking further clarification of documentation. Please utilize your independent clinical judgment when addressing the question(s) below.    The Medical record contains the following:  Supporting Clinical Findings Location in Medical Record   Pre-operative Diagnosis: right lower extremity critical limb ischemia    Operation/Procedure Performed:   1. Right common femoral artery exposure  2. Right common iliac artery thromboembolectomy  3. Right superficial femoral artery reconstruction with saphenous vein interposition graft     Unfortunately the tension of the vessel loop on the proximal superficial femoral artery was enough to partially transect the fragile artery. A clamp was applied to the proximal common femoral artery to control inflow bleeding. We also noted that the thrombectomy catheter had widened transverse arteriotomy on the superficial femoral artery to more than 50% of the circumference. Unfortunately neither of these vessel injuries could be primarily repaired due to the extent of damage and tension on the artery. Therefore the damaged segment of SFA was resected. An interposition graft would be necessary to restore flow in the SFA.  1/11 OR note       Please clarify if the partial transection of the SFA was:      [  ] Inherent/Integral to the procedure     [  ] Present, but not a complication of the procedure   [x  ] Complication of the procedure     [  ] Other (please specify): __________________   [  ] Clinically Undetermined

## 2020-01-22 LAB
FINAL PATHOLOGIC DIAGNOSIS: NORMAL
GROSS: NORMAL
SUPPLEMENTAL DIAGNOSIS: NORMAL

## 2022-12-01 NOTE — ED NOTES
Tele Box verified with Melissa (Tele, [place]), NS 90 bpm   Advancement-Rotation Flap Text: The defect edges were debeveled with a #15 scalpel blade.  Given the location of the defect, shape of the defect and the proximity to free margins an advancement-rotation flap was deemed most appropriate.  Using a sterile surgical marker, an appropriate flap was drawn incorporating the defect and placing the expected incisions within the relaxed skin tension lines where possible. The area thus outlined was incised deep to adipose tissue with a #15 scalpel blade.  The skin margins were undermined to an appropriate distance in all directions utilizing iris scissors.

## (undated) DEVICE — BLADE SURG CARBON STEEL SZ11

## (undated) DEVICE — SUT 7/0 24IN PROLENE BL MO

## (undated) DEVICE — ELECTRODE REM PLYHSV RETURN 9

## (undated) DEVICE — CATH EMBOLECTOMY 3F REGULAR

## (undated) DEVICE — CATH ALL PUR URTHL RR 20FR

## (undated) DEVICE — SUT 3-0 12-18IN SILK

## (undated) DEVICE — DRAPE C ARM 42 X 120 10/BX

## (undated) DEVICE — SUT MONOCRYL 3-0 SH U/D

## (undated) DEVICE — SUT MCRYL PLUS 4-0 PS2 27IN

## (undated) DEVICE — DRAPE INCISE IOBAN 2 23X33IN

## (undated) DEVICE — STOPCOCK 1 WAY PLASTIC

## (undated) DEVICE — Device

## (undated) DEVICE — TAPE UMBILICAL 1/8X36IN WHITE

## (undated) DEVICE — SPONGE DERMACEA GAUZE 4X4

## (undated) DEVICE — DRESSING TRANS 4X4 TEGADERM

## (undated) DEVICE — STOCKINET TUBULAR 1 PLY 6X60IN

## (undated) DEVICE — SET MICROPUNCT 5FRMPIS-501

## (undated) DEVICE — CATH EMBOLECTOMY 6FR

## (undated) DEVICE — SET DECANTER MEDICHOICE

## (undated) DEVICE — GLIDE CATH ANGLED 4FR 65CM

## (undated) DEVICE — STOCKINET 4INX48

## (undated) DEVICE — SUT PROLENE 6-0 BV-1 30IN

## (undated) DEVICE — INFLATOR ENCORE

## (undated) DEVICE — SUT 4-0 12-18IN SILK BLACK

## (undated) DEVICE — SHEATH INTRODUCER 4FR PINNACLE

## (undated) DEVICE — STOPCOCK 3 WAY MED PRESSURE

## (undated) DEVICE — SEE MEDLINE ITEM 153688

## (undated) DEVICE — CATH OMNI FLUSH 4FR 65CM

## (undated) DEVICE — CATH EMBOLECTOMY 4F REGULAR

## (undated) DEVICE — PROSTHESIS TRIA AIRE SIZE 10

## (undated) DEVICE — SYR ONLY LUER LOCK 20CC

## (undated) DEVICE — CATH STERLING MR 8X40X135

## (undated) DEVICE — DRAPE STERI 32 X 50

## (undated) DEVICE — SUT LIGACLIP SMALL XTRA

## (undated) DEVICE — SYR SLIP TIP 1CC

## (undated) DEVICE — SUT PROLENE 6-0 C-1 30IN BL

## (undated) DEVICE — DRAPE PLASTIC U 60X72

## (undated) DEVICE — SYR 3CC LUER LOC

## (undated) DEVICE — LOOP VESSEL BLUE MAXI

## (undated) DEVICE — SUT 2-0 12-18IN SILK

## (undated) DEVICE — SET INFUSION WINGED 19GA X 3/4

## (undated) DEVICE — INTRODUCER VASC RADPQ 5FRX10CM

## (undated) DEVICE — SEE MEDLINE ITEM 152622

## (undated) DEVICE — CATH PRUITT IRRIG 4FROCCLUS

## (undated) DEVICE — CATH PRUITT IRRIG 9F OCCLUS

## (undated) DEVICE — PACK UNIVERSAL SPLIT II

## (undated) DEVICE — CLIP MED TICALL

## (undated) DEVICE — GAUZE SPONGE 4X4 12PLY

## (undated) DEVICE — CATH PRUITT IRRIG 5FR OCCLUS

## (undated) DEVICE — TRAY MINOR GEN SURG

## (undated) DEVICE — KIT INTRO MICRO NIT VSI 4FR

## (undated) DEVICE — CATH EMBOLECTOMY 5F REGULAR

## (undated) DEVICE — BOOT SUTURE AID

## (undated) DEVICE — SYR MED RAD 150ML

## (undated) DEVICE — SEE MEDLINE ITEM 157173

## (undated) DEVICE — VISE RADIFOCUS MULTI TORQUE

## (undated) DEVICE — COVER SNAP 36IN X 30IN

## (undated) DEVICE — STAPLER SKIN PROXIMATE WIDE

## (undated) DEVICE — APPLICATOR CHLORAPREP ORN 26ML

## (undated) DEVICE — GUIDEWIRE ADVNTG 018X180CM ANG

## (undated) DEVICE — GUIDEWIRE STF .035X260CM ANG